# Patient Record
Sex: MALE | Race: WHITE | NOT HISPANIC OR LATINO | Employment: OTHER | URBAN - NONMETROPOLITAN AREA
[De-identification: names, ages, dates, MRNs, and addresses within clinical notes are randomized per-mention and may not be internally consistent; named-entity substitution may affect disease eponyms.]

---

## 2019-04-12 LAB
ALBUMIN SERPL-MCNC: 4 G/DL (ref 3.5–5.2)
ALP BLD-CCNC: 93 U/L (ref 40–130)
ALT SERPL-CCNC: 9 U/L (ref 5–41)
ANION GAP SERPL CALCULATED.3IONS-SCNC: 14 MMOL/L (ref 7–19)
AST SERPL-CCNC: 11 U/L (ref 5–40)
BASOPHILS ABSOLUTE: 0.1 K/UL (ref 0–0.2)
BASOPHILS RELATIVE PERCENT: 0.6 % (ref 0–1)
BILIRUB SERPL-MCNC: 0.3 MG/DL (ref 0.2–1.2)
BUN BLDV-MCNC: 29 MG/DL (ref 8–23)
CALCIUM SERPL-MCNC: 9.5 MG/DL (ref 8.8–10.2)
CHLORIDE BLD-SCNC: 104 MMOL/L (ref 98–111)
CHOLESTEROL, TOTAL: 202 MG/DL (ref 160–199)
CO2: 25 MMOL/L (ref 22–29)
CREAT SERPL-MCNC: 1 MG/DL (ref 0.5–1.2)
EOSINOPHILS ABSOLUTE: 0.2 K/UL (ref 0–0.6)
EOSINOPHILS RELATIVE PERCENT: 1.8 % (ref 0–5)
GFR NON-AFRICAN AMERICAN: >60
GLUCOSE BLD-MCNC: 105 MG/DL (ref 74–109)
HBA1C MFR BLD: 8 % (ref 4–6)
HCT VFR BLD CALC: 46.3 % (ref 42–52)
HDLC SERPL-MCNC: 41 MG/DL (ref 55–121)
HEMOGLOBIN: 15.3 G/DL (ref 14–18)
LDL CHOLESTEROL CALCULATED: 132 MG/DL
LYMPHOCYTES ABSOLUTE: 2.2 K/UL (ref 1.1–4.5)
LYMPHOCYTES RELATIVE PERCENT: 23.1 % (ref 20–40)
MCH RBC QN AUTO: 30.4 PG (ref 27–31)
MCHC RBC AUTO-ENTMCNC: 33 G/DL (ref 33–37)
MCV RBC AUTO: 92 FL (ref 80–94)
MONOCYTES ABSOLUTE: 0.9 K/UL (ref 0–0.9)
MONOCYTES RELATIVE PERCENT: 9.4 % (ref 0–10)
NEUTROPHILS ABSOLUTE: 6.1 K/UL (ref 1.5–7.5)
NEUTROPHILS RELATIVE PERCENT: 64.8 % (ref 50–65)
PDW BLD-RTO: 14.1 % (ref 11.5–14.5)
PLATELET # BLD: 236 K/UL (ref 130–400)
PMV BLD AUTO: 10.1 FL (ref 9.4–12.4)
POTASSIUM SERPL-SCNC: 4.2 MMOL/L (ref 3.5–5)
PROSTATE SPECIFIC ANTIGEN: 0.49 NG/ML (ref 0–4)
RBC # BLD: 5.03 M/UL (ref 4.7–6.1)
SODIUM BLD-SCNC: 143 MMOL/L (ref 136–145)
TOTAL PROTEIN: 7.4 G/DL (ref 6.6–8.7)
TRIGL SERPL-MCNC: 145 MG/DL (ref 0–149)
WBC # BLD: 9.5 K/UL (ref 4.8–10.8)

## 2019-11-01 LAB
ANION GAP SERPL CALCULATED.3IONS-SCNC: 14 MMOL/L (ref 7–19)
BUN BLDV-MCNC: 29 MG/DL (ref 8–23)
CALCIUM SERPL-MCNC: 9.3 MG/DL (ref 8.8–10.2)
CHLORIDE BLD-SCNC: 107 MMOL/L (ref 98–111)
CO2: 23 MMOL/L (ref 22–29)
CREAT SERPL-MCNC: 1 MG/DL (ref 0.5–1.2)
GFR NON-AFRICAN AMERICAN: >60
GLUCOSE BLD-MCNC: 91 MG/DL (ref 74–109)
HBA1C MFR BLD: 7.6 % (ref 4–6)
POTASSIUM SERPL-SCNC: 4.1 MMOL/L (ref 3.5–5)
SODIUM BLD-SCNC: 144 MMOL/L (ref 136–145)

## 2020-04-01 LAB
ALBUMIN SERPL-MCNC: 4.2 G/DL (ref 3.5–5.2)
ALP BLD-CCNC: 77 U/L (ref 40–130)
ALT SERPL-CCNC: 12 U/L (ref 5–41)
ANION GAP SERPL CALCULATED.3IONS-SCNC: 14 MMOL/L (ref 7–19)
AST SERPL-CCNC: 13 U/L (ref 5–40)
BASOPHILS ABSOLUTE: 0.1 K/UL (ref 0–0.2)
BASOPHILS RELATIVE PERCENT: 1 % (ref 0–1)
BILIRUB SERPL-MCNC: 0.3 MG/DL (ref 0.2–1.2)
BUN BLDV-MCNC: 24 MG/DL (ref 8–23)
CALCIUM SERPL-MCNC: 9.6 MG/DL (ref 8.8–10.2)
CHLORIDE BLD-SCNC: 103 MMOL/L (ref 98–111)
CHOLESTEROL, TOTAL: 267 MG/DL (ref 160–199)
CO2: 24 MMOL/L (ref 22–29)
CREAT SERPL-MCNC: 0.9 MG/DL (ref 0.5–1.2)
EOSINOPHILS ABSOLUTE: 0.5 K/UL (ref 0–0.6)
EOSINOPHILS RELATIVE PERCENT: 5.8 % (ref 0–5)
GFR NON-AFRICAN AMERICAN: >60
GLUCOSE BLD-MCNC: 105 MG/DL (ref 74–109)
HBA1C MFR BLD: 7.1 % (ref 4–6)
HCT VFR BLD CALC: 52.9 % (ref 42–52)
HDLC SERPL-MCNC: 47 MG/DL (ref 55–121)
HEMOGLOBIN: 17.2 G/DL (ref 14–18)
IMMATURE GRANULOCYTES #: 0 K/UL
LDL CHOLESTEROL CALCULATED: 177 MG/DL
LYMPHOCYTES ABSOLUTE: 2.2 K/UL (ref 1.1–4.5)
LYMPHOCYTES RELATIVE PERCENT: 28.1 % (ref 20–40)
MCH RBC QN AUTO: 30 PG (ref 27–31)
MCHC RBC AUTO-ENTMCNC: 32.5 G/DL (ref 33–37)
MCV RBC AUTO: 92.2 FL (ref 80–94)
MONOCYTES ABSOLUTE: 0.7 K/UL (ref 0–0.9)
MONOCYTES RELATIVE PERCENT: 8.7 % (ref 0–10)
NEUTROPHILS ABSOLUTE: 4.4 K/UL (ref 1.5–7.5)
NEUTROPHILS RELATIVE PERCENT: 56.3 % (ref 50–65)
PDW BLD-RTO: 13.9 % (ref 11.5–14.5)
PLATELET # BLD: 221 K/UL (ref 130–400)
PMV BLD AUTO: 10.8 FL (ref 9.4–12.4)
POTASSIUM SERPL-SCNC: 4.8 MMOL/L (ref 3.5–5)
PROSTATE SPECIFIC ANTIGEN: 0.54 NG/ML (ref 0–4)
RBC # BLD: 5.74 M/UL (ref 4.7–6.1)
SODIUM BLD-SCNC: 141 MMOL/L (ref 136–145)
TOTAL PROTEIN: 6.7 G/DL (ref 6.6–8.7)
TRIGL SERPL-MCNC: 216 MG/DL (ref 0–149)
TSH SERPL DL<=0.05 MIU/L-ACNC: 6.08 UIU/ML (ref 0.27–4.2)
WBC # BLD: 7.8 K/UL (ref 4.8–10.8)

## 2020-05-01 LAB
CHOLESTEROL, TOTAL: 150 MG/DL (ref 160–199)
HDLC SERPL-MCNC: 41 MG/DL (ref 55–121)
LDL CHOLESTEROL CALCULATED: 83 MG/DL
TRIGL SERPL-MCNC: 128 MG/DL (ref 0–149)

## 2020-10-02 LAB
ANION GAP SERPL CALCULATED.3IONS-SCNC: 9 MMOL/L (ref 7–19)
BUN BLDV-MCNC: 30 MG/DL (ref 8–23)
CALCIUM SERPL-MCNC: 9.1 MG/DL (ref 8.8–10.2)
CHLORIDE BLD-SCNC: 105 MMOL/L (ref 98–111)
CHOLESTEROL, TOTAL: 145 MG/DL (ref 160–199)
CO2: 22 MMOL/L (ref 22–29)
CREAT SERPL-MCNC: 1.1 MG/DL (ref 0.5–1.2)
GFR AFRICAN AMERICAN: >59
GFR NON-AFRICAN AMERICAN: >60
GLUCOSE BLD-MCNC: 91 MG/DL (ref 74–109)
HBA1C MFR BLD: 8.5 % (ref 4–6)
HDLC SERPL-MCNC: 39 MG/DL (ref 55–121)
LDL CHOLESTEROL CALCULATED: 85 MG/DL
POTASSIUM SERPL-SCNC: 4.2 MMOL/L (ref 3.5–5)
SODIUM BLD-SCNC: 136 MMOL/L (ref 136–145)
TRIGL SERPL-MCNC: 107 MG/DL (ref 0–149)

## 2021-09-15 LAB
CHOLESTEROL, TOTAL: 166 MG/DL (ref 160–199)
HBA1C MFR BLD: 9.2 % (ref 4–6)
HDLC SERPL-MCNC: 35 MG/DL (ref 55–121)
LDL CHOLESTEROL CALCULATED: 95 MG/DL
TRIGL SERPL-MCNC: 178 MG/DL (ref 0–149)

## 2022-03-16 LAB
ANION GAP SERPL CALCULATED.3IONS-SCNC: 13 MMOL/L (ref 7–19)
BUN BLDV-MCNC: 29 MG/DL (ref 8–23)
CALCIUM SERPL-MCNC: 9.3 MG/DL (ref 8.8–10.2)
CHLORIDE BLD-SCNC: 100 MMOL/L (ref 98–111)
CHOLESTEROL, TOTAL: 168 MG/DL (ref 160–199)
CO2: 24 MMOL/L (ref 22–29)
CREAT SERPL-MCNC: 1 MG/DL (ref 0.5–1.2)
GFR AFRICAN AMERICAN: >59
GFR NON-AFRICAN AMERICAN: >60
GLUCOSE BLD-MCNC: 153 MG/DL (ref 74–109)
HBA1C MFR BLD: 8.9 % (ref 4–6)
HDLC SERPL-MCNC: 38 MG/DL (ref 55–121)
LDL CHOLESTEROL CALCULATED: 105 MG/DL
POTASSIUM SERPL-SCNC: 5.2 MMOL/L (ref 3.5–5)
SODIUM BLD-SCNC: 137 MMOL/L (ref 136–145)
TRIGL SERPL-MCNC: 126 MG/DL (ref 0–149)

## 2022-08-09 ENCOUNTER — APPOINTMENT (OUTPATIENT)
Dept: GENERAL RADIOLOGY | Facility: HOSPITAL | Age: 67
End: 2022-08-09

## 2022-08-09 ENCOUNTER — APPOINTMENT (OUTPATIENT)
Dept: CT IMAGING | Facility: HOSPITAL | Age: 67
End: 2022-08-09

## 2022-08-09 ENCOUNTER — HOSPITAL ENCOUNTER (INPATIENT)
Facility: HOSPITAL | Age: 67
LOS: 6 days | Discharge: HOME | End: 2022-08-15
Attending: EMERGENCY MEDICINE | Admitting: FAMILY MEDICINE

## 2022-08-09 DIAGNOSIS — E87.1 HYPONATREMIA: ICD-10-CM

## 2022-08-09 DIAGNOSIS — Z74.09 IMPAIRED FUNCTIONAL MOBILITY AND ACTIVITY TOLERANCE: ICD-10-CM

## 2022-08-09 DIAGNOSIS — I48.91 ATRIAL FIBRILLATION WITH RVR: ICD-10-CM

## 2022-08-09 DIAGNOSIS — E87.5 HYPERKALEMIA: ICD-10-CM

## 2022-08-09 DIAGNOSIS — E10.10 DIABETIC KETOACIDOSIS WITHOUT COMA ASSOCIATED WITH TYPE 1 DIABETES MELLITUS: ICD-10-CM

## 2022-08-09 DIAGNOSIS — D72.829 LEUKOCYTOSIS, UNSPECIFIED TYPE: ICD-10-CM

## 2022-08-09 DIAGNOSIS — N17.9 AKI (ACUTE KIDNEY INJURY): ICD-10-CM

## 2022-08-09 DIAGNOSIS — A41.9 SEPSIS, DUE TO UNSPECIFIED ORGANISM, UNSPECIFIED WHETHER ACUTE ORGAN DYSFUNCTION PRESENT: Primary | ICD-10-CM

## 2022-08-09 DIAGNOSIS — I48.0 PAROXYSMAL ATRIAL FIBRILLATION: ICD-10-CM

## 2022-08-09 DIAGNOSIS — Z74.09 IMPAIRED MOBILITY AND ADLS: ICD-10-CM

## 2022-08-09 DIAGNOSIS — Z78.9 IMPAIRED MOBILITY AND ADLS: ICD-10-CM

## 2022-08-09 LAB
ALBUMIN SERPL-MCNC: 3.5 G/DL (ref 3.5–5.2)
ALBUMIN/GLOB SERPL: 1.3 G/DL
ALP SERPL-CCNC: 97 U/L (ref 39–117)
ALT SERPL W P-5'-P-CCNC: 13 U/L (ref 1–41)
AMPHET+METHAMPHET UR QL: NEGATIVE
AMPHETAMINES UR QL: NEGATIVE
ANION GAP SERPL CALCULATED.3IONS-SCNC: 24 MMOL/L (ref 5–15)
ANION GAP SERPL CALCULATED.3IONS-SCNC: 29 MMOL/L (ref 5–15)
ANION GAP SERPL CALCULATED.3IONS-SCNC: 30 MMOL/L (ref 5–15)
APAP SERPL-MCNC: <5 MCG/ML (ref 0–30)
AST SERPL-CCNC: 13 U/L (ref 1–40)
ATMOSPHERIC PRESS: 750 MMHG
BACTERIA UR QL AUTO: ABNORMAL /HPF
BARBITURATES UR QL SCN: NEGATIVE
BASE EXCESS BLDV CALC-SCNC: -10.1 MMOL/L (ref 0–2)
BDY SITE: ABNORMAL
BENZODIAZ UR QL SCN: NEGATIVE
BILIRUB SERPL-MCNC: 0.4 MG/DL (ref 0–1.2)
BILIRUB UR QL STRIP: NEGATIVE
BODY TEMPERATURE: 37 C
BUN SERPL-MCNC: 227 MG/DL (ref 8–23)
BUN SERPL-MCNC: 245 MG/DL (ref 8–23)
BUN SERPL-MCNC: 254 MG/DL (ref 8–23)
BUN/CREAT SERPL: 36.5 (ref 7–25)
BUN/CREAT SERPL: 39.5 (ref 7–25)
BUN/CREAT SERPL: 42.4 (ref 7–25)
BUPRENORPHINE SERPL-MCNC: NEGATIVE NG/ML
BURR CELLS BLD QL SMEAR: ABNORMAL
CALCIUM SPEC-SCNC: 8.8 MG/DL (ref 8.6–10.5)
CALCIUM SPEC-SCNC: 9.2 MG/DL (ref 8.6–10.5)
CALCIUM SPEC-SCNC: 9.6 MG/DL (ref 8.6–10.5)
CANNABINOIDS SERPL QL: NEGATIVE
CHLORIDE SERPL-SCNC: 86 MMOL/L (ref 98–107)
CHLORIDE SERPL-SCNC: 91 MMOL/L (ref 98–107)
CHLORIDE SERPL-SCNC: 95 MMOL/L (ref 98–107)
CLARITY UR: CLEAR
CO2 SERPL-SCNC: 11 MMOL/L (ref 22–29)
CO2 SERPL-SCNC: 11 MMOL/L (ref 22–29)
CO2 SERPL-SCNC: 13 MMOL/L (ref 22–29)
COCAINE UR QL: NEGATIVE
COLOR UR: YELLOW
CREAT SERPL-MCNC: 5.36 MG/DL (ref 0.76–1.27)
CREAT SERPL-MCNC: 5.36 MG/DL (ref 0.76–1.27)
CREAT SERPL-MCNC: 6.21 MG/DL (ref 0.76–1.27)
CREAT SERPL-MCNC: 6.96 MG/DL (ref 0.76–1.27)
D-LACTATE SERPL-SCNC: 1.5 MMOL/L (ref 0.5–2)
D-LACTATE SERPL-SCNC: 2.4 MMOL/L (ref 0.5–2)
D-LACTATE SERPL-SCNC: 3.9 MMOL/L (ref 0.5–2)
D-LACTATE SERPL-SCNC: 4.1 MMOL/L (ref 0.5–2)
DEPRECATED RDW RBC AUTO: 44.9 FL (ref 37–54)
EGFRCR SERPLBLD CKD-EPI 2021: 11 ML/MIN/1.73
EGFRCR SERPLBLD CKD-EPI 2021: 11 ML/MIN/1.73
EGFRCR SERPLBLD CKD-EPI 2021: 8 ML/MIN/1.73
EGFRCR SERPLBLD CKD-EPI 2021: 9.2 ML/MIN/1.73
ERYTHROCYTE [DISTWIDTH] IN BLOOD BY AUTOMATED COUNT: 14.4 % (ref 12.3–15.4)
GAS FLOW AIRWAY: 6 LPM
GLOBULIN UR ELPH-MCNC: 2.8 GM/DL
GLUCOSE BLDC GLUCOMTR-MCNC: 193 MG/DL (ref 70–130)
GLUCOSE BLDC GLUCOMTR-MCNC: 207 MG/DL (ref 70–130)
GLUCOSE BLDC GLUCOMTR-MCNC: 251 MG/DL (ref 70–130)
GLUCOSE BLDC GLUCOMTR-MCNC: 297 MG/DL (ref 70–130)
GLUCOSE SERPL-MCNC: 315 MG/DL (ref 65–99)
GLUCOSE SERPL-MCNC: 329 MG/DL (ref 65–99)
GLUCOSE SERPL-MCNC: 340 MG/DL (ref 65–99)
GLUCOSE UR STRIP-MCNC: ABNORMAL MG/DL
HBA1C MFR BLD: 9.3 % (ref 4.8–5.6)
HCO3 BLDV-SCNC: 15.9 MMOL/L (ref 22–28)
HCT VFR BLD AUTO: 42.9 % (ref 37.5–51)
HGB BLD-MCNC: 15 G/DL (ref 13–17.7)
HGB UR QL STRIP.AUTO: ABNORMAL
HOLD SPECIMEN: NORMAL
HYALINE CASTS UR QL AUTO: ABNORMAL /LPF
INR PPP: 1.74 (ref 0.91–1.09)
KETONES UR QL STRIP: NEGATIVE
LEUKOCYTE ESTERASE UR QL STRIP.AUTO: NEGATIVE
LIPASE SERPL-CCNC: 61 U/L (ref 13–60)
LIPASE SERPL-CCNC: 66 U/L (ref 13–60)
LYMPHOCYTES # BLD MANUAL: 0.57 10*3/MM3 (ref 0.7–3.1)
LYMPHOCYTES NFR BLD MANUAL: 1.6 % (ref 5–12)
Lab: ABNORMAL
MAGNESIUM SERPL-MCNC: 2.7 MG/DL (ref 1.6–2.4)
MAGNESIUM SERPL-MCNC: 2.9 MG/DL (ref 1.6–2.4)
MAGNESIUM SERPL-MCNC: 3 MG/DL (ref 1.6–2.4)
MCH RBC QN AUTO: 29.9 PG (ref 26.6–33)
MCHC RBC AUTO-ENTMCNC: 35 G/DL (ref 31.5–35.7)
MCV RBC AUTO: 85.6 FL (ref 79–97)
METHADONE UR QL SCN: NEGATIVE
MODALITY: ABNORMAL
MONOCYTES # BLD: 0.57 10*3/MM3 (ref 0.1–0.9)
NEUTROPHILS # BLD AUTO: 34.5 10*3/MM3 (ref 1.7–7)
NEUTROPHILS NFR BLD MANUAL: 96.8 % (ref 42.7–76)
NITRITE UR QL STRIP: NEGATIVE
OPIATES UR QL: NEGATIVE
OSMOLALITY SERPL: 381 MOSM/KG (ref 280–301)
OXYCODONE UR QL SCN: NEGATIVE
PCO2 BLDV: 34.8 MM HG (ref 41–51)
PCP UR QL SCN: NEGATIVE
PH BLDV: 7.27 PH UNITS (ref 7.32–7.42)
PH UR STRIP.AUTO: <=5 [PH] (ref 5–8)
PHOSPHATE SERPL-MCNC: 9.7 MG/DL (ref 2.5–4.5)
PLAT MORPH BLD: NORMAL
PLATELET # BLD AUTO: 188 10*3/MM3 (ref 140–450)
PMV BLD AUTO: 12.2 FL (ref 6–12)
PO2 BLDV: 26.9 MM HG (ref 27–53)
POIKILOCYTOSIS BLD QL SMEAR: ABNORMAL
POTASSIUM SERPL-SCNC: 5.3 MMOL/L (ref 3.5–5.2)
POTASSIUM SERPL-SCNC: 5.7 MMOL/L (ref 3.5–5.2)
POTASSIUM SERPL-SCNC: 6.5 MMOL/L (ref 3.5–5.2)
PROCALCITONIN SERPL-MCNC: 0.62 NG/ML (ref 0–0.25)
PROPOXYPH UR QL: NEGATIVE
PROT ?TM UR-MCNC: 25.4 MG/DL
PROT SERPL-MCNC: 6.3 G/DL (ref 6–8.5)
PROT UR QL STRIP: ABNORMAL
PROTHROMBIN TIME: 19.7 SECONDS (ref 11.9–14.6)
RBC # BLD AUTO: 5.01 10*6/MM3 (ref 4.14–5.8)
RBC # UR STRIP: ABNORMAL /HPF
REF LAB TEST METHOD: ABNORMAL
SALICYLATES SERPL-MCNC: <0.3 MG/DL
SAO2 % BLDCOV: 44.3 % (ref 45–75)
SARS-COV-2 RNA PNL SPEC NAA+PROBE: NOT DETECTED
SODIUM SERPL-SCNC: 127 MMOL/L (ref 136–145)
SODIUM SERPL-SCNC: 131 MMOL/L (ref 136–145)
SODIUM SERPL-SCNC: 132 MMOL/L (ref 136–145)
SODIUM UR-SCNC: 52 MMOL/L
SP GR UR STRIP: 1.02 (ref 1–1.03)
SQUAMOUS #/AREA URNS HPF: ABNORMAL /HPF
TRICYCLICS UR QL SCN: NEGATIVE
TROPONIN T SERPL-MCNC: 0.05 NG/ML (ref 0–0.03)
TROPONIN T SERPL-MCNC: 0.06 NG/ML (ref 0–0.03)
URATE SERPL-MCNC: 15.4 MG/DL (ref 3.4–7)
UROBILINOGEN UR QL STRIP: ABNORMAL
VARIANT LYMPHS NFR BLD MANUAL: 1.6 % (ref 19.6–45.3)
VENTILATOR MODE: ABNORMAL
WBC # UR STRIP: ABNORMAL /HPF
WBC MORPH BLD: NORMAL
WBC NRBC COR # BLD: 35.64 10*3/MM3 (ref 3.4–10.8)
WHOLE BLOOD HOLD COAG: NORMAL
WHOLE BLOOD HOLD SPECIMEN: NORMAL

## 2022-08-09 PROCEDURE — 80306 DRUG TEST PRSMV INSTRMNT: CPT | Performed by: EMERGENCY MEDICINE

## 2022-08-09 PROCEDURE — 71045 X-RAY EXAM CHEST 1 VIEW: CPT

## 2022-08-09 PROCEDURE — 25010000002 AMIODARONE IN DEXTROSE 5% 150-4.21 MG/100ML-% SOLUTION: Performed by: EMERGENCY MEDICINE

## 2022-08-09 PROCEDURE — 83930 ASSAY OF BLOOD OSMOLALITY: CPT | Performed by: EMERGENCY MEDICINE

## 2022-08-09 PROCEDURE — 87040 BLOOD CULTURE FOR BACTERIA: CPT | Performed by: PHYSICIAN ASSISTANT

## 2022-08-09 PROCEDURE — 4A133J1 MONITORING OF ARTERIAL PULSE, PERIPHERAL, PERCUTANEOUS APPROACH: ICD-10-PCS | Performed by: STUDENT IN AN ORGANIZED HEALTH CARE EDUCATION/TRAINING PROGRAM

## 2022-08-09 PROCEDURE — 4A133B1 MONITORING OF ARTERIAL PRESSURE, PERIPHERAL, PERCUTANEOUS APPROACH: ICD-10-PCS | Performed by: STUDENT IN AN ORGANIZED HEALTH CARE EDUCATION/TRAINING PROGRAM

## 2022-08-09 PROCEDURE — 25010000002 ENOXAPARIN PER 10 MG: Performed by: PHYSICIAN ASSISTANT

## 2022-08-09 PROCEDURE — 93005 ELECTROCARDIOGRAM TRACING: CPT

## 2022-08-09 PROCEDURE — 80179 DRUG ASSAY SALICYLATE: CPT | Performed by: EMERGENCY MEDICINE

## 2022-08-09 PROCEDURE — 93010 ELECTROCARDIOGRAM REPORT: CPT | Performed by: INTERNAL MEDICINE

## 2022-08-09 PROCEDURE — 82803 BLOOD GASES ANY COMBINATION: CPT

## 2022-08-09 PROCEDURE — 63710000001 INSULIN REGULAR HUMAN PER 5 UNITS: Performed by: PHYSICIAN ASSISTANT

## 2022-08-09 PROCEDURE — 71250 CT THORAX DX C-: CPT

## 2022-08-09 PROCEDURE — 92960 CARDIOVERSION ELECTRIC EXT: CPT

## 2022-08-09 PROCEDURE — 84300 ASSAY OF URINE SODIUM: CPT | Performed by: INTERNAL MEDICINE

## 2022-08-09 PROCEDURE — 99152 MOD SED SAME PHYS/QHP 5/>YRS: CPT

## 2022-08-09 PROCEDURE — 94640 AIRWAY INHALATION TREATMENT: CPT

## 2022-08-09 PROCEDURE — 5A2204Z RESTORATION OF CARDIAC RHYTHM, SINGLE: ICD-10-PCS | Performed by: INTERNAL MEDICINE

## 2022-08-09 PROCEDURE — 81001 URINALYSIS AUTO W/SCOPE: CPT | Performed by: PHYSICIAN ASSISTANT

## 2022-08-09 PROCEDURE — 74176 CT ABD & PELVIS W/O CONTRAST: CPT

## 2022-08-09 PROCEDURE — 80053 COMPREHEN METABOLIC PANEL: CPT | Performed by: PHYSICIAN ASSISTANT

## 2022-08-09 PROCEDURE — 25010000002 ONDANSETRON PER 1 MG: Performed by: PHYSICIAN ASSISTANT

## 2022-08-09 PROCEDURE — 25010000002 AMIODARONE IN DEXTROSE 5% 150-4.21 MG/100ML-% SOLUTION: Performed by: STUDENT IN AN ORGANIZED HEALTH CARE EDUCATION/TRAINING PROGRAM

## 2022-08-09 PROCEDURE — 85025 COMPLETE CBC W/AUTO DIFF WBC: CPT | Performed by: PHYSICIAN ASSISTANT

## 2022-08-09 PROCEDURE — 87635 SARS-COV-2 COVID-19 AMP PRB: CPT | Performed by: PHYSICIAN ASSISTANT

## 2022-08-09 PROCEDURE — 02HV33Z INSERTION OF INFUSION DEVICE INTO SUPERIOR VENA CAVA, PERCUTANEOUS APPROACH: ICD-10-PCS | Performed by: INTERNAL MEDICINE

## 2022-08-09 PROCEDURE — 83036 HEMOGLOBIN GLYCOSYLATED A1C: CPT | Performed by: PHYSICIAN ASSISTANT

## 2022-08-09 PROCEDURE — 83690 ASSAY OF LIPASE: CPT | Performed by: PHYSICIAN ASSISTANT

## 2022-08-09 PROCEDURE — 25010000002 CEFTRIAXONE PER 250 MG: Performed by: PHYSICIAN ASSISTANT

## 2022-08-09 PROCEDURE — 82962 GLUCOSE BLOOD TEST: CPT

## 2022-08-09 PROCEDURE — 83735 ASSAY OF MAGNESIUM: CPT | Performed by: FAMILY MEDICINE

## 2022-08-09 PROCEDURE — 82043 UR ALBUMIN QUANTITATIVE: CPT | Performed by: INTERNAL MEDICINE

## 2022-08-09 PROCEDURE — 0 INSULIN REGULAR HUMAN PER 5 UNITS: Performed by: FAMILY MEDICINE

## 2022-08-09 PROCEDURE — 93005 ELECTROCARDIOGRAM TRACING: CPT | Performed by: PHYSICIAN ASSISTANT

## 2022-08-09 PROCEDURE — 80143 DRUG ASSAY ACETAMINOPHEN: CPT | Performed by: EMERGENCY MEDICINE

## 2022-08-09 PROCEDURE — 84145 PROCALCITONIN (PCT): CPT | Performed by: PHYSICIAN ASSISTANT

## 2022-08-09 PROCEDURE — 83735 ASSAY OF MAGNESIUM: CPT | Performed by: PHYSICIAN ASSISTANT

## 2022-08-09 PROCEDURE — 93005 ELECTROCARDIOGRAM TRACING: CPT | Performed by: STUDENT IN AN ORGANIZED HEALTH CARE EDUCATION/TRAINING PROGRAM

## 2022-08-09 PROCEDURE — 94664 DEMO&/EVAL PT USE INHALER: CPT

## 2022-08-09 PROCEDURE — 83690 ASSAY OF LIPASE: CPT | Performed by: FAMILY MEDICINE

## 2022-08-09 PROCEDURE — 84550 ASSAY OF BLOOD/URIC ACID: CPT | Performed by: INTERNAL MEDICINE

## 2022-08-09 PROCEDURE — 83605 ASSAY OF LACTIC ACID: CPT | Performed by: PHYSICIAN ASSISTANT

## 2022-08-09 PROCEDURE — 85610 PROTHROMBIN TIME: CPT | Performed by: FAMILY MEDICINE

## 2022-08-09 PROCEDURE — 25010000002 MAGNESIUM SULFATE 2 GM/50ML SOLUTION: Performed by: EMERGENCY MEDICINE

## 2022-08-09 PROCEDURE — 99291 CRITICAL CARE FIRST HOUR: CPT

## 2022-08-09 PROCEDURE — 84484 ASSAY OF TROPONIN QUANT: CPT | Performed by: PHYSICIAN ASSISTANT

## 2022-08-09 PROCEDURE — 84156 ASSAY OF PROTEIN URINE: CPT | Performed by: INTERNAL MEDICINE

## 2022-08-09 PROCEDURE — 85060 BLOOD SMEAR INTERPRETATION: CPT | Performed by: PHYSICIAN ASSISTANT

## 2022-08-09 PROCEDURE — 36415 COLL VENOUS BLD VENIPUNCTURE: CPT | Performed by: PHYSICIAN ASSISTANT

## 2022-08-09 PROCEDURE — 84100 ASSAY OF PHOSPHORUS: CPT | Performed by: FAMILY MEDICINE

## 2022-08-09 PROCEDURE — 94799 UNLISTED PULMONARY SVC/PX: CPT

## 2022-08-09 PROCEDURE — 94761 N-INVAS EAR/PLS OXIMETRY MLT: CPT

## 2022-08-09 PROCEDURE — 82565 ASSAY OF CREATININE: CPT | Performed by: FAMILY MEDICINE

## 2022-08-09 PROCEDURE — 85007 BL SMEAR W/DIFF WBC COUNT: CPT | Performed by: PHYSICIAN ASSISTANT

## 2022-08-09 PROCEDURE — 82570 ASSAY OF URINE CREATININE: CPT | Performed by: INTERNAL MEDICINE

## 2022-08-09 PROCEDURE — C1751 CATH, INF, PER/CENT/MIDLINE: HCPCS

## 2022-08-09 PROCEDURE — 93005 ELECTROCARDIOGRAM TRACING: CPT | Performed by: EMERGENCY MEDICINE

## 2022-08-09 RX ORDER — CALCIUM CHLORIDE 100 MG/ML
INJECTION INTRAVENOUS; INTRAVENTRICULAR
Status: COMPLETED
Start: 2022-08-09 | End: 2022-08-09

## 2022-08-09 RX ORDER — SODIUM CHLORIDE 0.9 % (FLUSH) 0.9 %
10 SYRINGE (ML) INJECTION EVERY 12 HOURS SCHEDULED
Status: DISCONTINUED | OUTPATIENT
Start: 2022-08-09 | End: 2022-08-10

## 2022-08-09 RX ORDER — SODIUM CHLORIDE 0.9 % (FLUSH) 0.9 %
10 SYRINGE (ML) INJECTION EVERY 12 HOURS SCHEDULED
Status: DISCONTINUED | OUTPATIENT
Start: 2022-08-09 | End: 2022-08-15 | Stop reason: HOSPADM

## 2022-08-09 RX ORDER — DEXTROSE AND SODIUM CHLORIDE 5; .45 G/100ML; G/100ML
150 INJECTION, SOLUTION INTRAVENOUS CONTINUOUS PRN
Status: DISCONTINUED | OUTPATIENT
Start: 2022-08-09 | End: 2022-08-10

## 2022-08-09 RX ORDER — AMOXICILLIN 250 MG
2 CAPSULE ORAL 2 TIMES DAILY PRN
Status: DISCONTINUED | OUTPATIENT
Start: 2022-08-09 | End: 2022-08-12

## 2022-08-09 RX ORDER — POLYETHYLENE GLYCOL 3350 17 G/17G
17 POWDER, FOR SOLUTION ORAL DAILY PRN
Status: DISCONTINUED | OUTPATIENT
Start: 2022-08-09 | End: 2022-08-12

## 2022-08-09 RX ORDER — ENOXAPARIN SODIUM 100 MG/ML
1 INJECTION SUBCUTANEOUS ONCE
Status: COMPLETED | OUTPATIENT
Start: 2022-08-09 | End: 2022-08-09

## 2022-08-09 RX ORDER — DIGOXIN 0.25 MG/ML
250 INJECTION INTRAMUSCULAR; INTRAVENOUS ONCE
Status: DISCONTINUED | OUTPATIENT
Start: 2022-08-09 | End: 2022-08-09

## 2022-08-09 RX ORDER — MAGNESIUM SULFATE HEPTAHYDRATE 40 MG/ML
2 INJECTION, SOLUTION INTRAVENOUS ONCE
Status: COMPLETED | OUTPATIENT
Start: 2022-08-09 | End: 2022-08-09

## 2022-08-09 RX ORDER — ETOMIDATE 2 MG/ML
0.3 INJECTION INTRAVENOUS ONCE
Status: DISCONTINUED | OUTPATIENT
Start: 2022-08-09 | End: 2022-08-10

## 2022-08-09 RX ORDER — SODIUM CHLORIDE 0.9 % (FLUSH) 0.9 %
10 SYRINGE (ML) INJECTION AS NEEDED
Status: DISCONTINUED | OUTPATIENT
Start: 2022-08-09 | End: 2022-08-15 | Stop reason: HOSPADM

## 2022-08-09 RX ORDER — ONDANSETRON 2 MG/ML
4 INJECTION INTRAMUSCULAR; INTRAVENOUS ONCE
Status: COMPLETED | OUTPATIENT
Start: 2022-08-09 | End: 2022-08-09

## 2022-08-09 RX ORDER — SODIUM CHLORIDE 9 MG/ML
250 INJECTION, SOLUTION INTRAVENOUS CONTINUOUS PRN
Status: DISCONTINUED | OUTPATIENT
Start: 2022-08-09 | End: 2022-08-10

## 2022-08-09 RX ORDER — MAGNESIUM SULFATE HEPTAHYDRATE 40 MG/ML
2 INJECTION, SOLUTION INTRAVENOUS ONCE
Status: DISCONTINUED | OUTPATIENT
Start: 2022-08-09 | End: 2022-08-09

## 2022-08-09 RX ORDER — ETOMIDATE 2 MG/ML
0.3 INJECTION INTRAVENOUS ONCE
Status: COMPLETED | OUTPATIENT
Start: 2022-08-09 | End: 2022-08-09

## 2022-08-09 RX ORDER — METOPROLOL TARTRATE 5 MG/5ML
INJECTION INTRAVENOUS
Status: COMPLETED
Start: 2022-08-09 | End: 2022-08-09

## 2022-08-09 RX ORDER — DEXTROSE MONOHYDRATE 25 G/50ML
10-50 INJECTION, SOLUTION INTRAVENOUS
Status: DISCONTINUED | OUTPATIENT
Start: 2022-08-09 | End: 2022-08-10

## 2022-08-09 RX ORDER — SODIUM CHLORIDE AND POTASSIUM CHLORIDE 300; 900 MG/100ML; MG/100ML
250 INJECTION, SOLUTION INTRAVENOUS CONTINUOUS PRN
Status: DISCONTINUED | OUTPATIENT
Start: 2022-08-09 | End: 2022-08-10

## 2022-08-09 RX ORDER — DEXTROSE, SODIUM CHLORIDE, AND POTASSIUM CHLORIDE 5; .9; .15 G/100ML; G/100ML; G/100ML
150 INJECTION INTRAVENOUS CONTINUOUS PRN
Status: DISCONTINUED | OUTPATIENT
Start: 2022-08-09 | End: 2022-08-10

## 2022-08-09 RX ORDER — SODIUM CHLORIDE 0.9 % (FLUSH) 0.9 %
10 SYRINGE (ML) INJECTION AS NEEDED
Status: DISCONTINUED | OUTPATIENT
Start: 2022-08-09 | End: 2022-08-09 | Stop reason: SDUPTHER

## 2022-08-09 RX ORDER — BISACODYL 10 MG
10 SUPPOSITORY, RECTAL RECTAL DAILY PRN
Status: DISCONTINUED | OUTPATIENT
Start: 2022-08-09 | End: 2022-08-12

## 2022-08-09 RX ORDER — ALBUTEROL SULFATE 2.5 MG/3ML
10 SOLUTION RESPIRATORY (INHALATION) ONCE
Status: COMPLETED | OUTPATIENT
Start: 2022-08-09 | End: 2022-08-09

## 2022-08-09 RX ORDER — METOPROLOL TARTRATE 5 MG/5ML
5 INJECTION INTRAVENOUS ONCE
Status: COMPLETED | OUTPATIENT
Start: 2022-08-09 | End: 2022-08-09

## 2022-08-09 RX ORDER — NICOTINE POLACRILEX 4 MG
15 LOZENGE BUCCAL
Status: DISCONTINUED | OUTPATIENT
Start: 2022-08-09 | End: 2022-08-09 | Stop reason: SDUPTHER

## 2022-08-09 RX ORDER — DEXTROSE AND SODIUM CHLORIDE 5; .9 G/100ML; G/100ML
150 INJECTION, SOLUTION INTRAVENOUS CONTINUOUS PRN
Status: DISCONTINUED | OUTPATIENT
Start: 2022-08-09 | End: 2022-08-10

## 2022-08-09 RX ORDER — ENOXAPARIN SODIUM 100 MG/ML
1 INJECTION SUBCUTANEOUS EVERY 24 HOURS
Status: DISCONTINUED | OUTPATIENT
Start: 2022-08-10 | End: 2022-08-10

## 2022-08-09 RX ORDER — ALUMINA, MAGNESIA, AND SIMETHICONE 2400; 2400; 240 MG/30ML; MG/30ML; MG/30ML
15 SUSPENSION ORAL EVERY 6 HOURS PRN
Status: DISCONTINUED | OUTPATIENT
Start: 2022-08-09 | End: 2022-08-15 | Stop reason: HOSPADM

## 2022-08-09 RX ORDER — INSULIN LISPRO 100 [IU]/ML
0-9 INJECTION, SOLUTION INTRAVENOUS; SUBCUTANEOUS
Status: DISCONTINUED | OUTPATIENT
Start: 2022-08-10 | End: 2022-08-09 | Stop reason: SDUPTHER

## 2022-08-09 RX ORDER — SODIUM CHLORIDE 0.9 % (FLUSH) 0.9 %
10 SYRINGE (ML) INJECTION AS NEEDED
Status: DISCONTINUED | OUTPATIENT
Start: 2022-08-09 | End: 2022-08-10

## 2022-08-09 RX ORDER — ATORVASTATIN CALCIUM 20 MG/1
20 TABLET, FILM COATED ORAL DAILY
COMMUNITY
End: 2022-10-04 | Stop reason: HOSPADM

## 2022-08-09 RX ORDER — BISACODYL 5 MG/1
5 TABLET, DELAYED RELEASE ORAL DAILY PRN
Status: DISCONTINUED | OUTPATIENT
Start: 2022-08-09 | End: 2022-08-12

## 2022-08-09 RX ORDER — POTASSIUM CHLORIDE, DEXTROSE MONOHYDRATE AND SODIUM CHLORIDE 300; 5; 900 MG/100ML; G/100ML; MG/100ML
150 INJECTION, SOLUTION INTRAVENOUS CONTINUOUS PRN
Status: DISCONTINUED | OUTPATIENT
Start: 2022-08-09 | End: 2022-08-10

## 2022-08-09 RX ORDER — DEXTROSE, SODIUM CHLORIDE, AND POTASSIUM CHLORIDE 5; .45; .3 G/100ML; G/100ML; G/100ML
150 INJECTION INTRAVENOUS CONTINUOUS PRN
Status: DISCONTINUED | OUTPATIENT
Start: 2022-08-09 | End: 2022-08-10

## 2022-08-09 RX ORDER — DEXTROSE MONOHYDRATE 25 G/50ML
25 INJECTION, SOLUTION INTRAVENOUS
Status: DISCONTINUED | OUTPATIENT
Start: 2022-08-09 | End: 2022-08-09 | Stop reason: SDUPTHER

## 2022-08-09 RX ORDER — NICOTINE POLACRILEX 4 MG
15 LOZENGE BUCCAL
Status: DISCONTINUED | OUTPATIENT
Start: 2022-08-09 | End: 2022-08-10

## 2022-08-09 RX ORDER — ACETAMINOPHEN 325 MG/1
650 TABLET ORAL EVERY 4 HOURS PRN
Status: DISCONTINUED | OUTPATIENT
Start: 2022-08-09 | End: 2022-08-15 | Stop reason: HOSPADM

## 2022-08-09 RX ORDER — LISINOPRIL AND HYDROCHLOROTHIAZIDE 20; 12.5 MG/1; MG/1
1 TABLET ORAL DAILY
COMMUNITY
End: 2022-08-15 | Stop reason: HOSPADM

## 2022-08-09 RX ORDER — ALBUTEROL SULFATE 1.25 MG/3ML
10 SOLUTION RESPIRATORY (INHALATION) ONCE
Status: DISCONTINUED | OUTPATIENT
Start: 2022-08-09 | End: 2022-08-09 | Stop reason: SDUPTHER

## 2022-08-09 RX ORDER — ONDANSETRON 2 MG/ML
4 INJECTION INTRAMUSCULAR; INTRAVENOUS EVERY 6 HOURS PRN
Status: DISCONTINUED | OUTPATIENT
Start: 2022-08-09 | End: 2022-08-15 | Stop reason: HOSPADM

## 2022-08-09 RX ORDER — SODIUM CHLORIDE AND POTASSIUM CHLORIDE 150; 900 MG/100ML; MG/100ML
250 INJECTION, SOLUTION INTRAVENOUS CONTINUOUS PRN
Status: DISCONTINUED | OUTPATIENT
Start: 2022-08-09 | End: 2022-08-10

## 2022-08-09 RX ORDER — DEXTROSE, SODIUM CHLORIDE, AND POTASSIUM CHLORIDE 5; .45; .15 G/100ML; G/100ML; G/100ML
150 INJECTION INTRAVENOUS CONTINUOUS PRN
Status: DISCONTINUED | OUTPATIENT
Start: 2022-08-09 | End: 2022-08-10

## 2022-08-09 RX ORDER — NOREPINEPHRINE BIT/0.9 % NACL 8 MG/250ML
.02-.3 INFUSION BOTTLE (ML) INTRAVENOUS
Status: DISCONTINUED | OUTPATIENT
Start: 2022-08-09 | End: 2022-08-10

## 2022-08-09 RX ORDER — CALCIUM CHLORIDE 100 MG/ML
1 INJECTION INTRAVENOUS; INTRAVENTRICULAR ONCE
Status: COMPLETED | OUTPATIENT
Start: 2022-08-09 | End: 2022-08-09

## 2022-08-09 RX ORDER — ALBUTEROL SULFATE 2.5 MG/3ML
SOLUTION RESPIRATORY (INHALATION)
Status: COMPLETED
Start: 2022-08-09 | End: 2022-08-09

## 2022-08-09 RX ORDER — AMLODIPINE BESYLATE 10 MG/1
10 TABLET ORAL DAILY
COMMUNITY
End: 2022-08-15 | Stop reason: HOSPADM

## 2022-08-09 RX ORDER — SODIUM CHLORIDE AND POTASSIUM CHLORIDE 150; 450 MG/100ML; MG/100ML
250 INJECTION, SOLUTION INTRAVENOUS CONTINUOUS PRN
Status: DISCONTINUED | OUTPATIENT
Start: 2022-08-09 | End: 2022-08-10

## 2022-08-09 RX ORDER — SODIUM CHLORIDE 450 MG/100ML
250 INJECTION, SOLUTION INTRAVENOUS CONTINUOUS PRN
Status: DISCONTINUED | OUTPATIENT
Start: 2022-08-09 | End: 2022-08-10

## 2022-08-09 RX ORDER — GLIMEPIRIDE 2 MG/1
2 TABLET ORAL
COMMUNITY

## 2022-08-09 RX ADMIN — ONDANSETRON 4 MG: 2 INJECTION INTRAMUSCULAR; INTRAVENOUS at 16:04

## 2022-08-09 RX ADMIN — Medication 10 ML: at 21:15

## 2022-08-09 RX ADMIN — SODIUM BICARBONATE 50 MEQ: 84 INJECTION INTRAVENOUS at 15:16

## 2022-08-09 RX ADMIN — AMIODARONE HYDROCHLORIDE 150 MG: 1.5 INJECTION, SOLUTION INTRAVENOUS at 19:17

## 2022-08-09 RX ADMIN — INSULIN HUMAN 6 UNITS: 100 INJECTION, SOLUTION PARENTERAL at 17:51

## 2022-08-09 RX ADMIN — SODIUM BICARBONATE 150 MEQ: 84 INJECTION, SOLUTION INTRAVENOUS at 17:39

## 2022-08-09 RX ADMIN — ENOXAPARIN SODIUM 60 MG: 100 INJECTION SUBCUTANEOUS at 15:20

## 2022-08-09 RX ADMIN — ALBUTEROL SULFATE 10 MG: 2.5 SOLUTION RESPIRATORY (INHALATION) at 15:20

## 2022-08-09 RX ADMIN — SODIUM CHLORIDE 1 G: 9 INJECTION, SOLUTION INTRAVENOUS at 18:20

## 2022-08-09 RX ADMIN — SODIUM BICARBONATE 150 MEQ: 84 INJECTION, SOLUTION INTRAVENOUS at 23:44

## 2022-08-09 RX ADMIN — SODIUM CHLORIDE 1000 ML: 9 INJECTION, SOLUTION INTRAVENOUS at 20:48

## 2022-08-09 RX ADMIN — AMIODARONE HYDROCHLORIDE 150 MG: 1.5 INJECTION, SOLUTION INTRAVENOUS at 15:00

## 2022-08-09 RX ADMIN — METOPROLOL TARTRATE 5 MG: 5 INJECTION INTRAVENOUS at 15:10

## 2022-08-09 RX ADMIN — CALCIUM CHLORIDE 1 G: 100 INJECTION INTRAVENOUS; INTRAVENTRICULAR at 18:22

## 2022-08-09 RX ADMIN — MAGNESIUM SULFATE HEPTAHYDRATE 2 G: 2 INJECTION, SOLUTION INTRAVENOUS at 15:19

## 2022-08-09 RX ADMIN — CALCIUM CHLORIDE 1 G: 100 INJECTION INTRAVENOUS; INTRAVENTRICULAR at 15:15

## 2022-08-09 RX ADMIN — SODIUM CHLORIDE 1000 ML/HR: 9 INJECTION, SOLUTION INTRAVENOUS at 21:22

## 2022-08-09 RX ADMIN — Medication 10 ML: at 21:16

## 2022-08-09 RX ADMIN — ETOMIDATE 5 MG: 20 INJECTION, SOLUTION INTRAVENOUS at 15:02

## 2022-08-09 RX ADMIN — Medication 0.02 MCG/KG/MIN: at 20:48

## 2022-08-09 RX ADMIN — SODIUM CHLORIDE 2.4 UNITS/HR: 9 INJECTION, SOLUTION INTRAVENOUS at 20:45

## 2022-08-09 RX ADMIN — SODIUM CHLORIDE 1500 ML: 9 INJECTION, SOLUTION INTRAVENOUS at 15:20

## 2022-08-09 RX ADMIN — SODIUM CHLORIDE, POTASSIUM CHLORIDE, SODIUM LACTATE AND CALCIUM CHLORIDE 1737 ML: 600; 310; 30; 20 INJECTION, SOLUTION INTRAVENOUS at 17:44

## 2022-08-09 NOTE — CONSULTS
Nephrology ([facility]) Consult Note      Patient:  Chito Govea  YOB: 1955  Date of Service: 8/9/2022  MRN: [id number]   Acct: [id number]   Primary Care Physician: Provider, No Known  Advance Directive:   There are no questions and answers to display.     Admit Date: 8/9/2022       Hospital Day: 0  Referring Provider: No Known Provider      Patient Seen, Chart, Consults, Notes, Labs, Radiology studies reviewed.    Chief complaint: Abnormal labs.    Subjective:  Chito Govea is a 66 y.o. male  whom we were consulted for acute kidney injury.  Patient denies any history of chronic kidney disease.  He has history of type 2 diabetes and hypertension.  He follows Dr. Chito Rubio in his office for these medical problem.  Presented to the emergency room complaining of nausea vomiting and diarrhea for the last 30 days.  This has been worsening lately.  He was complaining of profound weakness, lack of energy and was unable to hold any liquid food or medication by mouth.  He decided to come to the emergency room.  On arrival here his blood pressure was running low.  He was given a bolus of IV fluid.  His lab data indicated serum creatinine of 6.9 mg, potassium 6.5 mmol, bicarb of 11 mmol and blood urea nitrogen more than 200 mg.  He is waiting to go to ICU.  He has noticed some urine output.  His  hemodynamics has been stabilized, nephrology is consulted for evaluation and treatment of hyperkalemia and acute kidney injury.  In the ER he was already given insulin, D50, IV fluid bolus, calcium chloride and albuterol nebulizer.    Allergies:  Patient has no known allergies.    Home Meds:  (Not in a hospital admission)      Medicines:  Current Facility-Administered Medications   Medication Dose Route Frequency Provider Last Rate Last Admin   • cefTRIAXone (ROCEPHIN) 1 g in sodium chloride 0.9 % 100 mL IVPB-VTB  1 g Intravenous Once Graeme Gorman PA-C       • insulin regular  "(humuLIN R,novoLIN R) injection 6 Units  6 Units Subcutaneous Once Graeme Gorman PA-C       • lactated ringers bolus 1,000 mL  1,000 mL Intravenous Once Graeme Gorman PA-C       • lactated ringers bolus 1,737 mL  30 mL/kg Intravenous Once Graeme Gorman PA-C       • sodium bicarbonate 8.4 % 150 mEq in sterile water 1,000 mL infusion (greater than 75 mEq)  150 mEq Intravenous Continuous Bradly Roberson MD       • sodium chloride 0.9 % bolus 1,500 mL  1,500 mL Intravenous Once Graeme Gorman PA-C       • sodium chloride 0.9 % flush 10 mL  10 mL Intravenous PRN Bradly Roberson MD       • sodium chloride 0.9 % flush 10 mL  10 mL Intravenous PRN Graeme Gorman PA-C         No current outpatient medications on file.       Past Medical History:  1.  Type 2 diabetes.  2.  Benign essential hypertension.  Past Surgical History:  No past surgical history on file.    Family History  No family history on file.    Social History  Social History     Socioeconomic History   • Marital status:          Review of Systems:  History obtained from chart review and the patient  General ROS: No fever or chills  Respiratory ROS: No cough, shortness of breath, wheezing  Cardiovascular ROS: no chest pain or dyspnea on exertion  Gastrointestinal ROS: positive for - diarrhea and nausea/vomiting  Genito-Urinary ROS: No dysuria or hematuria  14 point ROS reviewed with the patient and negative except as noted above and in the HPI unless unable to obtain.    Objective:  BP (!) 144/109   Pulse 110   Temp 97.6 °F (36.4 °C) (Oral)   Resp 22   Ht 172.7 cm (68\")   Wt 57.9 kg (127 lb 9.6 oz)   SpO2 99%   BMI 19.40 kg/m²     Intake/Output Summary (Last 24 hours) at 8/9/2022 1626  Last data filed at 8/9/2022 1520  Gross per 24 hour   Intake 100 ml   Output --   Net 100 ml     General: awake/alert   HEENT: Normocephalic atraumatic head  Neck: Supple with no JVD or carotid bruits.  Chest:  clear to auscultation bilaterally without " respiratory distress  CVS: regular rate and rhythm  Abdominal: soft, nontender, normal bowel sounds  Extremities: no cyanosis or edema  Skin: warm and dry without rash      Labs:    Results from last 7 days   Lab Units 08/09/22  1435   WBC 10*3/mm3 35.64*   HEMOGLOBIN g/dL 15.0   HEMATOCRIT % 42.9   PLATELETS 10*3/mm3 188       Results from last 7 days   Lab Units 08/09/22  1435   SODIUM mmol/L 127*   POTASSIUM mmol/L 6.5*   CHLORIDE mmol/L 86*   CO2 mmol/L 11.0*   BUN mg/dL 254*   CREATININE mg/dL 6.96*   CALCIUM mg/dL 8.8   BILIRUBIN mg/dL 0.4   ALK PHOS U/L 97   ALT (SGPT) U/L 13   AST (SGOT) U/L 13   GLUCOSE mg/dL 329*   EGFR mL/min/1.73 8.0*         Radiology:   Imaging Results (Last 24 Hours)     Procedure Component Value Units Date/Time    XR Chest 1 View [[id number]] Collected: 08/09/22 1540     Updated: 08/09/22 1545    Narrative:      XR CHEST 1 VW- 8/9/2022 2:42 PM CDT     HISTORY: Chest pain     COMPARISON: None.     FINDINGS:      No lung consolidation. No pleural effusion or pneumothorax. Right IJ  central venous catheters in good position. Heart size is normal.  Pulmonary vasculature are nondilated. Remote granulomatous  calcification. Right-sided of rotator cuff arthropathy. No acute bony  abnormality.       Impression:      1. No radiographic evidence of acute cardiopulmonary process.  2. Right IJ central venous catheter is in good position.        This report was finalized on 08/09/2022 15:41 by Dr Luis Mullen, .          Culture:  No components found for: WOUNDCUL, 3  No components found for: CSFCUL, 3  No components found for: BC, 3  No components found for: URINECUL, 3      Assessment   1.  Acute kidney injury stage III.  2.  Severe intravascular volume depletion versus ATN.  3.  Severe metabolic acidosis.  4.  Moderate to severe hyperkalemia.  5.  Severe leukocytosis.  6.  Possible DKA.    Plan:  1.  Agree with the care provided thus far.  2.  Agree with IV fluid bolus and maintenance IV  fluid.  3.  Repeat BMP this afternoon to look for improvement of hyperkalemia and renal function.  4.  Agree to admit him to ICU.  5.  Baseline renal ultrasound.  6.  I will continue to follow him      Thank you for the consult, we appreciate the opportunity to provide care to your patients.  Feel free to contact me if I can be of any further assistance.      William Torres MD  8/9/2022  16:26 CDT

## 2022-08-09 NOTE — ED PROVIDER NOTES
"Subjective   History of Present Illness    Patient is a 66-year-old male presenting to ED via EMS with vomiting.  PMH unknown due to poor patient historian.  Patient states approximately a month ago he was exposed to his daughter who he believes had COVID symptoms.  Patient states since that time has had progressively worsening nausea, vomiting, as well as generalized abdominal pain.  Patient states at this point he is only able to tolerate water however over the past few days can no longer keep that down either.  Patient denies any hematemesis, black/bloody/tarry stools.  Patient states he developed generalized worsening weakness and today felt generalized chest pain at which time he contacted EMS.  Patient denies any feelings of palpitations, calf tenderness, lower extremity edema, dizziness, headaches.  Patient states he has not sought evaluation over the past month but was \"thinking I had COVID.\"  Patient does not believe that he takes any blood thinners however is unsure if he is on blood pressure medicines.  Patient states he has not been using any medicines for his symptoms.  Patient denies any other known recent sick contacts.    Records reviewed show patient last seen in the ED on 8/25/2013.    Patient follows up outpatient with his primary care provider routinely for labs with most recent on 3/16/2022    Review of Systems   Constitutional: Positive for appetite change (decreased due to nausea). Negative for chills, diaphoresis and fever.   HENT: Positive for sneezing. Negative for sore throat and trouble swallowing.    Eyes: Negative.    Respiratory: Positive for cough (productive). Negative for shortness of breath.    Cardiovascular: Positive for chest pain. Negative for palpitations and leg swelling.   Gastrointestinal: Positive for abdominal pain (Generalized), diarrhea, nausea and vomiting. Negative for blood in stool and constipation.        Denies hematemesis   Genitourinary: Positive for decreased " urine volume. Negative for dysuria, flank pain and hematuria.   Musculoskeletal: Positive for myalgias.   Skin: Negative.    Neurological: Positive for weakness (generalized, progressively worsening). Negative for dizziness, syncope and light-headedness.   Psychiatric/Behavioral: Negative.    All other systems reviewed and are negative.      Past Medical History:   Diagnosis Date   • Arthritis    • Diabetes mellitus (HCC)    • HLD (hyperlipidemia)    • Hypertension        No Known Allergies    No past surgical history on file.    Family History   Problem Relation Age of Onset   • Cancer Mother    • Heart disease Father    • Diabetes Sister    • Cancer Sister    • COPD Brother        Social History     Socioeconomic History   • Marital status:    Tobacco Use   • Smoking status: Former Smoker     Packs/day: 1.50     Years: 50.00     Pack years: 75.00     Types: Cigarettes   • Smokeless tobacco: Never Used   Substance and Sexual Activity   • Alcohol use: Never   • Drug use: Yes     Types: Marijuana   • Sexual activity: Defer           Objective   Physical Exam  Vitals and nursing note reviewed.   Constitutional:       Appearance: Normal appearance. He is well-developed, well-groomed and normal weight. He is ill-appearing. He is not diaphoretic.   HENT:      Head: Normocephalic.      Mouth/Throat:      Mouth: Mucous membranes are dry.      Pharynx: Oropharynx is clear.   Eyes:      General: No scleral icterus.     Conjunctiva/sclera: Conjunctivae normal.      Pupils: Pupils are equal, round, and reactive to light.      Comments: No conjunctival pallor   Cardiovascular:      Rate and Rhythm: Tachycardia present. Rhythm regularly irregular.      Heart sounds: No murmur heard.     Comments: No calf tenderness bilaterally  Pulmonary:      Effort: Tachypnea present. No accessory muscle usage, prolonged expiration or respiratory distress.   Abdominal:      General: Bowel sounds are normal. There is no distension.       Palpations: Abdomen is soft.      Tenderness: There is abdominal tenderness (generalized). There is no right CVA tenderness, left CVA tenderness or guarding.   Musculoskeletal:         General: Normal range of motion.      Cervical back: Normal range of motion and neck supple.      Right lower leg: No edema.      Left lower leg: No edema.   Skin:     General: Skin is warm and dry.      Coloration: Skin is not jaundiced.   Neurological:      Mental Status: He is alert and oriented to person, place, and time.   Psychiatric:         Attention and Perception: Attention normal.         Mood and Affect: Mood is anxious.         Speech: Speech normal.         Behavior: Behavior normal. Behavior is cooperative.         Procedures           ED Course    [id number] SCORE FOR AFIB: 2 (moderate to high risk, should otherwise be on anticoagulation)    ED Course as of 08/09/22 2027   Tue Aug 09, 2022   1443 Patient sick 66 years old with vomiting A. fib with RVR hypotensive poor peripheral perfusion with chest pain and discomfort we will emergently cardiovert medicated with amiodarone and magnesium.  Lovenox will be administered. [TS]   1532 The gentleman came to the ER complaining of vomiting and not feeling well was in A. fib very poor historian we do not know that he is got a history of A. fib or not.  In the ER was hypotensive tachycardic with poor perfusion and therefore was moderately sedated and cardioverted x2 we were able to get him into normal sinus rhythm but this rhythm did not last more than 10 to 15 seconds and reverted back to A. fib at a lower rate blood pressure although it has improved he received IV fluid boluses.  A central access obtained by me because the potassium was very elevated he was given calcium chloride sodium bicarbonate and IV fluids his white cell count is 35,000 IV antibiotics will be initiated [TS]   1537 Cardioversion on this patient was done under emergent basis [TS]   1548 Phone discussion  with MARKELL Andrews nephrology. Will kindly consult on patient during admission for initiation of dialysis.    [JS]   1551 Phone discussion with Dr. Du, hospitalist, will kindly accept the patient under his services at this time. [JS]   1844 Phone discussion with Dr. Link, cardiology.  States that if patient is unstable can cardiovert again with no further recommendations. [JS]      ED Course User Index  [JS] Graeme Gorman PA-C  [TS] Bradly Roberson MD                                           MDM  Number of Diagnoses or Management Options     Amount and/or Complexity of Data Reviewed  Clinical lab tests: ordered and reviewed  Tests in the radiology section of CPT®: reviewed and ordered  Tests in the medicine section of CPT®: reviewed and ordered  Discuss the patient with other providers: yes (Dr. Bradly Roberson (attending)  MARKELL Andrews (nephrology)  Dr. Du (hospitalist))      Patient is a 66-year-old male presenting to ED via EMS with vomiting.  PMH unknown due to poor patient historian.  Patient with unknown medical history secondary to confusion from acute illness as well as poor historian.  Patient has been noncompliant with his medications over the past month due to nausea vomiting and presents acidotic and new A. fib with RVR, as well as new onset renal failure.  Due to Hyperkalemia of 6.5 medication treatment was initiated utilizing calcium, insulin, dextrose, sodium bicarb, albuterol nebulizers.  Patient was given amiodarone, initial dose of Lovenox, etomidate, and had a cardioversion performed.  Patient had central line placed, arterial line placed, and was advised on need for admission to ICU for further evaluation and treatment for which she is amenable.    Final diagnoses:   Sepsis, due to unspecified organism, unspecified whether acute organ dysfunction present (HCC)   CONSUELO (acute kidney injury) (HCC)   Hyponatremia   Hyperkalemia   Atrial fibrillation with RVR  (HCC)   Leukocytosis, unspecified type   Diabetic ketoacidosis without coma associated with type 1 diabetes mellitus (HCC)       ED Disposition  ED Disposition     ED Disposition   Decision to Admit    Condition   --    Comment   Level of Care: Critical Care [6]   Diagnosis: Sepsis, due to unspecified organism, unspecified whether acute organ dysfunction present ([facility]) [[id number]]   Admitting Physician: AYANNA MALIK [[id number]]   Certification: I Certify That Inpatient Hospital Services Are Medically Necessary For Greater Than 2 Midnights               No follow-up provider specified.       Medication List      No changes were made to your prescriptions during this visit.          Graeme Gorman PA-C  08/09/22 2027

## 2022-08-09 NOTE — ED PROVIDER NOTES
Subjective   History of Present Illness    Review of Systems    Past Medical History:   Diagnosis Date   • Arthritis    • Diabetes mellitus (HCC)    • HLD (hyperlipidemia)    • Hypertension        No Known Allergies    No past surgical history on file.    Family History   Problem Relation Age of Onset   • Cancer Mother    • Heart disease Father    • Diabetes Sister    • Cancer Sister    • COPD Brother        Social History     Socioeconomic History   • Marital status:    Tobacco Use   • Smoking status: Former Smoker     Packs/day: 1.50     Years: 50.00     Pack years: 75.00     Types: Cigarettes   • Smokeless tobacco: Never Used   Substance and Sexual Activity   • Alcohol use: Never   • Drug use: Yes     Types: Marijuana   • Sexual activity: Defer           Objective   Physical Exam    Central Line At Bedside    Date/Time: 8/9/2022 3:34 PM  Performed by: Bradly Roberson MD  Authorized by: Bradly Roberson MD     Consent:     Consent obtained:  Emergent situation  Universal protocol:     Procedure explained and questions answered to patient or proxy's satisfaction: yes      Relevant documents present and verified: yes      Site/side marked: yes      Immediately prior to procedure, a time out was called: yes      Patient identity confirmed:  Hospital-assigned identification number  Pre-procedure details:     Indication(s): central venous access, hemodynamic monitoring and insufficient peripheral access      Hand hygiene: Hand hygiene performed prior to insertion      Skin preparation:  Chlorhexidine    Skin preparation agent: Skin preparation agent completely dried prior to procedure    Anesthesia:     Anesthesia method:  Local infiltration    Local anesthetic:  Lidocaine 1% w/o epi  Procedure details:     Location:  R internal jugular    Patient position:  Trendelenburg    Procedural supplies:  Triple lumen    Catheter size:  8 Fr    Landmarks identified: yes      Ultrasound guidance: yes      Ultrasound guidance  timing: prior to insertion and real time      Sterile ultrasound techniques: Sterile gel and sterile probe covers were used      Number of attempts:  1    Successful placement: yes    Post-procedure details:     Post-procedure:  Dressing applied    Assessment:  Blood return through all ports, free fluid flow and no pneumothorax on x-ray    Procedure completion:  Tolerated  Electrical Cardioversion    Date/Time: 8/9/2022 3:34 PM  Performed by: Bradly Roberson MD  Authorized by: Bradly Roberson MD     Consent:     Consent obtained:  Emergent situation    Risks, benefits, and alternatives were discussed: not applicable    Universal protocol:     Procedure explained and questions answered to patient or proxy's satisfaction: yes      Immediately prior to procedure, a time out was called: yes      Patient identity confirmed:  Hospital-assigned identification number  Pre-procedure details:     Cardioversion basis:  Emergent    Rhythm:  Atrial fibrillation    Electrode placement:  Anterior-posterior  Attempt one:     Cardioversion mode:  Synchronous    Waveform:  Biphasic    Shock (Joules):  200    Shock outcome:  Conversion to normal sinus rhythm  Attempt two:     Cardioversion mode:  Synchronous    Waveform:  Biphasic    Shock (Joules):  200    Shock outcome:  Conversion to normal sinus rhythm  Post-procedure details:     Patient status:  Awake    Procedure completion:  Tolerated  Procedural Sedation    Date/Time: 8/9/2022 3:35 PM  Performed by: Bradly Roberson MD  Authorized by: Bradly Roberson MD     Consent:     Consent obtained:  Emergent situation  Universal protocol:     Procedure explained and questions answered to patient or proxy's satisfaction: yes      Relevant documents present and verified: yes      Patient identity confirmed:  Hospital-assigned identification number  Indications:     Procedure performed:  Cardioversion    Procedure necessitating sedation performed by:  Physician performing sedation    Intended  level of sedation:  Moderate  Pre-sedation assessment:     NPO status caution: urgency dictates proceeding with non-ideal NPO status      ASA classification: class 3 - patient with severe systemic disease      Mouth opening:  3 or more finger widths    Mallampati score:  II - soft palate, uvula, fauces visible    Neck mobility: normal      Pre-sedation assessments completed and reviewed: airway patency, anesthesia/sedation history, cardiovascular function, hydration status, mental status, nausea/vomiting, pain level, respiratory function and temperature    Immediate pre-procedure details:     Reviewed: vital signs, relevant labs/tests and NPO status      Verified: bag valve mask available, emergency equipment available, intubation equipment available, IV patency confirmed, oxygen available and suction available    Procedure details (see MAR for exact dosages):     Preoxygenation:  Nasal cannula    Sedation:  Etomidate    Intra-procedure monitoring:  Blood pressure monitoring, cardiac monitor, continuous pulse oximetry, continuous capnometry, frequent LOC assessments and frequent vital sign checks    Intra-procedure events: none      Total sedation time (minutes):  10  Post-procedure details:     Attendance: Constant attendance by certified staff until patient recovered      Recovery: Patient returned to pre-procedure baseline      Post-sedation assessments completed and reviewed: airway patency, cardiovascular function, hydration status, mental status, nausea/vomiting, pain level, respiratory function and temperature      Specimens recovered:  None    Patient is stable for discharge or admission: yes      Procedure completion:  Tolerated               ED Course  ED Course as of 08/10/22 2131   Tue Aug 09, 2022   1443 Patient sick 66 years old with vomiting A. fib with RVR hypotensive poor peripheral perfusion with chest pain and discomfort we will emergently cardiovert medicated with amiodarone and magnesium.   Lovenox will be administered. [TS]   1535 The gentleman came to the ER complaining of vomiting and not feeling well was in A. fib very poor historian we do not know that he is got a history of A. fib or not.  In the ER was hypotensive tachycardic with poor perfusion and therefore was moderately sedated and cardioverted x2 we were able to get him into normal sinus rhythm but this rhythm did not last more than 10 to 15 seconds and reverted back to A. fib at a lower rate blood pressure although it has improved he received IV fluid boluses.  A central access obtained by me because the potassium was very elevated he was given calcium chloride sodium bicarbonate and IV fluids his white cell count is 35,000 IV antibiotics will be initiated [TS]   1537 Cardioversion on this patient was done under emergent basis [TS]   1545 Phone discussion with MARKELL Andrews nephrology. Will kindly consult on patient during admission for initiation of dialysis.    [JS]   1551 Phone discussion with Dr. Du, hospitalist, will kindly accept the patient under his services at this time. [JS]   1844 Phone discussion with Dr. Link, cardiology.  States that if patient is unstable can cardiovert again with no further recommendations. [JS]      ED Course User Index  [JS] Graeme Gorman PA-C  [TS] Bradly Roberson MD                  [id number] Score: 3                          MDM    Final diagnoses:   Sepsis, due to unspecified organism, unspecified whether acute organ dysfunction present (HCC)   CONSUELO (acute kidney injury) (HCC)   Hyponatremia   Hyperkalemia   Atrial fibrillation with RVR (HCC)   Leukocytosis, unspecified type   Diabetic ketoacidosis without coma associated with type 1 diabetes mellitus (HCC)       ED Disposition  ED Disposition     ED Disposition   Decision to Admit    Condition   --    Comment   Level of Care: Critical Care [6]   Diagnosis: Sepsis, due to unspecified organism, unspecified whether acute organ  dysfunction present ([facility]) [[id number]]   Admitting Physician: AYANNA MALIK [[id number]]   Certification: I Certify That Inpatient Hospital Services Are Medically Necessary For Greater Than 2 Midnights               No follow-up provider specified.       Medication List      No changes were made to your prescriptions during this visit.          Bradly Roberson MD  08/09/22 1630       Bradly Roberson MD  08/10/22 1115

## 2022-08-09 NOTE — H&P
[facility]  HISTORY AND PHYSICAL    Date of Admission: 8/9/2022  Primary Care Physician: Provider, No Known    Subjective     Chief Complaint:   Nausea and vomiting with abdominal pain    History of Present Illness    This 66-year-old male presents to the emergency department with a chief complaint of nausea, vomiting and abdominal discomfort.  The patient states that his primary complaints have been present for about a month.  He has become significantly worse over the past 2 to 3 days.  He has been unable to keep down food and has consumed only clear liquids including water for the past 3 to 4 days.  He has now no longer able to keep fluids down.  He also complains of generalized weakness and developed chest discomfort today.  He denies any palpitations.  On arrival at the emergency department the patient was found to be in atrial fibrillation with RVR.  Rate was as high as 160.  Because of his medical instability, the patient was anesthetized and electrically cardioverted x2.  Each time the patient was cardioverted, he subsequently reverted to atrial fibrillation.  At that point, cardioversion attempts were ceased.    Work-up in the emergency department reveals the patient to be acidotic on a venous blood gas with a pH of 7.268, PCO2 31.8 and PO2 of 26.9.  COVID swab is negative.  CMP shows glucose elevated at 329, , creatinine 6.96, sodium 127, potassium 6.5.  Lipase slightly elevated at 61.  Troponin slightly elevated at 0.062 likely secondary to renal clearance issues.  Lactate elevated at 4.1.  Procalcitonin 0.62, magnesium 2.9.  White blood cell count elevated at 35.6 and is likely secondary to stress related demargination due to renal failure.  There is no left shift.  Hemoglobin A1c is elevated at 9.3%.  Osmolality of serum elevated at 381 with uric acid 15.4.  A noncontrast CT scan of the abdomen, pelvis and chest is pending.  Renal ultrasound  "is pending.  The patient is currently on a bicarbonate drip.  The patient has been seen and evaluated by nephrology.    Review of Systems   Constitutional: Positive for activity change and appetite change.   Eyes: Negative.    Respiratory: Negative.    Cardiovascular: Positive for chest pain.   Gastrointestinal: Positive for abdominal pain, nausea and vomiting.   Endocrine: Negative.    Genitourinary: Negative.    Musculoskeletal: Negative.    Skin: Negative.    Allergic/Immunologic: Negative.    Neurological: Negative.    Hematological: Negative.    Psychiatric/Behavioral: Negative.         Otherwise complete ROS reviewed and negative except as mentioned in the HPI.      Past Medical History:     Past Medical History:   Diagnosis Date   • Arthritis    • Diabetes mellitus (HCC)    • HLD (hyperlipidemia)    • Hypertension        Past Surgical History:  No past surgical history on file.    Family History:   family history includes COPD in his brother; Cancer in his mother and sister; Diabetes in his sister; Heart disease in his father.    Social History:    reports that he has quit smoking. His smoking use included cigarettes. He has a 75.00 pack-year smoking history. He has never used smokeless tobacco. He reports current drug use. Drug: Marijuana. He reports that he does not drink alcohol.    Allergies:  No Known Allergies    Medications:  Prior to Admission medications    Not on File       I have utilized all available immediate resources to obtain, update, and review the patient's current medications.    Objective     Vital Signs:   BP (!) 144/109   Pulse 110   Temp 97.6 °F (36.4 °C) (Oral)   Resp 22   Ht 172.7 cm (68\")   Wt 57.9 kg (127 lb 9.6 oz)   SpO2 99%   BMI 19.40 kg/m²     Physical Exam  Constitutional:       General: He is in acute distress.      Appearance: Normal appearance. He is normal weight. He is ill-appearing.   HENT:      Head: Normocephalic and atraumatic.      Right Ear: External ear " normal.      Left Ear: External ear normal.      Nose: Nose normal.      Mouth/Throat:      Mouth: Mucous membranes are dry.      Pharynx: Oropharynx is clear.   Eyes:      General: No scleral icterus.     Extraocular Movements: Extraocular movements intact.      Conjunctiva/sclera: Conjunctivae normal.      Pupils: Pupils are equal, round, and reactive to light.   Cardiovascular:      Rate and Rhythm: Tachycardia present. Rhythm regularly irregular.      Pulses: Normal pulses.      Heart sounds: Normal heart sounds. No murmur heard.  Pulmonary:      Effort: Pulmonary effort is normal. Tachypnea present. No respiratory distress.      Breath sounds: Decreased breath sounds (Bilaterally) present.   Abdominal:      General: Abdomen is flat. Bowel sounds are normal.      Palpations: Abdomen is soft. There is no mass.      Tenderness: There is no abdominal tenderness.   Musculoskeletal:         General: Normal range of motion.      Right lower leg: No edema.      Left lower leg: No edema.   Skin:     General: Skin is warm and dry.      Coloration: Skin is not pale.   Neurological:      General: No focal deficit present.      Mental Status: He is alert and oriented to person, place, and time. Mental status is at baseline.      Cranial Nerves: No cranial nerve deficit.      Comments: Generalized weakness without focal deficit.   Psychiatric:         Mood and Affect: Mood normal.         Judgment: Judgment normal.       Results Reviewed:  Lab Results (last 24 hours)     Procedure Component Value Units Date/Time    Uric Acid [[id number]] Collected: 08/09/22 1435    Specimen: Blood Updated: 08/09/22 1704    Hemoglobin A1c [[id number]]  (Abnormal) Collected: 08/09/22 1435    Specimen: Blood Updated: 08/09/22 1641     Hemoglobin A1C 9.30 %     Narrative:      Hemoglobin A1C Ranges:    Increased Risk for Diabetes  5.7% to 6.4%  Diabetes                     >= 6.5%  Diabetic Goal                < 7.0%    Osmolality, Serum  [[id number]]  (Abnormal) Collected: 08/09/22 1435    Specimen: Blood Updated: 08/09/22 1636     Osmolality 381 mOsm/kg     Blood Culture - Blood, Arm, Right [[id number]] Updated: 08/09/22 1627    Specimen: Blood from Arm, Right     Blood Culture - Blood, Arm, Right [[id number]] Collected: 08/09/22 1600    Specimen: Blood from Arm, Right Updated: 08/09/22 1625    COVID-19,[facility] IN-HOUSE,Nasal Swab No Transport Media 3-4 HR TAT - Swab, Nasal Cavity [[id number]]  (Normal) Collected: 08/09/22 1444    Specimen: Swab from Nasal Cavity Updated: 08/09/22 1623     COVID19 Not Detected    Narrative:      Fact sheet for providers: https://www.fda.gov/media/254576/download     Fact sheet for patients: https://www.fda.gov/media/486008/download    Test performed by PCR.    Consider negative results in combination with clinical observations, patient history, and epidemiological information.  Fact sheet for providers: https://www.fda.gov/media/368538/download     Fact sheet for patients: https://www.fda.gov/media/210428/download    Test performed by PCR.    Consider negative results in combination with clinical observations, patient history, and epidemiological information.    Salicylate Level [[id number]]  (Normal) Collected: 08/09/22 1435    Specimen: Blood Updated: 08/09/22 1617     Salicylate <0.3 mg/dL     Acetaminophen Level [[id number]]  (Normal) Collected: 08/09/22 1435    Specimen: Blood Updated: 08/09/22 1617     Acetaminophen <5.0 mcg/mL     Blood Gas, Venous - [[id number]]  (Abnormal) Collected: 08/09/22 1553    Specimen: Venous Blood Updated: 08/09/22 1553     Site OTHER     pH, Venous 7.268 pH Units      pCO2, Venous 34.8 mm Hg      Comment: 84 Value below reference range        pO2, Venous 26.9 mm Hg      Comment: 84 Value below reference range        HCO3, Venous 15.9 mmol/L      Comment: 84 Value below reference range        Base Excess, Venous -10.1 mmol/L      Comment: 84 Value below reference range        O2  Saturation, Venous 44.3 %      Comment: 84 Value below reference range        Temperature 37.0 C      Barometric Pressure for Blood Gas 750 mmHg      Modality Aerosol Mask     Flow Rate 6.0 lpm      Ventilator Mode NA     Collected by [id number]     Comment: Meter: [id number]     :  [id number]       [place] Draw [[id number]] Collected: 08/09/22 1435    Specimen: Blood Updated: 08/09/22 1548    Narrative:      The following orders were created for panel order [place] Draw.  Procedure                               Abnormality         Status                     ---------                               -----------         ------                     Green Top (Gel)[[id number]]                                  Final result               Lavender Top[[id number]]                                     Final result               Red Top[[id number]]                                          Final result               Grande Top[[id number]]                                         In process                 Light Blue Top[[id number]]                                   Final result                 Please view results for these tests on the individual orders.    Lavender Top [[id number]] Collected: 08/09/22 1435    Specimen: Blood Updated: 08/09/22 1548     Extra Tube hold for add-on     Comment: Auto resulted       Green Top (Gel) [[id number]] Collected: 08/09/22 1435    Specimen: Blood Updated: 08/09/22 1548     Extra Tube Hold for add-ons.     Comment: Auto resulted.       Red Top [[id number]] Collected: 08/09/22 1435    Specimen: Blood Updated: 08/09/22 1548     Extra Tube Hold for add-ons.     Comment: Auto resulted.       Light Blue Top [[id number]] Collected: 08/09/22 1435    Specimen: Blood Updated: 08/09/22 1548     Extra Tube Hold for add-ons.     Comment: Auto resulted       Peripheral Blood Smear [[id number]] Collected: 08/09/22 1435    Specimen: Blood Updated: 08/09/22 1536    Comprehensive Metabolic Panel [[id number]]   (Abnormal) Collected: 08/09/22 1435    Specimen: Blood Updated: 08/09/22 1530     Glucose 329 mg/dL       mg/dL      Creatinine 6.96 mg/dL      Sodium 127 mmol/L      Potassium 6.5 mmol/L      Chloride 86 mmol/L      CO2 11.0 mmol/L      Calcium 8.8 mg/dL      Total Protein 6.3 g/dL      Albumin 3.50 g/dL      ALT (SGPT) 13 U/L      AST (SGOT) 13 U/L      Alkaline Phosphatase 97 U/L      Total Bilirubin 0.4 mg/dL      Globulin 2.8 gm/dL      A/G Ratio 1.3 g/dL      BUN/Creatinine Ratio 36.5     Anion Gap 30.0 mmol/L      eGFR 8.0 mL/min/1.73      Comment: <15 Indicative of kidney failure       Narrative:      GFR Normal >60  Chronic Kidney Disease <60  Kidney Failure <15      Manual Differential [[id number]]  (Abnormal) Collected: 08/09/22 1435    Specimen: Blood Updated: 08/09/22 1526     Neutrophil % 96.8 %      Lymphocyte % 1.6 %      Monocyte % 1.6 %      Neutrophils Absolute 34.50 10*3/mm3      Lymphocytes Absolute 0.57 10*3/mm3      Monocytes Absolute 0.57 10*3/mm3      Crenated RBC's Slight/1+     Poikilocytes Slight/1+     WBC Morphology Normal     Platelet Morphology Normal    CBC & Differential [[id number]]  (Abnormal) Collected: 08/09/22 1435    Specimen: Blood Updated: 08/09/22 1526    Narrative:      The following orders were created for panel order CBC & Differential.  Procedure                               Abnormality         Status                     ---------                               -----------         ------                     CBC Auto Differential[[id number]]        Abnormal            Final result                 Please view results for these tests on the individual orders.    CBC Auto Differential [[id number]]  (Abnormal) Collected: 08/09/22 1435    Specimen: Blood Updated: 08/09/22 1526     WBC 35.64 10*3/mm3      RBC 5.01 10*6/mm3      Hemoglobin 15.0 g/dL      Hematocrit 42.9 %      MCV 85.6 fL      MCH 29.9 pg      MCHC 35.0 g/dL      RDW 14.4 %      RDW-SD 44.9 fl      MPV 12.2  "fL      Platelets 188 10*3/mm3     Procalcitonin [[id number]]  (Abnormal) Collected: 08/09/22 1435    Specimen: Blood Updated: 08/09/22 1520     Procalcitonin 0.62 ng/mL     Narrative:      As a Marker for Sepsis (Non-Neonates):    1. <0.5 ng/mL represents a low risk of severe sepsis and/or septic shock.  2. >2 ng/mL represents a high risk of severe sepsis and/or septic shock.    As a Marker for Lower Respiratory Tract Infections that require antibiotic therapy:    PCT on Admission    Antibiotic Therapy       6-12 Hrs later    >0.5                Strongly Recommended  >0.25 - <0.5        Recommended   0.1 - 0.25          Discouraged              Remeasure/reassess PCT  <0.1                Strongly Discouraged     Remeasure/reassess PCT    As 28 day mortality risk marker: \"Change in Procalcitonin Result\" (>80% or <=80%) if Day 0 (or Day 1) and Day 4 values are available. Refer to http://www.[facility]pct[place]calculator[place]com    Change in PCT <=80%  A decrease of PCT levels below or equal to 80% defines a positive change in PCT test result representing a higher risk for 28-day all-cause mortality of patients diagnosed with severe sepsis for septic shock.    Change in PCT >80%  A decrease of PCT levels of more than 80% defines a negative change in PCT result representing a lower risk for 28-day all-cause mortality of patients diagnosed with severe sepsis or septic shock.       Troponin [[id number]]  (Abnormal) Collected: 08/09/22 1435    Specimen: Blood Updated: 08/09/22 1516     Troponin T 0.062 ng/mL     Narrative:      Troponin T Reference Range:  <= 0.03 ng/mL-   Negative for AMI  >0.03 ng/mL-     Abnormal for myocardial necrosis.  Clinicians would have to utilize clinical acumen, EKG, Troponin and serial changes to determine if it is an Acute Myocardial Infarction or myocardial injury due to an underlying chronic condition.       Results may be falsely decreased if patient taking Biotin.      Lactic Acid, Plasma [[id number]]  " (Abnormal) Collected: 08/09/22 1435    Specimen: Blood Updated: 08/09/22 1516     Lactate 4.1 mmol/L     Lipase [[id number]]  (Abnormal) Collected: 08/09/22 1435    Specimen: Blood Updated: 08/09/22 1511     Lipase 61 U/L     Magnesium [[id number]]  (Abnormal) Collected: 08/09/22 1435    Specimen: Blood Updated: 08/09/22 1510     Magnesium 2.9 mg/dL     Gray Top [[id number]] Collected: 08/09/22 1435    Specimen: Blood Updated: 08/09/22 1447          XR Chest 1 View    Result Date: 8/9/2022  Narrative: XR CHEST 1 VW- 8/9/2022 2:42 PM CDT  HISTORY: Chest pain  COMPARISON: None.  FINDINGS:  No lung consolidation. No pleural effusion or pneumothorax. Right IJ central venous catheters in good position. Heart size is normal. Pulmonary vasculature are nondilated. Remote granulomatous calcification. Right-sided of rotator cuff arthropathy. No acute bony abnormality.      Impression: 1. No radiographic evidence of acute cardiopulmonary process. 2. Right IJ central venous catheter is in good position.   This report was finalized on 08/09/2022 15:41 by Dr Luis Mullen, .     I have personally reviewed and interpreted the radiology studies and ECG obtained at time of admission.     Assessment / Plan      Assessment & Plan  Active Hospital Problems    Diagnosis    • **Acute renal failure (HCC)    • Atrial fibrillation with rapid ventricular response (HCC)    • Hypertension    • Diabetes mellitus (HCC)        PLAN:   Admit to critical care unit  Nephrology consultation, done  Fluids including bicarbonate drip per nephrology  Noncontrast CT scan of the abdomen, pelvis, chest pending  Renal ultrasound pending  Daily CBC, CMP, magnesium, phosphorus, lipase    Code Status:   Full code  Surrogate decision makers the patient's daughter     I discussed the patient's findings and my recommendations with: The patient and his daughter    Estimated length of stay: Unknown    Electronically signed by Edwin uD DO, 08/09/22, 17:14  CDT.    I spent 50 minutes providing critical care management to this patient. This excludes time spent in performing separately billed procedures.

## 2022-08-09 NOTE — ED PROVIDER NOTES
Notified by TRE of acute change in patient status; pt previously co-managed by MD from previous shift. On my evaluation, he has hyperglycemia, AGMA, tachyarrhythmia. ECG shows aflutter with variable response 140s-150s, and BP is unclear: cuff hypertensive, manual BP per RN about 100/40. Mentation is intact, cap refill 2-3s.  Patient has central line in place and has received calcium, bicarbonate, amiodarone already.  He received a gram of calcium chloride upon my bedside evaluation as per above and have verbally ordered an amiodarone bolus.  Arterial line was placed and shows technically normal blood pressures with a systolic of 100, MAP of 65.  After the calcium was given in the amiodarone was initiated, the patient's heart rate slowed to between 100-110.  I discussed with his admitting doctor, Dr. Du, who had initially plan to give digoxin but I let him know I would like to give amiodarone and asked him to discontinue the digoxin.  Graeme TOLEDO discussed with cardiology as the patient has received extensive medications and his tachyarrhythmia is difficult to control and is likely the etiology of his hypotension; they recommended cardioversion for instability.  I discussed with the patient and family and I have elected to defer cardioversion at this time given his normotensive status and the fact that he has improved mentation and appears to have improved perfusion after calcium with amiodarone infusion.  I discussed the case prior to his disposition to the unit with Dr. Andrews and requested that the digoxin be discontinued since he received another bolus of amiodarone and Dr. Andrews agreed to cancel it.  Shortly after our discussion the patient was dispositioned to the medical ICU. See procedure note for details.     Antonio Phan MD  08/09/22 2002       Antonio Phan MD  08/09/22 2002       Antonio Phan MD  08/09/22 2003

## 2022-08-10 ENCOUNTER — APPOINTMENT (OUTPATIENT)
Dept: ULTRASOUND IMAGING | Facility: HOSPITAL | Age: 67
End: 2022-08-10

## 2022-08-10 LAB
ALBUMIN SERPL-MCNC: 2.8 G/DL (ref 3.5–5.2)
ALBUMIN UR-MCNC: 8.4 MG/DL
ALBUMIN/GLOB SERPL: 1.5 G/DL
ALP SERPL-CCNC: 76 U/L (ref 39–117)
ALT SERPL W P-5'-P-CCNC: 13 U/L (ref 1–41)
ANION GAP SERPL CALCULATED.3IONS-SCNC: 12 MMOL/L (ref 5–15)
ANION GAP SERPL CALCULATED.3IONS-SCNC: 15 MMOL/L (ref 5–15)
ANION GAP SERPL CALCULATED.3IONS-SCNC: 17 MMOL/L (ref 5–15)
AST SERPL-CCNC: 18 U/L (ref 1–40)
BASOPHILS # BLD AUTO: 0.03 10*3/MM3 (ref 0–0.2)
BASOPHILS NFR BLD AUTO: 0.1 % (ref 0–1.5)
BILIRUB SERPL-MCNC: 0.3 MG/DL (ref 0–1.2)
BUN SERPL-MCNC: 163 MG/DL (ref 8–23)
BUN SERPL-MCNC: 176 MG/DL (ref 8–23)
BUN SERPL-MCNC: 204 MG/DL (ref 8–23)
BUN/CREAT SERPL: 45.8 (ref 7–25)
BUN/CREAT SERPL: 52.9 (ref 7–25)
BUN/CREAT SERPL: 53.4 (ref 7–25)
CALCIUM SPEC-SCNC: 8 MG/DL (ref 8.6–10.5)
CALCIUM SPEC-SCNC: 8.4 MG/DL (ref 8.6–10.5)
CALCIUM SPEC-SCNC: 8.5 MG/DL (ref 8.6–10.5)
CHLORIDE SERPL-SCNC: 102 MMOL/L (ref 98–107)
CHLORIDE SERPL-SCNC: 105 MMOL/L (ref 98–107)
CHLORIDE SERPL-SCNC: 105 MMOL/L (ref 98–107)
CO2 SERPL-SCNC: 16 MMOL/L (ref 22–29)
CO2 SERPL-SCNC: 19 MMOL/L (ref 22–29)
CO2 SERPL-SCNC: 23 MMOL/L (ref 22–29)
CREAT SERPL-MCNC: 3.05 MG/DL (ref 0.76–1.27)
CREAT SERPL-MCNC: 3.33 MG/DL (ref 0.76–1.27)
CREAT SERPL-MCNC: 4.45 MG/DL (ref 0.76–1.27)
CREAT UR-MCNC: 88 MG/DL
CREAT UR-MCNC: 88 MG/DL
CYTOLOGIST CVX/VAG CYTO: NORMAL
DEPRECATED RDW RBC AUTO: 42.6 FL (ref 37–54)
EGFRCR SERPLBLD CKD-EPI 2021: 13.8 ML/MIN/1.73
EGFRCR SERPLBLD CKD-EPI 2021: 19.5 ML/MIN/1.73
EGFRCR SERPLBLD CKD-EPI 2021: 21.6 ML/MIN/1.73
EOSINOPHIL # BLD AUTO: 0.01 10*3/MM3 (ref 0–0.4)
EOSINOPHIL NFR BLD AUTO: 0 % (ref 0.3–6.2)
ERYTHROCYTE [DISTWIDTH] IN BLOOD BY AUTOMATED COUNT: 14.2 % (ref 12.3–15.4)
GLOBULIN UR ELPH-MCNC: 1.9 GM/DL
GLUCOSE BLDC GLUCOMTR-MCNC: 117 MG/DL (ref 70–130)
GLUCOSE BLDC GLUCOMTR-MCNC: 129 MG/DL (ref 70–130)
GLUCOSE BLDC GLUCOMTR-MCNC: 131 MG/DL (ref 70–130)
GLUCOSE BLDC GLUCOMTR-MCNC: 149 MG/DL (ref 70–130)
GLUCOSE BLDC GLUCOMTR-MCNC: 151 MG/DL (ref 70–130)
GLUCOSE BLDC GLUCOMTR-MCNC: 159 MG/DL (ref 70–130)
GLUCOSE BLDC GLUCOMTR-MCNC: 173 MG/DL (ref 70–130)
GLUCOSE BLDC GLUCOMTR-MCNC: 177 MG/DL (ref 70–130)
GLUCOSE BLDC GLUCOMTR-MCNC: 177 MG/DL (ref 70–130)
GLUCOSE BLDC GLUCOMTR-MCNC: 181 MG/DL (ref 70–130)
GLUCOSE BLDC GLUCOMTR-MCNC: 187 MG/DL (ref 70–130)
GLUCOSE BLDC GLUCOMTR-MCNC: 188 MG/DL (ref 70–130)
GLUCOSE SERPL-MCNC: 143 MG/DL (ref 65–99)
GLUCOSE SERPL-MCNC: 162 MG/DL (ref 65–99)
GLUCOSE SERPL-MCNC: 199 MG/DL (ref 65–99)
HCT VFR BLD AUTO: 36.4 % (ref 37.5–51)
HGB BLD-MCNC: 13.2 G/DL (ref 13–17.7)
IMM GRANULOCYTES # BLD AUTO: 0.11 10*3/MM3 (ref 0–0.05)
IMM GRANULOCYTES NFR BLD AUTO: 0.4 % (ref 0–0.5)
LYMPHOCYTES # BLD AUTO: 0.5 10*3/MM3 (ref 0.7–3.1)
LYMPHOCYTES NFR BLD AUTO: 2 % (ref 19.6–45.3)
MAGNESIUM SERPL-MCNC: 2.5 MG/DL (ref 1.6–2.4)
MAGNESIUM SERPL-MCNC: 2.6 MG/DL (ref 1.6–2.4)
MCH RBC QN AUTO: 30 PG (ref 26.6–33)
MCHC RBC AUTO-ENTMCNC: 36.3 G/DL (ref 31.5–35.7)
MCV RBC AUTO: 82.7 FL (ref 79–97)
MICROALBUMIN/CREAT UR: 95.5 MG/G
MONOCYTES # BLD AUTO: 2.29 10*3/MM3 (ref 0.1–0.9)
MONOCYTES NFR BLD AUTO: 9 % (ref 5–12)
NEUTROPHILS NFR BLD AUTO: 22.53 10*3/MM3 (ref 1.7–7)
NEUTROPHILS NFR BLD AUTO: 88.5 % (ref 42.7–76)
NRBC BLD AUTO-RTO: 0 /100 WBC (ref 0–0.2)
PATH INTERP BLD-IMP: NORMAL
PHOSPHATE SERPL-MCNC: 4.6 MG/DL (ref 2.5–4.5)
PHOSPHATE SERPL-MCNC: 5.4 MG/DL (ref 2.5–4.5)
PHOSPHATE SERPL-MCNC: 7.1 MG/DL (ref 2.5–4.5)
PLATELET # BLD AUTO: 168 10*3/MM3 (ref 140–450)
PMV BLD AUTO: 12 FL (ref 6–12)
POTASSIUM SERPL-SCNC: 4.6 MMOL/L (ref 3.5–5.2)
POTASSIUM SERPL-SCNC: 4.9 MMOL/L (ref 3.5–5.2)
POTASSIUM SERPL-SCNC: 5 MMOL/L (ref 3.5–5.2)
PROT SERPL-MCNC: 4.7 G/DL (ref 6–8.5)
RBC # BLD AUTO: 4.4 10*6/MM3 (ref 4.14–5.8)
SODIUM SERPL-SCNC: 135 MMOL/L (ref 136–145)
SODIUM SERPL-SCNC: 139 MMOL/L (ref 136–145)
SODIUM SERPL-SCNC: 140 MMOL/L (ref 136–145)
WBC NRBC COR # BLD: 25.47 10*3/MM3 (ref 3.4–10.8)

## 2022-08-10 PROCEDURE — 93005 ELECTROCARDIOGRAM TRACING: CPT | Performed by: FAMILY MEDICINE

## 2022-08-10 PROCEDURE — 84100 ASSAY OF PHOSPHORUS: CPT | Performed by: FAMILY MEDICINE

## 2022-08-10 PROCEDURE — 80048 BASIC METABOLIC PNL TOTAL CA: CPT | Performed by: FAMILY MEDICINE

## 2022-08-10 PROCEDURE — 83735 ASSAY OF MAGNESIUM: CPT | Performed by: FAMILY MEDICINE

## 2022-08-10 PROCEDURE — 94799 UNLISTED PULMONARY SVC/PX: CPT

## 2022-08-10 PROCEDURE — 25010000002 ONDANSETRON PER 1 MG: Performed by: FAMILY MEDICINE

## 2022-08-10 PROCEDURE — 25010000002 DIGOXIN PER 500 MCG: Performed by: FAMILY MEDICINE

## 2022-08-10 PROCEDURE — 82962 GLUCOSE BLOOD TEST: CPT

## 2022-08-10 PROCEDURE — 25010000002 ENOXAPARIN PER 10 MG: Performed by: FAMILY MEDICINE

## 2022-08-10 PROCEDURE — 76775 US EXAM ABDO BACK WALL LIM: CPT

## 2022-08-10 PROCEDURE — 63710000001 INSULIN LISPRO (HUMAN) PER 5 UNITS: Performed by: FAMILY MEDICINE

## 2022-08-10 PROCEDURE — 85025 COMPLETE CBC W/AUTO DIFF WBC: CPT | Performed by: FAMILY MEDICINE

## 2022-08-10 PROCEDURE — 93010 ELECTROCARDIOGRAM REPORT: CPT | Performed by: INTERNAL MEDICINE

## 2022-08-10 PROCEDURE — 94761 N-INVAS EAR/PLS OXIMETRY MLT: CPT

## 2022-08-10 RX ORDER — ENOXAPARIN SODIUM 100 MG/ML
1 INJECTION SUBCUTANEOUS EVERY 24 HOURS
Status: DISCONTINUED | OUTPATIENT
Start: 2022-08-10 | End: 2022-08-11

## 2022-08-10 RX ORDER — NICOTINE POLACRILEX 4 MG
15 LOZENGE BUCCAL
Status: DISCONTINUED | OUTPATIENT
Start: 2022-08-10 | End: 2022-08-15 | Stop reason: HOSPADM

## 2022-08-10 RX ORDER — INSULIN LISPRO 100 [IU]/ML
0-9 INJECTION, SOLUTION INTRAVENOUS; SUBCUTANEOUS
Status: DISCONTINUED | OUTPATIENT
Start: 2022-08-10 | End: 2022-08-15 | Stop reason: HOSPADM

## 2022-08-10 RX ORDER — DIGOXIN 0.25 MG/ML
250 INJECTION INTRAMUSCULAR; INTRAVENOUS ONCE
Status: COMPLETED | OUTPATIENT
Start: 2022-08-10 | End: 2022-08-10

## 2022-08-10 RX ORDER — NOREPINEPHRINE BIT/0.9 % NACL 8 MG/250ML
.02-.3 INFUSION BOTTLE (ML) INTRAVENOUS
Status: DISCONTINUED | OUTPATIENT
Start: 2022-08-10 | End: 2022-08-12

## 2022-08-10 RX ORDER — DEXTROSE MONOHYDRATE 25 G/50ML
25 INJECTION, SOLUTION INTRAVENOUS
Status: DISCONTINUED | OUTPATIENT
Start: 2022-08-10 | End: 2022-08-15 | Stop reason: HOSPADM

## 2022-08-10 RX ORDER — SODIUM CHLORIDE 9 MG/ML
50 INJECTION, SOLUTION INTRAVENOUS CONTINUOUS
Status: DISCONTINUED | OUTPATIENT
Start: 2022-08-10 | End: 2022-08-12

## 2022-08-10 RX ADMIN — POTASSIUM CHLORIDE, DEXTROSE MONOHYDRATE AND SODIUM CHLORIDE 150 ML/HR: 150; 5; 450 INJECTION, SOLUTION INTRAVENOUS at 06:36

## 2022-08-10 RX ADMIN — Medication 10 ML: at 21:58

## 2022-08-10 RX ADMIN — Medication 10 ML: at 08:01

## 2022-08-10 RX ADMIN — SODIUM CHLORIDE, POTASSIUM CHLORIDE, SODIUM LACTATE AND CALCIUM CHLORIDE 1000 ML: 600; 310; 30; 20 INJECTION, SOLUTION INTRAVENOUS at 10:53

## 2022-08-10 RX ADMIN — POTASSIUM CHLORIDE, DEXTROSE MONOHYDRATE AND SODIUM CHLORIDE 150 ML/HR: 150; 5; 450 INJECTION, SOLUTION INTRAVENOUS at 00:39

## 2022-08-10 RX ADMIN — Medication 0.08 MCG/KG/MIN: at 19:52

## 2022-08-10 RX ADMIN — DIGOXIN 250 MCG: 0.25 INJECTION INTRAMUSCULAR; INTRAVENOUS at 17:22

## 2022-08-10 RX ADMIN — ENOXAPARIN SODIUM 70 MG: 100 INJECTION SUBCUTANEOUS at 17:26

## 2022-08-10 RX ADMIN — ONDANSETRON 4 MG: 2 INJECTION INTRAMUSCULAR; INTRAVENOUS at 18:55

## 2022-08-10 RX ADMIN — INSULIN LISPRO 2 UNITS: 100 INJECTION, SOLUTION INTRAVENOUS; SUBCUTANEOUS at 18:03

## 2022-08-10 RX ADMIN — SODIUM CHLORIDE 100 ML/HR: 9 INJECTION, SOLUTION INTRAVENOUS at 17:54

## 2022-08-10 RX ADMIN — Medication 0.3 MCG/KG/MIN: at 05:28

## 2022-08-10 NOTE — CASE MANAGEMENT/SOCIAL WORK
Discharge Planning Assessment  [facility]     Patient Name: Chito Govea  MRN: [id number]  Today's Date: 8/10/2022    Admit Date: 8/9/2022     Discharge Needs Assessment     Row Name 08/10/22 1159       Living Environment    People in Home child(juana), adult    Name(s) of People in Home Shae    Current Living Arrangements home    Primary Care Provided by child(juana)    Family Caregiver if Needed child(juana), adult    Family Caregiver Names Shae    Able to Return to Prior Arrangements yes       Transition Planning    Patient/Family Anticipates Transition to home with family       Discharge Needs Assessment    Equipment Currently Used at Home none    Concerns to be Addressed no discharge needs identified    Equipment Needed After Discharge none    Discharge Coordination/Progress spoke to daughter Ekaterina patient lives with christophe Kaufman; unable to reach Shae; has RX coverage and PCP; independent at home prior to illness will follow for DC needs               Discharge Plan     Row Name 08/10/22 0946       Plan    Plan Comments VM left for Ekaterina daughter requesting return call for DC planning screen              Continued Care and Services - Admitted Since 8/9/2022    Coordination has not been started for this encounter.          Demographic Summary    No documentation.                Functional Status    No documentation.                Psychosocial    No documentation.                Abuse/Neglect    No documentation.                Legal    No documentation.                Substance Abuse    No documentation.                Patient Forms    No documentation.                   Sujatha Redmond RN

## 2022-08-10 NOTE — CASE MANAGEMENT/SOCIAL WORK
Continued Stay Note   [place]     Patient Name: Chito Govea  MRN: [id number]  Today's Date: 8/10/2022    Admit Date: 8/9/2022     Discharge Plan     Row Name 08/10/22 0946       Plan    Plan Comments VM left for Ekaterina daughter requesting return call for DC planning screen               Discharge Codes    No documentation.                     Sujatha Redmond RN

## 2022-08-10 NOTE — ED PROCEDURE NOTE
Place of Service:[facility] CARDIAC CARE  Patient Name:Chito Govea  :1955    Procedure  Insert Arterial Line    Date/Time: 2022 8:03 PM  Performed by: Antonio Phan MD  Authorized by: Edwni Du DO     Consent:     Consent obtained:  Verbal    Consent given by:  Patient    Risks, benefits, and alternatives were discussed: yes      Risks discussed:  Bleeding, infection, pain, ischemia and repeat procedure  Universal protocol:     Patient identity confirmed:  Verbally with patient  Indications:     Indications: hemodynamic monitoring and multiple ABGs    Pre-procedure details:     Skin preparation:  Chlorhexidine    Preparation: Patient was prepped and draped in sterile fashion    Procedure details:     Location:  L radial    Steve's test performed: yes      Steve's test abnormal: no      Needle gauge:  20 G    Placement technique:  Kattydinger    Number of attempts:  1    Transducer: waveform confirmed    Post-procedure details:     Post-procedure:  Sutured and sterile dressing applied    CMS:  Normal    Procedure completion:  Tolerated    Critical Care  Performed by: Antonio Phan MD  Authorized by: Edwin Du DO     Critical care provider statement:     Critical care time (minutes):  45    Critical care start time:  2022 7:00 PM    Critical care end time:  2022 7:45 PM    Critical care was necessary to treat or prevent imminent or life-threatening deterioration of the following conditions:  Circulatory failure and metabolic crisis    Critical care was time spent personally by me on the following activities:  Evaluation of patient's response to treatment, examination of patient, obtaining history from patient or surrogate, vascular access procedures, ordering and performing treatments and interventions and ordering and review of laboratory studies    I assumed direction of critical care for this patient from another provider in my  specialty: yes      Care discussed with: admitting provider                Antonio Phan MD  08/09/22 2006

## 2022-08-10 NOTE — PROGRESS NOTES
[facility] Medicine Services  INPATIENT PROGRESS NOTE    Length of Stay: 1  Date of Admission: 8/9/2022  Primary Care Physician: Provider, No Known    Subjective     Chief Complaint:     Nausea and vomiting with abdominal pain    HPI the patient    The patient is feeling better this morning.  Blood sugars are stable.  He was placed on an insulin drip by opal.  Insulin drip will be discontinued.  Glucose is 131.  He was made n.p.o. per insulin drip protocol.  He will be allowed to eat today.  Sliding scale insulin will be initiated as ordered at admission.  There was no evidence of diabetic ketoacidosis at the time of admission and acidosis was secondary to renal failure, not diabetes.  The patient continues to require a low-dose of Levophed for pressor support.  He will be bolused 1 L of LR today in an attempt to wean pressor support.  Renal function is significantly improved with creatinine 3.05.  BUN is improved to 163.  Magnesium and phosphorus are elevated today.  Bicarbonate fluids have been discontinued by nephrology.  Arterial line is in place.  Intake has exceeded output by 3600 cc so far.  The patient continues to produce urine.    Review of Systems     All pertinent negatives and positives are as above. All other systems have been reviewed and are negative unless otherwise stated.     Objective    Temp:  [97.6 °F (36.4 °C)-97.9 °F (36.6 °C)] 97.9 °F (36.6 °C)  Heart Rate:  [] 128  Resp:  [12-27] 13  BP: ()/() 118/68  Arterial Line BP: ()/(39-67) 112/52    Lab Results (last 24 hours)     Procedure Component Value Units Date/Time    POC Glucose Once [[id number]]  (Abnormal) Collected: 08/10/22 0940    Specimen: Blood Updated: 08/10/22 0951     Glucose 131 mg/dL      Comment: : [id number] Lomas  Leola ID: [id number]       Basic Metabolic Panel [[id number]]  (Abnormal) Collected: 08/10/22 0758    Specimen: Blood Updated: 08/10/22  0839     Glucose 143 mg/dL       mg/dL      Creatinine 3.05 mg/dL      Sodium 140 mmol/L      Potassium 4.6 mmol/L      Chloride 105 mmol/L      CO2 23.0 mmol/L      Calcium 8.0 mg/dL      BUN/Creatinine Ratio 53.4     Anion Gap 12.0 mmol/L      eGFR 21.6 mL/min/1.73      Comment: National Kidney Foundation and American Society of Nephrology (ASN) Task Force recommended calculation based on the Chronic Kidney Disease Epidemiology Collaboration (CKD-EPI) equation refit without adjustment for race.       Narrative:      GFR Normal >60  Chronic Kidney Disease <60  Kidney Failure <15      Phosphorus [[id number]]  (Abnormal) Collected: 08/10/22 0758    Specimen: Blood Updated: 08/10/22 0828     Phosphorus 4.6 mg/dL     Magnesium [[id number]]  (Abnormal) Collected: 08/10/22 0758    Specimen: Blood Updated: 08/10/22 0828     Magnesium 2.5 mg/dL     POC Glucose Once [[id number]]  (Normal) Collected: 08/10/22 0743    Specimen: Blood Updated: 08/10/22 0803     Glucose 129 mg/dL      Comment: : [id number] Gwendolyn SyedMeter ID: [id number]       POC Glucose Once [[id number]]  (Abnormal) Collected: 08/10/22 0622    Specimen: Blood Updated: 08/10/22 0633     Glucose 151 mg/dL      Comment: : [id number] Triston TimMeter ID: [id number]       Basic Metabolic Panel [[id number]]  (Abnormal) Collected: 08/10/22 0534    Specimen: Blood Updated: 08/10/22 0610     Glucose 162 mg/dL       mg/dL      Creatinine 3.33 mg/dL      Sodium 139 mmol/L      Potassium 5.0 mmol/L      Chloride 105 mmol/L      CO2 19.0 mmol/L      Calcium 8.4 mg/dL      BUN/Creatinine Ratio 52.9     Anion Gap 15.0 mmol/L      eGFR 19.5 mL/min/1.73      Comment: National Kidney Foundation and American Society of Nephrology (ASN) Task Force recommended calculation based on the Chronic Kidney Disease Epidemiology Collaboration (CKD-EPI) equation refit without adjustment for race.       Narrative:      GFR Normal >60  Chronic Kidney Disease <60  Kidney  Failure <15      Phosphorus [[id number]]  (Abnormal) Collected: 08/10/22 0534    Specimen: Blood Updated: 08/10/22 0559     Phosphorus 5.4 mg/dL     Magnesium [[id number]]  (Abnormal) Collected: 08/10/22 0534    Specimen: Blood Updated: 08/10/22 0559     Magnesium 2.6 mg/dL     CBC & Differential [[id number]]  (Abnormal) Collected: 08/10/22 0534    Specimen: Blood Updated: 08/10/22 0547    Narrative:      The following orders were created for panel order CBC & Differential.  Procedure                               Abnormality         Status                     ---------                               -----------         ------                     CBC Auto Differential[[id number]]        Abnormal            Final result                 Please view results for these tests on the individual orders.    CBC Auto Differential [[id number]]  (Abnormal) Collected: 08/10/22 0534    Specimen: Blood Updated: 08/10/22 0547     WBC 25.47 10*3/mm3      RBC 4.40 10*6/mm3      Hemoglobin 13.2 g/dL      Hematocrit 36.4 %      MCV 82.7 fL      MCH 30.0 pg      MCHC 36.3 g/dL      RDW 14.2 %      RDW-SD 42.6 fl      MPV 12.0 fL      Platelets 168 10*3/mm3      Neutrophil % 88.5 %      Lymphocyte % 2.0 %      Monocyte % 9.0 %      Eosinophil % 0.0 %      Basophil % 0.1 %      Immature Grans % 0.4 %      Neutrophils, Absolute 22.53 10*3/mm3      Lymphocytes, Absolute 0.50 10*3/mm3      Monocytes, Absolute 2.29 10*3/mm3      Eosinophils, Absolute 0.01 10*3/mm3      Basophils, Absolute 0.03 10*3/mm3      Immature Grans, Absolute 0.11 10*3/mm3      nRBC 0.0 /100 WBC     POC Glucose Once [[id number]]  (Abnormal) Collected: 08/10/22 0531    Specimen: Blood Updated: 08/10/22 0542     Glucose 149 mg/dL      Comment: : [id number] Goldstein [employer] ID: [id number]       POC Glucose Once [[id number]]  (Abnormal) Collected: 08/10/22 0429    Specimen: Blood Updated: 08/10/22 0440     Glucose 159 mg/dL      Comment: : [id number] Exhibia  ID: [id number]       POC Glucose Once [[id number]]  (Abnormal) Collected: 08/10/22 0329    Specimen: Blood Updated: 08/10/22 0340     Glucose 173 mg/dL      Comment: : [id number] TeachScapeMeter ID: [id number]       Creatinine, Urine, Random - Urine, Clean Catch [[id number]] Collected: 08/09/22 1841    Specimen: Urine, Clean Catch Updated: 08/10/22 0315     Creatinine, Urine 88.0 mg/dL     Narrative:      Reference intervals for random urine have not been established.  Clinical usage is dependent upon physician's interpretation in combination with other laboratory tests.       POC Glucose Once [[id number]]  (Abnormal) Collected: 08/10/22 0232    Specimen: Blood Updated: 08/10/22 0243     Glucose 181 mg/dL      Comment: : [id number] [employer] ID: [id number]       POC Glucose Once [[id number]]  (Abnormal) Collected: 08/10/22 0135    Specimen: Blood Updated: 08/10/22 0146     Glucose 187 mg/dL      Comment: : [id number] [employer]eter ID: [id number]       Microalbumin / Creatinine Urine Ratio - Urine, Clean Catch [[id number]] Collected: 08/09/22 1841    Specimen: Urine, Clean Catch Updated: 08/10/22 0110     Microalbumin/Creatinine Ratio 95.5 mg/g      Creatinine, Urine 88.0 mg/dL      Microalbumin, Urine 8.4 mg/dL     POC Glucose Once [[id number]]  (Abnormal) Collected: 08/10/22 0025    Specimen: Blood Updated: 08/10/22 0037     Glucose 177 mg/dL      Comment: : [id number] [employer]eter ID: [id number]       Comprehensive Metabolic Panel [[id number]]  (Abnormal) Collected: 08/09/22 2328    Specimen: Blood Updated: 08/10/22 0010     Glucose 199 mg/dL       mg/dL      Creatinine 4.45 mg/dL      Sodium 135 mmol/L      Potassium 4.9 mmol/L      Chloride 102 mmol/L      CO2 16.0 mmol/L      Calcium 8.5 mg/dL      Total Protein 4.7 g/dL      Albumin 2.80 g/dL      ALT (SGPT) 13 U/L      AST (SGOT) 18 U/L      Alkaline Phosphatase 76 U/L      Total Bilirubin 0.3 mg/dL      Globulin 1.9  gm/dL      A/G Ratio 1.5 g/dL      BUN/Creatinine Ratio 45.8     Anion Gap 17.0 mmol/L      eGFR 13.8 mL/min/1.73      Comment: <15 Indicative of kidney failure       Narrative:      GFR Normal >60  Chronic Kidney Disease <60  Kidney Failure <15      Phosphorus [[id number]]  (Abnormal) Collected: 08/09/22 2328    Specimen: Blood Updated: 08/10/22 0002     Phosphorus 7.1 mg/dL     STAT Lactic Acid, Reflex [[id number]]  (Normal) Collected: 08/09/22 2328    Specimen: Blood Updated: 08/09/22 2354     Lactate 1.5 mmol/L     Lipase [[id number]]  (Abnormal) Collected: 08/09/22 2328    Specimen: Blood Updated: 08/09/22 2351     Lipase 66 U/L     Magnesium [[id number]]  (Abnormal) Collected: 08/09/22 2328    Specimen: Blood Updated: 08/09/22 2351     Magnesium 2.7 mg/dL     POC Glucose Once [[id number]]  (Abnormal) Collected: 08/09/22 2325    Specimen: Blood Updated: 08/09/22 2336     Glucose 193 mg/dL      Comment: : [id number] TastemakerXer ID: [id number]       POC Glucose Once [[id number]]  (Abnormal) Collected: 08/09/22 2231    Specimen: Blood Updated: 08/09/22 2242     Glucose 207 mg/dL      Comment: : [id number] YibailinMeter ID: [id number]       POC Glucose Once [[id number]]  (Abnormal) Collected: 08/09/22 2140    Specimen: Blood Updated: 08/09/22 2151     Glucose 251 mg/dL      Comment: : [id number] YibailinMeter ID: [id number]       Basic Metabolic Panel [[id number]]  (Abnormal) Collected: 08/09/22 2022    Specimen: Blood Updated: 08/09/22 2106     Glucose 315 mg/dL       mg/dL      Creatinine 5.36 mg/dL      Sodium 132 mmol/L      Potassium 5.3 mmol/L      Chloride 95 mmol/L      CO2 13.0 mmol/L      Calcium 9.6 mg/dL      BUN/Creatinine Ratio 42.4     Anion Gap 24.0 mmol/L      eGFR 11.0 mL/min/1.73      Comment: <15 Indicative of kidney failure       Narrative:      GFR Normal >60  Chronic Kidney Disease <60  Kidney Failure <15      STAT Lactic Acid, Reflex [[id number]]  (Abnormal)  Collected: 08/09/22 2022    Specimen: Blood Updated: 08/09/22 2055     Lactate 2.4 mmol/L     Phosphorus [[id number]]  (Abnormal) Collected: 08/09/22 2022    Specimen: Blood Updated: 08/09/22 2051     Phosphorus 9.7 mg/dL     Creatinine, Serum [[id number]]  (Abnormal) Collected: 08/09/22 2022    Specimen: Blood Updated: 08/09/22 2048     Creatinine 5.36 mg/dL      eGFR 11.0 mL/min/1.73      Comment: <15 Indicative of kidney failure       Narrative:      GFR Normal >60  Chronic Kidney Disease <60  Kidney Failure <15      Magnesium [[id number]]  (Abnormal) Collected: 08/09/22 2022    Specimen: Blood Updated: 08/09/22 2048     Magnesium 3.0 mg/dL     POC Glucose Once [[id number]]  (Abnormal) Collected: 08/09/22 2032    Specimen: Blood Updated: 08/09/22 2043     Glucose 297 mg/dL      Comment: : [id number] Goldstein JosephMeter ID: [id number]       Protime-INR [[id number]]  (Abnormal) Collected: 08/09/22 2022    Specimen: Blood Updated: 08/09/22 2039     Protime 19.7 Seconds      INR 1.74    Urinalysis, Microscopic Only - Urine, Clean Catch [[id number]]  (Abnormal) Collected: 08/09/22 1841    Specimen: Urine, Clean Catch Updated: 08/09/22 1924     RBC, UA 3-5 /HPF      WBC, UA 0-2 /HPF      Comment: Urine culture not indicated.        Bacteria, UA None Seen /HPF      Squamous Epithelial Cells, UA 0-2 /HPF      Hyaline Casts, UA 7-12 /LPF      Methodology Automated Microscopy    Urinalysis With Culture If Indicated - Urine, Clean Catch [[id number]]  (Abnormal) Collected: 08/09/22 1841    Specimen: Urine, Clean Catch Updated: 08/09/22 1914     Color, UA Yellow     Appearance, UA Clear     pH, UA <=5.0     Specific Gravity, UA 1.016     Glucose,  mg/dL (Trace)     Ketones, UA Negative     Bilirubin, UA Negative     Blood, UA Moderate (2+)     Protein, UA 30 mg/dL (1+)     Leuk Esterase, UA Negative     Nitrite, UA Negative     Urobilinogen, UA 0.2 E.U./dL    Narrative:      In absence of clinical symptoms, the presence of  pyuria, bacteria, and/or nitrites on the urinalysis result does not correlate with infection.    Sodium, Urine, Random - Urine, Clean Catch [[id number]] Collected: 08/09/22 1841    Specimen: Urine, Clean Catch Updated: 08/09/22 1909     Sodium, Urine 52 mmol/L     Narrative:      Reference intervals for random urine have not been established.  Clinical usage is dependent upon physician's interpretation in combination with other laboratory tests.       Protein, Urine, Random - Urine, Clean Catch [[id number]] Collected: 08/09/22 1841    Specimen: Urine, Clean Catch Updated: 08/09/22 1908     Total Protein, Urine 25.4 mg/dL     Narrative:      Reference intervals for random urine have not been established.  Clinical usage is dependent upon physician's interpretation in combination with other laboratory tests.       Urine Drug Screen - Urine, Clean Catch [[id number]]  (Normal) Collected: 08/09/22 1841    Specimen: Urine, Clean Catch Updated: 08/09/22 1904     THC, Screen, Urine Negative     Phencyclidine (PCP), Urine Negative     Cocaine Screen, Urine Negative     Methamphetamine, Ur Negative     Opiate Screen Negative     Amphetamine Screen, Urine Negative     Benzodiazepine Screen, Urine Negative     Tricyclic Antidepressants Screen Negative     Methadone Screen, Urine Negative     Barbiturates Screen, Urine Negative     Oxycodone Screen, Urine Negative     Propoxyphene Screen Negative     Buprenorphine, Screen, Urine Negative    Narrative:      Cutoff For Drugs Screened:    Amphetamines               500 ng/ml  Barbiturates               200 ng/ml  Benzodiazepines            150 ng/ml  Cocaine                    150 ng/ml  Methadone                  200 ng/ml  Opiates                    100 ng/ml  Phencyclidine               25 ng/ml  THC                            50 ng/ml  Methamphetamine            500 ng/ml  Tricyclic Antidepressants  300 ng/ml  Oxycodone                  100 ng/ml  Propoxyphene               300  ng/ml  Buprenorphine               10 ng/ml    The normal value for all drugs tested is negative. This report includes unconfirmed screening results, with the cutoff values listed, to be used for medical treatment purposes only.  Unconfirmed results must not be used for non-medical purposes such as employment or legal testing.  Clinical consideration should be applied to any drug of abuse test, particularly when unconfirmed results are used.      [place] Draw [[id number]] Collected: 08/09/22 1435    Specimen: Blood Updated: 08/09/22 1848    Narrative:      The following orders were created for panel order [place] Draw.  Procedure                               Abnormality         Status                     ---------                               -----------         ------                     Green Top (Gel)[[id number]]                                  Final result               Lavender Top[[id number]]                                     Final result               Red Top[[id number]]                                          Final result               Grande Top[[id number]]                                         Final result               Light Blue Top[[id number]]                                   Final result                 Please view results for these tests on the individual orders.    Gray Top [[id number]] Collected: 08/09/22 1435    Specimen: Blood Updated: 08/09/22 1848     Extra Tube Hold for add-ons.     Comment: Auto resulted.       Basic Metabolic Panel [[id number]]  (Abnormal) Collected: 08/09/22 1758    Specimen: Blood Updated: 08/09/22 1843     Glucose 340 mg/dL       mg/dL      Creatinine 6.21 mg/dL      Sodium 131 mmol/L      Potassium 5.7 mmol/L      Comment: Slight hemolysis detected by analyzer. Results may be affected.        Chloride 91 mmol/L      CO2 11.0 mmol/L      Calcium 9.2 mg/dL      BUN/Creatinine Ratio 39.5     Anion Gap 29.0 mmol/L      eGFR 9.2 mL/min/1.73      Comment: <15 Indicative  of kidney failure       Narrative:      GFR Normal >60  Chronic Kidney Disease <60  Kidney Failure <15      STAT Lactic Acid, Reflex [[id number]]  (Abnormal) Collected: 08/09/22 1758    Specimen: Blood Updated: 08/09/22 1838     Lactate 3.9 mmol/L     Troponin [[id number]]  (Abnormal) Collected: 08/09/22 1758    Specimen: Blood Updated: 08/09/22 1832     Troponin T 0.054 ng/mL     Narrative:      Troponin T Reference Range:  <= 0.03 ng/mL-   Negative for AMI  >0.03 ng/mL-     Abnormal for myocardial necrosis.  Clinicians would have to utilize clinical acumen, EKG, Troponin and serial changes to determine if it is an Acute Myocardial Infarction or myocardial injury due to an underlying chronic condition.       Results may be falsely decreased if patient taking Biotin.      Blood Culture - Blood, Arm, Right [[id number]] Collected: 08/09/22 1800    Specimen: Blood from Arm, Right Updated: 08/09/22 1822    Uric Acid [[id number]]  (Abnormal) Collected: 08/09/22 1435    Specimen: Blood Updated: 08/09/22 1717     Uric Acid 15.4 mg/dL     Hemoglobin A1c [[id number]]  (Abnormal) Collected: 08/09/22 1435    Specimen: Blood Updated: 08/09/22 1641     Hemoglobin A1C 9.30 %     Narrative:      Hemoglobin A1C Ranges:    Increased Risk for Diabetes  5.7% to 6.4%  Diabetes                     >= 6.5%  Diabetic Goal                < 7.0%    Osmolality, Serum [[id number]]  (Abnormal) Collected: 08/09/22 1435    Specimen: Blood Updated: 08/09/22 1636     Osmolality 381 mOsm/kg     Blood Culture - Blood, Arm, Right [[id number]] Collected: 08/09/22 1600    Specimen: Blood from Arm, Right Updated: 08/09/22 1625    COVID-19,[facility],Nasal Swab No Transport Media 3-4 HR TAT - Swab, Nasal Cavity [[id number]]  (Normal) Collected: 08/09/22 1444    Specimen: Swab from Nasal Cavity Updated: 08/09/22 1623     COVID19 Not Detected    Narrative:      Fact sheet for providers: https://www.fda.gov/media/355697/download     Fact sheet for patients:  https://www.fda.gov/media/639822/download    Test performed by PCR.    Consider negative results in combination with clinical observations, patient history, and epidemiological information.  Fact sheet for providers: https://www.fda.gov/media/988222/download     Fact sheet for patients: https://www.fda.gov/media/402773/download    Test performed by PCR.    Consider negative results in combination with clinical observations, patient history, and epidemiological information.    Salicylate Level [[id number]]  (Normal) Collected: 08/09/22 1435    Specimen: Blood Updated: 08/09/22 1617     Salicylate <0.3 mg/dL     Acetaminophen Level [[id number]]  (Normal) Collected: 08/09/22 1435    Specimen: Blood Updated: 08/09/22 1617     Acetaminophen <5.0 mcg/mL     Blood Gas, Venous - [[id number]]  (Abnormal) Collected: 08/09/22 1553    Specimen: Venous Blood Updated: 08/09/22 1553     Site OTHER     pH, Venous 7.268 pH Units      pCO2, Venous 34.8 mm Hg      Comment: 84 Value below reference range        pO2, Venous 26.9 mm Hg      Comment: 84 Value below reference range        HCO3, Venous 15.9 mmol/L      Comment: 84 Value below reference range        Base Excess, Venous -10.1 mmol/L      Comment: 84 Value below reference range        O2 Saturation, Venous 44.3 %      Comment: 84 Value below reference range        Temperature 37.0 C      Barometric Pressure for Blood Gas 750 mmHg      Modality Aerosol Mask     Flow Rate 6.0 lpm      Ventilator Mode NA     Collected by [id number]     Comment: Meter: [id number]     :  [id number]       Lavender Top [[id number]] Collected: 08/09/22 1435    Specimen: Blood Updated: 08/09/22 1548     Extra Tube hold for add-on     Comment: Auto resulted       Green Top (Gel) [[id number]] Collected: 08/09/22 1435    Specimen: Blood Updated: 08/09/22 1548     Extra Tube Hold for add-ons.     Comment: Auto resulted.       Red Top [[id number]] Collected: 08/09/22 1435    Specimen: Blood Updated:  08/09/22 1548     Extra Tube Hold for add-ons.     Comment: Auto resulted.       Light Blue Top [[id number]] Collected: 08/09/22 1435    Specimen: Blood Updated: 08/09/22 1548     Extra Tube Hold for add-ons.     Comment: Auto resulted       Peripheral Blood Smear [[id number]] Collected: 08/09/22 1435    Specimen: Blood Updated: 08/09/22 1536    Comprehensive Metabolic Panel [[id number]]  (Abnormal) Collected: 08/09/22 1435    Specimen: Blood Updated: 08/09/22 1530     Glucose 329 mg/dL       mg/dL      Creatinine 6.96 mg/dL      Sodium 127 mmol/L      Potassium 6.5 mmol/L      Chloride 86 mmol/L      CO2 11.0 mmol/L      Calcium 8.8 mg/dL      Total Protein 6.3 g/dL      Albumin 3.50 g/dL      ALT (SGPT) 13 U/L      AST (SGOT) 13 U/L      Alkaline Phosphatase 97 U/L      Total Bilirubin 0.4 mg/dL      Globulin 2.8 gm/dL      A/G Ratio 1.3 g/dL      BUN/Creatinine Ratio 36.5     Anion Gap 30.0 mmol/L      eGFR 8.0 mL/min/1.73      Comment: <15 Indicative of kidney failure       Narrative:      GFR Normal >60  Chronic Kidney Disease <60  Kidney Failure <15      Manual Differential [[id number]]  (Abnormal) Collected: 08/09/22 1435    Specimen: Blood Updated: 08/09/22 1526     Neutrophil % 96.8 %      Lymphocyte % 1.6 %      Monocyte % 1.6 %      Neutrophils Absolute 34.50 10*3/mm3      Lymphocytes Absolute 0.57 10*3/mm3      Monocytes Absolute 0.57 10*3/mm3      Crenated RBC's Slight/1+     Poikilocytes Slight/1+     WBC Morphology Normal     Platelet Morphology Normal    CBC & Differential [[id number]]  (Abnormal) Collected: 08/09/22 1435    Specimen: Blood Updated: 08/09/22 1526    Narrative:      The following orders were created for panel order CBC & Differential.  Procedure                               Abnormality         Status                     ---------                               -----------         ------                     CBC Auto Differential[[id number]]        Abnormal            Final result       "           Please view results for these tests on the individual orders.    CBC Auto Differential [[id number]]  (Abnormal) Collected: 08/09/22 1435    Specimen: Blood Updated: 08/09/22 1526     WBC 35.64 10*3/mm3      RBC 5.01 10*6/mm3      Hemoglobin 15.0 g/dL      Hematocrit 42.9 %      MCV 85.6 fL      MCH 29.9 pg      MCHC 35.0 g/dL      RDW 14.4 %      RDW-SD 44.9 fl      MPV 12.2 fL      Platelets 188 10*3/mm3     Procalcitonin [[id number]]  (Abnormal) Collected: 08/09/22 1435    Specimen: Blood Updated: 08/09/22 1520     Procalcitonin 0.62 ng/mL     Narrative:      As a Marker for Sepsis (Non-Neonates):    1. <0.5 ng/mL represents a low risk of severe sepsis and/or septic shock.  2. >2 ng/mL represents a high risk of severe sepsis and/or septic shock.    As a Marker for Lower Respiratory Tract Infections that require antibiotic therapy:    PCT on Admission    Antibiotic Therapy       6-12 Hrs later    >0.5                Strongly Recommended  >0.25 - <0.5        Recommended   0.1 - 0.25          Discouraged              Remeasure/reassess PCT  <0.1                Strongly Discouraged     Remeasure/reassess PCT    As 28 day mortality risk marker: \"Change in Procalcitonin Result\" (>80% or <=80%) if Day 0 (or Day 1) and Day 4 values are available. Refer to [email or web address]    Change in PCT <=80%  A decrease of PCT levels below or equal to 80% defines a positive change in PCT test result representing a higher risk for 28-day all-cause mortality of patients diagnosed with severe sepsis for septic shock.    Change in PCT >80%  A decrease of PCT levels of more than 80% defines a negative change in PCT result representing a lower risk for 28-day all-cause mortality of patients diagnosed with severe sepsis or septic shock.       Troponin [[id number]]  (Abnormal) Collected: 08/09/22 1435    Specimen: Blood Updated: 08/09/22 1516     Troponin T 0.062 ng/mL     Narrative:      Troponin T Reference " Range:  <= 0.03 ng/mL-   Negative for AMI  >0.03 ng/mL-     Abnormal for myocardial necrosis.  Clinicians would have to utilize clinical acumen, EKG, Troponin and serial changes to determine if it is an Acute Myocardial Infarction or myocardial injury due to an underlying chronic condition.       Results may be falsely decreased if patient taking Biotin.      Lactic Acid, Plasma [[id number]]  (Abnormal) Collected: 08/09/22 1435    Specimen: Blood Updated: 08/09/22 1516     Lactate 4.1 mmol/L     Lipase [[id number]]  (Abnormal) Collected: 08/09/22 1435    Specimen: Blood Updated: 08/09/22 1511     Lipase 61 U/L     Magnesium [[id number]]  (Abnormal) Collected: 08/09/22 1435    Specimen: Blood Updated: 08/09/22 1510     Magnesium 2.9 mg/dL           Imaging Results (Last 24 Hours)     Procedure Component Value Units Date/Time    CT Abdomen Pelvis Without Contrast [[id number]] Collected: 08/09/22 1751     Updated: 08/09/22 1806    Narrative:      EXAM/TECHNIQUE: CT abdomen pelvis without contrast     INDICATION: Pain, nausea, vomiting, diarrhea, sepsis     COMPARISON: None available.     DLP: 183 mGy cm. Automated exposure control was also utilized to  decrease patient radiation dose.     FINDINGS:     Findings in the included lower lungs are described in a separate report.     Unenhanced liver appears unremarkable. No gallstones or gallbladder wall  thickening. Gallbladder is mildly distended. No biliary ductal  dilatation. The pancreas appears unremarkable. Spleen and adrenal glands  appear normal.     Bilateral hilar renal vascular calcifications are noted. No urolithiasis  or hydronephrosis. No large renal lesion. No focal urinary bladder wall  thickening.     No colonic wall thickening or pericolonic inflammation. The appendix is  not confidently identified but there are no secondary signs of  appendicitis. Mild circumferential wall thickening of the distal  esophagus. No focal gastric wall thickening or perigastric  fat  stranding. No abnormal small bowel distention or evidence of active  small bowel inflammation.     No ascites or free pelvic fluid. No pelvic mass or pelvic collection.  Prostate and seminal vesicles appear unremarkable.     Nonaneurysmal atherosclerotic abdominal aorta. No enlarged  retroperitoneal, mesenteric, pelvic, or inguinal lymph nodes.     No acute abdominal wall soft tissue abnormality. A small amount of soft  tissue gas in the LEFT lower abdomen is likely related to subcutaneous  injections. No acute osseous finding. Multilevel lumbar spine  degenerative change.       Impression:         No bowel obstruction. No definite evidence of active inflammation in the  small bowel or colon. There is some mild wall thickening of the distal  esophagus which may represent a mild esophagitis.  This report was finalized on 08/09/2022 18:03 by Dr. Kishor Escobedo MD.    CT Chest Without Contrast Diagnostic [[id number]] Collected: 08/09/22 1746     Updated: 08/09/22 1758    Narrative:      CT CHEST WO CONTRAST DIAGNOSTIC- 8/9/2022 5:14 PM CDT     HISTORY: sob, pain, palpitations,; A41.9-Sepsis, unspecified organism;  N17.9-Acute kidney failure, unspecified; E87.4-Egyi-flkonqqhop and  hyponatremia; E87.5-Hyperkalemia; I48.91-Unspecified atrial  fibrillation; D72.829-Elevated white blood cell count, unspecified;  E10.10-Type 1 diabetes mellitus with ketoacidosis without coma     COMPARISON: Chest x-ray dated 08/09/2022     DOSE LENGTH PRODUCT: 142 mGy cm. Automated exposure control was also  utilized to decrease patient radiation dose.     TECHNIQUE: Serial helical tomographic images of the chest were acquired  without contrast. Multiplanar reformatted images were provided for  review.     FINDINGS:     Visualized neck base: The imaged portion of the base of the neck appears  unremarkable.      Lungs: There is a mild lung emphysema. Scattered granulomatous  calcifications. There are a few peripheral groundglass  opacities  identified, right lung more so than the left. In the right lower lobe  there is a developing mixed groundglass/consolidative infiltrate.  Minimal groundglass opacities in the left lower lobe. No pleural  effusion. The airways are clear.     Heart: The heart is normal in size. There is no pericardial effusion.  Moderate coronary atheromatous calcification.     Vasculature: Thoracic aorta is normal in caliber. Densely calcified  plaque in the aortic arch. Central pulmonary arteries are nondilated.     Mediastinum and lymph nodes: No suspicious hilar or mediastinal  adenopathy. Incidental granulomatous calcification..     Skeletal and soft tissues: Chest wall soft tissues are unremarkable. No  acute bony abnormality. 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        Impression:      1. There are a few asymmetric pulmonary infiltrates, predominantly  peripheral and groundglass. This is most notable in the right lower  lobe. Early/mild atypical pneumonia would be consideration although this  could also be chronic/fibrotic in nature.  2. Lungs are well expanded. No consolidation.  3. There is no interlobular septal thickening to suggest cardiogenic  interstitial edema.  4. Coronary atheromatous calcification.     This report was finalized on 08/09/2022 17:55 by Dr Luis Mullen, .    XR Chest 1 View [[id number]] Collected: 08/09/22 1540     Updated: 08/09/22 1545    Narrative:      XR CHEST 1 VW- 8/9/2022 2:42 PM CDT     HISTORY: Chest pain     COMPARISON: None.     FINDINGS:      No lung consolidation. No pleural effusion or pneumothorax. Right IJ  central venous catheters in good position. Heart size is normal.  Pulmonary vasculature are nondilated. Remote granulomatous  calcification. Right-sided of rotator cuff arthropathy. No acute bony  abnormality.       Impression:      1. No radiographic evidence of acute cardiopulmonary process.  2. Right IJ central venous catheter  is in good position.        This report was finalized on 08/09/2022 15:41 by Dr Luis Mullen, .             Intake/Output Summary (Last 24 hours) at 8/10/2022 1030  Last data filed at 8/10/2022 0800  Gross per 24 hour   Intake 5289.47 ml   Output 1600 ml   Net 3689.47 ml       Physical Exam  Constitutional:       General: He is in no acute distress.      Appearance: Normal appearance. He is normal weight. He is ill-appearing.   HENT:      Head: Normocephalic and atraumatic.      Right Ear: External ear normal.      Left Ear: External ear normal.      Nose: Nose normal.      Mouth: Mucous membranes are dry.      Pharynx: Oropharynx is clear.   Eyes:      General: No scleral icterus.      Conjunctiva/sclera: Conjunctivae normal.   Cardiovascular:      Rate and Rhythm: Tachycardia present. Rhythm regularly irregular.      Pulses: Normal pulses.      Heart sounds: Normal heart sounds. No murmur heard.  Pulmonary:      Effort: Pulmonary effort is normal.  No respiratory distress.      Breath sounds: Decreased breath sounds (Bilaterally) present.   Abdominal:      General: Abdomen is flat. Bowel sounds are normal.      Palpations: Abdomen is soft. There is no mass.      Tenderness: There is no abdominal tenderness.   Musculoskeletal:         General: Normal range of motion.      Right lower leg: No edema.      Left lower leg: No edema.   Skin:     General: Skin is warm and dry.      Coloration: Skin is not pale.   Neurological:      General: No focal deficit present.      Mental Status: He is alert and oriented to person, place, and time. Mental status is at baseline.      Comments: Generalized weakness without focal deficit.   Psychiatric:         Mood and Affect: Mood normal.         Judgment: Judgment normal.     Results Review:  I have reviewed the labs, radiology results, and diagnostic studies since my last progress note and made treatment changes reflective of the results.   I have reviewed the current  medications.    Assessment/Plan     Active Hospital Problems    Diagnosis    • **Acute renal failure (HCC)    • Atrial fibrillation with rapid ventricular response (HCC)    • Hypertension    • Diabetes mellitus (HCC)        PLAN:  Lactated Ringers 1 L bolus  Attempt to wean Levophed  Continue daily labs  Continue to monitor for renal recovery  Discontinue DKA protocol  Sliding scale insulin    Electronically signed by Edwin Du DO, 08/10/22, 10:30 CDT.

## 2022-08-10 NOTE — PROGRESS NOTES
"Pharmacy Dosing Service  Anticoagulant  Enoxaparin    Assessment/Action/Plan:  Consult placed for Pharmacy to Dose Lovenox for Afib.  Initiated Lovenox 1 mg/kg q 24hrs.  Pharmacy will continue to follow and adjust as needed.     Subjective:  Chito Govea is a 66 y.o. male on Enoxaparin 1 mg/kg SQ every 24 hours for indication of atrial fibrillation.  Objective:  [Ht: 172.7 cm (68\"); Wt: 57.9 kg (127 lb 9.6 oz); BMI: Body mass index is 19.4 kg/m².]  Estimated Creatinine Clearance: 9.5 mL/min (A) (by C-G formula based on SCr of 6.21 mg/dL (H)). No results found for: DDIMER No results found for: INR, PROTIME   Lab Results   Component Value Date    HGB 15.0 08/09/2022    HGB 17.2 04/01/2020    HGB 15.3 04/12/2019      Lab Results   Component Value Date     08/09/2022     04/01/2020     04/12/2019       DARRIAN Goldstein [facility]  08/09/22 20:33 CDT     "

## 2022-08-10 NOTE — PROGRESS NOTES
Nephrology ([facility]) Progress Note      Patient:  Chito Govea  YOB: 1955  Date of Service: 8/10/2022  MRN: [id number]   Acct: [id number]   Primary Care Physician: Provider, No Known  Advance Directive:   Code Status and Medical Interventions:   Ordered at: 08/09/22 1714     Level Of Support Discussed With:    Patient     Code Status (Patient has no pulse and is not breathing):    CPR (Attempt to Resuscitate)     Medical Interventions (Patient has pulse or is breathing):    Full Support     Admit Date: 8/9/2022       Hospital Day: 1  Referring Provider: No Known Provider      Patient personally seen and examined.  Complete chart including Consults, Notes, Operative Reports, Labs, Cardiology, and Radiology studies reviewed as able.        Subjective:  Chito Govea is a 66 y.o. male for whom we were consulted for evaluation and treatment of acute kidney injury, hyperkalemia. No prior known renal issues. History of type 2 diabetes, hypertension. Presented to ER with nausea, vomiting, abdominal pain. Symptoms present for several weeks and significantly worse the past 3-4 days. Minimal PO intake last several days. Labs in ER revealed multiple abnormalities. Creatinine 6.96, potassium 6.5, , sodium 127, glucose 329. He was in atrial fibrillation with RVR and was cardioverted X 2.  Patient was also hypotensive requiring Levophed drip. Admitted to ICU and started on bicarbonate IV fluids and DKA protocol.    Today is still weak/fatigued. Denies pain or nausea at time of exam. Remains on Levophed and insulin drips. Urine output >200 ml/hour overnight    Allergies:  Patient has no known allergies.    Home Meds:  Medications Prior to Admission   Medication Sig Dispense Refill Last Dose   • amLODIPine (NORVASC) 10 MG tablet Take 10 mg by mouth Daily.      • atorvastatin (LIPITOR) 20 MG tablet Take 20 mg by mouth Daily.      • glimepiride (AMARYL) 2 MG tablet Take 2 mg by  mouth Every Morning Before Breakfast.      • lisinopril-hydrochlorothiazide (PRINZIDE,ZESTORETIC) 20-12.5 MG per tablet Take 1 tablet by mouth Daily.          Medicines:  Current Facility-Administered Medications   Medication Dose Route Frequency Provider Last Rate Last Admin   • acetaminophen (TYLENOL) tablet 650 mg  650 mg Oral Q4H PRN Edwin Du, DO       • aluminum-magnesium hydroxide-simethicone (MAALOX MAX) 400-400-40 MG/5ML suspension 15 mL  15 mL Oral Q6H PRN Edwin Du DO       • sennosides-docusate (PERICOLACE) 8.6-50 MG per tablet 2 tablet  2 tablet Oral BID PRN Edwin Du DO        And   • polyethylene glycol (MIRALAX) packet 17 g  17 g Oral Daily PRN Edwin Du DO        And   • bisacodyl (DULCOLAX) EC tablet 5 mg  5 mg Oral Daily PRN Edwin Du DO        And   • bisacodyl (DULCOLAX) suppository 10 mg  10 mg Rectal Daily PRN Edwin Du DO       • dextrose (D50W) (25 g/50 mL) IV injection 10-50 mL  10-50 mL Intravenous Q15 Min PRN Twin Andrews MD       • dextrose (GLUTOSE) oral gel 15 g  15 g Oral Q15 Min PRN Twin Andrews MD       • dextrose 5 % and sodium chloride 0.45 % infusion  150 mL/hr Intravenous Continuous PRN Twin Andrews MD       • dextrose 5 % and sodium chloride 0.45 % with KCl 20 mEq/L infusion  150 mL/hr Intravenous Continuous PRN Twin Adnrews  mL/hr at 08/10/22 0636 150 mL/hr at 08/10/22 0636   • dextrose 5 % and sodium chloride 0.45 % with KCl 40 mEq/L infusion  150 mL/hr Intravenous Continuous PRN Twin Andrews MD       • dextrose 5 % and sodium chloride 0.9 % infusion  150 mL/hr Intravenous Continuous PRN Twin Andrews MD       • dextrose 5 % and sodium chloride 0.9 % with KCl 20 mEq/L infusion  150 mL/hr Intravenous Continuous PRN Twin Andrews MD       • dextrose 5 % and sodium chloride 0.9 % with KCl 40 mEq/L infusion  150 mL/hr Intravenous  Continuous PRN Twin Andrews MD       • Enoxaparin Sodium (LOVENOX) syringe 60 mg  1 mg/kg Subcutaneous Q24H Edwin uD DO       • etomidate (AMIDATE) injection 17.38 mg  0.3 mg/kg Intravenous Once Antonio Phan MD       • glucagon (human recombinant) (GLUCAGEN DIAGNOSTIC) injection 1 mg  1 mg Intramuscular Q15 Min PRN Twin Andrews MD       • insulin regular (HumuLIN R,NovoLIN R) 100 Units in sodium chloride 0.9 % 100 mL (1 Units/mL) infusion  0-100 Units/hr Intravenous Titrated Twin Andrews MD 2.1 mL/hr at 08/10/22 0745 2.1 Units/hr at 08/10/22 0745   • lactated ringers bolus 1,000 mL  1,000 mL Intravenous Once Edwin Du DO       • norepinephrine (LEVOPHED) 8 mg in 250 mL NS infusion (premix)  0.02-0.3 mcg/kg/min Intravenous Titrated Twin Andrews MD 23.9 mL/hr at 08/10/22 0913 0.22 mcg/kg/min at 08/10/22 0913   • ondansetron (ZOFRAN) injection 4 mg  4 mg Intravenous Q6H PRN Edwin Du DO       • Pharmacy to Dose enoxaparin (LOVENOX)   Does not apply Continuous PRN Edwin Du DO       • sodium chloride 0.45 % infusion  250 mL/hr Intravenous Continuous PRN Twin Andrews MD       • sodium chloride 0.45 % with KCl 20 mEq/L infusion  250 mL/hr Intravenous Continuous PRN Twin Andrews MD       • sodium chloride 0.9 % flush 10 mL  10 mL Intravenous Q12H Edwin Du DO   10 mL at 08/10/22 0801   • sodium chloride 0.9 % flush 10 mL  10 mL Intravenous PRN Edwin Du DO       • sodium chloride 0.9 % flush 10 mL  10 mL Intravenous Q12H Twin Andrews MD   10 mL at 08/10/22 0801   • sodium chloride 0.9 % flush 10 mL  10 mL Intravenous PRN Twin Andrews MD       • sodium chloride 0.9 % infusion  250 mL/hr Intravenous Continuous PRN Twin Andrews MD       • sodium chloride 0.9 % with KCl 20 mEq/L infusion  250 mL/hr Intravenous Continuous PRN Twin Andrews  MD Armaan       • sodium chloride 0.9 % with KCl 40 mEq/L infusion  250 mL/hr Intravenous Continuous PRN Twin Andrews MD           Past Medical History:  Past Medical History:   Diagnosis Date   • Arthritis    • Diabetes mellitus (HCC)    • HLD (hyperlipidemia)    • Hypertension        Past Surgical History:  No past surgical history on file.    Family History  Family History   Problem Relation Age of Onset   • Cancer Mother    • Heart disease Father    • Diabetes Sister    • Cancer Sister    • COPD Brother        Social History  Social History     Socioeconomic History   • Marital status:    Tobacco Use   • Smoking status: Former Smoker     Packs/day: 1.50     Years: 50.00     Pack years: 75.00     Types: Cigarettes   • Smokeless tobacco: Never Used   Substance and Sexual Activity   • Alcohol use: Never   • Drug use: Yes     Types: Marijuana   • Sexual activity: Defer       Review of Systems:  History obtained from chart review and the patient  General ROS: No fever or chills  Respiratory ROS: No cough, shortness of breath, wheezing  Cardiovascular ROS: No chest pain or palpitations  Gastrointestinal ROS: No abdominal pain or melena  Genito-Urinary ROS: No dysuria or hematuria  Psych ROS: No anxiety and depression  14 point ROS reviewed with the patient and negative except as noted above and in the HPI unless unable to obtain.    Objective:  Patient Vitals for the past 24 hrs:   BP Temp Temp src Pulse Resp SpO2 Height Weight   08/10/22 0845 145/87 -- -- 120 -- 97 % -- --   08/10/22 0830 154/68 -- -- 113 -- 98 % -- --   08/10/22 0815 132/64 -- -- 108 -- 98 % -- --   08/10/22 0800 141/81 97.9 °F (36.6 °C) Axillary (!) 124 13 97 % -- --   08/10/22 0745 144/86 -- -- 106 -- 98 % -- --   08/10/22 0730 142/74 -- -- 106 -- 97 % -- --   08/10/22 0715 147/76 -- -- 109 -- 98 % -- --   08/10/22 0700 133/68 -- -- (!) 124 -- 98 % -- --   08/10/22 0645 118/73 -- -- 91 -- 98 % -- --   08/10/22 0630 103/57 -- --  "100 -- 98 % -- --   08/10/22 0615 113/69 -- -- 103 -- 98 % -- --   08/10/22 0607 -- -- -- (!) 121 13 99 % -- --   08/10/22 0600 138/85 -- -- 117 -- 99 % -- --   08/10/22 0545 111/68 -- -- 115 -- 97 % -- --   08/10/22 0530 142/84 -- -- (!) 122 -- 98 % -- --   08/10/22 0515 150/76 -- -- 105 -- 98 % -- --   08/10/22 0500 130/80 -- -- 112 -- 98 % -- --   08/10/22 0445 129/78 -- -- 112 -- 98 % -- --   08/10/22 0430 144/70 -- -- 95 -- 99 % -- --   08/10/22 0415 122/66 -- -- 102 -- 98 % -- --   08/10/22 0400 131/75 97.8 °F (36.6 °C) -- 103 -- 99 % -- 67.4 kg (148 lb 11.2 oz)   08/10/22 0345 121/67 -- -- 98 -- 99 % -- --   08/10/22 0330 133/79 -- -- 106 -- 99 % -- --   08/10/22 0304 104/73 -- -- 89 -- 99 % -- --   08/10/22 0230 133/73 -- -- 107 -- 99 % -- --   08/10/22 0215 135/74 -- -- 99 -- 99 % -- --   08/10/22 0200 117/61 -- -- 114 -- 98 % -- --   08/10/22 0145 140/69 -- -- 110 -- 98 % -- --   08/10/22 0130 146/76 -- -- (!) 128 -- 96 % -- --   08/10/22 0115 114/74 -- -- 103 -- 98 % -- --   08/10/22 0100 109/66 -- -- 112 -- 98 % -- --   08/10/22 0045 90/61 -- -- (!) 123 -- 98 % -- --   08/10/22 0030 120/72 -- -- (!) 121 -- 98 % -- --   08/10/22 0015 124/73 -- -- 117 -- 98 % -- --   08/10/22 0000 135/58 97.6 °F (36.4 °C) Oral 116 12 98 % -- --   08/09/22 2345 118/92 -- -- 102 -- 97 % -- --   08/09/22 2330 128/72 -- -- 107 -- 96 % 174 cm (68.5\") --   08/09/22 2315 100/78 -- -- 117 -- 97 % -- --   08/09/22 2300 102/77 -- -- 109 -- 97 % -- --   08/09/22 2245 117/72 -- -- 93 -- 97 % -- --   08/09/22 2215 107/83 -- -- 105 -- 98 % -- --   08/09/22 2200 106/74 -- -- 110 -- 98 % -- --   08/09/22 2145 103/74 -- -- 85 -- 97 % -- --   08/09/22 2130 108/62 -- -- 110 -- 98 % -- --   08/09/22 2115 114/69 -- -- 110 -- 98 % -- --   08/09/22 2100 95/53 -- -- 109 -- 98 % -- --   08/09/22 2045 (!) 82/53 -- -- 108 -- 97 % -- --   08/09/22 2030 (!) 84/51 -- -- 114 -- 97 % -- --   08/09/22 2015 (!) 78/41 -- -- 106 -- 97 % -- --   08/09/22 2005 " "(!) 87/61 97.6 °F (36.4 °C) -- 108 22 94 % -- --   08/09/22 2000 116/62 -- -- 102 -- 97 % -- 63 kg (138 lb 14.4 oz)   08/09/22 1945 91/45 -- -- 108 -- 94 % -- --   08/09/22 1930 100/45 -- -- 105 -- 98 % -- --   08/09/22 1915 92/52 -- -- (!) 126 -- 95 % -- --   08/09/22 1622 -- -- -- 110 22 99 % -- --   08/09/22 1618 -- -- -- (!) 138 20 100 % -- --   08/09/22 1546 -- -- -- 113 20 98 % -- --   08/09/22 1532 -- -- -- 98 22 94 % -- --   08/09/22 1520 -- -- -- 110 14 94 % -- --   08/09/22 1510 (!) 144/109 -- -- 105 -- -- -- --   08/09/22 1438 -- -- -- -- -- 97 % -- --   08/09/22 1431 97/63 97.6 °F (36.4 °C) Oral 119 27 -- 172.7 cm (68\") 57.9 kg (127 lb 9.6 oz)       Intake/Output Summary (Last 24 hours) at 8/10/2022 0915  Last data filed at 8/10/2022 0800  Gross per 24 hour   Intake 5289.47 ml   Output 1600 ml   Net 3689.47 ml     General: awake/alert   Chest:  clear to auscultation bilaterally without respiratory distress  CVS: regular rate and rhythm  Abdominal: soft, nontender, positive bowel sounds  Extremities: no cyanosis or edema  Skin: warm and dry without rash      Labs:  Results from last 7 days   Lab Units 08/10/22  0534 08/09/22  1435   WBC 10*3/mm3 25.47* 35.64*   HEMOGLOBIN g/dL 13.2 15.0   HEMATOCRIT % 36.4* 42.9   PLATELETS 10*3/mm3 168 188         Results from last 7 days   Lab Units 08/10/22  0758 08/10/22  0534 08/09/22  2328 08/09/22  1758 08/09/22  1435   SODIUM mmol/L 140 139 135*   < > 127*   POTASSIUM mmol/L 4.6 5.0 4.9   < > 6.5*   CHLORIDE mmol/L 105 105 102   < > 86*   CO2 mmol/L 23.0 19.0* 16.0*   < > 11.0*   BUN mg/dL 163* 176* 204*   < > 254*   CREATININE mg/dL 3.05* 3.33* 4.45*   < > 6.96*   CALCIUM mg/dL 8.0* 8.4* 8.5*   < > 8.8   EGFR mL/min/1.73 21.6* 19.5* 13.8*   < > 8.0*   BILIRUBIN mg/dL  --   --  0.3  --  0.4   ALK PHOS U/L  --   --  76  --  97   ALT (SGPT) U/L  --   --  13  --  13   AST (SGOT) U/L  --   --  18  --  13   GLUCOSE mg/dL 143* 162* 199*   < > 329*    < > = values in " this interval not displayed.       Radiology:   Imaging Results (Last 72 Hours)     Procedure Component Value Units Date/Time    CT Abdomen Pelvis Without Contrast [[id number]] Collected: 08/09/22 1751     Updated: 08/09/22 1806    Narrative:      EXAM/TECHNIQUE: CT abdomen pelvis without contrast     INDICATION: Pain, nausea, vomiting, diarrhea, sepsis     COMPARISON: None available.     DLP: 183 mGy cm. Automated exposure control was also utilized to  decrease patient radiation dose.     FINDINGS:     Findings in the included lower lungs are described in a separate report.     Unenhanced liver appears unremarkable. No gallstones or gallbladder wall  thickening. Gallbladder is mildly distended. No biliary ductal  dilatation. The pancreas appears unremarkable. Spleen and adrenal glands  appear normal.     Bilateral hilar renal vascular calcifications are noted. No urolithiasis  or hydronephrosis. No large renal lesion. No focal urinary bladder wall  thickening.     No colonic wall thickening or pericolonic inflammation. The appendix is  not confidently identified but there are no secondary signs of  appendicitis. Mild circumferential wall thickening of the distal  esophagus. No focal gastric wall thickening or perigastric fat  stranding. No abnormal small bowel distention or evidence of active  small bowel inflammation.     No ascites or free pelvic fluid. No pelvic mass or pelvic collection.  Prostate and seminal vesicles appear unremarkable.     Nonaneurysmal atherosclerotic abdominal aorta. No enlarged  retroperitoneal, mesenteric, pelvic, or inguinal lymph nodes.     No acute abdominal wall soft tissue abnormality. A small amount of soft  tissue gas in the LEFT lower abdomen is likely related to subcutaneous  injections. No acute osseous finding. Multilevel lumbar spine  degenerative change.       Impression:         No bowel obstruction. No definite evidence of active inflammation in the  small bowel or colon.  There is some mild wall thickening of the distal  esophagus which may represent a mild esophagitis.  This report was finalized on 08/09/2022 18:03 by Dr. Kishor Escobedo MD.    CT Chest Without Contrast Diagnostic [[id number]] Collected: 08/09/22 1746     Updated: 08/09/22 1758    Narrative:      CT CHEST WO CONTRAST DIAGNOSTIC- 8/9/2022 5:14 PM CDT     HISTORY: sob, pain, palpitations,; A41.9-Sepsis, unspecified organism;  N17.9-Acute kidney failure, unspecified; E87.2-Phsf-wsbzgookgj and  hyponatremia; E87.5-Hyperkalemia; I48.91-Unspecified atrial  fibrillation; D72.829-Elevated white blood cell count, unspecified;  E10.10-Type 1 diabetes mellitus with ketoacidosis without coma     COMPARISON: Chest x-ray dated 08/09/2022     DOSE LENGTH PRODUCT: 142 mGy cm. Automated exposure control was also  utilized to decrease patient radiation dose.     TECHNIQUE: Serial helical tomographic images of the chest were acquired  without contrast. Multiplanar reformatted images were provided for  review.     FINDINGS:     Visualized neck base: The imaged portion of the base of the neck appears  unremarkable.      Lungs: There is a mild lung emphysema. Scattered granulomatous  calcifications. There are a few peripheral groundglass opacities  identified, right lung more so than the left. In the right lower lobe  there is a developing mixed groundglass/consolidative infiltrate.  Minimal groundglass opacities in the left lower lobe. No pleural  effusion. The airways are clear.     Heart: The heart is normal in size. There is no pericardial effusion.  Moderate coronary atheromatous calcification.     Vasculature: Thoracic aorta is normal in caliber. Densely calcified  plaque in the aortic arch. Central pulmonary arteries are nondilated.     Mediastinum and lymph nodes: No suspicious hilar or mediastinal  adenopathy. Incidental granulomatous calcification..     Skeletal and soft tissues: Chest wall soft tissues are unremarkable.  No  acute bony abnormality. 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        Impression:      1. There are a few asymmetric pulmonary infiltrates, predominantly  peripheral and groundglass. This is most notable in the right lower  lobe. Early/mild atypical pneumonia would be consideration although this  could also be chronic/fibrotic in nature.  2. Lungs are well expanded. No consolidation.  3. There is no interlobular septal thickening to suggest cardiogenic  interstitial edema.  4. Coronary atheromatous calcification.     This report was finalized on 08/09/2022 17:55 by Dr Luis Mullen, .    XR Chest 1 View [[id number]] Collected: 08/09/22 1540     Updated: 08/09/22 1545    Narrative:      XR CHEST 1 VW- 8/9/2022 2:42 PM CDT     HISTORY: Chest pain     COMPARISON: None.     FINDINGS:      No lung consolidation. No pleural effusion or pneumothorax. Right IJ  central venous catheters in good position. Heart size is normal.  Pulmonary vasculature are nondilated. Remote granulomatous  calcification. Right-sided of rotator cuff arthropathy. No acute bony  abnormality.       Impression:      1. No radiographic evidence of acute cardiopulmonary process.  2. Right IJ central venous catheter is in good position.        This report was finalized on 08/09/2022 15:41 by Dr Luis Mullen, .          Culture:  No results found for: BLOODCX, URINECX, WOUNDCX, MRSACX, RESPCX, STOOLCX      Assessment   1.  Acute kidney injury, prerenal--improving  2.  Metabolic acidosis--improved  3.  DKA--improved  4.  Hyperkalemia--improved  5.  Hyponatremia--improved  6.  Leukocytosis     Plan:  1.  Hold bicarbonate IV fluids  2.  Wean Levophed as able  3.  Continue to monitor labs for ongoing renal recovery      Corey Vallecillo, MARKELL  8/10/2022  09:15 CDT

## 2022-08-10 NOTE — PROGRESS NOTES
"Pharmacy Dosing Service  Anticoagulant  Enoxaparin    Assessment/Action/Plan:  Pharmacy to dose Enoxaparin for atrial fibrillation. Patient is currently receiving Enoxaparin 60 mg (1mg/kg) SQ every 24 hours.. Labs/dose reviewed. Patient's weight has increased significantly since admission. Will increase dose to 70 mg (1mg/kg) SQ every 24 hours to reflect current weight of 67.4 kg. Pharmacy will continue to monitor renal function and adjust dose accordingly.     Subjective:  Chito Govea is a 66 y.o. male  Objective:  [Ht: 174 cm (68.5\"); Wt: 67.4 kg (148 lb 11.2 oz); BMI: Body mass index is 22.28 kg/m².]  Estimated Creatinine Clearance: 22.4 mL/min (A) (by C-G formula based on SCr of 3.05 mg/dL (H)).   Lab Results   Component Value Date    INR 1.74 (H) 08/09/2022    PROTIME 19.7 (H) 08/09/2022      Lab Results   Component Value Date    HGB 13.2 08/10/2022    HGB 15.0 08/09/2022    HGB 17.2 04/01/2020      Lab Results   Component Value Date     08/10/2022     08/09/2022     04/01/2020         Ad Nogueira PharmWALKER  08/10/22 10:07 CDT     "

## 2022-08-11 ENCOUNTER — APPOINTMENT (OUTPATIENT)
Dept: CARDIOLOGY | Facility: HOSPITAL | Age: 67
End: 2022-08-11

## 2022-08-11 LAB
ALBUMIN SERPL-MCNC: 2.7 G/DL (ref 3.5–5.2)
ALBUMIN/GLOB SERPL: 1 G/DL
ALP SERPL-CCNC: 79 U/L (ref 39–117)
ALT SERPL W P-5'-P-CCNC: 13 U/L (ref 1–41)
ANION GAP SERPL CALCULATED.3IONS-SCNC: 17 MMOL/L (ref 5–15)
AST SERPL-CCNC: 16 U/L (ref 1–40)
BASOPHILS # BLD AUTO: 0.03 10*3/MM3 (ref 0–0.2)
BASOPHILS NFR BLD AUTO: 0.2 % (ref 0–1.5)
BH CV ECHO MEAS - ACS: 0.9 CM
BH CV ECHO MEAS - AO MAX PG: 28.5 MMHG
BH CV ECHO MEAS - AO MEAN PG: 10 MMHG
BH CV ECHO MEAS - AO ROOT DIAM: 2.8 CM
BH CV ECHO MEAS - AO V2 MAX: 267 CM/SEC
BH CV ECHO MEAS - AO V2 VTI: 35.5 CM
BH CV ECHO MEAS - AVA(I,D): 0.83 CM2
BH CV ECHO MEAS - EDV(CUBED): 64 ML
BH CV ECHO MEAS - EDV(MOD-SP2): 65.6 ML
BH CV ECHO MEAS - EDV(MOD-SP4): 71.1 ML
BH CV ECHO MEAS - EF(MOD-BP): 51.5 %
BH CV ECHO MEAS - EF(MOD-SP2): 60.7 %
BH CV ECHO MEAS - EF(MOD-SP4): 48.1 %
BH CV ECHO MEAS - ESV(CUBED): 14.7 ML
BH CV ECHO MEAS - ESV(MOD-SP2): 25.8 ML
BH CV ECHO MEAS - ESV(MOD-SP4): 36.9 ML
BH CV ECHO MEAS - FS: 38.7 %
BH CV ECHO MEAS - IVS/LVPW: 0.81 CM
BH CV ECHO MEAS - IVSD: 0.92 CM
BH CV ECHO MEAS - LA DIMENSION: 3.1 CM
BH CV ECHO MEAS - LAT PEAK E' VEL: 9.6 CM/SEC
BH CV ECHO MEAS - LV MASS(C)D: 132.7 GRAMS
BH CV ECHO MEAS - LV MAX PG: 1.5 MMHG
BH CV ECHO MEAS - LV MEAN PG: 1 MMHG
BH CV ECHO MEAS - LV V1 MAX: 61.3 CM/SEC
BH CV ECHO MEAS - LV V1 VTI: 8.6 CM
BH CV ECHO MEAS - LVIDD: 4 CM
BH CV ECHO MEAS - LVIDS: 2.45 CM
BH CV ECHO MEAS - LVOT AREA: 3.5 CM2
BH CV ECHO MEAS - LVOT DIAM: 2.1 CM
BH CV ECHO MEAS - LVPWD: 1.14 CM
BH CV ECHO MEAS - MED PEAK E' VEL: 7.4 CM/SEC
BH CV ECHO MEAS - MR MAX PG: 13.2 MMHG
BH CV ECHO MEAS - MR MAX VEL: 182 CM/SEC
BH CV ECHO MEAS - MV A MAX VEL: 42.2 CM/SEC
BH CV ECHO MEAS - MV DEC SLOPE: 586.5 CM/SEC2
BH CV ECHO MEAS - MV DEC TIME: 0.23 MSEC
BH CV ECHO MEAS - MV E MAX VEL: 104 CM/SEC
BH CV ECHO MEAS - MV E/A: 2.46
BH CV ECHO MEAS - MV MAX PG: 4.2 MMHG
BH CV ECHO MEAS - MV MEAN PG: 2 MMHG
BH CV ECHO MEAS - MV P1/2T: 52.9 MSEC
BH CV ECHO MEAS - MV V2 VTI: 17 CM
BH CV ECHO MEAS - MVA(P1/2T): 4.2 CM2
BH CV ECHO MEAS - MVA(VTI): 1.74 CM2
BH CV ECHO MEAS - RAP SYSTOLE: 10 MMHG
BH CV ECHO MEAS - RVDD: 2.25 CM
BH CV ECHO MEAS - RVSP: 35.8 MMHG
BH CV ECHO MEAS - SV(LVOT): 29.6 ML
BH CV ECHO MEAS - SV(MOD-SP2): 39.8 ML
BH CV ECHO MEAS - SV(MOD-SP4): 34.2 ML
BH CV ECHO MEAS - TAPSE (>1.6): 1.74 CM
BH CV ECHO MEAS - TR MAX PG: 25.8 MMHG
BH CV ECHO MEAS - TR MAX VEL: 254 CM/SEC
BH CV ECHO MEASUREMENTS AVERAGE E/E' RATIO: 12.24
BH CV XLRA - TDI S': 10.7 CM/SEC
BILIRUB SERPL-MCNC: 0.5 MG/DL (ref 0–1.2)
BUN SERPL-MCNC: 96 MG/DL (ref 8–23)
BUN/CREAT SERPL: 62.7 (ref 7–25)
CALCIUM SPEC-SCNC: 7.9 MG/DL (ref 8.6–10.5)
CHLORIDE SERPL-SCNC: 106 MMOL/L (ref 98–107)
CO2 SERPL-SCNC: 19 MMOL/L (ref 22–29)
CREAT SERPL-MCNC: 1.53 MG/DL (ref 0.76–1.27)
DEPRECATED RDW RBC AUTO: 46.7 FL (ref 37–54)
EGFRCR SERPLBLD CKD-EPI 2021: 49.5 ML/MIN/1.73
EOSINOPHIL # BLD AUTO: 0.01 10*3/MM3 (ref 0–0.4)
EOSINOPHIL NFR BLD AUTO: 0.1 % (ref 0.3–6.2)
ERYTHROCYTE [DISTWIDTH] IN BLOOD BY AUTOMATED COUNT: 14.6 % (ref 12.3–15.4)
GLOBULIN UR ELPH-MCNC: 2.7 GM/DL
GLUCOSE BLDC GLUCOMTR-MCNC: 120 MG/DL (ref 70–130)
GLUCOSE BLDC GLUCOMTR-MCNC: 209 MG/DL (ref 70–130)
GLUCOSE BLDC GLUCOMTR-MCNC: 210 MG/DL (ref 70–130)
GLUCOSE BLDC GLUCOMTR-MCNC: 228 MG/DL (ref 70–130)
GLUCOSE SERPL-MCNC: 225 MG/DL (ref 65–99)
HCT VFR BLD AUTO: 36.4 % (ref 37.5–51)
HGB BLD-MCNC: 12.1 G/DL (ref 13–17.7)
IMM GRANULOCYTES # BLD AUTO: 0.11 10*3/MM3 (ref 0–0.05)
IMM GRANULOCYTES NFR BLD AUTO: 0.6 % (ref 0–0.5)
LEFT ATRIUM VOLUME: 36.9 ML
LV EF 2D ECHO EST: 50 %
LYMPHOCYTES # BLD AUTO: 0.49 10*3/MM3 (ref 0.7–3.1)
LYMPHOCYTES NFR BLD AUTO: 2.6 % (ref 19.6–45.3)
MAGNESIUM SERPL-MCNC: 1.9 MG/DL (ref 1.6–2.4)
MAXIMAL PREDICTED HEART RATE: 153 BPM
MCH RBC QN AUTO: 29.2 PG (ref 26.6–33)
MCHC RBC AUTO-ENTMCNC: 33.2 G/DL (ref 31.5–35.7)
MCV RBC AUTO: 87.9 FL (ref 79–97)
MONOCYTES # BLD AUTO: 1.66 10*3/MM3 (ref 0.1–0.9)
MONOCYTES NFR BLD AUTO: 8.8 % (ref 5–12)
NEUTROPHILS NFR BLD AUTO: 16.56 10*3/MM3 (ref 1.7–7)
NEUTROPHILS NFR BLD AUTO: 87.7 % (ref 42.7–76)
NRBC BLD AUTO-RTO: 0 /100 WBC (ref 0–0.2)
PHOSPHATE SERPL-MCNC: 3 MG/DL (ref 2.5–4.5)
PLATELET # BLD AUTO: 143 10*3/MM3 (ref 140–450)
PMV BLD AUTO: 12.4 FL (ref 6–12)
POTASSIUM SERPL-SCNC: 4.9 MMOL/L (ref 3.5–5.2)
PROT SERPL-MCNC: 5.4 G/DL (ref 6–8.5)
QT INTERVAL: 304 MS
QT INTERVAL: 312 MS
QT INTERVAL: 314 MS
QT INTERVAL: 340 MS
QT INTERVAL: 346 MS
QTC INTERVAL: 450 MS
QTC INTERVAL: 451 MS
QTC INTERVAL: 468 MS
QTC INTERVAL: 472 MS
QTC INTERVAL: 500 MS
RBC # BLD AUTO: 4.14 10*6/MM3 (ref 4.14–5.8)
SODIUM SERPL-SCNC: 142 MMOL/L (ref 136–145)
STRESS TARGET HR: 130 BPM
WBC NRBC COR # BLD: 18.86 10*3/MM3 (ref 3.4–10.8)

## 2022-08-11 PROCEDURE — 25010000002 PERFLUTREN 6.52 MG/ML SUSPENSION: Performed by: FAMILY MEDICINE

## 2022-08-11 PROCEDURE — 99222 1ST HOSP IP/OBS MODERATE 55: CPT | Performed by: INTERNAL MEDICINE

## 2022-08-11 PROCEDURE — 25010000002 ENOXAPARIN PER 10 MG: Performed by: FAMILY MEDICINE

## 2022-08-11 PROCEDURE — 83735 ASSAY OF MAGNESIUM: CPT | Performed by: FAMILY MEDICINE

## 2022-08-11 PROCEDURE — 80053 COMPREHEN METABOLIC PANEL: CPT | Performed by: NURSE PRACTITIONER

## 2022-08-11 PROCEDURE — 85025 COMPLETE CBC W/AUTO DIFF WBC: CPT | Performed by: FAMILY MEDICINE

## 2022-08-11 PROCEDURE — 93306 TTE W/DOPPLER COMPLETE: CPT

## 2022-08-11 PROCEDURE — 84100 ASSAY OF PHOSPHORUS: CPT | Performed by: FAMILY MEDICINE

## 2022-08-11 PROCEDURE — 82962 GLUCOSE BLOOD TEST: CPT

## 2022-08-11 PROCEDURE — 63710000001 INSULIN LISPRO (HUMAN) PER 5 UNITS: Performed by: FAMILY MEDICINE

## 2022-08-11 PROCEDURE — 25010000002 ONDANSETRON PER 1 MG: Performed by: FAMILY MEDICINE

## 2022-08-11 PROCEDURE — 25010000002 DIGOXIN PER 500 MCG: Performed by: INTERNAL MEDICINE

## 2022-08-11 PROCEDURE — 93306 TTE W/DOPPLER COMPLETE: CPT | Performed by: INTERNAL MEDICINE

## 2022-08-11 RX ORDER — ENOXAPARIN SODIUM 100 MG/ML
1 INJECTION SUBCUTANEOUS EVERY 12 HOURS
Status: DISCONTINUED | OUTPATIENT
Start: 2022-08-11 | End: 2022-08-11

## 2022-08-11 RX ORDER — DIGOXIN 0.25 MG/ML
500 INJECTION INTRAMUSCULAR; INTRAVENOUS ONCE
Status: COMPLETED | OUTPATIENT
Start: 2022-08-11 | End: 2022-08-11

## 2022-08-11 RX ORDER — AMIODARONE HYDROCHLORIDE 200 MG/1
800 TABLET ORAL ONCE
Status: COMPLETED | OUTPATIENT
Start: 2022-08-11 | End: 2022-08-11

## 2022-08-11 RX ORDER — HYDROXYZINE HYDROCHLORIDE 25 MG/1
50 TABLET, FILM COATED ORAL NIGHTLY PRN
Status: DISCONTINUED | OUTPATIENT
Start: 2022-08-11 | End: 2022-08-15 | Stop reason: HOSPADM

## 2022-08-11 RX ORDER — AMIODARONE HYDROCHLORIDE 200 MG/1
200 TABLET ORAL EVERY 12 HOURS SCHEDULED
Status: DISCONTINUED | OUTPATIENT
Start: 2022-08-11 | End: 2022-08-15 | Stop reason: HOSPADM

## 2022-08-11 RX ADMIN — Medication 10 ML: at 21:18

## 2022-08-11 RX ADMIN — AMIODARONE HYDROCHLORIDE 800 MG: 200 TABLET ORAL at 15:13

## 2022-08-11 RX ADMIN — INSULIN LISPRO 4 UNITS: 100 INJECTION, SOLUTION INTRAVENOUS; SUBCUTANEOUS at 06:44

## 2022-08-11 RX ADMIN — Medication 10 ML: at 09:05

## 2022-08-11 RX ADMIN — INSULIN LISPRO 4 UNITS: 100 INJECTION, SOLUTION INTRAVENOUS; SUBCUTANEOUS at 11:12

## 2022-08-11 RX ADMIN — SODIUM CHLORIDE 100 ML/HR: 9 INJECTION, SOLUTION INTRAVENOUS at 03:56

## 2022-08-11 RX ADMIN — APIXABAN 5 MG: 5 TABLET, FILM COATED ORAL at 21:18

## 2022-08-11 RX ADMIN — AMIODARONE HYDROCHLORIDE 200 MG: 200 TABLET ORAL at 21:18

## 2022-08-11 RX ADMIN — DIGOXIN 500 MCG: 0.25 INJECTION INTRAMUSCULAR; INTRAVENOUS at 15:12

## 2022-08-11 RX ADMIN — ONDANSETRON 4 MG: 2 INJECTION INTRAMUSCULAR; INTRAVENOUS at 15:17

## 2022-08-11 RX ADMIN — ONDANSETRON 4 MG: 2 INJECTION INTRAMUSCULAR; INTRAVENOUS at 00:43

## 2022-08-11 RX ADMIN — ONDANSETRON 4 MG: 2 INJECTION INTRAMUSCULAR; INTRAVENOUS at 21:17

## 2022-08-11 RX ADMIN — ONDANSETRON 4 MG: 2 INJECTION INTRAMUSCULAR; INTRAVENOUS at 06:37

## 2022-08-11 RX ADMIN — PERFLUTREN 2 ML: 6.52 INJECTION, SUSPENSION INTRAVENOUS at 16:11

## 2022-08-11 RX ADMIN — ENOXAPARIN SODIUM 70 MG: 100 INJECTION SUBCUTANEOUS at 09:05

## 2022-08-11 NOTE — PLAN OF CARE
Goal Outcome Evaluation: Bedside report and levo verified with MILES Cardona.  Yadirafran x 3 for nausea.  Pt taking water po well.  Good urine OP.  No c/o pain.  BP labile.  Levo at 0.12.  Pt remains tachy in afib.  Currently 1 teens to 1 20's.

## 2022-08-11 NOTE — PROGRESS NOTES
Nephrology ([facility]) Progress Note      Patient:  Chito Govea  YOB: 1955  Date of Service: 8/11/2022  MRN: [id number]   Acct: [id number]   Primary Care Physician: Provider, No Known  Advance Directive:   Code Status and Medical Interventions:   Ordered at: 08/09/22 1714     Level Of Support Discussed With:    Patient     Code Status (Patient has no pulse and is not breathing):    CPR (Attempt to Resuscitate)     Medical Interventions (Patient has pulse or is breathing):    Full Support     Admit Date: 8/9/2022       Hospital Day: 2  Referring Provider: No Known Provider      Patient personally seen and examined.  Complete chart including Consults, Notes, Operative Reports, Labs, Cardiology, and Radiology studies reviewed as able.        Subjective:  Chito Govea is a 66 y.o. male for whom we were consulted for evaluation and treatment of acute kidney injury, hyperkalemia. No prior known renal issues. History of type 2 diabetes, hypertension. Presented to ER with nausea, vomiting, abdominal pain. Symptoms present for several weeks and significantly worse the past 3-4 days. Minimal PO intake last several days. Labs in ER revealed multiple abnormalities. Creatinine 6.96, potassium 6.5, , sodium 127, glucose 329. He was in atrial fibrillation with RVR and was cardioverted X 2.  Patient was also hypotensive requiring Levophed drip. Admitted to ICU and started on bicarbonate IV fluids and DKA protocol.    Today is feeling better, still weak. Denies pain or nausea at time of exam. Remains on Levophed drip. Urine output nonoliguric    Allergies:  Patient has no known allergies.    Home Meds:  Medications Prior to Admission   Medication Sig Dispense Refill Last Dose   • amLODIPine (NORVASC) 10 MG tablet Take 10 mg by mouth Daily.      • atorvastatin (LIPITOR) 20 MG tablet Take 20 mg by mouth Daily.      • glimepiride (AMARYL) 2 MG tablet Take 2 mg by mouth Every Morning  Before Breakfast.      • lisinopril-hydrochlorothiazide (PRINZIDE,ZESTORETIC) 20-12.5 MG per tablet Take 1 tablet by mouth Daily.          Medicines:  Current Facility-Administered Medications   Medication Dose Route Frequency Provider Last Rate Last Admin   • acetaminophen (TYLENOL) tablet 650 mg  650 mg Oral Q4H PRN Edwin Du DO       • aluminum-magnesium hydroxide-simethicone (MAALOX MAX) 400-400-40 MG/5ML suspension 15 mL  15 mL Oral Q6H PRN Edwin Du DO       • sennosides-docusate (PERICOLACE) 8.6-50 MG per tablet 2 tablet  2 tablet Oral BID PRN Edwin Du DO        And   • polyethylene glycol (MIRALAX) packet 17 g  17 g Oral Daily PRN Edwin Du DO        And   • bisacodyl (DULCOLAX) EC tablet 5 mg  5 mg Oral Daily PRN Edwin Du DO        And   • bisacodyl (DULCOLAX) suppository 10 mg  10 mg Rectal Daily PRN Edwin Du DO       • dextrose (D50W) (25 g/50 mL) IV injection 25 g  25 g Intravenous Q15 Min PRN Edwin Du DO       • dextrose (GLUTOSE) oral gel 15 g  15 g Oral Q15 Min PRN Edwin Du DO       • Enoxaparin Sodium (LOVENOX) syringe 70 mg  1 mg/kg Subcutaneous Q12H Edwin Du DO   70 mg at 08/11/22 0905   • glucagon (human recombinant) (GLUCAGEN DIAGNOSTIC) injection 1 mg  1 mg Intramuscular Q15 Min PRN Edwin Du DO       • Insulin Lispro (humaLOG) injection 0-9 Units  0-9 Units Subcutaneous TID AC Edwin Du DO   4 Units at 08/11/22 1112   • lactated ringers bolus 1,000 mL  1,000 mL Intravenous Once Edwin Du DO       • norepinephrine (LEVOPHED) 8 mg in 250 mL NS infusion (premix)  0.02-0.3 mcg/kg/min Intravenous Titrated Twin Andrews MD 10.13 mL/hr at 08/11/22 1117 0.08 mcg/kg/min at 08/11/22 1117   • ondansetron (ZOFRAN) injection 4 mg  4 mg Intravenous Q6H PRN Edwin Du DO   4 mg at 08/11/22 0637   • Pharmacy to Dose enoxaparin (LOVENOX)   Does not apply  Continuous PRN Edwin Du DO       • sodium chloride 0.9 % flush 10 mL  10 mL Intravenous Q12H Edwin Du DO   10 mL at 08/11/22 0905   • sodium chloride 0.9 % flush 10 mL  10 mL Intravenous PRN Edwin Du DO       • sodium chloride 0.9 % infusion  100 mL/hr Intravenous Continuous William Torres  mL/hr at 08/11/22 0356 100 mL/hr at 08/11/22 0356       Past Medical History:  Past Medical History:   Diagnosis Date   • Arthritis    • Diabetes mellitus (HCC)    • HLD (hyperlipidemia)    • Hypertension        Past Surgical History:  No past surgical history on file.    Family History  Family History   Problem Relation Age of Onset   • Cancer Mother    • Heart disease Father    • Diabetes Sister    • Cancer Sister    • COPD Brother        Social History  Social History     Socioeconomic History   • Marital status:    Tobacco Use   • Smoking status: Former Smoker     Packs/day: 1.50     Years: 50.00     Pack years: 75.00     Types: Cigarettes   • Smokeless tobacco: Never Used   Substance and Sexual Activity   • Alcohol use: Never   • Drug use: Yes     Types: Marijuana   • Sexual activity: Defer       Review of Systems:  History obtained from chart review and the patient  General ROS: No fever or chills  Respiratory ROS: No cough, shortness of breath, wheezing  Cardiovascular ROS: No chest pain or palpitations  Gastrointestinal ROS: No abdominal pain or melena  Genito-Urinary ROS: No dysuria or hematuria  Psych ROS: No anxiety and depression  14 point ROS reviewed with the patient and negative except as noted above and in the HPI unless unable to obtain.    Objective:  Patient Vitals for the past 24 hrs:   BP Temp Temp src Pulse Resp SpO2   08/11/22 1100 127/93 -- -- (!) 156 -- 94 %   08/11/22 1030 137/81 -- -- (!) 139 -- 94 %   08/11/22 1000 (!) 84/64 -- -- 99 -- 94 %   08/11/22 0930 124/84 -- -- (!) 122 -- 93 %   08/11/22 0900 118/71 -- -- 114 -- 93 %   08/11/22 0830 138/80 -- --  109 -- 94 %   08/11/22 0800 113/73 -- -- (!) 141 -- 93 %   08/11/22 0730 138/66 98.5 °F (36.9 °C) Oral (!) 124 -- 92 %   08/11/22 0700 141/73 -- -- (!) 132 -- 93 %   08/11/22 0500 142/93 -- -- (!) 139 14 93 %   08/11/22 0400 124/80 -- -- 117 14 93 %   08/11/22 0300 146/81 -- -- (!) 131 14 93 %   08/11/22 0200 120/84 -- -- (!) 134 14 92 %   08/11/22 0100 104/62 -- -- (!) 141 14 93 %   08/11/22 0000 127/84 98.3 °F (36.8 °C) Oral (!) 125 14 95 %   08/10/22 2300 100/62 -- -- (!) 154 14 93 %   08/10/22 2200 113/68 -- -- (!) 139 13 95 %   08/10/22 2100 106/74 -- -- 118 14 95 %   08/10/22 2000 113/99 98.3 °F (36.8 °C) -- (!) 122 14 95 %   08/10/22 1900 120/86 -- -- (!) 155 14 95 %   08/10/22 1800 120/62 -- -- (!) 137 -- 95 %   08/10/22 1700 110/68 -- -- (!) 142 -- 95 %   08/10/22 1600 105/89 -- -- (!) 121 -- 94 %   08/10/22 1545 -- -- -- (!) 127 -- 95 %   08/10/22 1530 -- -- -- (!) 141 -- 96 %   08/10/22 1515 -- -- -- (!) 136 -- 93 %   08/10/22 1500 129/72 -- -- (!) 142 -- 95 %   08/10/22 1445 -- -- -- (!) 142 -- 96 %   08/10/22 1430 -- -- -- 117 -- 95 %   08/10/22 1415 -- -- -- (!) 150 -- 95 %   08/10/22 1400 114/71 99.1 °F (37.3 °C) Axillary (!) 133 14 95 %   08/10/22 1345 -- -- -- 110 -- 95 %   08/10/22 1330 -- -- -- (!) 130 -- 92 %   08/10/22 1315 -- -- -- 113 -- 94 %   08/10/22 1300 (!) 164/142 -- -- (!) 152 -- 95 %   08/10/22 1245 -- -- -- (!) 143 -- 94 %   08/10/22 1230 -- -- -- (!) 124 -- 97 %   08/10/22 1215 -- -- -- (!) 130 -- 97 %   08/10/22 1200 141/65 98.4 °F (36.9 °C) Oral (!) 125 12 97 %   08/10/22 1145 -- -- -- (!) 124 -- 97 %   08/10/22 1130 -- -- -- (!) 121 -- 97 %       Intake/Output Summary (Last 24 hours) at 8/11/2022 1126  Last data filed at 8/11/2022 1117  Gross per 24 hour   Intake 3059.29 ml   Output 3350 ml   Net -290.71 ml     General: awake/alert   Chest:  clear to auscultation bilaterally without respiratory distress  CVS: regular rate and rhythm  Abdominal: soft, nontender, positive bowel  sounds  Extremities: no cyanosis or edema  Skin: warm and dry without rash      Labs:  Results from last 7 days   Lab Units 08/11/22  0529 08/10/22  0534 08/09/22  1435   WBC 10*3/mm3 18.86* 25.47* 35.64*   HEMOGLOBIN g/dL 12.1* 13.2 15.0   HEMATOCRIT % 36.4* 36.4* 42.9   PLATELETS 10*3/mm3 143 168 188         Results from last 7 days   Lab Units 08/11/22  0529 08/10/22  0758 08/10/22  0534 08/09/22  2328 08/09/22  1758 08/09/22  1435   SODIUM mmol/L 142 140 139 135*   < > 127*   POTASSIUM mmol/L 4.9 4.6 5.0 4.9   < > 6.5*   CHLORIDE mmol/L 106 105 105 102   < > 86*   CO2 mmol/L 19.0* 23.0 19.0* 16.0*   < > 11.0*   BUN mg/dL 96* 163* 176* 204*   < > 254*   CREATININE mg/dL 1.53* 3.05* 3.33* 4.45*   < > 6.96*   CALCIUM mg/dL 7.9* 8.0* 8.4* 8.5*   < > 8.8   EGFR mL/min/1.73 49.5* 21.6* 19.5* 13.8*   < > 8.0*   BILIRUBIN mg/dL 0.5  --   --  0.3  --  0.4   ALK PHOS U/L 79  --   --  76  --  97   ALT (SGPT) U/L 13  --   --  13  --  13   AST (SGOT) U/L 16  --   --  18  --  13   GLUCOSE mg/dL 225* 143* 162* 199*   < > 329*    < > = values in this interval not displayed.       Radiology:   Imaging Results (Last 72 Hours)     Procedure Component Value Units Date/Time    US Renal Bilateral [[id number]] Collected: 08/10/22 1451     Updated: 08/10/22 1456    Narrative:      EXAM/TECHNIQUE: US RENAL BILATERAL-     INDICATION: CONSUELO; A41.9-Sepsis, unspecified organism; N17.9-Acute kidney  failure, unspecified; E87.1-Ifhi-vrjmwsxgye and hyponatremia;  E87.5-Hyperkalemia; I48.91-Unspecified atrial fibrillation;  D72.829-Elevated white blood cell count, unspecified; E10.10-Type 1  diabetes mellitus with ketoacidosis without coma     COMPARISON: 8/9/2022     FINDINGS:     RIGHT kidney is 10.9 cm in length and demonstrates normal cortical  thickness and echogenicity. No right-sided renal calculi or  hydronephrosis. Trace nonspecific RIGHT perinephric stranding/fluid.     LEFT kidney is 10.9 cm in length and demonstrates normal  cortical  thickness and echogenicity. No left-sided renal calculi or  hydronephrosis.     No focal urinary bladder abnormality.       Impression:         Negative for hydronephrosis, renal atrophy, or renal calculi.  This report was finalized on 08/10/2022 14:53 by Dr. Kishor Escobedo MD.    CT Abdomen Pelvis Without Contrast [[id number]] Collected: 08/09/22 1751     Updated: 08/09/22 1806    Narrative:      EXAM/TECHNIQUE: CT abdomen pelvis without contrast     INDICATION: Pain, nausea, vomiting, diarrhea, sepsis     COMPARISON: None available.     DLP: 183 mGy cm. Automated exposure control was also utilized to  decrease patient radiation dose.     FINDINGS:     Findings in the included lower lungs are described in a separate report.     Unenhanced liver appears unremarkable. No gallstones or gallbladder wall  thickening. Gallbladder is mildly distended. No biliary ductal  dilatation. The pancreas appears unremarkable. Spleen and adrenal glands  appear normal.     Bilateral hilar renal vascular calcifications are noted. No urolithiasis  or hydronephrosis. No large renal lesion. No focal urinary bladder wall  thickening.     No colonic wall thickening or pericolonic inflammation. The appendix is  not confidently identified but there are no secondary signs of  appendicitis. Mild circumferential wall thickening of the distal  esophagus. No focal gastric wall thickening or perigastric fat  stranding. No abnormal small bowel distention or evidence of active  small bowel inflammation.     No ascites or free pelvic fluid. No pelvic mass or pelvic collection.  Prostate and seminal vesicles appear unremarkable.     Nonaneurysmal atherosclerotic abdominal aorta. No enlarged  retroperitoneal, mesenteric, pelvic, or inguinal lymph nodes.     No acute abdominal wall soft tissue abnormality. A small amount of soft  tissue gas in the LEFT lower abdomen is likely related to subcutaneous  injections. No acute osseous finding.  Multilevel lumbar spine  degenerative change.       Impression:         No bowel obstruction. No definite evidence of active inflammation in the  small bowel or colon. There is some mild wall thickening of the distal  esophagus which may represent a mild esophagitis.  This report was finalized on 08/09/2022 18:03 by Dr. Kishor Escobedo MD.    CT Chest Without Contrast Diagnostic [[id number]] Collected: 08/09/22 1746     Updated: 08/09/22 1758    Narrative:      CT CHEST WO CONTRAST DIAGNOSTIC- 8/9/2022 5:14 PM CDT     HISTORY: sob, pain, palpitations,; A41.9-Sepsis, unspecified organism;  N17.9-Acute kidney failure, unspecified; E87.0-Zyxo-eosjcahsly and  hyponatremia; E87.5-Hyperkalemia; I48.91-Unspecified atrial  fibrillation; D72.829-Elevated white blood cell count, unspecified;  E10.10-Type 1 diabetes mellitus with ketoacidosis without coma     COMPARISON: Chest x-ray dated 08/09/2022     DOSE LENGTH PRODUCT: 142 mGy cm. Automated exposure control was also  utilized to decrease patient radiation dose.     TECHNIQUE: Serial helical tomographic images of the chest were acquired  without contrast. Multiplanar reformatted images were provided for  review.     FINDINGS:     Visualized neck base: The imaged portion of the base of the neck appears  unremarkable.      Lungs: There is a mild lung emphysema. Scattered granulomatous  calcifications. There are a few peripheral groundglass opacities  identified, right lung more so than the left. In the right lower lobe  there is a developing mixed groundglass/consolidative infiltrate.  Minimal groundglass opacities in the left lower lobe. No pleural  effusion. The airways are clear.     Heart: The heart is normal in size. There is no pericardial effusion.  Moderate coronary atheromatous calcification.     Vasculature: Thoracic aorta is normal in caliber. Densely calcified  plaque in the aortic arch. Central pulmonary arteries are nondilated.     Mediastinum and lymph nodes: No  suspicious hilar or mediastinal  adenopathy. Incidental granulomatous calcification..     Skeletal and soft tissues: Chest wall soft tissues are unremarkable. No  acute bony abnormality. 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        Impression:      1. There are a few asymmetric pulmonary infiltrates, predominantly  peripheral and groundglass. This is most notable in the right lower  lobe. Early/mild atypical pneumonia would be consideration although this  could also be chronic/fibrotic in nature.  2. Lungs are well expanded. No consolidation.  3. There is no interlobular septal thickening to suggest cardiogenic  interstitial edema.  4. Coronary atheromatous calcification.     This report was finalized on 08/09/2022 17:55 by Dr Luis Mullen, .    XR Chest 1 View [[id number]] Collected: 08/09/22 1540     Updated: 08/09/22 1545    Narrative:      XR CHEST 1 VW- 8/9/2022 2:42 PM CDT     HISTORY: Chest pain     COMPARISON: None.     FINDINGS:      No lung consolidation. No pleural effusion or pneumothorax. Right IJ  central venous catheters in good position. Heart size is normal.  Pulmonary vasculature are nondilated. Remote granulomatous  calcification. Right-sided of rotator cuff arthropathy. No acute bony  abnormality.       Impression:      1. No radiographic evidence of acute cardiopulmonary process.  2. Right IJ central venous catheter is in good position.        This report was finalized on 08/09/2022 15:41 by Dr Luis Mullen, .          Culture:  No results found for: BLOODCX, URINECX, WOUNDCX, MRSACX, RESPCX, STOOLCX      Assessment   1.  Acute kidney injury, prerenal--improving  2.  Metabolic acidosis--improved  3.  DKA--improved  4.  Hyperkalemia--improved  5.  Hyponatremia--improved  6.  Leukocytosis     Plan:  1.  Continue IV fluids  2.  Wean Levophed as able  3.  Continue to monitor labs for ongoing renal recovery      Corey Vallecillo, MARKELL  8/11/2022  11:26  CDT

## 2022-08-11 NOTE — CONSULTS
Referring Provider: No Known Provider    Reason for Consultation: afib with RVR    Chief Complaint:   Chief Complaint   Patient presents with   • Vomiting   • Shortness of Breath       Subjective .     History of present illness:  Chito Govea is a 66 y.o. yo male with history of DM and HTN who presented to the ER with complaints of N/V and was unaware that he was in afib. He denies any chest pain. He was cardioverted 2x in the ER unsuccessfully    Chief Complaint   Patient presents with   • Vomiting   • Shortness of Breath   .    History  Past Medical History:   Diagnosis Date   • Arthritis    • Diabetes mellitus (HCC)    • HLD (hyperlipidemia)    • Hypertension    ,   No past surgical history on file.,   Family History   Problem Relation Age of Onset   • Cancer Mother    • Heart disease Father    • Diabetes Sister    • Cancer Sister    • COPD Brother    ,   Social History     Tobacco Use   • Smoking status: Former Smoker     Packs/day: 1.50     Years: 50.00     Pack years: 75.00     Types: Cigarettes   • Smokeless tobacco: Never Used   Substance Use Topics   • Alcohol use: Never   • Drug use: Yes     Types: Marijuana   ,     Medications  Current Facility-Administered Medications   Medication Dose Route Frequency Provider Last Rate Last Admin   • acetaminophen (TYLENOL) tablet 650 mg  650 mg Oral Q4H PRN Edwin Du DO       • aluminum-magnesium hydroxide-simethicone (MAALOX MAX) 400-400-40 MG/5ML suspension 15 mL  15 mL Oral Q6H PRN Edwin Du DO       • sennosides-docusate (PERICOLACE) 8.6-50 MG per tablet 2 tablet  2 tablet Oral BID PRN Edwin Du DO        And   • polyethylene glycol (MIRALAX) packet 17 g  17 g Oral Daily PRN Edwin Du DO        And   • bisacodyl (DULCOLAX) EC tablet 5 mg  5 mg Oral Daily PRN Edwin Du DO        And   • bisacodyl (DULCOLAX) suppository 10 mg  10 mg Rectal Daily PRN Edwin Du DO       • dextrose (D50W) (25 g/50  mL) IV injection 25 g  25 g Intravenous Q15 Min PRN Edwin Du DO       • dextrose (GLUTOSE) oral gel 15 g  15 g Oral Q15 Min PRN Edwin Du DO       • Enoxaparin Sodium (LOVENOX) syringe 70 mg  1 mg/kg Subcutaneous Q12H Edwin Du DO   70 mg at 08/11/22 0905   • glucagon (human recombinant) (GLUCAGEN DIAGNOSTIC) injection 1 mg  1 mg Intramuscular Q15 Min PRN Edwin Du DO       • Insulin Lispro (humaLOG) injection 0-9 Units  0-9 Units Subcutaneous TID AC Edwin Du DO   4 Units at 08/11/22 1112   • lactated ringers bolus 1,000 mL  1,000 mL Intravenous Once Edwin Du DO       • norepinephrine (LEVOPHED) 8 mg in 250 mL NS infusion (premix)  0.02-0.3 mcg/kg/min Intravenous Titrated Twin Andrews MD   Stopped at 08/11/22 1201   • ondansetron (ZOFRAN) injection 4 mg  4 mg Intravenous Q6H PRN Edwin Du DO   4 mg at 08/11/22 0637   • Pharmacy to Dose enoxaparin (LOVENOX)   Does not apply Continuous PRN Edwin Du DO       • sodium chloride 0.9 % flush 10 mL  10 mL Intravenous Q12H Edwin Du DO   10 mL at 08/11/22 0905   • sodium chloride 0.9 % flush 10 mL  10 mL Intravenous PRN Edwin Du DO       • sodium chloride 0.9 % infusion  100 mL/hr Intravenous Continuous William Torres  mL/hr at 08/11/22 0356 100 mL/hr at 08/11/22 0356       Allergies:  Patient has no known allergies.    Review of Systems  Review of Systems   HENT: Negative for nosebleeds.    Cardiovascular: Positive for dyspnea on exertion. Negative for chest pain, claudication, leg swelling, near-syncope, orthopnea, palpitations, paroxysmal nocturnal dyspnea and syncope.   Respiratory: Negative for hemoptysis and shortness of breath.    Gastrointestinal: Positive for nausea and vomiting. Negative for melena.   Genitourinary: Negative for hematuria.       Objective     Physical Exam:  Patient Vitals for the past 24 hrs:   BP Temp Temp src Pulse  Resp SpO2   08/11/22 1145 -- -- -- 100 -- 94 %   08/11/22 1130 140/72 -- -- 112 -- 94 %   08/11/22 1115 -- -- -- 116 -- 95 %   08/11/22 1100 127/93 -- -- (!) 156 -- 94 %   08/11/22 1045 -- -- -- (!) 129 -- 93 %   08/11/22 1030 137/81 -- -- (!) 139 -- 94 %   08/11/22 1015 -- -- -- 101 -- 94 %   08/11/22 1000 (!) 84/64 -- -- 99 -- 94 %   08/11/22 0945 -- -- -- 112 -- 94 %   08/11/22 0930 124/84 -- -- (!) 122 -- 93 %   08/11/22 0915 -- -- -- (!) 132 -- 94 %   08/11/22 0900 118/71 -- -- 114 -- 93 %   08/11/22 0845 -- -- -- (!) 127 -- 94 %   08/11/22 0830 138/80 -- -- 109 -- 94 %   08/11/22 0815 -- -- -- (!) 145 -- 93 %   08/11/22 0800 113/73 -- -- (!) 141 -- 93 %   08/11/22 0745 -- -- -- (!) 122 -- 91 %   08/11/22 0730 138/66 98.5 °F (36.9 °C) Oral (!) 124 -- 92 %   08/11/22 0715 -- -- -- (!) 136 -- 95 %   08/11/22 0700 141/73 -- -- (!) 132 -- 93 %   08/11/22 0500 142/93 -- -- (!) 139 14 93 %   08/11/22 0400 124/80 -- -- 117 14 93 %   08/11/22 0300 146/81 -- -- (!) 131 14 93 %   08/11/22 0200 120/84 -- -- (!) 134 14 92 %   08/11/22 0100 104/62 -- -- (!) 141 14 93 %   08/11/22 0000 127/84 98.3 °F (36.8 °C) Oral (!) 125 14 95 %   08/10/22 2300 100/62 -- -- (!) 154 14 93 %   08/10/22 2200 113/68 -- -- (!) 139 13 95 %   08/10/22 2100 106/74 -- -- 118 14 95 %   08/10/22 2000 113/99 98.3 °F (36.8 °C) -- (!) 122 14 95 %   08/10/22 1900 120/86 -- -- (!) 155 14 95 %   08/10/22 1800 120/62 -- -- (!) 137 -- 95 %   08/10/22 1700 110/68 -- -- (!) 142 -- 95 %   08/10/22 1600 105/89 -- -- (!) 121 -- 94 %   08/10/22 1545 -- -- -- (!) 127 -- 95 %   08/10/22 1530 -- -- -- (!) 141 -- 96 %   08/10/22 1515 -- -- -- (!) 136 -- 93 %   08/10/22 1500 129/72 -- -- (!) 142 -- 95 %   08/10/22 1445 -- -- -- (!) 142 -- 96 %     Physical Exam  Vitals and nursing note reviewed.   Constitutional:       Appearance: He is ill-appearing.   HENT:      Head: Normocephalic and atraumatic.   Cardiovascular:      Rate and Rhythm: Tachycardia present. Rhythm  irregular.      Pulses: Normal pulses.      Heart sounds: S1 normal and S2 normal. No murmur heard.    No gallop.   Pulmonary:      Effort: Pulmonary effort is normal.      Breath sounds: Normal breath sounds.   Abdominal:      General: Bowel sounds are normal. There is no distension.      Palpations: Abdomen is soft.      Tenderness: There is no abdominal tenderness. There is no guarding.   Musculoskeletal:      Cervical back: Normal range of motion and neck supple.      Right lower leg: No edema.      Left lower leg: No edema.   Skin:     General: Skin is warm and dry.   Neurological:      General: No focal deficit present.      Mental Status: He is alert and oriented to person, place, and time.   Psychiatric:         Mood and Affect: Mood normal.         Behavior: Behavior normal.         Results Review:   I reviewed the patient's new clinical results.  Lab Results (last 24 hours)     Procedure Component Value Units Date/Time    POC Glucose Once [[id number]]  (Abnormal) Collected: 08/11/22 1107    Specimen: Blood Updated: 08/11/22 1118     Glucose 228 mg/dL      Comment: : COLE Russellolm AnthonyMeter ID: [id number]       POC Glucose Once [[id number]]  (Abnormal) Collected: 08/11/22 0637    Specimen: Blood Updated: 08/11/22 0648     Glucose 210 mg/dL      Comment: : [id number] Chavo DanielleMeter ID: [id number]       Phosphorus [[id number]]  (Normal) Collected: 08/11/22 0529    Specimen: Blood Updated: 08/11/22 0603     Phosphorus 3.0 mg/dL     Comprehensive Metabolic Panel [[id number]]  (Abnormal) Collected: 08/11/22 0529    Specimen: Blood Updated: 08/11/22 0601     Glucose 225 mg/dL      BUN 96 mg/dL      Creatinine 1.53 mg/dL      Sodium 142 mmol/L      Potassium 4.9 mmol/L      Chloride 106 mmol/L      CO2 19.0 mmol/L      Calcium 7.9 mg/dL      Total Protein 5.4 g/dL      Albumin 2.70 g/dL      ALT (SGPT) 13 U/L      AST (SGOT) 16 U/L      Alkaline Phosphatase 79 U/L      Total Bilirubin 0.5  mg/dL      Globulin 2.7 gm/dL      A/G Ratio 1.0 g/dL      BUN/Creatinine Ratio 62.7     Anion Gap 17.0 mmol/L      eGFR 49.5 mL/min/1.73      Comment: National Kidney Foundation and American Society of Nephrology (ASN) Task Force recommended calculation based on the Chronic Kidney Disease Epidemiology Collaboration (CKD-EPI) equation refit without adjustment for race.       Narrative:      GFR Normal >60  Chronic Kidney Disease <60  Kidney Failure <15      Magnesium [[id number]]  (Normal) Collected: 08/11/22 0529    Specimen: Blood Updated: 08/11/22 0555     Magnesium 1.9 mg/dL     CBC & Differential [[id number]]  (Abnormal) Collected: 08/11/22 0529    Specimen: Blood Updated: 08/11/22 0545    Narrative:      The following orders were created for panel order CBC & Differential.  Procedure                               Abnormality         Status                     ---------                               -----------         ------                     CBC Auto Differential[[id number]]        Abnormal            Final result                 Please view results for these tests on the individual orders.    CBC Auto Differential [[id number]]  (Abnormal) Collected: 08/11/22 0529    Specimen: Blood Updated: 08/11/22 0545     WBC 18.86 10*3/mm3      RBC 4.14 10*6/mm3      Hemoglobin 12.1 g/dL      Hematocrit 36.4 %      MCV 87.9 fL      MCH 29.2 pg      MCHC 33.2 g/dL      RDW 14.6 %      RDW-SD 46.7 fl      MPV 12.4 fL      Platelets 143 10*3/mm3      Neutrophil % 87.7 %      Lymphocyte % 2.6 %      Monocyte % 8.8 %      Eosinophil % 0.1 %      Basophil % 0.2 %      Immature Grans % 0.6 %      Neutrophils, Absolute 16.56 10*3/mm3      Lymphocytes, Absolute 0.49 10*3/mm3      Monocytes, Absolute 1.66 10*3/mm3      Eosinophils, Absolute 0.01 10*3/mm3      Basophils, Absolute 0.03 10*3/mm3      Immature Grans, Absolute 0.11 10*3/mm3      nRBC 0.0 /100 WBC     POC Glucose Once [[id number]]  (Abnormal) Collected: 08/10/22 5090     Specimen: Blood Updated: 08/10/22 2208     Glucose 177 mg/dL      Comment: : [id number] Chavo BradshawMeter ID: [id number]       Blood Culture - Blood, Arm, Right [[id number]]  (Normal) Collected: 08/09/22 1800    Specimen: Blood from Arm, Right Updated: 08/10/22 1833     Blood Culture No growth at 24 hours    POC Glucose Once [[id number]]  (Abnormal) Collected: 08/10/22 1753    Specimen: Blood Updated: 08/10/22 1805     Glucose 188 mg/dL      Comment: : [id number] Gwendolyn SyedMeter ID: [id number]       Blood Culture - Blood, Arm, Right [[id number]]  (Normal) Collected: 08/09/22 1600    Specimen: Blood from Arm, Right Updated: 08/10/22 1634     Blood Culture No growth at 24 hours    Peripheral Blood Smear [[id number]] Collected: 08/09/22 1435    Specimen: Blood Updated: 08/10/22 1448     Performed by: Reji Coronel MD     Pathologist Interpretation Peripheral blood smear:  Moderate leukocytosis with absolute neutrophilia.  Otherwise within normal limits.         Imaging Results (Last 24 Hours)     Procedure Component Value Units Date/Time    US Renal Bilateral [[id number]] Collected: 08/10/22 1451     Updated: 08/10/22 1456    Narrative:      EXAM/TECHNIQUE: US RENAL BILATERAL-     INDICATION: CONSUELO; A41.9-Sepsis, unspecified organism; N17.9-Acute kidney  failure, unspecified; E87.6-Setf-kpjnmnidkb and hyponatremia;  E87.5-Hyperkalemia; I48.91-Unspecified atrial fibrillation;  D72.829-Elevated white blood cell count, unspecified; E10.10-Type 1  diabetes mellitus with ketoacidosis without coma     COMPARISON: 8/9/2022     FINDINGS:     RIGHT kidney is 10.9 cm in length and demonstrates normal cortical  thickness and echogenicity. No right-sided renal calculi or  hydronephrosis. Trace nonspecific RIGHT perinephric stranding/fluid.     LEFT kidney is 10.9 cm in length and demonstrates normal cortical  thickness and echogenicity. No left-sided renal calculi or  hydronephrosis.     No focal urinary bladder  abnormality.       Impression:         Negative for hydronephrosis, renal atrophy, or renal calculi.  This report was finalized on 08/10/2022 14:53 by Dr. Kishor Escobedo MD.        No results found for: ECHOEFEST  I have reviewed his ECG which reveals Afib with RVR    Assessment & Plan     Acute renal failure (HCC)    Atrial fibrillation with rapid ventricular response (HCC)    Hypertension    Diabetes mellitus (HCC)    New Problems that need treatment:  1. afib with RVR of undetermined onset. Could be chronic. Will add a dose of dig and oral amiodarone. Check an echo to assess LV function and atrial size  2. N/V of ?etiology. Will get amylase and lipase to r/o pancreatits. May also need to consider cholelithiasis  New Problems that are stable:  1. HTN  2, DM  3. COPD    MDM High complexity

## 2022-08-11 NOTE — PROGRESS NOTES
"Pharmacy Dosing Service  Anticoagulant  Enoxaparin    Assessment/Action/Plan:  Pharmacy consulted to dose Lovenox for Atrial fibrillation.  Patient admitted with acute renal failure. Serum creatinine trend below  3 hours (08/11/22 0529) 1.53 (H)    0.76 - 1.27 mg/dL Final result     1 day (08/10/22 0758) 3.05 (H)    0.76 - 1.27 mg/dL Final result    1 day (08/10/22 0534) 3.33 (H)    0.76 - 1.27 mg/dL Final result     Current weight: 67.4 kg. Est CrCl > 30     Adjust to Lovenox 70 mg (rounded for weight) subQ every 12 hours    Will continue to follow     Subjective:  Chito Govea is a 66 y.o. male on Enoxaparin 70 mg SQ every 12 hours for indication of atrial fibrillation.  Objective:  [Ht: 174 cm (68.5\"); Wt: 67.4 kg (148 lb 11.2 oz); BMI: Body mass index is 22.28 kg/m².]  Estimated Creatinine Clearance: 44.7 mL/min (A) (by C-G formula based on SCr of 1.53 mg/dL (H)). No results found for: DDIMER   Lab Results   Component Value Date    INR 1.74 (H) 08/09/2022    PROTIME 19.7 (H) 08/09/2022      Lab Results   Component Value Date    HGB 12.1 (L) 08/11/2022    HGB 13.2 08/10/2022    HGB 15.0 08/09/2022      Lab Results   Component Value Date     08/11/2022     08/10/2022     08/09/2022       Myles Dunbar, PharmD  08/11/22 08:29 CDT     "

## 2022-08-11 NOTE — PROGRESS NOTES
"    [facility] Medicine Services  INPATIENT PROGRESS NOTE    Length of Stay: 2  Date of Admission: 8/9/2022  Primary Care Physician: Provider, No Known    Subjective     Chief Complaint:     Nausea, vomiting with abdominal pain    HPI     Patient continues to improve gradually.  He is alert and talkative but he has no appetite this morning.  He remains in atrial flutter with rates as high as 160s.  He continues to require pressor support.  He was given amiodarone boluses twice in the emergency department without significant effect on the heart rate.  Digoxin was given yesterday without much effect on heart rate.  Diltiazem does not appear to be an option secondary to the patient's hypotension requiring pressors.  Improvements in renal function and electrolyte balance have not contributed to heart rate reduction.  Cardiology will be consulted for evaluation and treatment regarding persistent, refractory A. fib RVR.  Arterial line remains in place.  The patient remains on IV fluids.  Hemoglobin 12.1.  White blood cell count has improved to 18,900.  Magnesium and phosphorus are within normal limits.  He has been told in the past that his heart rate of 140-150 is \"normal for him\".    Review of Systems     All pertinent negatives and positives are as above. All other systems have been reviewed and are negative unless otherwise stated.     Objective    Temp:  [98.3 °F (36.8 °C)-99.1 °F (37.3 °C)] 98.5 °F (36.9 °C)  Heart Rate:  [] 100  Resp:  [13-14] 14  BP: ()/() 140/72  Arterial Line BP: ()/(31-77) 151/61    Lab Results (last 24 hours)     Procedure Component Value Units Date/Time    POC Glucose Once [[id number]]  (Abnormal) Collected: 08/11/22 1107    Specimen: Blood Updated: 08/11/22 1118     Glucose 228 mg/dL      Comment: : COLE Starkey AnthonyMeter ID: [id number]       POC Glucose Once [[id number]]  (Abnormal) Collected: 08/11/22 0637    Specimen: " Blood Updated: 08/11/22 0648     Glucose 210 mg/dL      Comment: : [id number] Chavo Reeder ID: [id number]       Phosphorus [[id number]]  (Normal) Collected: 08/11/22 0529    Specimen: Blood Updated: 08/11/22 0603     Phosphorus 3.0 mg/dL     Comprehensive Metabolic Panel [[id number]]  (Abnormal) Collected: 08/11/22 0529    Specimen: Blood Updated: 08/11/22 0601     Glucose 225 mg/dL      BUN 96 mg/dL      Creatinine 1.53 mg/dL      Sodium 142 mmol/L      Potassium 4.9 mmol/L      Chloride 106 mmol/L      CO2 19.0 mmol/L      Calcium 7.9 mg/dL      Total Protein 5.4 g/dL      Albumin 2.70 g/dL      ALT (SGPT) 13 U/L      AST (SGOT) 16 U/L      Alkaline Phosphatase 79 U/L      Total Bilirubin 0.5 mg/dL      Globulin 2.7 gm/dL      A/G Ratio 1.0 g/dL      BUN/Creatinine Ratio 62.7     Anion Gap 17.0 mmol/L      eGFR 49.5 mL/min/1.73      Comment: National Kidney Foundation and American Society of Nephrology (ASN) Task Force recommended calculation based on the Chronic Kidney Disease Epidemiology Collaboration (CKD-EPI) equation refit without adjustment for race.       Narrative:      GFR Normal >60  Chronic Kidney Disease <60  Kidney Failure <15      Magnesium [[id number]]  (Normal) Collected: 08/11/22 0529    Specimen: Blood Updated: 08/11/22 0555     Magnesium 1.9 mg/dL     CBC & Differential [[id number]]  (Abnormal) Collected: 08/11/22 0529    Specimen: Blood Updated: 08/11/22 0545    Narrative:      The following orders were created for panel order CBC & Differential.  Procedure                               Abnormality         Status                     ---------                               -----------         ------                     CBC Auto Differential[[id number]]        Abnormal            Final result                 Please view results for these tests on the individual orders.    CBC Auto Differential [[id number]]  (Abnormal) Collected: 08/11/22 0529    Specimen: Blood Updated: 08/11/22 0545      WBC 18.86 10*3/mm3      RBC 4.14 10*6/mm3      Hemoglobin 12.1 g/dL      Hematocrit 36.4 %      MCV 87.9 fL      MCH 29.2 pg      MCHC 33.2 g/dL      RDW 14.6 %      RDW-SD 46.7 fl      MPV 12.4 fL      Platelets 143 10*3/mm3      Neutrophil % 87.7 %      Lymphocyte % 2.6 %      Monocyte % 8.8 %      Eosinophil % 0.1 %      Basophil % 0.2 %      Immature Grans % 0.6 %      Neutrophils, Absolute 16.56 10*3/mm3      Lymphocytes, Absolute 0.49 10*3/mm3      Monocytes, Absolute 1.66 10*3/mm3      Eosinophils, Absolute 0.01 10*3/mm3      Basophils, Absolute 0.03 10*3/mm3      Immature Grans, Absolute 0.11 10*3/mm3      nRBC 0.0 /100 WBC     POC Glucose Once [[id number]]  (Abnormal) Collected: 08/10/22 2157    Specimen: Blood Updated: 08/10/22 2208     Glucose 177 mg/dL      Comment: : [id number] Raulharlan ManuelmargotMeter ID: [id number]       Blood Culture - Blood, Arm, Right [[id number]]  (Normal) Collected: 08/09/22 1800    Specimen: Blood from Arm, Right Updated: 08/10/22 1833     Blood Culture No growth at 24 hours    POC Glucose Once [[id number]]  (Abnormal) Collected: 08/10/22 1753    Specimen: Blood Updated: 08/10/22 1805     Glucose 188 mg/dL      Comment: : [id number] Gwendolyn SyedMeter ID: [id number]       Blood Culture - Blood, Arm, Right [[id number]]  (Normal) Collected: 08/09/22 1600    Specimen: Blood from Arm, Right Updated: 08/10/22 1634     Blood Culture No growth at 24 hours    Peripheral Blood Smear [[id number]] Collected: 08/09/22 1435    Specimen: Blood Updated: 08/10/22 1448     Performed by: Reji Coronel MD     Pathologist Interpretation Peripheral blood smear:  Moderate leukocytosis with absolute neutrophilia.  Otherwise within normal limits.           Imaging Results (Last 24 Hours)     Procedure Component Value Units Date/Time    US Renal Bilateral [[id number]] Collected: 08/10/22 1451     Updated: 08/10/22 5187    Narrative:      EXAM/TECHNIQUE: US RENAL BILATERAL-     INDICATION: CONSUELO;  A41.9-Sepsis, unspecified organism; N17.9-Acute kidney  failure, unspecified; E87.0-Mmpk-scdqdivxzl and hyponatremia;  E87.5-Hyperkalemia; I48.91-Unspecified atrial fibrillation;  D72.829-Elevated white blood cell count, unspecified; E10.10-Type 1  diabetes mellitus with ketoacidosis without coma     COMPARISON: 8/9/2022     FINDINGS:     RIGHT kidney is 10.9 cm in length and demonstrates normal cortical  thickness and echogenicity. No right-sided renal calculi or  hydronephrosis. Trace nonspecific RIGHT perinephric stranding/fluid.     LEFT kidney is 10.9 cm in length and demonstrates normal cortical  thickness and echogenicity. No left-sided renal calculi or  hydronephrosis.     No focal urinary bladder abnormality.       Impression:         Negative for hydronephrosis, renal atrophy, or renal calculi.  This report was finalized on 08/10/2022 14:53 by Dr. Kishor Escobedo MD.             Intake/Output Summary (Last 24 hours) at 8/11/2022 1200  Last data filed at 8/11/2022 1117  Gross per 24 hour   Intake 2761.56 ml   Output 2550 ml   Net 211.56 ml       Physical Exam  Constitutional:       General: He is in no acute distress.      Appearance: Normal appearance. He is normal weight. He is ill-appearing.   HENT:      Head: Normocephalic and atraumatic.      Right Ear: External ear normal.      Left Ear: External ear normal.      Nose: Nose normal.      Mouth: Mucous membranes are dry.      Pharynx: Oropharynx is clear.   Eyes:      General: No scleral icterus.      Conjunctiva/sclera: Conjunctivae normal.   Cardiovascular:      Rate and Rhythm: Tachycardia present. Rhythm regularly irregular.      Pulses: Normal pulses.      Heart sounds: Normal heart sounds. No murmur heard.  Pulmonary:      Effort: Pulmonary effort is normal.  No respiratory distress.      Breath sounds: Decreased breath sounds (Bilaterally) present.   Abdominal:      General: Abdomen is flat. Bowel sounds are normal.      Palpations: Abdomen is  soft. There is no mass.      Tenderness: There is no abdominal tenderness.   Musculoskeletal:         General: Normal range of motion.      Right lower leg: No edema.      Left lower leg: No edema.   Skin:     General: Skin is warm and dry.      Coloration: Skin is not pale.   Neurological:      General: No focal deficit present.      Mental Status: He is alert and oriented to person, place, and time. Mental status is at baseline.      Comments: Generalized weakness without focal deficit.   Psychiatric:         Mood and Affect: Mood normal.         Judgment: Judgment normal.     Results Review:  I have reviewed the labs, radiology results, and diagnostic studies since my last progress note and made treatment changes reflective of the results.   I have reviewed the current medications.    Assessment/Plan     Active Hospital Problems    Diagnosis    • **Acute renal failure (HCC)    • Atrial fibrillation with rapid ventricular response (HCC)    • Hypertension    • Diabetes mellitus (HCC)        PLAN:  Continue attempts to wean pressor support  Continue IV fluids  Cardiology consultation regarding refractory atrial fibrillation  PT/OT evaluations    Electronically signed by Edwin Du DO, 08/11/22, 12:00 CDT.

## 2022-08-11 NOTE — PLAN OF CARE
Goal Outcome Evaluation:  Plan of Care Reviewed With: patient, daughter        Progress: improving   No s/s of distress currently. Levophed titrated off, currently MAP goal WNL. No c/o pain at this time, but does request medication to help him sleep. Attending informed, awaiting new orders at this time. Patient received digoxin and loading dose of amiodarone, afib currently at a controlled rate.

## 2022-08-12 ENCOUNTER — APPOINTMENT (OUTPATIENT)
Dept: GENERAL RADIOLOGY | Facility: HOSPITAL | Age: 67
End: 2022-08-12

## 2022-08-12 PROBLEM — E44.0 MODERATE MALNUTRITION: Status: ACTIVE | Noted: 2022-08-12

## 2022-08-12 LAB
ALBUMIN SERPL-MCNC: 2.6 G/DL (ref 3.5–5.2)
ALBUMIN/GLOB SERPL: 1 G/DL
ALP SERPL-CCNC: 73 U/L (ref 39–117)
ALT SERPL W P-5'-P-CCNC: 11 U/L (ref 1–41)
AMYLASE SERPL-CCNC: 42 U/L (ref 28–100)
ANION GAP SERPL CALCULATED.3IONS-SCNC: 11 MMOL/L (ref 5–15)
AST SERPL-CCNC: 13 U/L (ref 1–40)
BASOPHILS # BLD AUTO: 0.02 10*3/MM3 (ref 0–0.2)
BASOPHILS NFR BLD AUTO: 0.1 % (ref 0–1.5)
BILIRUB SERPL-MCNC: 0.4 MG/DL (ref 0–1.2)
BUN SERPL-MCNC: 55 MG/DL (ref 8–23)
BUN/CREAT SERPL: 47.8 (ref 7–25)
CALCIUM SPEC-SCNC: 7.9 MG/DL (ref 8.6–10.5)
CHLORIDE SERPL-SCNC: 106 MMOL/L (ref 98–107)
CO2 SERPL-SCNC: 25 MMOL/L (ref 22–29)
CREAT SERPL-MCNC: 1.15 MG/DL (ref 0.76–1.27)
DEPRECATED RDW RBC AUTO: 48.7 FL (ref 37–54)
EGFRCR SERPLBLD CKD-EPI 2021: 69.8 ML/MIN/1.73
EOSINOPHIL # BLD AUTO: 0.02 10*3/MM3 (ref 0–0.4)
EOSINOPHIL NFR BLD AUTO: 0.1 % (ref 0.3–6.2)
ERYTHROCYTE [DISTWIDTH] IN BLOOD BY AUTOMATED COUNT: 14.5 % (ref 12.3–15.4)
GLOBULIN UR ELPH-MCNC: 2.6 GM/DL
GLUCOSE BLDC GLUCOMTR-MCNC: 143 MG/DL (ref 70–130)
GLUCOSE BLDC GLUCOMTR-MCNC: 156 MG/DL (ref 70–130)
GLUCOSE BLDC GLUCOMTR-MCNC: 170 MG/DL (ref 70–130)
GLUCOSE SERPL-MCNC: 143 MG/DL (ref 65–99)
HCT VFR BLD AUTO: 36.2 % (ref 37.5–51)
HGB BLD-MCNC: 11.5 G/DL (ref 13–17.7)
IMM GRANULOCYTES # BLD AUTO: 0.06 10*3/MM3 (ref 0–0.05)
IMM GRANULOCYTES NFR BLD AUTO: 0.4 % (ref 0–0.5)
LIPASE SERPL-CCNC: 39 U/L (ref 13–60)
LYMPHOCYTES # BLD AUTO: 0.56 10*3/MM3 (ref 0.7–3.1)
LYMPHOCYTES NFR BLD AUTO: 4.1 % (ref 19.6–45.3)
MAGNESIUM SERPL-MCNC: 1.7 MG/DL (ref 1.6–2.4)
MCH RBC QN AUTO: 29.4 PG (ref 26.6–33)
MCHC RBC AUTO-ENTMCNC: 31.8 G/DL (ref 31.5–35.7)
MCV RBC AUTO: 92.6 FL (ref 79–97)
MONOCYTES # BLD AUTO: 1.19 10*3/MM3 (ref 0.1–0.9)
MONOCYTES NFR BLD AUTO: 8.7 % (ref 5–12)
NEUTROPHILS NFR BLD AUTO: 11.79 10*3/MM3 (ref 1.7–7)
NEUTROPHILS NFR BLD AUTO: 86.6 % (ref 42.7–76)
NRBC BLD AUTO-RTO: 0 /100 WBC (ref 0–0.2)
PHOSPHATE SERPL-MCNC: 2.4 MG/DL (ref 2.5–4.5)
PLATELET # BLD AUTO: 128 10*3/MM3 (ref 140–450)
PMV BLD AUTO: 12.7 FL (ref 6–12)
POTASSIUM SERPL-SCNC: 4.8 MMOL/L (ref 3.5–5.2)
PROT SERPL-MCNC: 5.2 G/DL (ref 6–8.5)
RBC # BLD AUTO: 3.91 10*6/MM3 (ref 4.14–5.8)
SODIUM SERPL-SCNC: 142 MMOL/L (ref 136–145)
TSH SERPL DL<=0.05 MIU/L-ACNC: 1.88 UIU/ML (ref 0.27–4.2)
WBC NRBC COR # BLD: 13.64 10*3/MM3 (ref 3.4–10.8)

## 2022-08-12 PROCEDURE — 85025 COMPLETE CBC W/AUTO DIFF WBC: CPT | Performed by: FAMILY MEDICINE

## 2022-08-12 PROCEDURE — 83690 ASSAY OF LIPASE: CPT | Performed by: INTERNAL MEDICINE

## 2022-08-12 PROCEDURE — 84443 ASSAY THYROID STIM HORMONE: CPT | Performed by: INTERNAL MEDICINE

## 2022-08-12 PROCEDURE — 25010000002 ONDANSETRON PER 1 MG: Performed by: INTERNAL MEDICINE

## 2022-08-12 PROCEDURE — 83735 ASSAY OF MAGNESIUM: CPT | Performed by: FAMILY MEDICINE

## 2022-08-12 PROCEDURE — 97162 PT EVAL MOD COMPLEX 30 MIN: CPT

## 2022-08-12 PROCEDURE — 74018 RADEX ABDOMEN 1 VIEW: CPT

## 2022-08-12 PROCEDURE — 82962 GLUCOSE BLOOD TEST: CPT

## 2022-08-12 PROCEDURE — 25010000002 ONDANSETRON PER 1 MG: Performed by: FAMILY MEDICINE

## 2022-08-12 PROCEDURE — 80053 COMPREHEN METABOLIC PANEL: CPT | Performed by: NURSE PRACTITIONER

## 2022-08-12 PROCEDURE — 63710000001 INSULIN LISPRO (HUMAN) PER 5 UNITS: Performed by: FAMILY MEDICINE

## 2022-08-12 PROCEDURE — 25010000002 METOCLOPRAMIDE PER 10 MG: Performed by: INTERNAL MEDICINE

## 2022-08-12 PROCEDURE — 99232 SBSQ HOSP IP/OBS MODERATE 35: CPT | Performed by: NURSE PRACTITIONER

## 2022-08-12 PROCEDURE — 84100 ASSAY OF PHOSPHORUS: CPT | Performed by: FAMILY MEDICINE

## 2022-08-12 PROCEDURE — 97166 OT EVAL MOD COMPLEX 45 MIN: CPT | Performed by: OCCUPATIONAL THERAPIST

## 2022-08-12 PROCEDURE — 36415 COLL VENOUS BLD VENIPUNCTURE: CPT | Performed by: INTERNAL MEDICINE

## 2022-08-12 PROCEDURE — 82150 ASSAY OF AMYLASE: CPT | Performed by: INTERNAL MEDICINE

## 2022-08-12 RX ORDER — PANTOPRAZOLE SODIUM 40 MG/1
40 TABLET, DELAYED RELEASE ORAL
Status: DISCONTINUED | OUTPATIENT
Start: 2022-08-12 | End: 2022-08-15 | Stop reason: HOSPADM

## 2022-08-12 RX ORDER — AMOXICILLIN 250 MG
1 CAPSULE ORAL 2 TIMES DAILY
Status: DISCONTINUED | OUTPATIENT
Start: 2022-08-12 | End: 2022-08-15 | Stop reason: HOSPADM

## 2022-08-12 RX ORDER — METOCLOPRAMIDE HYDROCHLORIDE 5 MG/ML
10 INJECTION INTRAMUSCULAR; INTRAVENOUS ONCE
Status: COMPLETED | OUTPATIENT
Start: 2022-08-12 | End: 2022-08-12

## 2022-08-12 RX ORDER — METOPROLOL TARTRATE 5 MG/5ML
5 INJECTION INTRAVENOUS ONCE
Status: COMPLETED | OUTPATIENT
Start: 2022-08-12 | End: 2022-08-12

## 2022-08-12 RX ORDER — SUCRALFATE ORAL 1 G/10ML
1 SUSPENSION ORAL
Status: DISCONTINUED | OUTPATIENT
Start: 2022-08-12 | End: 2022-08-15 | Stop reason: HOSPADM

## 2022-08-12 RX ADMIN — SODIUM CHLORIDE 50 ML/HR: 9 INJECTION, SOLUTION INTRAVENOUS at 10:24

## 2022-08-12 RX ADMIN — INSULIN LISPRO 2 UNITS: 100 INJECTION, SOLUTION INTRAVENOUS; SUBCUTANEOUS at 08:31

## 2022-08-12 RX ADMIN — SODIUM CHLORIDE 100 ML/HR: 9 INJECTION, SOLUTION INTRAVENOUS at 00:42

## 2022-08-12 RX ADMIN — Medication 10 ML: at 21:07

## 2022-08-12 RX ADMIN — ONDANSETRON 4 MG: 2 INJECTION INTRAMUSCULAR; INTRAVENOUS at 16:42

## 2022-08-12 RX ADMIN — METOCLOPRAMIDE 10 MG: 5 INJECTION, SOLUTION INTRAMUSCULAR; INTRAVENOUS at 12:13

## 2022-08-12 RX ADMIN — PANTOPRAZOLE SODIUM 40 MG: 40 TABLET, DELAYED RELEASE ORAL at 16:42

## 2022-08-12 RX ADMIN — AMIODARONE HYDROCHLORIDE 200 MG: 200 TABLET ORAL at 21:06

## 2022-08-12 RX ADMIN — APIXABAN 5 MG: 5 TABLET, FILM COATED ORAL at 21:06

## 2022-08-12 RX ADMIN — SUCRALFATE 1 G: 1 SUSPENSION ORAL at 21:06

## 2022-08-12 RX ADMIN — ONDANSETRON 4 MG: 2 INJECTION INTRAMUSCULAR; INTRAVENOUS at 04:15

## 2022-08-12 RX ADMIN — ONDANSETRON 4 MG: 2 INJECTION INTRAMUSCULAR; INTRAVENOUS at 10:24

## 2022-08-12 RX ADMIN — SUCRALFATE 1 G: 1 SUSPENSION ORAL at 12:13

## 2022-08-12 RX ADMIN — Medication 10 ML: at 08:31

## 2022-08-12 RX ADMIN — APIXABAN 5 MG: 5 TABLET, FILM COATED ORAL at 08:32

## 2022-08-12 RX ADMIN — INSULIN LISPRO 2 UNITS: 100 INJECTION, SOLUTION INTRAVENOUS; SUBCUTANEOUS at 12:14

## 2022-08-12 RX ADMIN — SUCRALFATE 1 G: 1 SUSPENSION ORAL at 17:10

## 2022-08-12 RX ADMIN — DOCUSATE SODIUM 50 MG AND SENNOSIDES 8.6 MG 1 TABLET: 8.6; 5 TABLET, FILM COATED ORAL at 12:13

## 2022-08-12 RX ADMIN — METOPROLOL TARTRATE 5 MG: 1 INJECTION, SOLUTION INTRAVENOUS at 17:28

## 2022-08-12 RX ADMIN — AMIODARONE HYDROCHLORIDE 200 MG: 200 TABLET ORAL at 08:32

## 2022-08-12 NOTE — THERAPY EVALUATION
Patient Name: Chito Govea  : 1955    MRN: [id number]                              Today's Date: 2022       Admit Date: 2022    Visit Dx:     ICD-10-CM ICD-9-CM   1. Sepsis, due to unspecified organism, unspecified whether acute organ dysfunction present (formerly [facility])  A41.9 038.9     995.91   2. CONSUELO (acute kidney injury) (formerly [facility])  N17.9 584.9   3. Hyponatremia  E87.1 276.1   4. Hyperkalemia  E87.5 276.7   5. Atrial fibrillation with RVR (formerly [facility])  I48.91 427.31   6. Leukocytosis, unspecified type  D72.829 288.60   7. Diabetic ketoacidosis without coma associated with type 1 diabetes mellitus (formerly [facility])  E10.10 250.13   8. Impaired functional mobility and activity tolerance  Z74.09 V49.89   9. Impaired mobility and ADLs  Z74.09 V49.89    Z78.9      Patient Active Problem List   Diagnosis   • Atrial fibrillation with rapid ventricular response (HCC)   • Acute renal failure (HCC)   • Hypertension   • Diabetes mellitus (HCC)     Past Medical History:   Diagnosis Date   • Arthritis    • Diabetes mellitus (HCC)    • HLD (hyperlipidemia)    • Hypertension      No past surgical history on file.   General Information     Row Name 22          Physical Therapy Time and Intention    Document Type evaluation;other (see comments)  see MAR  -JE     Mode of Treatment physical therapy  -     Row Name 22          General Information    Patient Profile Reviewed yes  -JE     Prior Level of Function independent:;all household mobility;ADL's  prior to 1 mth ago; pt has had a gradual decline in mobility  -JE     Existing Precautions/Restrictions fall  -JE     Barriers to Rehab medically complex  -JE     Row Name 22          Living Environment    People in Home child(juana), adult  -JE     Name(s) of People in Home Shae- dtr  -JE     Row Name 22          Cognition    Orientation Status (Cognition) oriented x 4  -JE     Row Name 22          Safety Issues, Functional Mobility    Safety  Issues Affecting Function (Mobility) friction/shear risk;safety precaution awareness;insight into deficits/self-awareness  -     Impairments Affecting Function (Mobility) balance;endurance/activity tolerance;strength;range of motion (ROM)  -           User Key  (r) = Recorded By, (t) = Taken By, (c) = Cosigned By    Initials Name Provider Type    Lyubov Johnson, PT Physical Therapist               Mobility     Row Name 08/12/22 0951          Bed Mobility    Bed Mobility supine-sit  -     Supine-Sit [place] (Bed Mobility) contact guard;verbal cues  -     Assistive Device (Bed Mobility) head of bed elevated  -     Row Name 08/12/22 0951          Sit-Stand Transfer    Sit-Stand [place] (Transfers) contact guard;minimum assist (75% patient effort);2 person assist;verbal cues  -     Row Name 08/12/22 0951          Gait/Stairs (Locomotion)    [place] Level (Gait) contact guard;minimum assist (75% patient effort);2 person assist;verbal cues  -     Assistive Device (Gait) other (see comments)  HHA  -     Distance in Feet (Gait) ~ 8-10 ft  -     Deviations/Abnormal Patterns (Gait) gait speed decreased;stride length decreased;base of support, wide  -     Bilateral Gait Deviations forward flexed posture;heel strike decreased  -     Comment, (Gait/Stairs) c/os nausea w/ vomiting after moving to sit up in chair; RN notified and treated w/ prescribed meds  -           User Key  (r) = Recorded By, (t) = Taken By, (c) = Cosigned By    Initials Name Provider Type    Lyubov Johnson, PT Physical Therapist               Obj/Interventions     Row Name 08/12/22 0951          Range of Motion Comprehensive    Comment, General Range of Motion AROM all 4 extremities WFLs except noted decrease flexion R fingers - per pt due to swelling from unknown cause ~ 2-3 mths prior  -     Row Name 08/12/22 0951          Strength Comprehensive (MMT)    Comment, General Manual Muscle Testing (MMT)  Assessment defer to OT for UE strength assessment, however pt able to move all 4 extremities against gravity w/out difficulty; decrease  on R due to decrease ROM; B LEs grossly 4/5  -[facility] Name 08/12/22 0951          Balance    Balance Assessment sitting static balance;sitting dynamic balance;sit to stand dynamic balance;standing static balance;standing dynamic balance  -     Static Sitting Balance standby assist  -     Dynamic Sitting Balance standby assist  -     Position, Sitting Balance supported;sitting in chair  -     Sit to Stand Dynamic Balance contact guard;minimal assist;2-person assist;verbal cues  -     Static Standing Balance contact guard;1-person assist;2-person assist  -     Dynamic Standing Balance contact guard;minimal assist;2-person assist;verbal cues  -     Position/Device Used, Standing Balance supported  HHA X 1-2  -[facility] Name 08/12/22 0951          Sensory Assessment (Somatosensory)    Sensory Assessment (Somatosensory) sensation intact  -           User Key  (r) = Recorded By, (t) = Taken By, (c) = Cosigned By    Initials Name Provider Type    Lyubov Johnson, PT Physical Therapist               Goals/Plan     [facility] Name 08/12/22 0951          Bed Mobility Goal 1 (PT)    Activity/Assistive Device (Bed Mobility Goal 1, PT) bed mobility activities, all  -     [place] Level/Cues Needed (Bed Mobility Goal 1, PT) independent  -     Time Frame (Bed Mobility Goal 1, PT) long term goal (LTG);10 days  -     Progress/Outcomes (Bed Mobility Goal 1, PT) goal ongoing  -[facility] Name 08/12/22 0951          Transfer Goal 1 (PT)    Activity/Assistive Device (Transfer Goal 1, PT) sit-to-stand/stand-to-sit;bed-to-chair/chair-to-bed  -     [place] Level/Cues Needed (Transfer Goal 1, PT) standby assist  -     Time Frame (Transfer Goal 1, PT) long term goal (LTG);10 days  -     Progress/Outcome (Transfer Goal 1, PT) goal ongoing  -JE     Row Name 08/12/22  0951          Gait Training Goal 1 (PT)    Activity/Assistive Device (Gait Training Goal 1, PT) gait (walking locomotion);assistive device use;decrease fall risk;diminish gait deviation;improve balance and speed;increase endurance/gait distance;walker, rolling  -     [place] Level (Gait Training Goal 1, PT) contact guard required;standby assist  -JE     Distance (Gait Training Goal 1, PT)  ft  -     Time Frame (Gait Training Goal 1, PT) long term goal (LTG);10 days  -     Progress/Outcome (Gait Training Goal 1, PT) goal ongoing  -     Row Name 08/12/22 0950          Therapy Assessment/Plan (PT)    Planned Therapy Interventions (PT) balance training;bed mobility training;gait training;postural re-education;patient/family education;ROM (range of motion);strengthening;transfer training;other (see comments)  safety/falls prevention  -           User Key  (r) = Recorded By, (t) = Taken By, (c) = Cosigned By    Initials Name Provider Type    Lyubov Johnson, PT Physical Therapist               Clinical Impression     Row Name 08/12/22 1402          Pain    Pretreatment Pain Rating 0/10 - no pain  -     Posttreatment Pain Rating 0/10 - no pain  -     Row Name 08/12/22 1402          Plan of Care Review    Plan of Care Reviewed With patient  -     Progress no change  -     Outcome Evaluation PT eval completed.  Pt pleasant and agreeable to therapy.  Pt demonstrates generalized weakness and decrease balance requiring UE support w/ out of bed activity.  Pt performed bed mobility w/ assist of 1, tfers w/ assist of 2, and gait ~ 8-10 ft w/ assist of 2 w/ noted decrease gait mechanics and increase fall risk.  Pt will benefit from continued PT services to improve strength, balance, activity tolerance, safety awareness, and I w/ functional mobility reducing fall risk using AD if appropriate progressing activity when safety allows.  Recommend continued skilled care at discharge.  Will follow for  progress and needs.  Pt may need stay in SNF pending his progress.  -     Row Name 08/12/22 1402          Therapy Assessment/Plan (PT)    Patient/Family Therapy Goals Statement (PT) improve mobility, decrease nausea  -     Rehab Potential (PT) good, to achieve stated therapy goals  -     Criteria for Skilled Interventions Met (PT) yes;meets criteria;skilled treatment is necessary  -     Therapy Frequency (PT) 2 times/day  -     Predicted Duration of Therapy Intervention (PT) until discharge or goals achieved  -     Row Name 08/12/22 1402          Vital Signs    O2 Delivery Pre Treatment room air  -JE     O2 Delivery Intra Treatment room air  -JE     O2 Delivery Post Treatment room air  -JE     Pre Patient Position Supine  -JE     Intra Patient Position Standing  -JE     Post Patient Position Sitting  -     Row Name 08/12/22 1402          Positioning and Restraints    Pre-Treatment Position in bed  -JE     Post Treatment Position chair  -JE     In Chair notified nsg;reclined;call light within reach;encouraged to call for assist;legs elevated;patient within staff view  -           User Key  (r) = Recorded By, (t) = Taken By, (c) = Cosigned By    Initials Name Provider Type    Lyubov Johnson, PT Physical Therapist               Outcome Measures     Row Name 08/12/22 0951          How much help from another person do you currently need...    Turning from your back to your side while in flat bed without using bedrails? 3  -JE     Moving from lying on back to sitting on the side of a flat bed without bedrails? 3  -JE     Moving to and from a bed to a chair (including a wheelchair)? 3  -JE     Standing up from a chair using your arms (e.g., wheelchair, bedside chair)? 3  -JE     Climbing 3-5 steps with a railing? 3  -JE     To walk in hospital room? 3  -JE     AM-PAC 6 Clicks Score (PT) 18  -     Highest level of mobility 6 --> Walked 10 steps or more  -GRISELDA     Row Name 08/12/22 0951 08/12/22 0987        Functional Assessment    Outcome Measure Options AM-PAC 6 Clicks Basic Mobility (PT)  -GRISELDA AM-PAC 6 Clicks Daily Activity (OT)  -STEPHAN          User Key  (r) = Recorded By, (t) = Taken By, (c) = Cosigned By    Initials Name Provider Type    Lyubov Johnson, PT Physical Therapist    Radha Saenz, OTR/L, CSRS Occupational Therapist                             Physical Therapy Education                 Title: PT OT SLP Therapies (In Progress)     Topic: Physical Therapy (In Progress)     Point: Mobility training (Done)     Learning Progress Summary           Patient Acceptance, E,TB,D, VU,NR by GRISELDA at 8/12/2022 1349    Comment: Education re: purpose of PT/importance of activity, for safety/falls prevetions, improved tech w/ tfers and gait activity                   Point: Home exercise program (Not Started)     Learner Progress:  Not documented in this visit.          Point: Precautions (Done)     Learning Progress Summary           Patient Acceptance, E,TB,D, VU,NR by  at 8/12/2022 1349    Comment: Education re: purpose of PT/importance of activity, for safety/falls prevetions, improved tech w/ tfers and gait activity                               User Key     Initials Effective Dates Name Provider Type Discipline     08/02/18 -  Lyubov Shay, PT Physical Therapist PT              PT Recommendation and Plan  Planned Therapy Interventions (PT): balance training, bed mobility training, gait training, postural re-education, patient/family education, ROM (range of motion), strengthening, transfer training, other (see comments) (safety/falls prevention)  Plan of Care Reviewed With: patient  Progress: no change  Outcome Evaluation: PT eval completed.  Pt pleasant and agreeable to therapy.  Pt demonstrates generalized weakness and decrease balance requiring UE support w/ out of bed activity.  Pt performed bed mobility w/ assist of 1, tfers w/ assist of 2, and gait ~ 8-10 ft w/ assist of 2 w/ noted decrease  gait mechanics and increase fall risk.  Pt will benefit from continued PT services to improve strength, balance, activity tolerance, safety awareness, and I w/ functional mobility reducing fall risk using AD if appropriate progressing activity when safety allows.  Recommend continued skilled care at discharge.  Will follow for progress and needs.  Pt may need stay in SNF pending his progress.     Time Calculation:    PT Charges     Row Name 08/12/22 1202             Time Calculation    Start Time 0951  -      Stop Time 1034  -      Time Calculation (min) 43 min  -      PT Received On 08/12/22  -      PT Goal Re-Cert Due Date 08/22/22  -            User Key  (r) = Recorded By, (t) = Taken By, (c) = Cosigned By    Initials Name Provider Type    Lyubov Johnson, PT Physical Therapist              Therapy Charges for Today     Code Description Service Date Service Provider Modifiers Qty    48147803075 HC PT EVAL MOD COMPLEXITY 3 8/12/2022 Lyubov Shay, PT GP 1          PT G-Codes  Outcome Measure Options: AM-PAC 6 Clicks Basic Mobility (PT)  AM-PAC 6 Clicks Score (PT): 18  AM-PAC 6 Clicks Score (OT): 18    Lyubov Shay PT  8/12/2022

## 2022-08-12 NOTE — PROGRESS NOTES
[facility]  INPATIENT PROGRESS NOTE    Patient Name: Chito Govea  Date of Admission: 8/9/2022  Today's Date: 08/12/22  Length of Stay: 3  Primary Care Physician: Provider, No Known    Subjective   Chief Complaint: nausea  HPI     Patient seen and examined at bedside.  Patient hemodynamics and HR improved.  Still having poor intake and nausea and vomiting with any attempts of eating or drinking.        Review of Systems   Constitutional: Positive for activity change, appetite change and fatigue. Negative for chills and fever.   Respiratory: Negative for cough and shortness of breath.    Gastrointestinal: Positive for abdominal distention, abdominal pain (bloating), nausea and vomiting.   Neurological: Positive for weakness.        All pertinent negatives and positives are as above. All other systems have been reviewed and are negative unless otherwise stated.     Objective    Temp:  [97.9 °F (36.6 °C)-98.1 °F (36.7 °C)] 98 °F (36.7 °C)  Heart Rate:  [] 80  Resp:  [12-21] 19  BP: ()/(55-93) 153/68  Arterial Line BP: ()/(48-62) 107/48  Physical Exam  Vitals and nursing note reviewed.   Constitutional:       Appearance: He is ill-appearing.   HENT:      Head: Normocephalic and atraumatic.      Mouth/Throat:      Mouth: Mucous membranes are dry.      Pharynx: Oropharynx is clear.   Eyes:      General: No scleral icterus.     Conjunctiva/sclera: Conjunctivae normal.   Cardiovascular:      Rate and Rhythm: Normal rate and regular rhythm.      Heart sounds: Murmur heard.   Pulmonary:      Effort: Pulmonary effort is normal. No respiratory distress.      Breath sounds: Normal breath sounds. No stridor.   Abdominal:      General: Abdomen is flat. Bowel sounds are normal.      Palpations: Abdomen is soft.   Skin:     General: Skin is warm and dry.      Coloration: Skin is pale.   Neurological:      General: No focal deficit present.      Mental Status:  He is alert and oriented to person, place, and time.   Psychiatric:         Mood and Affect: Mood normal.         Behavior: Behavior normal.             Results Review:  I have reviewed the labs, radiology results, and diagnostic studies.    Laboratory Data:   Results from last 7 days   Lab Units 08/12/22 0231 08/11/22  0529 08/10/22  0534   WBC 10*3/mm3 13.64* 18.86* 25.47*   HEMOGLOBIN g/dL 11.5* 12.1* 13.2   HEMATOCRIT % 36.2* 36.4* 36.4*   PLATELETS 10*3/mm3 128* 143 168        Results from last 7 days   Lab Units 08/12/22  0231 08/11/22  0529 08/10/22  0758 08/10/22  0534 08/09/22  2328   SODIUM mmol/L 142 142 140   < > 135*   POTASSIUM mmol/L 4.8 4.9 4.6   < > 4.9   CHLORIDE mmol/L 106 106 105   < > 102   CO2 mmol/L 25.0 19.0* 23.0   < > 16.0*   BUN mg/dL 55* 96* 163*   < > 204*   CREATININE mg/dL 1.15 1.53* 3.05*   < > 4.45*   CALCIUM mg/dL 7.9* 7.9* 8.0*   < > 8.5*   BILIRUBIN mg/dL 0.4 0.5  --   --  0.3   ALK PHOS U/L 73 79  --   --  76   ALT (SGPT) U/L 11 13  --   --  13   AST (SGOT) U/L 13 16  --   --  18   GLUCOSE mg/dL 143* 225* 143*   < > 199*    < > = values in this interval not displayed.       Culture Data:   Blood Culture   Date Value Ref Range Status   08/09/2022 No growth at 2 days  Preliminary   08/09/2022 No growth at 2 days  Preliminary       Radiology Data:   Imaging Results (Last 24 Hours)     ** No results found for the last 24 hours. **          I have reviewed the patient's current medications.     Assessment/Plan     Active Hospital Problems    Diagnosis    • **Acute renal failure (HCC)    • Aortic stenosis, moderate    • Moderate malnutrition (HCC)    • Paroxysmal atrial fibrillation (HCC)    • Hypertension    • Diabetes mellitus (HCC)        Plan:  Patient admitted on 8/9 by Dr. Du.  The patient had a 1 month history of poor PO intake due to nausea and vomiting.  Upon arrival patient was acidotic , hyponatremic, elevated creatine.    Patient presented with a Cr of 6.96 and a BUN  of 254.  Patient's CONSUELO most likely related to volume depletion.  Patient was treated with bicarbonate containing fluids initially and subsequently fluids were adjusted depending on BMP.  Nephrology was consulted and assisted in the care of the patient.  Cr has returned to normal.    Patient had poorly controlled diabetes mellitus.  A1c was > 9.  Sliding scale insulin with accuchecks.  May add some levemir depending on trends.  Patient required insulin gtt early is he stay.    Patient had an elevated troponin upon arrival.  This is a type 2 MI related to hypotension, CONSUELO, and tachycardia.  Patient was requiring levophed support early in his stay.    Patient was in atrial fibrillation with RVR and they attempted cardioversion x 2 as patient was hypotensive.  This was unsuccessful.  Suspect it was unsuccessful as the patient was significant volume depleted.  Treating the underlying cause of the tachyarrhythmia did show improvement, although controlled rates to acceptable level (<110) was still somewhat difficult.  Cardiology consulted and patient was placed on amiodarone.  Echo showed,  Results for orders placed during the hospital encounter of 08/09/22    Adult Transthoracic Echo Complete W/ Cont if Necessary Per Protocol    Interpretation Summary  · Mild pulmonary hypertension is present.  · Estimated right ventricular systolic pressure from tricuspid regurgitation is mildly elevated (35-45 mmHg).  · There is a small (<1cm) pericardial effusion.  · Moderate aortic valve stenosis is present.  · Left ventricular wall thickness is consistent with mild concentric hypertrophy.  · Estimated left ventricular EF = 50% Left ventricular systolic function is normal.  · Left ventricular diastolic dysfunction is noted.     Patient ultimately started on eliquis for empiric stroke reduction related to atrial fibrillation.  [id number] 3.  Recommend holter at [place] per cardiology.  HR improved.  Patient now only intermittent atrial  fibrillation and mostly sinus.    For the patients nausea and vomiting, he had a CT scan of the abdomen and pelvis that showed, concern for esophagitis.  Otherwise it was unremarkable.  Ordered pantoprazole, carafate and a dose of reglan.  Patient also had not had a BM will start bowel regimen.  I think this nausea, early satiety, and emesis is likely related to severe GERD and esophagitis, he may also have some gastroparesis given his poorly controlled DM.      Patient extremely weak, PT/OT    D/c central line    Transfer to floor      Discharge Planning: I expect the patient to be discharged to ? in >2 days.    Electronically signed by Ellis Rey MD, 08/12/22, 11:14 CDT.

## 2022-08-12 NOTE — PROGRESS NOTES
Nephrology ([facility]) Progress Note      Patient:  Chito Govea  YOB: 1955  Date of Service: 8/12/2022  MRN: [id number]   Acct: [id number]   Primary Care Physician: Provider, No Known  Advance Directive:   Code Status and Medical Interventions:   Ordered at: 08/09/22 1714     Level Of Support Discussed With:    Patient     Code Status (Patient has no pulse and is not breathing):    CPR (Attempt to Resuscitate)     Medical Interventions (Patient has pulse or is breathing):    Full Support     Admit Date: 8/9/2022       Hospital Day: 3  Referring Provider: No Known Provider      Patient personally seen and examined.  Complete chart including Consults, Notes, Operative Reports, Labs, Cardiology, and Radiology studies reviewed as able.        Subjective:  Chito Govea is a 66 y.o. male for whom we were consulted for evaluation and treatment of acute kidney injury, hyperkalemia. No prior known renal issues. History of type 2 diabetes, hypertension. Presented to ER with nausea, vomiting, abdominal pain. Symptoms present for several weeks and significantly worse the past 3-4 days. Minimal PO intake last several days. Labs in ER revealed multiple abnormalities. Creatinine 6.96, potassium 6.5, , sodium 127, glucose 329. He was in atrial fibrillation with RVR and was cardioverted X 2.  Patient was also hypotensive requiring Levophed drip. Admitted to ICU and started on bicarbonate IV fluids and DKA protocol.    Today is feeling better, labs continue to improve. Levophed is off. Urine output nonoliguric    Allergies:  Patient has no known allergies.    Home Meds:  Medications Prior to Admission   Medication Sig Dispense Refill Last Dose   • amLODIPine (NORVASC) 10 MG tablet Take 10 mg by mouth Daily.      • atorvastatin (LIPITOR) 20 MG tablet Take 20 mg by mouth Daily.      • glimepiride (AMARYL) 2 MG tablet Take 2 mg by mouth Every Morning Before Breakfast.      •  lisinopril-hydrochlorothiazide (PRINZIDE,ZESTORETIC) 20-12.5 MG per tablet Take 1 tablet by mouth Daily.          Medicines:  Current Facility-Administered Medications   Medication Dose Route Frequency Provider Last Rate Last Admin   • acetaminophen (TYLENOL) tablet 650 mg  650 mg Oral Q4H PRN Edwin Du DO       • aluminum-magnesium hydroxide-simethicone (MAALOX MAX) 400-400-40 MG/5ML suspension 15 mL  15 mL Oral Q6H PRN Edwin Du DO       • amiodarone (PACERONE) tablet 200 mg  200 mg Oral Q12H Ben Mckeon MD   200 mg at 08/12/22 0832   • apixaban (ELIQUIS) tablet 5 mg  5 mg Oral Q12H Ben Mckeon MD   5 mg at 08/12/22 0832   • sennosides-docusate (PERICOLACE) 8.6-50 MG per tablet 2 tablet  2 tablet Oral BID PRN Edwin Du DO        And   • polyethylene glycol (MIRALAX) packet 17 g  17 g Oral Daily PRN Edwin Du DO        And   • bisacodyl (DULCOLAX) EC tablet 5 mg  5 mg Oral Daily PRN Edwin Du DO        And   • bisacodyl (DULCOLAX) suppository 10 mg  10 mg Rectal Daily PRN Edwin Du DO       • dextrose (D50W) (25 g/50 mL) IV injection 25 g  25 g Intravenous Q15 Min PRN Edwin Du DO       • dextrose (GLUTOSE) oral gel 15 g  15 g Oral Q15 Min PRN Edwin Du DO       • glucagon (human recombinant) (GLUCAGEN DIAGNOSTIC) injection 1 mg  1 mg Intramuscular Q15 Min PRN Edwin Du DO       • hydrOXYzine (ATARAX) tablet 50 mg  50 mg Oral Nightly PRN Edwin Du DO       • Insulin Lispro (humaLOG) injection 0-9 Units  0-9 Units Subcutaneous TID AC Edwin Du DO   2 Units at 08/12/22 0831   • lactated ringers bolus 1,000 mL  1,000 mL Intravenous Once Edwin Du DO       • norepinephrine (LEVOPHED) 8 mg in 250 mL NS infusion (premix)  0.02-0.3 mcg/kg/min Intravenous Titrated Twin Andrews MD   Stopped at 08/11/22 1201   • ondansetron (ZOFRAN) injection 4 mg  4 mg Intravenous Q6H PRN  Edwin Du DO   4 mg at 08/12/22 0415   • Pharmacy to Dose enoxaparin (LOVENOX)   Does not apply Continuous PRN Edwin Du DO       • sodium chloride 0.9 % flush 10 mL  10 mL Intravenous Q12H Edwin Du DO   10 mL at 08/12/22 0831   • sodium chloride 0.9 % flush 10 mL  10 mL Intravenous PRN Edwin Du DO       • sodium chloride 0.9 % infusion  100 mL/hr Intravenous Continuous William Torres  mL/hr at 08/12/22 0042 100 mL/hr at 08/12/22 0042       Past Medical History:  Past Medical History:   Diagnosis Date   • Arthritis    • Diabetes mellitus (HCC)    • HLD (hyperlipidemia)    • Hypertension        Past Surgical History:  No past surgical history on file.    Family History  Family History   Problem Relation Age of Onset   • Cancer Mother    • Heart disease Father    • Diabetes Sister    • Cancer Sister    • COPD Brother        Social History  Social History     Socioeconomic History   • Marital status:    Tobacco Use   • Smoking status: Former Smoker     Packs/day: 1.50     Years: 50.00     Pack years: 75.00     Types: Cigarettes   • Smokeless tobacco: Never Used   Substance and Sexual Activity   • Alcohol use: Never   • Drug use: Yes     Types: Marijuana   • Sexual activity: Defer       Review of Systems:  History obtained from chart review and the patient  General ROS: No fever or chills  Respiratory ROS: No cough, shortness of breath, wheezing  Cardiovascular ROS: No chest pain or palpitations  Gastrointestinal ROS: No abdominal pain or melena  Genito-Urinary ROS: No dysuria or hematuria  Psych ROS: No anxiety and depression  14 point ROS reviewed with the patient and negative except as noted above and in the HPI unless unable to obtain.    Objective:  Patient Vitals for the past 24 hrs:   BP Temp Temp src Pulse Resp SpO2   08/12/22 0930 117/66 -- -- 98 -- 95 %   08/12/22 0900 133/93 -- -- 109 -- 97 %   08/12/22 0830 115/89 -- -- 95 -- 96 %   08/12/22 0800  131/87 98 °F (36.7 °C) Oral 103 19 95 %   08/12/22 0700 107/69 -- -- 98 -- 94 %   08/12/22 0600 138/66 -- -- 78 20 95 %   08/12/22 0500 112/67 -- -- 98 15 94 %   08/12/22 0400 94/64 98.1 °F (36.7 °C) Oral 98 15 94 %   08/12/22 0300 148/57 -- -- 110 15 95 %   08/12/22 0200 (!) 84/55 -- -- 92 15 94 %   08/12/22 0100 114/65 -- -- 106 16 96 %   08/12/22 0000 119/68 97.9 °F (36.6 °C) Oral 112 18 96 %   08/11/22 2300 109/70 -- -- 98 21 94 %   08/11/22 2200 119/72 -- -- 99 16 93 %   08/11/22 2118 102/74 -- -- 93 -- --   08/11/22 2100 102/74 -- -- 98 12 93 %   08/11/22 2000 125/66 98.1 °F (36.7 °C) Oral 107 16 96 %   08/11/22 1900 123/72 -- -- 109 18 94 %   08/11/22 1830 117/71 -- -- 103 14 95 %   08/11/22 1800 132/65 -- -- 100 12 95 %   08/11/22 1730 121/77 -- -- (!) 124 18 93 %   08/11/22 1700 144/71 -- -- 116 14 94 %   08/11/22 1630 138/64 -- -- 101 12 94 %   08/11/22 1600 165/68 -- -- 102 16 94 %   08/11/22 1530 137/86 -- -- 109 18 95 %   08/11/22 1500 116/69 98 °F (36.7 °C) Oral (!) 132 16 93 %   08/11/22 1430 135/73 -- -- 82 14 94 %   08/11/22 1400 140/76 -- -- 110 12 95 %   08/11/22 1330 105/60 -- -- 93 13 95 %   08/11/22 1300 106/70 -- -- (!) 128 17 93 %   08/11/22 1230 112/63 -- -- 113 14 94 %   08/11/22 1200 125/67 -- -- (!) 132 16 93 %   08/11/22 1145 -- -- -- 100 12 94 %   08/11/22 1130 140/72 -- -- 112 14 94 %   08/11/22 1115 -- -- -- 116 14 95 %   08/11/22 1100 127/93 -- -- (!) 156 16 94 %   08/11/22 1045 -- -- -- (!) 129 18 93 %   08/11/22 1030 137/81 -- -- (!) 139 13 94 %   08/11/22 1015 -- -- -- 101 12 94 %   08/11/22 1000 (!) 84/64 -- -- 99 17 94 %   08/11/22 0945 -- -- -- 112 15 94 %       Intake/Output Summary (Last 24 hours) at 8/12/2022 0938  Last data filed at 8/12/2022 0800  Gross per 24 hour   Intake 2623 ml   Output 2300 ml   Net 323 ml     General: awake/alert   Chest:  clear to auscultation bilaterally without respiratory distress  CVS: regular rate and rhythm  Abdominal: soft, nontender,  positive bowel sounds  Extremities: no cyanosis or edema  Skin: warm and dry without rash   Neuro: no focal motor deficits      Labs:  Results from last 7 days   Lab Units 08/12/22  0231 08/11/22  0529 08/10/22  0534   WBC 10*3/mm3 13.64* 18.86* 25.47*   HEMOGLOBIN g/dL 11.5* 12.1* 13.2   HEMATOCRIT % 36.2* 36.4* 36.4*   PLATELETS 10*3/mm3 128* 143 168         Results from last 7 days   Lab Units 08/12/22  0231 08/11/22  0529 08/10/22  0758 08/10/22  0534 08/09/22  2328   SODIUM mmol/L 142 142 140   < > 135*   POTASSIUM mmol/L 4.8 4.9 4.6   < > 4.9   CHLORIDE mmol/L 106 106 105   < > 102   CO2 mmol/L 25.0 19.0* 23.0   < > 16.0*   BUN mg/dL 55* 96* 163*   < > 204*   CREATININE mg/dL 1.15 1.53* 3.05*   < > 4.45*   CALCIUM mg/dL 7.9* 7.9* 8.0*   < > 8.5*   EGFR mL/min/1.73 69.8 49.5* 21.6*   < > 13.8*   BILIRUBIN mg/dL 0.4 0.5  --   --  0.3   ALK PHOS U/L 73 79  --   --  76   ALT (SGPT) U/L 11 13  --   --  13   AST (SGOT) U/L 13 16  --   --  18   GLUCOSE mg/dL 143* 225* 143*   < > 199*    < > = values in this interval not displayed.       Radiology:   Imaging Results (Last 72 Hours)     Procedure Component Value Units Date/Time    US Renal Bilateral [[id number]] Collected: 08/10/22 1451     Updated: 08/10/22 1456    Narrative:      EXAM/TECHNIQUE: US RENAL BILATERAL-     INDICATION: CONSUELO; A41.9-Sepsis, unspecified organism; N17.9-Acute kidney  failure, unspecified; E87.6-Wwyy-rboagjezff and hyponatremia;  E87.5-Hyperkalemia; I48.91-Unspecified atrial fibrillation;  D72.829-Elevated white blood cell count, unspecified; E10.10-Type 1  diabetes mellitus with ketoacidosis without coma     COMPARISON: 8/9/2022     FINDINGS:     RIGHT kidney is 10.9 cm in length and demonstrates normal cortical  thickness and echogenicity. No right-sided renal calculi or  hydronephrosis. Trace nonspecific RIGHT perinephric stranding/fluid.     LEFT kidney is 10.9 cm in length and demonstrates normal cortical  thickness and echogenicity. No  left-sided renal calculi or  hydronephrosis.     No focal urinary bladder abnormality.       Impression:         Negative for hydronephrosis, renal atrophy, or renal calculi.  This report was finalized on 08/10/2022 14:53 by Dr. Kishor Escobedo MD.    CT Abdomen Pelvis Without Contrast [[id number]] Collected: 08/09/22 1751     Updated: 08/09/22 1806    Narrative:      EXAM/TECHNIQUE: CT abdomen pelvis without contrast     INDICATION: Pain, nausea, vomiting, diarrhea, sepsis     COMPARISON: None available.     DLP: 183 mGy cm. Automated exposure control was also utilized to  decrease patient radiation dose.     FINDINGS:     Findings in the included lower lungs are described in a separate report.     Unenhanced liver appears unremarkable. No gallstones or gallbladder wall  thickening. Gallbladder is mildly distended. No biliary ductal  dilatation. The pancreas appears unremarkable. Spleen and adrenal glands  appear normal.     Bilateral hilar renal vascular calcifications are noted. No urolithiasis  or hydronephrosis. No large renal lesion. No focal urinary bladder wall  thickening.     No colonic wall thickening or pericolonic inflammation. The appendix is  not confidently identified but there are no secondary signs of  appendicitis. Mild circumferential wall thickening of the distal  esophagus. No focal gastric wall thickening or perigastric fat  stranding. No abnormal small bowel distention or evidence of active  small bowel inflammation.     No ascites or free pelvic fluid. No pelvic mass or pelvic collection.  Prostate and seminal vesicles appear unremarkable.     Nonaneurysmal atherosclerotic abdominal aorta. No enlarged  retroperitoneal, mesenteric, pelvic, or inguinal lymph nodes.     No acute abdominal wall soft tissue abnormality. A small amount of soft  tissue gas in the LEFT lower abdomen is likely related to subcutaneous  injections. No acute osseous finding. Multilevel lumbar spine  degenerative change.        Impression:         No bowel obstruction. No definite evidence of active inflammation in the  small bowel or colon. There is some mild wall thickening of the distal  esophagus which may represent a mild esophagitis.  This report was finalized on 08/09/2022 18:03 by Dr. Kishor Escobedo MD.    CT Chest Without Contrast Diagnostic [[id number]] Collected: 08/09/22 1746     Updated: 08/09/22 1758    Narrative:      CT CHEST WO CONTRAST DIAGNOSTIC- 8/9/2022 5:14 PM CDT     HISTORY: sob, pain, palpitations,; A41.9-Sepsis, unspecified organism;  N17.9-Acute kidney failure, unspecified; E87.8-Ldbj-zltghfvapm and  hyponatremia; E87.5-Hyperkalemia; I48.91-Unspecified atrial  fibrillation; D72.829-Elevated white blood cell count, unspecified;  E10.10-Type 1 diabetes mellitus with ketoacidosis without coma     COMPARISON: Chest x-ray dated 08/09/2022     DOSE LENGTH PRODUCT: 142 mGy cm. Automated exposure control was also  utilized to decrease patient radiation dose.     TECHNIQUE: Serial helical tomographic images of the chest were acquired  without contrast. Multiplanar reformatted images were provided for  review.     FINDINGS:     Visualized neck base: The imaged portion of the base of the neck appears  unremarkable.      Lungs: There is a mild lung emphysema. Scattered granulomatous  calcifications. There are a few peripheral groundglass opacities  identified, right lung more so than the left. In the right lower lobe  there is a developing mixed groundglass/consolidative infiltrate.  Minimal groundglass opacities in the left lower lobe. No pleural  effusion. The airways are clear.     Heart: The heart is normal in size. There is no pericardial effusion.  Moderate coronary atheromatous calcification.     Vasculature: Thoracic aorta is normal in caliber. Densely calcified  plaque in the aortic arch. Central pulmonary arteries are nondilated.     Mediastinum and lymph nodes: No suspicious hilar or mediastinal  adenopathy.  Incidental granulomatous calcification..     Skeletal and soft tissues: Chest wall soft tissues are unremarkable. No  acute bony abnormality. 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        Impression:      1. There are a few asymmetric pulmonary infiltrates, predominantly  peripheral and groundglass. This is most notable in the right lower  lobe. Early/mild atypical pneumonia would be consideration although this  could also be chronic/fibrotic in nature.  2. Lungs are well expanded. No consolidation.  3. There is no interlobular septal thickening to suggest cardiogenic  interstitial edema.  4. Coronary atheromatous calcification.     This report was finalized on 08/09/2022 17:55 by Dr Luis Mullen, .    XR Chest 1 View [[id number]] Collected: 08/09/22 1540     Updated: 08/09/22 1545    Narrative:      XR CHEST 1 VW- 8/9/2022 2:42 PM CDT     HISTORY: Chest pain     COMPARISON: None.     FINDINGS:      No lung consolidation. No pleural effusion or pneumothorax. Right IJ  central venous catheters in good position. Heart size is normal.  Pulmonary vasculature are nondilated. Remote granulomatous  calcification. Right-sided of rotator cuff arthropathy. No acute bony  abnormality.       Impression:      1. No radiographic evidence of acute cardiopulmonary process.  2. Right IJ central venous catheter is in good position.        This report was finalized on 08/09/2022 15:41 by Dr Luis Mullen, .          Culture:  No results found for: BLOODCX, URINECX, WOUNDCX, MRSACX, RESPCX, STOOLCX      Assessment   1.  Acute kidney injury, prerenal--improving  2.  Metabolic acidosis--improved  3.  DKA--improved  4.  Hyperkalemia--improved  5.  Hyponatremia--improved  6.  Leukocytosis     Plan:  1.  Continue IV fluids, adjust rate  2.  Continue to monitor labs for ongoing renal recovery      Corey Vallecillo, MARKELL  8/12/2022  09:38 CDT

## 2022-08-12 NOTE — PLAN OF CARE
Goal Outcome Evaluation:  Plan of Care Reviewed With: patient        Progress: no change  Outcome Evaluation: PT eval completed.  Pt pleasant and agreeable to therapy.  Pt demonstrates generalized weakness and decrease balance requiring UE support w/ out of bed activity.  Pt performed bed mobility w/ assist of 1, tfers w/ assist of 2, and gait ~ 8-10 ft w/ assist of 2 w/ noted decrease gait mechanics and increase fall risk.  Pt will benefit from continued PT services to improve strength, balance, activity tolerance, safety awareness, and I w/ functional mobility reducing fall risk using AD if appropriate progressing activity when safety allows.  Recommend continued skilled care at discharge.  Will follow for progress and needs.  Pt may need stay in SNF pending his progress.

## 2022-08-12 NOTE — PROGRESS NOTES
[facility] -  Progress Note     LOS: 3 days   Patient Care Team:  Provider, No Known as PCP - General  Provider, No Known as PCP - Family Medicine    Chief Complaint: afib with RVR    Subjective     Interval History:   No complaints today. Review of telemetry notes intermittent atrial fib with HR 90-110s while in afib. Labs stable.     Review of Systems:   Review of Systems   Constitutional: Negative for chills, diaphoresis, fatigue and unexpected weight change.   HENT: Negative for nosebleeds.    Respiratory: Negative for cough, chest tightness, shortness of breath and wheezing.    Cardiovascular: Negative for chest pain, palpitations and leg swelling.   Gastrointestinal: Negative for anal bleeding, blood in stool, diarrhea and nausea.   Neurological: Negative for dizziness, syncope and light-headedness.   All other systems reviewed and are negative.      Objective     Vital Sign Min/Max for last 24 hours  Temp  Min: 97.9 °F (36.6 °C)  Max: 98.1 °F (36.7 °C)   BP  Min: 84/55  Max: 165/68   Pulse  Min: 73  Max: 132   Resp  Min: 12  Max: 21   SpO2  Min: 93 %  Max: 98 %   No data recorded   No data recorded         08/10/22  0400   Weight: 67.4 kg (148 lb 11.2 oz)         Intake/Output Summary (Last 24 hours) at 8/12/2022 1423  Last data filed at 8/12/2022 1214  Gross per 24 hour   Intake 2805 ml   Output 2300 ml   Net 505 ml         Physical Exam:  Vitals reviewed.   Constitutional:       General: Not in acute distress.     Appearance: Healthy appearance. Well-developed. Not diaphoretic.   Eyes:      General: No scleral icterus.     Conjunctiva/sclera: Conjunctivae normal.      Pupils: Pupils are equal, round, and reactive to light.   HENT:      Head: Normocephalic.    Mouth/Throat:      Pharynx: No oropharyngeal exudate.   Neck:      Vascular: No JVR.   Pulmonary:      Effort: Pulmonary effort is normal. No respiratory distress.      Breath sounds: Examination of the right-upper field reveals  rhonchi. Examination of the left-upper field reveals rhonchi. No wheezing. Rhonchi present. No rales.   Chest:      Chest wall: Not tender to palpatation.   Cardiovascular:      Tachycardia present. Irregularly irregular rhythm.   Pulses:     Intact distal pulses.   Edema:     Peripheral edema absent.   Abdominal:      General: Bowel sounds are normal. There is no distension.      Palpations: Abdomen is soft.      Tenderness: There is no abdominal tenderness.   Musculoskeletal: Normal range of motion.      Cervical back: Normal range of motion and neck supple. Skin:     General: Skin is warm and dry.      Coloration: Skin is not pale.      Findings: No erythema or rash.   Neurological:      Mental Status: Alert, oriented to person, place, and time and oriented to person, place and time.      Deep Tendon Reflexes: Reflexes are normal and symmetric.   Psychiatric:         Behavior: Behavior normal.          Results Review:   Lab Results (last 72 hours)     Procedure Component Value Units Date/Time    POC Glucose Once [[id number]]  (Abnormal) Collected: 08/12/22 1140    Specimen: Blood Updated: 08/12/22 1151     Glucose 156 mg/dL      Comment: : [id number] Cole TheodoreTaxiPixi ID: [id number]       POC Glucose Once [[id number]]  (Abnormal) Collected: 08/12/22 0805    Specimen: Blood Updated: 08/12/22 0817     Glucose 170 mg/dL      Comment: : [id number] Cole TheodoreTaxiPixi ID: [id number]       Comprehensive Metabolic Panel [[id number]]  (Abnormal) Collected: 08/12/22 0231    Specimen: Blood Updated: 08/12/22 0341     Glucose 143 mg/dL      BUN 55 mg/dL      Creatinine 1.15 mg/dL      Sodium 142 mmol/L      Potassium 4.8 mmol/L      Chloride 106 mmol/L      CO2 25.0 mmol/L      Calcium 7.9 mg/dL      Total Protein 5.2 g/dL      Albumin 2.60 g/dL      ALT (SGPT) 11 U/L      AST (SGOT) 13 U/L      Alkaline Phosphatase 73 U/L      Total Bilirubin 0.4 mg/dL      Globulin 2.6 gm/dL      A/G Ratio 1.0 g/dL       BUN/Creatinine Ratio 47.8     Anion Gap 11.0 mmol/L      eGFR 69.8 mL/min/1.73      Comment: National Kidney Foundation and American Society of Nephrology (ASN) Task Force recommended calculation based on the Chronic Kidney Disease Epidemiology Collaboration (CKD-EPI) equation refit without adjustment for race.       Narrative:      GFR Normal >60  Chronic Kidney Disease <60  Kidney Failure <15      Amylase [[id number]]  (Normal) Collected: 08/12/22 0231    Specimen: Blood Updated: 08/12/22 0341     Amylase 42 U/L     Lipase [[id number]]  (Normal) Collected: 08/12/22 0231    Specimen: Blood Updated: 08/12/22 0341     Lipase 39 U/L     Phosphorus [[id number]]  (Abnormal) Collected: 08/12/22 0231    Specimen: Blood Updated: 08/12/22 0341     Phosphorus 2.4 mg/dL     Magnesium [[id number]]  (Normal) Collected: 08/12/22 0231    Specimen: Blood Updated: 08/12/22 0341     Magnesium 1.7 mg/dL     TSH [[id number]]  (Normal) Collected: 08/12/22 0231    Specimen: Blood Updated: 08/12/22 0341     TSH 1.880 uIU/mL     CBC & Differential [[id number]]  (Abnormal) Collected: 08/12/22 0231    Specimen: Blood Updated: 08/12/22 0323    Narrative:      The following orders were created for panel order CBC & Differential.  Procedure                               Abnormality         Status                     ---------                               -----------         ------                     CBC Auto Differential[[id number]]        Abnormal            Final result                 Please view results for these tests on the individual orders.    CBC Auto Differential [[id number]]  (Abnormal) Collected: 08/12/22 0231    Specimen: Blood Updated: 08/12/22 0323     WBC 13.64 10*3/mm3      RBC 3.91 10*6/mm3      Hemoglobin 11.5 g/dL      Hematocrit 36.2 %      MCV 92.6 fL      MCH 29.4 pg      MCHC 31.8 g/dL      RDW 14.5 %      RDW-SD 48.7 fl      MPV 12.7 fL      Platelets 128 10*3/mm3      Neutrophil % 86.6 %      Lymphocyte % 4.1 %       Monocyte % 8.7 %      Eosinophil % 0.1 %      Basophil % 0.1 %      Immature Grans % 0.4 %      Neutrophils, Absolute 11.79 10*3/mm3      Lymphocytes, Absolute 0.56 10*3/mm3      Monocytes, Absolute 1.19 10*3/mm3      Eosinophils, Absolute 0.02 10*3/mm3      Basophils, Absolute 0.02 10*3/mm3      Immature Grans, Absolute 0.06 10*3/mm3      nRBC 0.0 /100 WBC     POC Glucose Once [[id number]]  (Abnormal) Collected: 08/11/22 2020    Specimen: Blood Updated: 08/11/22 2032     Glucose 209 mg/dL      Comment: : [id number] Anu  GABRIELeter ID: [id number]       Blood Culture - Blood, Arm, Right [[id number]]  (Normal) Collected: 08/09/22 1800    Specimen: Blood from Arm, Right Updated: 08/11/22 1831     Blood Culture No growth at 2 days    Blood Culture - Blood, Arm, Right [[id number]]  (Normal) Collected: 08/09/22 1600    Specimen: Blood from Arm, Right Updated: 08/11/22 1632     Blood Culture No growth at 2 days    POC Glucose Once [[id number]]  (Normal) Collected: 08/11/22 1615    Specimen: Blood Updated: 08/11/22 1628     Glucose 120 mg/dL      Comment: : [id number] Genovevarafa TheodorenMeter ID: [id number]       POC Glucose Once [[id number]]  (Abnormal) Collected: 08/11/22 1107    Specimen: Blood Updated: 08/11/22 1118     Glucose 228 mg/dL      Comment: : COLE MondragononyMeter ID: [id number]       POC Glucose Once [[id number]]  (Abnormal) Collected: 08/11/22 0637    Specimen: Blood Updated: 08/11/22 0648     Glucose 210 mg/dL      Comment: : [id number] Chavo Reeder ID: [id number]       Phosphorus [[id number]]  (Normal) Collected: 08/11/22 0529    Specimen: Blood Updated: 08/11/22 0603     Phosphorus 3.0 mg/dL     Comprehensive Metabolic Panel [[id number]]  (Abnormal) Collected: 08/11/22 0529    Specimen: Blood Updated: 08/11/22 0601     Glucose 225 mg/dL      BUN 96 mg/dL      Creatinine 1.53 mg/dL      Sodium 142 mmol/L      Potassium 4.9 mmol/L      Chloride 106 mmol/L      CO2 19.0  mmol/L      Calcium 7.9 mg/dL      Total Protein 5.4 g/dL      Albumin 2.70 g/dL      ALT (SGPT) 13 U/L      AST (SGOT) 16 U/L      Alkaline Phosphatase 79 U/L      Total Bilirubin 0.5 mg/dL      Globulin 2.7 gm/dL      A/G Ratio 1.0 g/dL      BUN/Creatinine Ratio 62.7     Anion Gap 17.0 mmol/L      eGFR 49.5 mL/min/1.73      Comment: National Kidney Foundation and American Society of Nephrology (ASN) Task Force recommended calculation based on the Chronic Kidney Disease Epidemiology Collaboration (CKD-EPI) equation refit without adjustment for race.       Narrative:      GFR Normal >60  Chronic Kidney Disease <60  Kidney Failure <15      Magnesium [[id number]]  (Normal) Collected: 08/11/22 0529    Specimen: Blood Updated: 08/11/22 0555     Magnesium 1.9 mg/dL     CBC & Differential [[id number]]  (Abnormal) Collected: 08/11/22 0529    Specimen: Blood Updated: 08/11/22 0545    Narrative:      The following orders were created for panel order CBC & Differential.  Procedure                               Abnormality         Status                     ---------                               -----------         ------                     CBC Auto Differential[[id number]]        Abnormal            Final result                 Please view results for these tests on the individual orders.    CBC Auto Differential [[id number]]  (Abnormal) Collected: 08/11/22 0529    Specimen: Blood Updated: 08/11/22 0545     WBC 18.86 10*3/mm3      RBC 4.14 10*6/mm3      Hemoglobin 12.1 g/dL      Hematocrit 36.4 %      MCV 87.9 fL      MCH 29.2 pg      MCHC 33.2 g/dL      RDW 14.6 %      RDW-SD 46.7 fl      MPV 12.4 fL      Platelets 143 10*3/mm3      Neutrophil % 87.7 %      Lymphocyte % 2.6 %      Monocyte % 8.8 %      Eosinophil % 0.1 %      Basophil % 0.2 %      Immature Grans % 0.6 %      Neutrophils, Absolute 16.56 10*3/mm3      Lymphocytes, Absolute 0.49 10*3/mm3      Monocytes, Absolute 1.66 10*3/mm3      Eosinophils, Absolute 0.01  10*3/mm3      Basophils, Absolute 0.03 10*3/mm3      Immature Grans, Absolute 0.11 10*3/mm3      nRBC 0.0 /100 WBC     POC Glucose Once [[id number]]  (Abnormal) Collected: 08/10/22 2157    Specimen: Blood Updated: 08/10/22 2208     Glucose 177 mg/dL      Comment: : [id number] Chavo BradshawMeter ID: [id number]       POC Glucose Once [[id number]]  (Abnormal) Collected: 08/10/22 1753    Specimen: Blood Updated: 08/10/22 1805     Glucose 188 mg/dL      Comment: : [id number] Gwendolyn SyedMeter ID: [id number]       Peripheral Blood Smear [[id number]] Collected: 08/09/22 1435    Specimen: Blood Updated: 08/10/22 1448     Performed by: Reji Coronel MD     Pathologist Interpretation Peripheral blood smear:  Moderate leukocytosis with absolute neutrophilia.  Otherwise within normal limits.     POC Glucose Once [[id number]]  (Normal) Collected: 08/10/22 1113    Specimen: Blood Updated: 08/10/22 1124     Glucose 117 mg/dL      Comment: : [id number] Gwendolyn SyedMeter ID: [id number]       POC Glucose Once [[id number]]  (Abnormal) Collected: 08/10/22 0940    Specimen: Blood Updated: 08/10/22 0951     Glucose 131 mg/dL      Comment: : [id number] Abebe Duarte) [place] ID: [id number]       Basic Metabolic Panel [[id number]]  (Abnormal) Collected: 08/10/22 0758    Specimen: Blood Updated: 08/10/22 0839     Glucose 143 mg/dL       mg/dL      Creatinine 3.05 mg/dL      Sodium 140 mmol/L      Potassium 4.6 mmol/L      Chloride 105 mmol/L      CO2 23.0 mmol/L      Calcium 8.0 mg/dL      BUN/Creatinine Ratio 53.4     Anion Gap 12.0 mmol/L      eGFR 21.6 mL/min/1.73      Comment: National Kidney Foundation and American Society of Nephrology (ASN) Task Force recommended calculation based on the Chronic Kidney Disease Epidemiology Collaboration (CKD-EPI) equation refit without adjustment for race.       Narrative:      GFR Normal >60  Chronic Kidney Disease <60  Kidney Failure <15      Phosphorus [[id number]]   (Abnormal) Collected: 08/10/22 0758    Specimen: Blood Updated: 08/10/22 0828     Phosphorus 4.6 mg/dL     Magnesium [[id number]]  (Abnormal) Collected: 08/10/22 0758    Specimen: Blood Updated: 08/10/22 0828     Magnesium 2.5 mg/dL     POC Glucose Once [[id number]]  (Normal) Collected: 08/10/22 0743    Specimen: Blood Updated: 08/10/22 0803     Glucose 129 mg/dL      Comment: : [id number] Gwendolyn SyedMeter ID: [id number]       POC Glucose Once [[id number]]  (Abnormal) Collected: 08/10/22 0622    Specimen: Blood Updated: 08/10/22 0633     Glucose 151 mg/dL      Comment: : [id number] Triston TimMeter ID: [id number]       Basic Metabolic Panel [[id number]]  (Abnormal) Collected: 08/10/22 0534    Specimen: Blood Updated: 08/10/22 0610     Glucose 162 mg/dL       mg/dL      Creatinine 3.33 mg/dL      Sodium 139 mmol/L      Potassium 5.0 mmol/L      Chloride 105 mmol/L      CO2 19.0 mmol/L      Calcium 8.4 mg/dL      BUN/Creatinine Ratio 52.9     Anion Gap 15.0 mmol/L      eGFR 19.5 mL/min/1.73      Comment: National Kidney Foundation and American Society of Nephrology (ASN) Task Force recommended calculation based on the Chronic Kidney Disease Epidemiology Collaboration (CKD-EPI) equation refit without adjustment for race.       Narrative:      GFR Normal >60  Chronic Kidney Disease <60  Kidney Failure <15      Phosphorus [[id number]]  (Abnormal) Collected: 08/10/22 0534    Specimen: Blood Updated: 08/10/22 0559     Phosphorus 5.4 mg/dL     Magnesium [[id number]]  (Abnormal) Collected: 08/10/22 0534    Specimen: Blood Updated: 08/10/22 0559     Magnesium 2.6 mg/dL     CBC & Differential [[id number]]  (Abnormal) Collected: 08/10/22 0534    Specimen: Blood Updated: 08/10/22 0547    Narrative:      The following orders were created for panel order CBC & Differential.  Procedure                               Abnormality         Status                     ---------                               -----------          ------                     CBC Auto Differential[[id number]]        Abnormal            Final result                 Please view results for these tests on the individual orders.    CBC Auto Differential [[id number]]  (Abnormal) Collected: 08/10/22 0534    Specimen: Blood Updated: 08/10/22 0547     WBC 25.47 10*3/mm3      RBC 4.40 10*6/mm3      Hemoglobin 13.2 g/dL      Hematocrit 36.4 %      MCV 82.7 fL      MCH 30.0 pg      MCHC 36.3 g/dL      RDW 14.2 %      RDW-SD 42.6 fl      MPV 12.0 fL      Platelets 168 10*3/mm3      Neutrophil % 88.5 %      Lymphocyte % 2.0 %      Monocyte % 9.0 %      Eosinophil % 0.0 %      Basophil % 0.1 %      Immature Grans % 0.4 %      Neutrophils, Absolute 22.53 10*3/mm3      Lymphocytes, Absolute 0.50 10*3/mm3      Monocytes, Absolute 2.29 10*3/mm3      Eosinophils, Absolute 0.01 10*3/mm3      Basophils, Absolute 0.03 10*3/mm3      Immature Grans, Absolute 0.11 10*3/mm3      nRBC 0.0 /100 WBC     POC Glucose Once [[id number]]  (Abnormal) Collected: 08/10/22 0531    Specimen: Blood Updated: 08/10/22 0542     Glucose 149 mg/dL      Comment: : [id number] GearBoxMeter ID: [id number]       POC Glucose Once [[id number]]  (Abnormal) Collected: 08/10/22 0429    Specimen: Blood Updated: 08/10/22 0440     Glucose 159 mg/dL      Comment: : [id number] GearBoxMeter ID: [id number]       POC Glucose Once [[id number]]  (Abnormal) Collected: 08/10/22 0329    Specimen: Blood Updated: 08/10/22 0340     Glucose 173 mg/dL      Comment: : [id number] GearBoxM[employer] ID: [id number]       Creatinine, Urine, Random - Urine, Clean Catch [[id number]] Collected: 08/09/22 1841    Specimen: Urine, Clean Catch Updated: 08/10/22 0315     Creatinine, Urine 88.0 mg/dL     Narrative:      Reference intervals for random urine have not been established.  Clinical usage is dependent upon physician's interpretation in combination with other laboratory tests.       POC Glucose Once [[id number]]   (Abnormal) Collected: 08/10/22 0232    Specimen: Blood Updated: 08/10/22 0243     Glucose 181 mg/dL      Comment: : [id number] Goldstein JosephMeter ID: [id number]       POC Glucose Once [[id number]]  (Abnormal) Collected: 08/10/22 0135    Specimen: Blood Updated: 08/10/22 0146     Glucose 187 mg/dL      Comment: : [id number] [place] MedicalMeter ID: [id number]       Microalbumin / Creatinine Urine Ratio - Urine, Clean Catch [[id number]] Collected: 08/09/22 1841    Specimen: Urine, Clean Catch Updated: 08/10/22 0110     Microalbumin/Creatinine Ratio 95.5 mg/g      Creatinine, Urine 88.0 mg/dL      Microalbumin, Urine 8.4 mg/dL     POC Glucose Once [[id number]]  (Abnormal) Collected: 08/10/22 0025    Specimen: Blood Updated: 08/10/22 0037     Glucose 177 mg/dL      Comment: : [id number] [place] MedicalMeter ID: [id number]       Comprehensive Metabolic Panel [[id number]]  (Abnormal) Collected: 08/09/22 2328    Specimen: Blood Updated: 08/10/22 0010     Glucose 199 mg/dL       mg/dL      Creatinine 4.45 mg/dL      Sodium 135 mmol/L      Potassium 4.9 mmol/L      Chloride 102 mmol/L      CO2 16.0 mmol/L      Calcium 8.5 mg/dL      Total Protein 4.7 g/dL      Albumin 2.80 g/dL      ALT (SGPT) 13 U/L      AST (SGOT) 18 U/L      Alkaline Phosphatase 76 U/L      Total Bilirubin 0.3 mg/dL      Globulin 1.9 gm/dL      A/G Ratio 1.5 g/dL      BUN/Creatinine Ratio 45.8     Anion Gap 17.0 mmol/L      eGFR 13.8 mL/min/1.73      Comment: <15 Indicative of kidney failure       Narrative:      GFR Normal >60  Chronic Kidney Disease <60  Kidney Failure <15      Phosphorus [[id number]]  (Abnormal) Collected: 08/09/22 2328    Specimen: Blood Updated: 08/10/22 0002     Phosphorus 7.1 mg/dL     STAT Lactic Acid, Reflex [[id number]]  (Normal) Collected: 08/09/22 2328    Specimen: Blood Updated: 08/09/22 2354     Lactate 1.5 mmol/L     Lipase [[id number]]  (Abnormal) Collected: 08/09/22 2328    Specimen: Blood Updated: 08/09/22 1059      Lipase 66 U/L     Magnesium [[id number]]  (Abnormal) Collected: 08/09/22 2328    Specimen: Blood Updated: 08/09/22 2351     Magnesium 2.7 mg/dL     POC Glucose Once [[id number]]  (Abnormal) Collected: 08/09/22 2325    Specimen: Blood Updated: 08/09/22 2336     Glucose 193 mg/dL      Comment: : [id number] [employer]Whe[employer] ID: [id number]       POC Glucose Once [[id number]]  (Abnormal) Collected: 08/09/22 2231    Specimen: Blood Updated: 08/09/22 2242     Glucose 207 mg/dL      Comment: : [id number] [employer] ID: [id number]       POC Glucose Once [[id number]]  (Abnormal) Collected: 08/09/22 2140    Specimen: Blood Updated: 08/09/22 2151     Glucose 251 mg/dL      Comment: : [id number] [employer] ID: [id number]       Basic Metabolic Panel [[id number]]  (Abnormal) Collected: 08/09/22 2022    Specimen: Blood Updated: 08/09/22 2106     Glucose 315 mg/dL       mg/dL      Creatinine 5.36 mg/dL      Sodium 132 mmol/L      Potassium 5.3 mmol/L      Chloride 95 mmol/L      CO2 13.0 mmol/L      Calcium 9.6 mg/dL      BUN/Creatinine Ratio 42.4     Anion Gap 24.0 mmol/L      eGFR 11.0 mL/min/1.73      Comment: <15 Indicative of kidney failure       Narrative:      GFR Normal >60  Chronic Kidney Disease <60  Kidney Failure <15      STAT Lactic Acid, Reflex [[id number]]  (Abnormal) Collected: 08/09/22 2022    Specimen: Blood Updated: 08/09/22 2055     Lactate 2.4 mmol/L     Phosphorus [[id number]]  (Abnormal) Collected: 08/09/22 2022    Specimen: Blood Updated: 08/09/22 2051     Phosphorus 9.7 mg/dL     Creatinine, Serum [[id number]]  (Abnormal) Collected: 08/09/22 2022    Specimen: Blood Updated: 08/09/22 2048     Creatinine 5.36 mg/dL      eGFR 11.0 mL/min/1.73      Comment: <15 Indicative of kidney failure       Narrative:      GFR Normal >60  Chronic Kidney Disease <60  Kidney Failure <15      Magnesium [[id number]]  (Abnormal) Collected: 08/09/22 2022    Specimen: Blood Updated: 08/09/22 2048      Magnesium 3.0 mg/dL     POC Glucose Once [[id number]]  (Abnormal) Collected: 08/09/22 2032    Specimen: Blood Updated: 08/09/22 2043     Glucose 297 mg/dL      Comment: : [id number] TimMeter ID: [id number]       Protime-INR [[id number]]  (Abnormal) Collected: 08/09/22 2022    Specimen: Blood Updated: 08/09/22 2039     Protime 19.7 Seconds      INR 1.74    Urinalysis, Microscopic Only - Urine, Clean Catch [[id number]]  (Abnormal) Collected: 08/09/22 1841    Specimen: Urine, Clean Catch Updated: 08/09/22 1924     RBC, UA 3-5 /HPF      WBC, UA 0-2 /HPF      Comment: Urine culture not indicated.        Bacteria, UA None Seen /HPF      Squamous Epithelial Cells, UA 0-2 /HPF      Hyaline Casts, UA 7-12 /LPF      Methodology Automated Microscopy    Urinalysis With Culture If Indicated - Urine, Clean Catch [[id number]]  (Abnormal) Collected: 08/09/22 1841    Specimen: Urine, Clean Catch Updated: 08/09/22 1914     Color, UA Yellow     Appearance, UA Clear     pH, UA <=5.0     Specific Gravity, UA 1.016     Glucose,  mg/dL (Trace)     Ketones, UA Negative     Bilirubin, UA Negative     Blood, UA Moderate (2+)     Protein, UA 30 mg/dL (1+)     Leuk Esterase, UA Negative     Nitrite, UA Negative     Urobilinogen, UA 0.2 E.U./dL    Narrative:      In absence of clinical symptoms, the presence of pyuria, bacteria, and/or nitrites on the urinalysis result does not correlate with infection.    Sodium, Urine, Random - Urine, Clean Catch [[id number]] Collected: 08/09/22 1841    Specimen: Urine, Clean Catch Updated: 08/09/22 1909     Sodium, Urine 52 mmol/L     Narrative:      Reference intervals for random urine have not been established.  Clinical usage is dependent upon physician's interpretation in combination with other laboratory tests.       Protein, Urine, Random - Urine, Clean Catch [[id number]] Collected: 08/09/22 1841    Specimen: Urine, Clean Catch Updated: 08/09/22 1908     Total Protein, Urine 25.4 mg/dL      Narrative:      Reference intervals for random urine have not been established.  Clinical usage is dependent upon physician's interpretation in combination with other laboratory tests.       Urine Drug Screen - Urine, Clean Catch [[id number]]  (Normal) Collected: 08/09/22 1841    Specimen: Urine, Clean Catch Updated: 08/09/22 1904     THC, Screen, Urine Negative     Phencyclidine (PCP), Urine Negative     Cocaine Screen, Urine Negative     Methamphetamine, Ur Negative     Opiate Screen Negative     Amphetamine Screen, Urine Negative     Benzodiazepine Screen, Urine Negative     Tricyclic Antidepressants Screen Negative     Methadone Screen, Urine Negative     Barbiturates Screen, Urine Negative     Oxycodone Screen, Urine Negative     Propoxyphene Screen Negative     Buprenorphine, Screen, Urine Negative    Narrative:      Cutoff For Drugs Screened:    Amphetamines               500 ng/ml  Barbiturates               200 ng/ml  Benzodiazepines            150 ng/ml  Cocaine                    150 ng/ml  Methadone                  200 ng/ml  Opiates                    100 ng/ml  Phencyclidine               25 ng/ml  THC                            50 ng/ml  Methamphetamine            500 ng/ml  Tricyclic Antidepressants  300 ng/ml  Oxycodone                  100 ng/ml  Propoxyphene               300 ng/ml  Buprenorphine               10 ng/ml    The normal value for all drugs tested is negative. This report includes unconfirmed screening results, with the cutoff values listed, to be used for medical treatment purposes only.  Unconfirmed results must not be used for non-medical purposes such as employment or legal testing.  Clinical consideration should be applied to any drug of abuse test, particularly when unconfirmed results are used.      Miles Draw [[id number]] Collected: 08/09/22 1435    Specimen: Blood Updated: 08/09/22 1848    Narrative:      The following orders were created for panel order Miles  Draw.  Procedure                               Abnormality         Status                     ---------                               -----------         ------                     Green Top (Gel)[[id number]]                                  Final result               Lavender Top[[id number]]                                     Final result               Red Top[[id number]]                                          Final result               Grande Top[[id number]]                                         Final result               Light Blue Top[[id number]]                                   Final result                 Please view results for these tests on the individual orders.    Gray Top [[id number]] Collected: 08/09/22 1435    Specimen: Blood Updated: 08/09/22 1848     Extra Tube Hold for add-ons.     Comment: Auto resulted.       Basic Metabolic Panel [[id number]]  (Abnormal) Collected: 08/09/22 1758    Specimen: Blood Updated: 08/09/22 1843     Glucose 340 mg/dL       mg/dL      Creatinine 6.21 mg/dL      Sodium 131 mmol/L      Potassium 5.7 mmol/L      Comment: Slight hemolysis detected by analyzer. Results may be affected.        Chloride 91 mmol/L      CO2 11.0 mmol/L      Calcium 9.2 mg/dL      BUN/Creatinine Ratio 39.5     Anion Gap 29.0 mmol/L      eGFR 9.2 mL/min/1.73      Comment: <15 Indicative of kidney failure       Narrative:      GFR Normal >60  Chronic Kidney Disease <60  Kidney Failure <15      STAT Lactic Acid, Reflex [[id number]]  (Abnormal) Collected: 08/09/22 1758    Specimen: Blood Updated: 08/09/22 1838     Lactate 3.9 mmol/L     Troponin [[id number]]  (Abnormal) Collected: 08/09/22 1758    Specimen: Blood Updated: 08/09/22 1832     Troponin T 0.054 ng/mL     Narrative:      Troponin T Reference Range:  <= 0.03 ng/mL-   Negative for AMI  >0.03 ng/mL-     Abnormal for myocardial necrosis.  Clinicians would have to utilize clinical acumen, EKG, Troponin and serial changes to determine if it is an  Acute Myocardial Infarction or myocardial injury due to an underlying chronic condition.       Results may be falsely decreased if patient taking Biotin.      Uric Acid [[id number]]  (Abnormal) Collected: 08/09/22 1435    Specimen: Blood Updated: 08/09/22 1717     Uric Acid 15.4 mg/dL     Hemoglobin A1c [[id number]]  (Abnormal) Collected: 08/09/22 1435    Specimen: Blood Updated: 08/09/22 1641     Hemoglobin A1C 9.30 %     Narrative:      Hemoglobin A1C Ranges:    Increased Risk for Diabetes  5.7% to 6.4%  Diabetes                     >= 6.5%  Diabetic Goal                < 7.0%    Osmolality, Serum [[id number]]  (Abnormal) Collected: 08/09/22 1435    Specimen: Blood Updated: 08/09/22 1636     Osmolality 381 mOsm/kg     COVID-19,[facility],Nasal Swab No Transport Media 3-4 HR TAT - Swab, Nasal Cavity [[id number]]  (Normal) Collected: 08/09/22 1444    Specimen: Swab from Nasal Cavity Updated: 08/09/22 1623     COVID19 Not Detected    Narrative:      Fact sheet for providers: https://www.fda.gov/media/009603/download     Fact sheet for patients: https://www.fda.gov/media/306508/download    Test performed by PCR.    Consider negative results in combination with clinical observations, patient history, and epidemiological information.  Fact sheet for providers: https://www.fda.gov/media/770132/download     Fact sheet for patients: https://www.fda.gov/media/478990/download    Test performed by PCR.    Consider negative results in combination with clinical observations, patient history, and epidemiological information.    Salicylate Level [[id number]]  (Normal) Collected: 08/09/22 1435    Specimen: Blood Updated: 08/09/22 1617     Salicylate <0.3 mg/dL     Acetaminophen Level [[id number]]  (Normal) Collected: 08/09/22 1435    Specimen: Blood Updated: 08/09/22 1617     Acetaminophen <5.0 mcg/mL     Blood Gas, Venous - [[id number]]  (Abnormal) Collected: 08/09/22 1553    Specimen: Venous Blood Updated: 08/09/22 1553     Site  OTHER     pH, Venous 7.268 pH Units      pCO2, Venous 34.8 mm Hg      Comment: 84 Value below reference range        pO2, Venous 26.9 mm Hg      Comment: 84 Value below reference range        HCO3, Venous 15.9 mmol/L      Comment: 84 Value below reference range        Base Excess, Venous -10.1 mmol/L      Comment: 84 Value below reference range        O2 Saturation, Venous 44.3 %      Comment: 84 Value below reference range        Temperature 37.0 C      Barometric Pressure for Blood Gas 750 mmHg      Modality Aerosol Mask     Flow Rate 6.0 lpm      Ventilator Mode NA     Collected by [id number]     Comment: Meter: [id number]     :  [id number]       Lavender Top [[id number]] Collected: 08/09/22 1435    Specimen: Blood Updated: 08/09/22 1548     Extra Tube hold for add-on     Comment: Auto resulted       Green Top (Gel) [[id number]] Collected: 08/09/22 1435    Specimen: Blood Updated: 08/09/22 1548     Extra Tube Hold for add-ons.     Comment: Auto resulted.       Red Top [[id number]] Collected: 08/09/22 1435    Specimen: Blood Updated: 08/09/22 1548     Extra Tube Hold for add-ons.     Comment: Auto resulted.       Light Blue Top [[id number]] Collected: 08/09/22 1435    Specimen: Blood Updated: 08/09/22 1548     Extra Tube Hold for add-ons.     Comment: Auto resulted       Comprehensive Metabolic Panel [[id number]]  (Abnormal) Collected: 08/09/22 1435    Specimen: Blood Updated: 08/09/22 1530     Glucose 329 mg/dL       mg/dL      Creatinine 6.96 mg/dL      Sodium 127 mmol/L      Potassium 6.5 mmol/L      Chloride 86 mmol/L      CO2 11.0 mmol/L      Calcium 8.8 mg/dL      Total Protein 6.3 g/dL      Albumin 3.50 g/dL      ALT (SGPT) 13 U/L      AST (SGOT) 13 U/L      Alkaline Phosphatase 97 U/L      Total Bilirubin 0.4 mg/dL      Globulin 2.8 gm/dL      A/G Ratio 1.3 g/dL      BUN/Creatinine Ratio 36.5     Anion Gap 30.0 mmol/L      eGFR 8.0 mL/min/1.73      Comment: <15 Indicative of kidney failure        Narrative:      GFR Normal >60  Chronic Kidney Disease <60  Kidney Failure <15      Manual Differential [[id number]]  (Abnormal) Collected: 08/09/22 1435    Specimen: Blood Updated: 08/09/22 1526     Neutrophil % 96.8 %      Lymphocyte % 1.6 %      Monocyte % 1.6 %      Neutrophils Absolute 34.50 10*3/mm3      Lymphocytes Absolute 0.57 10*3/mm3      Monocytes Absolute 0.57 10*3/mm3      Crenated RBC's Slight/1+     Poikilocytes Slight/1+     WBC Morphology Normal     Platelet Morphology Normal    CBC & Differential [[id number]]  (Abnormal) Collected: 08/09/22 1435    Specimen: Blood Updated: 08/09/22 1526    Narrative:      The following orders were created for panel order CBC & Differential.  Procedure                               Abnormality         Status                     ---------                               -----------         ------                     CBC Auto Differential[[id number]]        Abnormal            Final result                 Please view results for these tests on the individual orders.    CBC Auto Differential [[id number]]  (Abnormal) Collected: 08/09/22 1435    Specimen: Blood Updated: 08/09/22 1526     WBC 35.64 10*3/mm3      RBC 5.01 10*6/mm3      Hemoglobin 15.0 g/dL      Hematocrit 42.9 %      MCV 85.6 fL      MCH 29.9 pg      MCHC 35.0 g/dL      RDW 14.4 %      RDW-SD 44.9 fl      MPV 12.2 fL      Platelets 188 10*3/mm3     Procalcitonin [[id number]]  (Abnormal) Collected: 08/09/22 1435    Specimen: Blood Updated: 08/09/22 1520     Procalcitonin 0.62 ng/mL     Narrative:      As a Marker for Sepsis (Non-Neonates):    1. <0.5 ng/mL represents a low risk of severe sepsis and/or septic shock.  2. >2 ng/mL represents a high risk of severe sepsis and/or septic shock.    As a Marker for Lower Respiratory Tract Infections that require antibiotic therapy:    PCT on Admission    Antibiotic Therapy       6-12 Hrs later    >0.5                Strongly Recommended  >0.25 - <0.5        Recommended  "  0.1 - 0.25          Discouraged              Remeasure/reassess PCT  <0.1                Strongly Discouraged     Remeasure/reassess PCT    As 28 day mortality risk marker: \"Change in Procalcitonin Result\" (>80% or <=80%) if Day 0 (or Day 1) and Day 4 values are available. Refer to http://www.[facility] [place]pct[facility]cal[facility]    Change in PCT <=80%  A decrease of PCT levels below or equal to 80% defines a positive change in PCT test result representing a higher risk for 28-day all-cause mortality of patients diagnosed with severe sepsis for septic shock.    Change in PCT >80%  A decrease of PCT levels of more than 80% defines a negative change in PCT result representing a lower risk for 28-day all-cause mortality of patients diagnosed with severe sepsis or septic shock.       Troponin [[id number]]  (Abnormal) Collected: 08/09/22 1435    Specimen: Blood Updated: 08/09/22 1516     Troponin T 0.062 ng/mL     Narrative:      Troponin T Reference Range:  <= 0.03 ng/mL-   Negative for AMI  >0.03 ng/mL-     Abnormal for myocardial necrosis.  Clinicians would have to utilize clinical acumen, EKG, Troponin and serial changes to determine if it is an Acute Myocardial Infarction or myocardial injury due to an underlying chronic condition.       Results may be falsely decreased if patient taking Biotin.      Lactic Acid, Plasma [[id number]]  (Abnormal) Collected: 08/09/22 1435    Specimen: Blood Updated: 08/09/22 1516     Lactate 4.1 mmol/L     Lipase [[id number]]  (Abnormal) Collected: 08/09/22 1435    Specimen: Blood Updated: 08/09/22 1511     Lipase 61 U/L     Magnesium [[id number]]  (Abnormal) Collected: 08/09/22 1435    Specimen: Blood Updated: 08/09/22 1510     Magnesium 2.9 mg/dL               Echo EF Estimated  Lab Results   Component Value Date    ECHOEFEST 50 08/11/2022         Cath Ejection Fraction Quantitative  No results found for: CATHEF        Medication Review: yes  Current Facility-Administered Medications   Medication " Dose Route Frequency Provider Last Rate Last Admin   • acetaminophen (TYLENOL) tablet 650 mg  650 mg Oral Q4H PRN Edwin Du DO       • aluminum-magnesium hydroxide-simethicone (MAALOX MAX) 400-400-40 MG/5ML suspension 15 mL  15 mL Oral Q6H PRN Edwin Du DO       • amiodarone (PACERONE) tablet 200 mg  200 mg Oral Q12H Ben Mckeon MD   200 mg at 08/12/22 0832   • apixaban (ELIQUIS) tablet 5 mg  5 mg Oral Q12H Ben Mckeon MD   5 mg at 08/12/22 0832   • dextrose (D50W) (25 g/50 mL) IV injection 25 g  25 g Intravenous Q15 Min PRN Edwin Du DO       • dextrose (GLUTOSE) oral gel 15 g  15 g Oral Q15 Min PRN Edwin Du DO       • glucagon (human recombinant) (GLUCAGEN DIAGNOSTIC) injection 1 mg  1 mg Intramuscular Q15 Min PRN Edwin Du DO       • hydrOXYzine (ATARAX) tablet 50 mg  50 mg Oral Nightly PRN Edwin Du DO       • Insulin Lispro (humaLOG) injection 0-9 Units  0-9 Units Subcutaneous TID AC Edwin Du DO   2 Units at 08/12/22 1214   • ondansetron (ZOFRAN) injection 4 mg  4 mg Intravenous Q6H PRN Edwin Du DO   4 mg at 08/12/22 1024   • pantoprazole (PROTONIX) EC tablet 40 mg  40 mg Oral BID AC Ellis Rey MD       • sennosides-docusate (PERICOLACE) 8.6-50 MG per tablet 1 tablet  1 tablet Oral BID Ellis Rey MD   1 tablet at 08/12/22 1213   • sodium chloride 0.9 % flush 10 mL  10 mL Intravenous Q12H Edwin Du DO   10 mL at 08/12/22 0831   • sodium chloride 0.9 % flush 10 mL  10 mL Intravenous PRN Edwin Du DO       • sucralfate (CARAFATE) 1 GM/10ML suspension 1 g  1 g Oral 4x Daily AC & at Bedtime Ellis Rey MD   1 g at 08/12/22 1213       Assessment & Plan       Acute renal failure (HCC)    Atrial fibrillation with rapid ventricular response (HCC)    Hypertension    Diabetes mellitus (HCC)    Plan:   Afib with RVR: intermittent episodes on tele with rates semi controlled   while in afib. Continue Amiodarone 200mg BID and Eliquis 5mg BID. [id number] 3.     HTN: controlled.     Acute renal failure: improved with hydration. creatinine normal today. Nephrology is following.     Further recommendations per Dr. Mckeon     Electronically signed by MARKELL Simon, 08/12/22, 2:11 PM CDT.

## 2022-08-12 NOTE — THERAPY EVALUATION
Patient Name: Chito Govea  : 1955    MRN: [id number]                              Today's Date: 2022       Admit Date: 2022    Visit Dx:     ICD-10-CM ICD-9-CM   1. Sepsis, due to unspecified organism, unspecified whether acute organ dysfunction present (HCC)  A41.9 038.9     995.91   2. CONSUELO (acute kidney injury) (HCC)  N17.9 584.9   3. Hyponatremia  E87.1 276.1   4. Hyperkalemia  E87.5 276.7   5. Atrial fibrillation with RVR (HCC)  I48.91 427.31   6. Leukocytosis, unspecified type  D72.829 288.60   7. Diabetic ketoacidosis without coma associated with type 1 diabetes mellitus (HCC)  E10.10 250.13   8. Impaired functional mobility and activity tolerance  Z74.09 V49.89   9. Impaired mobility and ADLs  Z74.09 V49.89    Z78.9      Patient Active Problem List   Diagnosis   • Atrial fibrillation with rapid ventricular response (HCC)   • Acute renal failure (HCC)   • Hypertension   • Diabetes mellitus (HCC)     Past Medical History:   Diagnosis Date   • Arthritis    • Diabetes mellitus (HCC)    • HLD (hyperlipidemia)    • Hypertension      No past surgical history on file.   General Information     Row Name 22 0941          OT Time and Intention    Document Type evaluation  Pt presented with n/v, weakness, abd pain and chest pain. Dx: sepsis, CONSUELO, hyponatremia, hyperkalemia, a-fib, RVR, DKA without coma, ARF.  -JJ     Mode of Treatment occupational therapy  -JJ     Row Name 22          General Information    Patient Profile Reviewed yes  -JJ     Prior Level of Function independent:;all household mobility;transfer;bed mobility;ADL's  prior to one month ago. Gradually decline in independence and mobility.  -JJ     Existing Precautions/Restrictions fall  -JJ     Barriers to Rehab medically complex  -JJ     Row Name 22          Living Environment    People in Home child(juana), adult  -JJ     Row Name 22          Cognition    Orientation Status (Cognition) oriented x 4   -     Row Name 08/12/22 0941          Safety Issues, Functional Mobility    Impairments Affecting Function (Mobility) balance;endurance/activity tolerance;strength;range of motion (ROM)  -           User Key  (r) = Recorded By, (t) = Taken By, (c) = Cosigned By    Initials Name Provider Type     Radha Nevarez OTR/L, CSRS Occupational Therapist                 Mobility/ADL's     Row Name 08/12/22 0941          Bed Mobility    Bed Mobility supine-sit  -     Supine-Sit [place] (Bed Mobility) contact guard;verbal cues  -     Assistive Device (Bed Mobility) head of bed elevated;bed rails  -     Row Name 08/12/22 0941          Transfers    Transfers sit-stand transfer;stand-sit transfer  -     Sit-Stand [place] (Transfers) contact guard;minimum assist (75% patient effort)  -     Stand-Sit [place] (Transfers) contact guard;minimum assist (75% patient effort)  -[facility] Name 08/12/22 0941          Functional Mobility    Functional Mobility- Ind. Level contact guard assist;minimum assist (75% patient effort)  -     Functional Mobility- Device --  HHAx2  -     Functional Mobility- Safety Issues step length decreased;loses balance backward  -     Functional Mobility- Comment bed to window and back to chair.  -     Row Name 08/12/22 0941          Activities of Daily Living    BADL Assessment/Intervention lower body dressing  -[facility] Name 08/12/22 0941          Lower Body Dressing Assessment/Training    [place] Level (Lower Body Dressing) don;doff;socks;maximum assist (25% patient effort)  -     Position (Lower Body Dressing) edge of bed sitting  -           User Key  (r) = Recorded By, (t) = Taken By, (c) = Cosigned By    Initials Name Provider Type     Radha Nevarez OTR/L, CSRS Occupational Therapist               Obj/Interventions     Row Name 08/12/22 0941          Sensory Assessment (Somatosensory)    Sensory Assessment (Somatosensory) UE sensation intact   -[facility] Name 08/12/22 0941          Range of Motion Comprehensive    General Range of Motion bilateral upper extremity ROM WFL  -JJ     Comment, General Range of Motion except for limited flexion of MP, DIPs and PIPs - limited approx 25%. Pt reports this has been going on approx 2-3 months.  -[facility] Name 08/12/22 0941          Strength Comprehensive (MMT)    Comment, General Manual Muscle Testing (MMT) Assessment B UE strength 4/5, except for L  strength 2-/5  -[facility] Name 08/12/22 0941          Balance    Balance Assessment sitting static balance;standing dynamic balance;standing static balance  -JJ     Static Sitting Balance standby assist  -JJ     Position, Sitting Balance unsupported;sitting edge of bed  -JJ     Static Standing Balance contact guard  -JJ     Dynamic Standing Balance contact guard;minimal assist  -JJ     Position/Device Used, Standing Balance supported  HHAx1  -JJ           User Key  (r) = Recorded By, (t) = Taken By, (c) = Cosigned By    Initials Name Provider Type    Radha Saenz, OTR/L, CSRS Occupational Therapist               Goals/Plan     Row Name 08/12/22 0941          Dressing Goal 1 (OT)    Activity/Device (Dressing Goal 1, OT) lower body dressing  -JJ     [place]/Cues Needed (Dressing Goal 1, OT) minimum assist (75% or more patient effort)  -JJ     Time Frame (Dressing Goal 1, OT) long term goal (LTG);by discharge  -JJ     Progress/Outcome (Dressing Goal 1, OT) new goal  -JJ     Row Name 08/12/22 0941          Toileting Goal 1 (OT)    Activity/Device (Toileting Goal 1, OT) toileting skills, all  -JJ     [place] Level/Cues Needed (Toileting Goal 1, OT) supervision required  -JJ     Time Frame (Toileting Goal 1, OT) long term goal (LTG);by discharge  -JJ     Progress/Outcome (Toileting Goal 1, OT) new goal  -JJ     Row Name 08/12/22 0941          Therapy Assessment/Plan (OT)    Planned Therapy Interventions (OT) activity tolerance training;adaptive  equipment training;BADL retraining;functional balance retraining;occupation/activity based interventions;patient/caregiver education/training;transfer/mobility retraining;strengthening exercise  -           User Key  (r) = Recorded By, (t) = Taken By, (c) = Cosigned By    Initials Name Provider Type    Radha Saenz, OTR/L, CSRS Occupational Therapist               Clinical Impression     Row Name 08/12/22 0941          Pain Assessment    Pretreatment Pain Rating 0/10 - no pain  -JJ     Posttreatment Pain Rating 0/10 - no pain  -J     Row Name 08/12/22 0941          Plan of Care Review    Plan of Care Reviewed With patient  -     Outcome Evaluation OT eval completed. Pt presents alert and oriented x4, c/o nausea and weakness. He reports at baseline being independent with all adls and mobility, but over the past month has required increased assistance d/t continued dizziness and weakness. Today he demonstrates decreased strength, balance, activity tolerance, and ROM of L hand. He was able to bring self to sitting at EOB with CGA. Required Max A to don/doff socks d/t weakness. CGA for sit <> stand t/f from EOB and CGA/Min A for short distance in room mobility with HHAx2. He demonstrates limited ROM of digits of L hand and decreased strength, he reports this has been on going for the last several months. He would benefit from skilled OT services to address these deficits. Recommend d/c to SNF for continued skilled therapy services, pending pt progress.  -     Row Name 08/12/22 0941          Therapy Assessment/Plan (OT)    Rehab Potential (OT) good, to achieve stated therapy goals  -     Criteria for Skilled Therapeutic Interventions Met (OT) yes;skilled treatment is necessary  -     Therapy Frequency (OT) 5 times/wk  -J     Predicted Duration of Therapy Intervention (OT) 10 days  -     Row Name 08/12/22 0941          Therapy Plan Review/Discharge Plan (OT)    Anticipated Discharge Disposition (OT)  skilled nursing facility  -     Row Name 08/12/22 0941          Positioning and Restraints    Pre-Treatment Position in bed  -     Post Treatment Position chair  -     In Chair notified nsg;reclined;call light within reach;encouraged to call for assist;patient within staff view  -           User Key  (r) = Recorded By, (t) = Taken By, (c) = Cosigned By    Initials Name Provider Type    Radha Saenz OTR/L, GIOVANNA Occupational Therapist               Outcome Measures     Row Name 08/12/22 0941          How much help from another is currently needed...    Putting on and taking off regular lower body clothing? 2  -JJ     Bathing (including washing, rinsing, and drying) 2  -JJ     Toileting (which includes using toilet bed pan or urinal) 3  -JJ     Putting on and taking off regular upper body clothing 3  -JJ     Taking care of personal grooming (such as brushing teeth) 4  -JJ     Eating meals 4  -     AM-PAC 6 Clicks Score (OT) 18  -     Row Name 08/12/22 0941          Functional Assessment    Outcome Measure Options AM-PAC 6 Clicks Daily Activity (OT)  -           User Key  (r) = Recorded By, (t) = Taken By, (c) = Cosigned By    Initials Name Provider Type    Radha Saenz OTR/L, GIOVANNA Occupational Therapist                Occupational Therapy Education                 Title: PT OT SLP Therapies (In Progress)     Topic: Occupational Therapy (In Progress)     Point: ADL training (Done)     Description:   Instruct learner(s) on proper safety adaptation and remediation techniques during self care or transfers.   Instruct in proper use of assistive devices.              Learning Progress Summary           Patient Acceptance, E, VU by STEPHAN at 8/12/2022 1317                   Point: Home exercise program (Not Started)     Description:   Instruct learner(s) on appropriate technique for monitoring, assisting and/or progressing therapeutic exercises/activities.              Learner Progress:  Not  documented in this visit.          Point: Precautions (Done)     Description:   Instruct learner(s) on prescribed precautions during self-care and functional transfers.              Learning Progress Summary           Patient Tim, RASHID VU by STEPHAN at 8/12/2022 [id number]                   Point: Body mechanics (Not Started)     Description:   Instruct learner(s) on proper positioning and spine alignment during self-care, functional mobility activities and/or exercises.              Learner Progress:  Not documented in this visit.                      User Key     Initials Effective Dates Name Provider Type Discipline    STEPHAN 11/10/21 -  Radha Nevarez, OTAMANDEEP/L, CSRS Occupational Therapist OT              OT Recommendation and Plan  Planned Therapy Interventions (OT): activity tolerance training, adaptive equipment training, BADL retraining, functional balance retraining, occupation/activity based interventions, patient/caregiver education/training, transfer/mobility retraining, strengthening exercise  Therapy Frequency (OT): 5 times/wk  Plan of Care Review  Plan of Care Reviewed With: patient  Outcome Evaluation: OT eval completed. Pt presents alert and oriented x4, c/o nausea and weakness. He reports at baseline being independent with all adls and mobility, but over the past month has required increased assistance d/t continued dizziness and weakness. Today he demonstrates decreased strength, balance, activity tolerance, and ROM of L hand. He was able to bring self to sitting at EOB with CGA. Required Max A to don/doff socks d/t weakness. CGA for sit <> stand t/f from EOB and CGA/Min A for short distance in room mobility with HHAx2. He demonstrates limited ROM of digits of L hand and decreased strength, he reports this has been on going for the last several months. He would benefit from skilled OT services to address these deficits. Recommend d/c to SNF for continued skilled therapy services, pending pt progress.      Time Calculation:    Time Calculation- OT     Row Name 08/12/22 0941             Time Calculation- OT    OT Start Time 0941  -      OT Stop Time 1038  -      OT Time Calculation (min) 57 min  -      OT Received On 08/12/22  -      OT Goal Re-Cert Due Date 08/22/22  -            User Key  (r) = Recorded By, (t) = Taken By, (c) = Cosigned By    Initials Name Provider Type    Radha Saenz OTR/L, CSRS Occupational Therapist              Therapy Charges for Today     Code Description Service Date Service Provider Modifiers Qty    97116960300 HC OT EVAL MOD COMPLEXITY 4 8/12/2022 Radha Nevarez OTR/L, GIOVANNA GO 1               Radha Nevarez, OTR/L, CSRS  8/12/2022

## 2022-08-12 NOTE — CASE MANAGEMENT/SOCIAL WORK
Continued Stay Note   Chris     Patient Name: Chito Govea  MRN: [id number]  Today's Date: 8/12/2022    Admit Date: 8/9/2022     Discharge Plan     Row Name 08/12/22 1055       Plan    Plan Home    Plan Comments Patient plans to return home upon discharge.  SW following for needs.               Discharge Codes    No documentation.                     FELICITY Chan

## 2022-08-12 NOTE — PLAN OF CARE
Goal Outcome Evaluation:     Problem: Fall Injury Risk  Goal: Absence of Fall and Fall-Related Injury  Outcome: Ongoing, Progressing  Intervention: Identify and Manage Contributors  Recent Flowsheet Documentation  Taken 8/12/2022 0400 by Sujatha Bradshaw RN  Medication Review/Management: medications reviewed  Taken 8/12/2022 0100 by Sujatha Bradshaw RN  Medication Review/Management: medications reviewed  Taken 8/12/2022 0000 by Sujatha Bradshaw RN  Medication Review/Management:   medications reviewed   high-risk medications identified  Taken 8/11/2022 2000 by Sujatha Bradshaw RN  Medication Review/Management:   medications reviewed   high-risk medications identified  Intervention: Promote Injury-Free Environment  Recent Flowsheet Documentation  Taken 8/12/2022 0600 by Sujatha Bradshaw RN  Safety Promotion/Fall Prevention: safety round/check completed  Taken 8/12/2022 0500 by Suajtha Bradshaw RN  Safety Promotion/Fall Prevention: safety round/check completed  Taken 8/12/2022 0400 by Sujatha Bradshaw RN  Safety Promotion/Fall Prevention:   assistive device/personal items within reach   clutter free environment maintained   fall prevention program maintained   room organization consistent   safety round/check completed  Taken 8/12/2022 0300 by Sujatha Bradshaw RN  Safety Promotion/Fall Prevention: safety round/check completed  Taken 8/12/2022 0200 by Sujatha Bradshaw RN  Safety Promotion/Fall Prevention: safety round/check completed  Taken 8/12/2022 0100 by Sujatha Bradshaw RN  Safety Promotion/Fall Prevention: safety round/check completed  Taken 8/12/2022 0000 by Sujatha Bradshaw RN  Safety Promotion/Fall Prevention:   assistive device/personal items within reach   clutter free environment maintained   fall prevention program maintained   room organization consistent   safety round/check completed  Taken 8/11/2022 2300 by Sujatha Bradshaw RN  Safety Promotion/Fall Prevention: safety round/check completed  Taken 8/11/2022 2200 by  Bradshaw, Sujatha, RN  Safety Promotion/Fall Prevention: safety round/check completed  Taken 8/11/2022 2100 by Sujatha Bradshaw RN  Safety Promotion/Fall Prevention: safety round/check completed  Taken 8/11/2022 2000 by Sujatha Bradshaw RN  Safety Promotion/Fall Prevention:   assistive device/personal items within reach   clutter free environment maintained   fall prevention program maintained   lighting adjusted   nonskid shoes/slippers when out of bed   room organization consistent   safety round/check completed  Taken 8/11/2022 1900 by Sujatha Bradshaw RN  Safety Promotion/Fall Prevention: room organization consistent     Problem: Adult Inpatient Plan of Care  Goal: Plan of Care Review  Outcome: Ongoing, Progressing  Goal: Patient-Specific Goal (Individualized)  Outcome: Ongoing, Progressing  Goal: Absence of Hospital-Acquired Illness or Injury  Outcome: Ongoing, Progressing  Intervention: Identify and Manage Fall Risk  Recent Flowsheet Documentation  Taken 8/12/2022 0600 by Sujatha Bradshaw RN  Safety Promotion/Fall Prevention: safety round/check completed  Taken 8/12/2022 0500 by Sujatha Bradshaw RN  Safety Promotion/Fall Prevention: safety round/check completed  Taken 8/12/2022 0400 by Sujatha Bradshaw RN  Safety Promotion/Fall Prevention:   assistive device/personal items within reach   clutter free environment maintained   fall prevention program maintained   room organization consistent   safety round/check completed  Taken 8/12/2022 0300 by Sujatha Bradshaw RN  Safety Promotion/Fall Prevention: safety round/check completed  Taken 8/12/2022 0200 by Sujatha Bradshaw RN  Safety Promotion/Fall Prevention: safety round/check completed  Taken 8/12/2022 0100 by Sujatha Bradshaw RN  Safety Promotion/Fall Prevention: safety round/check completed  Taken 8/12/2022 0000 by Sujatha Bradshaw RN  Safety Promotion/Fall Prevention:   assistive device/personal items within reach   clutter free environment maintained   fall prevention  program maintained   room organization consistent   safety round/check completed  Taken 8/11/2022 2300 by Sujatha Bradshaw RN  Safety Promotion/Fall Prevention: safety round/check completed  Taken 8/11/2022 2200 by Sujatha Bradshaw RN  Safety Promotion/Fall Prevention: safety round/check completed  Taken 8/11/2022 2100 by Sujatha Bradshaw RN  Safety Promotion/Fall Prevention: safety round/check completed  Taken 8/11/2022 2000 by Sujatha Bradshaw RN  Safety Promotion/Fall Prevention:   assistive device/personal items within reach   clutter free environment maintained   fall prevention program maintained   lighting adjusted   nonskid shoes/slippers when out of bed   room organization consistent   safety round/check completed  Taken 8/11/2022 1900 by Sujatah Bradshaw RN  Safety Promotion/Fall Prevention: room organization consistent  Intervention: Prevent Skin Injury  Recent Flowsheet Documentation  Taken 8/12/2022 0600 by Sujatha Bradshaw RN  Body Position:   position changed independently   supine  Taken 8/12/2022 0400 by Sujatha Bradshaw RN  Body Position:   position changed independently   turned   left  Taken 8/12/2022 0200 by Sujatha Bradshaw RN  Body Position:   position changed independently   turned   right  Taken 8/12/2022 0000 by Sujatha Bradshaw RN  Body Position:   position changed independently   turned   left  Taken 8/11/2022 2200 by Sujatha Bradshaw RN  Body Position:   position changed independently   turned   right  Taken 8/11/2022 2000 by Sujatha Bradshaw RN  Body Position:   position changed independently   supine, legs elevated  Skin Protection: adhesive use limited  Intervention: Prevent and Manage VTE (Venous Thromboembolism) Risk  Recent Flowsheet Documentation  Taken 8/12/2022 0400 by Sjuatha Bradshaw RN  Range of Motion: active ROM (range of motion) encouraged  Taken 8/12/2022 0000 by Sujatha Bradshaw RN  VTE Prevention/Management:   bilateral   sequential compression devices on  Range of Motion: active  ROM (range of motion) encouraged  Taken 8/11/2022 2000 by Sujatha Bradshaw RN  Activity Management:   activity adjusted per tolerance   bedrest  VTE Prevention/Management:   bilateral   sequential compression devices on  Range of Motion: active ROM (range of motion) encouraged  Intervention: Prevent Infection  Recent Flowsheet Documentation  Taken 8/12/2022 0600 by Sujatha Bradshaw RN  Infection Prevention: rest/sleep promoted  Taken 8/12/2022 0500 by Sujatha Bradshaw RN  Infection Prevention: rest/sleep promoted  Taken 8/12/2022 0400 by Sujatha Bradshaw RN  Infection Prevention: rest/sleep promoted  Taken 8/12/2022 0300 by Sujatha Bradshaw RN  Infection Prevention: rest/sleep promoted  Taken 8/12/2022 0200 by Sujatha Bradshaw RN  Infection Prevention: rest/sleep promoted  Taken 8/12/2022 0100 by Sujatha Bradshaw RN  Infection Prevention: rest/sleep promoted  Taken 8/12/2022 0000 by Sujatha Bradshaw RN  Infection Prevention: rest/sleep promoted  Taken 8/11/2022 2300 by Sujatha Bradshaw RN  Infection Prevention: rest/sleep promoted  Taken 8/11/2022 2200 by Sujatha Bradshaw RN  Infection Prevention: rest/sleep promoted  Taken 8/11/2022 2100 by Sujatha Bradshaw RN  Infection Prevention: rest/sleep promoted  Taken 8/11/2022 2000 by Sujatha Bradshaw RN  Infection Prevention: rest/sleep promoted  Taken 8/11/2022 1900 by Sujatha Bradshaw RN  Infection Prevention: hand hygiene promoted  Goal: Optimal Comfort and Wellbeing  Outcome: Ongoing, Progressing  Intervention: Provide Person-Centered Care  Recent Flowsheet Documentation  Taken 8/12/2022 0000 by Sujatha Bradshaw RN  Trust Relationship/Rapport:   questions answered   questions encouraged  Taken 8/11/2022 2000 by Sujatha Bradshaw RN  Trust Relationship/Rapport:   care explained   questions answered   questions encouraged   thoughts/feelings acknowledged  Goal: Readiness for Transition of Care  Outcome: Ongoing, Progressing     Problem: Skin Injury Risk Increased  Goal: Skin Health and  Integrity  Outcome: Ongoing, Progressing  Intervention: Optimize Skin Protection  Recent Flowsheet Documentation  Taken 8/12/2022 0600 by Sujatha Bradshaw RN  Head of Bed (HOB) Positioning: HOB at 30 degrees  Taken 8/12/2022 0400 by Sujatha Bradshaw RN  Head of Bed (HOB) Positioning: HOB at 30 degrees  Taken 8/12/2022 0200 by Sujatha Bradshaw RN  Head of Bed (HOB) Positioning: HOB at 20-30 degrees  Taken 8/12/2022 0000 by Sujatha Bradshaw RN  Head of Bed (HOB) Positioning: HOB at 20-30 degrees  Taken 8/11/2022 2200 by Sujatha Bradshaw RN  Head of Bed (HOB) Positioning: HOB at 20-30 degrees  Taken 8/11/2022 2000 by Sujatha Bradshaw RN  Pressure Reduction Techniques: frequent weight shift encouraged  Head of Bed (HOB) Positioning: HOB at 20-30 degrees  Pressure Reduction Devices: pressure-redistributing mattress utilized  Skin Protection: adhesive use limited     Problem: Diabetes Comorbidity  Goal: Blood Glucose Level Within Targeted Range  Outcome: Ongoing, Progressing  Intervention: Monitor and Manage Glycemia  Recent Flowsheet Documentation  Taken 8/11/2022 2000 by Sujatha Bradshaw RN  Glycemic Management: blood glucose monitored     Problem: Hypertension Comorbidity  Goal: Blood Pressure in Desired Range  Outcome: Ongoing, Progressing  Intervention: Maintain Blood Pressure Management  Recent Flowsheet Documentation  Taken 8/12/2022 0400 by Sujatha Bradshaw RN  Medication Review/Management: medications reviewed  Taken 8/12/2022 0100 by Sujatha Bradshaw RN  Medication Review/Management: medications reviewed  Taken 8/12/2022 0000 by Sujatha Bradshaw RN  Medication Review/Management:   medications reviewed   high-risk medications identified  Taken 8/11/2022 2000 by Sujatha Bradshaw RN  Medication Review/Management:   medications reviewed   high-risk medications identified

## 2022-08-12 NOTE — PROGRESS NOTES
Malnutrition Severity Assessment    Patient Name:  Chito Govea  YOB: 1955  MRN: [id number]  Admit Date:  8/9/2022    Patient meets criteria for : Moderate (non-severe) Malnutrition (secondary physical signs: poor skin turgor)    Comments:  See RD STEFAN note for further details.     Malnutrition Severity Assessment  Malnutrition Type: Acute Disease or Injury - Related Malnutrition  Malnutrition Type (last 8 hours)     Malnutrition Severity Assessment     Row Name 08/12/22 1549       Malnutrition Severity Assessment    Malnutrition Type Acute Disease or Injury - Related Malnutrition    Row Name 08/12/22 1549       Insufficient Energy Intake     Insufficient Energy Intake Findings Severe    Insufficient Energy Intake  < or equal to 50% of est. energy requirement for > or equal to 5d)    Row Name 08/12/22 1549       Unintentional Weight Loss     Unintentional Weight Loss Findings --  no weight hx to review, pt unsure    Row Name 08/12/22 1549       Muscle Loss    Loss of Muscle Mass Findings Moderate    Alevism Region Moderate - slight depression    Clavicle Bone Region Moderate - some protrusion in females, visible in males    Acromion Bone Region Moderate - acromion may slightly protrude    Scapular Bone Region Moderate - mild depression, bones may show slightly    Row Name 08/12/22 1549       Fat Loss    Subcutaneous Fat Loss Findings Moderate    Orbital Region  Moderate -  somewhat hollowness, slightly dark circles    Row Name 08/12/22 1549       Declining Functional Status    Declining Functional Status Findings Measurably Reduced    Row Name 08/12/22 1549       Criteria Met (Must meet criteria for severity in at least 2 of these categories: M Wasting, Fat Loss, Fluid, Secondary Signs, Wt. Status, Intake)    Patient meets criteria for  Moderate (non-severe) Malnutrition  secondary physical signs: poor skin turgor                Electronically signed by:  Shakila Nur RDN, GERTRUED  08/12/22 15:54  CDT

## 2022-08-12 NOTE — PLAN OF CARE
Goal Outcome Evaluation:  Plan of Care Reviewed With: patient        Progress: no change  Outcome Evaluation: Ntn follow up. Pt is on a C.CHO diet now as of 8/10. He is with N/V today and reports he has had a poor appetite for quite a while. He reports he has not been tolerating any po intake in the hospital and had refused his lunch tray. He says the only thing he has been tolerating has been sipping on sprite zero. He requested crackers. This RD provided him a couple packs of saltine crackers and a pack of moiz crackers. He did start eating the saltines and reports they tasted good. He had an emesis bag in his lap. He did not appear to feel good at this time. He declined nutrition supplements d/t GI distress. Advised pt of alternate selections when he felt like trying to eat. He was unsure of weight changes. No weight hx available for review. He qualifies for malnutrition, see MSA for more details. Will cont to follow.

## 2022-08-13 PROBLEM — I48.0 PAROXYSMAL ATRIAL FIBRILLATION: Status: ACTIVE | Noted: 2022-08-09

## 2022-08-13 PROBLEM — I35.0 AORTIC STENOSIS, MODERATE: Status: ACTIVE | Noted: 2022-08-13

## 2022-08-13 LAB
ALBUMIN SERPL-MCNC: 2.4 G/DL (ref 3.5–5.2)
ALBUMIN/GLOB SERPL: 1 G/DL
ALP SERPL-CCNC: 72 U/L (ref 39–117)
ALT SERPL W P-5'-P-CCNC: 12 U/L (ref 1–41)
ANION GAP SERPL CALCULATED.3IONS-SCNC: 9 MMOL/L (ref 5–15)
AST SERPL-CCNC: 14 U/L (ref 1–40)
BASOPHILS # BLD AUTO: 0.02 10*3/MM3 (ref 0–0.2)
BASOPHILS NFR BLD AUTO: 0.2 % (ref 0–1.5)
BILIRUB SERPL-MCNC: 0.4 MG/DL (ref 0–1.2)
BUN SERPL-MCNC: 35 MG/DL (ref 8–23)
BUN/CREAT SERPL: 37.2 (ref 7–25)
CALCIUM SPEC-SCNC: 7.3 MG/DL (ref 8.6–10.5)
CHLORIDE SERPL-SCNC: 101 MMOL/L (ref 98–107)
CO2 SERPL-SCNC: 25 MMOL/L (ref 22–29)
CREAT SERPL-MCNC: 0.94 MG/DL (ref 0.76–1.27)
DEPRECATED RDW RBC AUTO: 47.6 FL (ref 37–54)
EGFRCR SERPLBLD CKD-EPI 2021: 88.9 ML/MIN/1.73
EOSINOPHIL # BLD AUTO: 0.09 10*3/MM3 (ref 0–0.4)
EOSINOPHIL NFR BLD AUTO: 0.8 % (ref 0.3–6.2)
ERYTHROCYTE [DISTWIDTH] IN BLOOD BY AUTOMATED COUNT: 14.2 % (ref 12.3–15.4)
GLOBULIN UR ELPH-MCNC: 2.5 GM/DL
GLUCOSE BLDC GLUCOMTR-MCNC: 127 MG/DL (ref 70–130)
GLUCOSE BLDC GLUCOMTR-MCNC: 198 MG/DL (ref 70–130)
GLUCOSE BLDC GLUCOMTR-MCNC: 202 MG/DL (ref 70–130)
GLUCOSE BLDC GLUCOMTR-MCNC: 212 MG/DL (ref 70–130)
GLUCOSE BLDC GLUCOMTR-MCNC: 229 MG/DL (ref 70–130)
GLUCOSE SERPL-MCNC: 224 MG/DL (ref 65–99)
HCT VFR BLD AUTO: 30.7 % (ref 37.5–51)
HGB BLD-MCNC: 10 G/DL (ref 13–17.7)
IMM GRANULOCYTES # BLD AUTO: 0.08 10*3/MM3 (ref 0–0.05)
IMM GRANULOCYTES NFR BLD AUTO: 0.7 % (ref 0–0.5)
LYMPHOCYTES # BLD AUTO: 0.75 10*3/MM3 (ref 0.7–3.1)
LYMPHOCYTES NFR BLD AUTO: 7 % (ref 19.6–45.3)
MCH RBC QN AUTO: 29.7 PG (ref 26.6–33)
MCHC RBC AUTO-ENTMCNC: 32.6 G/DL (ref 31.5–35.7)
MCV RBC AUTO: 91.1 FL (ref 79–97)
MONOCYTES # BLD AUTO: 0.99 10*3/MM3 (ref 0.1–0.9)
MONOCYTES NFR BLD AUTO: 9.2 % (ref 5–12)
NEUTROPHILS NFR BLD AUTO: 8.83 10*3/MM3 (ref 1.7–7)
NEUTROPHILS NFR BLD AUTO: 82.1 % (ref 42.7–76)
NRBC BLD AUTO-RTO: 0 /100 WBC (ref 0–0.2)
PLATELET # BLD AUTO: 123 10*3/MM3 (ref 140–450)
PMV BLD AUTO: 11.9 FL (ref 6–12)
POTASSIUM SERPL-SCNC: 4.2 MMOL/L (ref 3.5–5.2)
PROT SERPL-MCNC: 4.9 G/DL (ref 6–8.5)
RBC # BLD AUTO: 3.37 10*6/MM3 (ref 4.14–5.8)
SODIUM SERPL-SCNC: 135 MMOL/L (ref 136–145)
WBC NRBC COR # BLD: 10.76 10*3/MM3 (ref 3.4–10.8)

## 2022-08-13 PROCEDURE — 80053 COMPREHEN METABOLIC PANEL: CPT | Performed by: INTERNAL MEDICINE

## 2022-08-13 PROCEDURE — 25010000002 ONDANSETRON PER 1 MG: Performed by: INTERNAL MEDICINE

## 2022-08-13 PROCEDURE — 97116 GAIT TRAINING THERAPY: CPT

## 2022-08-13 PROCEDURE — 82962 GLUCOSE BLOOD TEST: CPT

## 2022-08-13 PROCEDURE — 63710000001 INSULIN LISPRO (HUMAN) PER 5 UNITS: Performed by: INTERNAL MEDICINE

## 2022-08-13 PROCEDURE — 99232 SBSQ HOSP IP/OBS MODERATE 35: CPT | Performed by: NURSE PRACTITIONER

## 2022-08-13 PROCEDURE — 97110 THERAPEUTIC EXERCISES: CPT

## 2022-08-13 PROCEDURE — 85025 COMPLETE CBC W/AUTO DIFF WBC: CPT | Performed by: INTERNAL MEDICINE

## 2022-08-13 RX ADMIN — SUCRALFATE 1 G: 1 SUSPENSION ORAL at 12:36

## 2022-08-13 RX ADMIN — INSULIN LISPRO 4 UNITS: 100 INJECTION, SOLUTION INTRAVENOUS; SUBCUTANEOUS at 10:00

## 2022-08-13 RX ADMIN — PANTOPRAZOLE SODIUM 40 MG: 40 TABLET, DELAYED RELEASE ORAL at 16:44

## 2022-08-13 RX ADMIN — ONDANSETRON 4 MG: 2 INJECTION INTRAMUSCULAR; INTRAVENOUS at 21:32

## 2022-08-13 RX ADMIN — PANTOPRAZOLE SODIUM 40 MG: 40 TABLET, DELAYED RELEASE ORAL at 10:00

## 2022-08-13 RX ADMIN — INSULIN LISPRO 4 UNITS: 100 INJECTION, SOLUTION INTRAVENOUS; SUBCUTANEOUS at 12:36

## 2022-08-13 RX ADMIN — APIXABAN 5 MG: 5 TABLET, FILM COATED ORAL at 21:32

## 2022-08-13 RX ADMIN — ONDANSETRON 4 MG: 2 INJECTION INTRAMUSCULAR; INTRAVENOUS at 07:44

## 2022-08-13 RX ADMIN — AMIODARONE HYDROCHLORIDE 200 MG: 200 TABLET ORAL at 10:00

## 2022-08-13 RX ADMIN — SUCRALFATE 1 G: 1 SUSPENSION ORAL at 16:44

## 2022-08-13 RX ADMIN — APIXABAN 5 MG: 5 TABLET, FILM COATED ORAL at 10:00

## 2022-08-13 RX ADMIN — SUCRALFATE 1 G: 1 SUSPENSION ORAL at 10:00

## 2022-08-13 RX ADMIN — AMIODARONE HYDROCHLORIDE 200 MG: 200 TABLET ORAL at 21:32

## 2022-08-13 RX ADMIN — HYDROXYZINE HYDROCHLORIDE 50 MG: 25 TABLET ORAL at 21:46

## 2022-08-13 RX ADMIN — SUCRALFATE 1 G: 1 SUSPENSION ORAL at 21:32

## 2022-08-13 RX ADMIN — Medication 10 ML: at 10:00

## 2022-08-13 RX ADMIN — Medication 10 ML: at 21:32

## 2022-08-13 NOTE — PROGRESS NOTES
Nephrology ([facility]) Progress Note      Patient:  Chito Govea  YOB: 1955  Date of Service: 8/13/2022  MRN: [id number]   Acct: [id number]   Primary Care Physician: Provider, No Known  Advance Directive:   Code Status and Medical Interventions:   Ordered at: 08/09/22 1714     Level Of Support Discussed With:    Patient     Code Status (Patient has no pulse and is not breathing):    CPR (Attempt to Resuscitate)     Medical Interventions (Patient has pulse or is breathing):    Full Support     Admit Date: 8/9/2022       Hospital Day: 4  Referring Provider: No Known Provider      Patient personally seen and examined.  Complete chart including Consults, Notes, Operative Reports, Labs, Cardiology, and Radiology studies reviewed as able.    Chief complaint: Abnormal labs.    Subjective:  Chito Govea is a 66 y.o. male for whom we were consulted for evaluation and treatment of acute kidney injury, hyperkalemia. No prior known renal issues. History of type 2 diabetes, hypertension. Presented to ER with nausea, vomiting, abdominal pain. Symptoms present for several weeks and significantly worse the past 3-4 days. Minimal PO intake last several days. Labs in ER revealed multiple abnormalities. Creatinine 6.96, potassium 6.5, , sodium 127, glucose 329. He was in atrial fibrillation with RVR and was cardioverted X 2.  Patient was also hypotensive requiring Levophed drip. Admitted to ICU and started on bicarbonate IV fluids and DKA protocol.  Hospital course remarkable for aggressive fluid resuscitation and treatment with insulin drip.  His renal function slowly improved back to normal values.  He is now moved to regular floor.    This afternoon patient feels well.  He is up and eating lunch     Allergies:  Patient has no known allergies.    Home Meds:  Medications Prior to Admission   Medication Sig Dispense Refill Last Dose   • amLODIPine (NORVASC) 10 MG tablet Take 10 mg  by mouth Daily.      • atorvastatin (LIPITOR) 20 MG tablet Take 20 mg by mouth Daily.      • glimepiride (AMARYL) 2 MG tablet Take 2 mg by mouth Every Morning Before Breakfast.      • lisinopril-hydrochlorothiazide (PRINZIDE,ZESTORETIC) 20-12.5 MG per tablet Take 1 tablet by mouth Daily.          Medicines:  Current Facility-Administered Medications   Medication Dose Route Frequency Provider Last Rate Last Admin   • acetaminophen (TYLENOL) tablet 650 mg  650 mg Oral Q4H PRN Ellis Rey MD       • aluminum-magnesium hydroxide-simethicone (MAALOX MAX) 400-400-40 MG/5ML suspension 15 mL  15 mL Oral Q6H PRN Ellis Rey MD       • amiodarone (PACERONE) tablet 200 mg  200 mg Oral Q12H Ellis Rye MD   200 mg at 08/13/22 1000   • apixaban (ELIQUIS) tablet 5 mg  5 mg Oral Q12H Ellis Rey MD   5 mg at 08/13/22 1000   • dextrose (D50W) (25 g/50 mL) IV injection 25 g  25 g Intravenous Q15 Min PRN Ellis Rey MD       • dextrose (GLUTOSE) oral gel 15 g  15 g Oral Q15 Min PRN Ellis Rey MD       • glucagon (human recombinant) (GLUCAGEN DIAGNOSTIC) injection 1 mg  1 mg Intramuscular Q15 Min PRN Ellis Rey MD       • hydrOXYzine (ATARAX) tablet 50 mg  50 mg Oral Nightly PRN Ellis Rey MD       • Insulin Lispro (humaLOG) injection 0-9 Units  0-9 Units Subcutaneous TID Ellis Cavazos MD   4 Units at 08/13/22 1236   • ondansetron (ZOFRAN) injection 4 mg  4 mg Intravenous Q6H PRN Ellis Rey MD   4 mg at 08/13/22 0744   • pantoprazole (PROTONIX) EC tablet 40 mg  40 mg Oral BID Ellis Cavazos MD   40 mg at 08/13/22 1000   • sennosides-docusate (PERICOLACE) 8.6-50 MG per tablet 1 tablet  1 tablet Oral BID Ellis Rey MD   1 tablet at 08/12/22 1213   • sodium chloride 0.9 % flush 10 mL  10 mL Intravenous Q12H Ellis Rey MD   10 mL at 08/13/22 1000   • sodium chloride 0.9 % flush 10 mL  10 mL Intravenous PRN Ellis Rey MD        • sucralfate (CARAFATE) 1 GM/10ML suspension 1 g  1 g Oral 4x Daily AC & at Bedtime Ellis Rey MD   1 g at 08/13/22 1236       Past Medical History:  Past Medical History:   Diagnosis Date   • Arthritis    • Diabetes mellitus (HCC)    • HLD (hyperlipidemia)    • Hypertension        Past Surgical History:  No past surgical history on file.    Family History  Family History   Problem Relation Age of Onset   • Cancer Mother    • Heart disease Father    • Diabetes Sister    • Cancer Sister    • COPD Brother        Social History  Social History     Socioeconomic History   • Marital status:    Tobacco Use   • Smoking status: Former Smoker     Packs/day: 1.50     Years: 50.00     Pack years: 75.00     Types: Cigarettes   • Smokeless tobacco: Never Used   Substance and Sexual Activity   • Alcohol use: Never   • Drug use: Yes     Types: Marijuana   • Sexual activity: Defer       Review of Systems:  History obtained from chart review and the patient  General ROS: No fever or chills  Respiratory ROS: No cough, shortness of breath, wheezing  Cardiovascular ROS: No chest pain or palpitations  Gastrointestinal ROS: No abdominal pain or melena  Genito-Urinary ROS: No dysuria or hematuria  Psych ROS: No anxiety and depression  14 point ROS reviewed with the patient and negative except as noted above and in the HPI unless unable to obtain.    Objective:  Patient Vitals for the past 24 hrs:   BP Temp Temp src Pulse Resp SpO2 Weight   08/13/22 1105 122/66 98.1 °F (36.7 °C) Oral 75 18 96 % --   08/13/22 0835 134/59 98.7 °F (37.1 °C) Oral 65 18 96 % --   08/13/22 0425 130/57 98.2 °F (36.8 °C) Oral 68 18 97 % --   08/13/22 0029 129/59 97.8 °F (36.6 °C) Oral 72 18 97 % 62.6 kg (138 lb 1.6 oz)   08/12/22 2106 -- -- -- 110 -- -- --   08/12/22 2053 123/73 98 °F (36.7 °C) Oral 95 18 98 % --   08/12/22 1819 -- -- -- 76 -- -- --   08/12/22 1750 -- -- -- 109 -- -- --   08/12/22 1728 -- -- -- (!) 137 -- -- --        Intake/Output Summary (Last 24 hours) at 8/13/2022 1506  Last data filed at 8/13/2022 1300  Gross per 24 hour   Intake 600 ml   Output --   Net 600 ml     General: awake/alert   HEENT: Normocephalic atraumatic head  Chest:  clear to auscultation bilaterally without respiratory distress  CVS: regular rate and rhythm  Abdominal: soft, nontender, positive bowel sounds  Extremities: no cyanosis or edema  Skin: warm and dry without rash   Neuro: no focal motor deficits      Labs:  Results from last 7 days   Lab Units 08/13/22  0357 08/12/22  0231 08/11/22  0529   WBC 10*3/mm3 10.76 13.64* 18.86*   HEMOGLOBIN g/dL 10.0* 11.5* 12.1*   HEMATOCRIT % 30.7* 36.2* 36.4*   PLATELETS 10*3/mm3 123* 128* 143         Results from last 7 days   Lab Units 08/13/22  0357 08/12/22  0231 08/11/22  0529   SODIUM mmol/L 135* 142 142   POTASSIUM mmol/L 4.2 4.8 4.9   CHLORIDE mmol/L 101 106 106   CO2 mmol/L 25.0 25.0 19.0*   BUN mg/dL 35* 55* 96*   CREATININE mg/dL 0.94 1.15 1.53*   CALCIUM mg/dL 7.3* 7.9* 7.9*   EGFR mL/min/1.73 88.9 69.8 49.5*   BILIRUBIN mg/dL 0.4 0.4 0.5   ALK PHOS U/L 72 73 79   ALT (SGPT) U/L 12 11 13   AST (SGOT) U/L 14 13 16   GLUCOSE mg/dL 224* 143* 225*       Radiology:   Imaging Results (Last 72 Hours)     Procedure Component Value Units Date/Time    XR Abdomen KUB [[id number]] Collected: 08/12/22 1533     Updated: 08/12/22 1537    Narrative:      EXAMINATION: XR ABDOMEN KUB- 8/12/2022 3:33 PM CDT     HISTORY: nausea, persistent; A41.9-Sepsis, unspecified organism;  N17.9-Acute kidney failure, unspecified; E87.1-Ghsq-zhtqostinw and  hyponatremia; E87.5-Hyperkalemia; I48.91-Unspecified atrial  fibrillation; D72.829-Elevated white blood cell count, unspecified;  E10.10-Type 1 diabetes mellitus with ketoacidosis without coma;  Z74.09-Other reduced mobility; Z74.09-Other reduced mobility;  Z78.9-Other specified .     REPORT: A supine view of the abdomen was obtained.     COMPARISON: CT abdomen pelvis without  contrast 8/9/2022.     The bowel gas pattern is within normal limits. Residual oral contrast  seen within nondistended bowel loops on the right. There are no definite  urinary tract calculi. Scattered phleboliths are present in the pelvis.  Advanced degenerative changes of the lumbar spine are identified, with  mild dextro scoliosis.       Impression:      Nonobstructive bowel gas pattern, no acute findings.  This report was finalized on 08/12/2022 15:34 by Dr. Ben Mathur MD.          Culture:  No results found for: BLOODCX, URINECX, WOUNDCX, MRSACX, RESPCX, STOOLCX      Assessment   1.  Acute kidney injury/now resolved.  2.  Prerenal azotemia.  3.  DKA--improved  4.  Hyperkalemia--improved  5.  Hyponatremia--improved  6.  Leukocytosis  7.  Metabolic acidosis resolved.    Plan:  1.  Discontinue IV fluid.  2.  I will sign off, please call back if needed.  Thank you for consultation      William Torres MD  8/13/2022  15:06 CDT

## 2022-08-13 NOTE — PROGRESS NOTES
[facility] -  Progress Note     LOS: 4 days   Patient Care Team:  Provider, No Known as PCP - General  Provider, No Known as PCP - Family Medicine    Chief Complaint:Afib with RVR     Subjective     Interval History:   Converted from NSR at 2200. Review of tele notes HR 67-84 with PACs and PVCs since conversion. Creatinine remains stable. He notes he feels better today than yesterday. He is less drowsy. He denies chest pain and dyspnea. He continues to struggle with nausea.     Review of Systems:   Review of Systems   Constitutional: Negative for chills, diaphoresis, fatigue and unexpected weight change.   HENT: Negative for nosebleeds.    Respiratory: Negative for cough, chest tightness, shortness of breath and wheezing.    Cardiovascular: Negative for chest pain, palpitations and leg swelling.   Gastrointestinal: Positive for nausea. Negative for anal bleeding, blood in stool and diarrhea.   Neurological: Negative for dizziness, syncope and light-headedness.   All other systems reviewed and are negative.      Objective     Vital Sign Min/Max for last 24 hours  Temp  Min: 97.8 °F (36.6 °C)  Max: 98.7 °F (37.1 °C)   BP  Min: 123/73  Max: 151/56   Pulse  Min: 65  Max: 137   Resp  Min: 18  Max: 18   SpO2  Min: 96 %  Max: 98 %   No data recorded   Weight  Min: 62.6 kg (138 lb 1.6 oz)  Max: 62.6 kg (138 lb 1.6 oz)         08/13/22  0029   Weight: 62.6 kg (138 lb 1.6 oz)         Intake/Output Summary (Last 24 hours) at 8/13/2022 1030  Last data filed at 8/12/2022 1214  Gross per 24 hour   Intake 302 ml   Output --   Net 302 ml         Physical Exam:  Vitals reviewed.   Constitutional:       General: Not in acute distress.     Appearance: Healthy appearance. Well-developed. Not diaphoretic.   Eyes:      General: No scleral icterus.     Conjunctiva/sclera: Conjunctivae normal.      Pupils: Pupils are equal, round, and reactive to light.   HENT:      Head: Normocephalic.    Mouth/Throat:       Pharynx: No oropharyngeal exudate.   Neck:      Vascular: No JVR.   Pulmonary:      Effort: Pulmonary effort is normal. No respiratory distress.      Breath sounds: Normal breath sounds. No wheezing. No rhonchi. No rales.   Chest:      Chest wall: Not tender to palpatation.   Cardiovascular:      Normal rate. Regular rhythm.      Murmurs: There is a systolic murmur.   Pulses:     Intact distal pulses.   Edema:     Peripheral edema absent.   Abdominal:      General: Bowel sounds are normal. There is no distension.      Palpations: Abdomen is soft.      Tenderness: There is no abdominal tenderness.   Musculoskeletal: Normal range of motion.      Cervical back: Normal range of motion and neck supple. Skin:     General: Skin is warm and dry.      Coloration: Skin is not pale.      Findings: No erythema or rash.   Neurological:      Mental Status: Alert, oriented to person, place, and time and oriented to person, place and time.      Deep Tendon Reflexes: Reflexes are normal and symmetric.   Psychiatric:         Behavior: Behavior normal.          Results Review:   Lab Results (last 72 hours)     Procedure Component Value Units Date/Time    POC Glucose Once [[id number]]  (Abnormal) Collected: 08/13/22 0839    Specimen: Blood Updated: 08/13/22 0914     Glucose 202 mg/dL      Comment: : [id number] Rickey LisaMeter ID: [id number]       Comprehensive Metabolic Panel [[id number]]  (Abnormal) Collected: 08/13/22 0357    Specimen: Blood Updated: 08/13/22 0427     Glucose 224 mg/dL      BUN 35 mg/dL      Creatinine 0.94 mg/dL      Sodium 135 mmol/L      Potassium 4.2 mmol/L      Chloride 101 mmol/L      CO2 25.0 mmol/L      Calcium 7.3 mg/dL      Total Protein 4.9 g/dL      Albumin 2.40 g/dL      ALT (SGPT) 12 U/L      AST (SGOT) 14 U/L      Alkaline Phosphatase 72 U/L      Total Bilirubin 0.4 mg/dL      Globulin 2.5 gm/dL      A/G Ratio 1.0 g/dL      BUN/Creatinine Ratio 37.2     Anion Gap 9.0 mmol/L      eGFR 88.9 mL/min/1.73       Comment: National Kidney Foundation and American Society of Nephrology (ASN) Task Force recommended calculation based on the Chronic Kidney Disease Epidemiology Collaboration (CKD-EPI) equation refit without adjustment for race.       Narrative:      GFR Normal >60  Chronic Kidney Disease <60  Kidney Failure <15      CBC & Differential [[id number]]  (Abnormal) Collected: 08/13/22 0357    Specimen: Blood Updated: 08/13/22 0409    Narrative:      The following orders were created for panel order CBC & Differential.  Procedure                               Abnormality         Status                     ---------                               -----------         ------                     CBC Auto Differential[[id number]]        Abnormal            Final result                 Please view results for these tests on the individual orders.    CBC Auto Differential [[id number]]  (Abnormal) Collected: 08/13/22 0357    Specimen: Blood Updated: 08/13/22 0409     WBC 10.76 10*3/mm3      RBC 3.37 10*6/mm3      Hemoglobin 10.0 g/dL      Hematocrit 30.7 %      MCV 91.1 fL      MCH 29.7 pg      MCHC 32.6 g/dL      RDW 14.2 %      RDW-SD 47.6 fl      MPV 11.9 fL      Platelets 123 10*3/mm3      Neutrophil % 82.1 %      Lymphocyte % 7.0 %      Monocyte % 9.2 %      Eosinophil % 0.8 %      Basophil % 0.2 %      Immature Grans % 0.7 %      Neutrophils, Absolute 8.83 10*3/mm3      Lymphocytes, Absolute 0.75 10*3/mm3      Monocytes, Absolute 0.99 10*3/mm3      Eosinophils, Absolute 0.09 10*3/mm3      Basophils, Absolute 0.02 10*3/mm3      Immature Grans, Absolute 0.08 10*3/mm3      nRBC 0.0 /100 WBC     POC Glucose Once [[id number]]  (Abnormal) Collected: 08/13/22 0033    Specimen: Blood Updated: 08/13/22 0102     Glucose 212 mg/dL      Comment: : [id number] Karen SeymouryMeter ID: [id number]       Blood Culture - Blood, Arm, Right [[id number]]  (Normal) Collected: 08/09/22 1800    Specimen: Blood from Arm, Right Updated: 08/12/22 1836      Blood Culture No growth at 3 days    POC Glucose Once [[id number]]  (Abnormal) Collected: 08/12/22 1621    Specimen: Blood Updated: 08/12/22 1638     Glucose 143 mg/dL      Comment: : [id number] Pilar Warner ID: [id number]       Blood Culture - Blood, Arm, Right [[id number]]  (Normal) Collected: 08/09/22 1600    Specimen: Blood from Arm, Right Updated: 08/12/22 1633     Blood Culture No growth at 3 days    POC Glucose Once [[id number]]  (Abnormal) Collected: 08/12/22 1140    Specimen: Blood Updated: 08/12/22 1151     Glucose 156 mg/dL      Comment: : [id number] Cole TheodorenMeter ID: [id number]       POC Glucose Once [[id number]]  (Abnormal) Collected: 08/12/22 0805    Specimen: Blood Updated: 08/12/22 0817     Glucose 170 mg/dL      Comment: : [id number] Cole TheodorenMeter ID: [id number]       Comprehensive Metabolic Panel [[id number]]  (Abnormal) Collected: 08/12/22 0231    Specimen: Blood Updated: 08/12/22 0341     Glucose 143 mg/dL      BUN 55 mg/dL      Creatinine 1.15 mg/dL      Sodium 142 mmol/L      Potassium 4.8 mmol/L      Chloride 106 mmol/L      CO2 25.0 mmol/L      Calcium 7.9 mg/dL      Total Protein 5.2 g/dL      Albumin 2.60 g/dL      ALT (SGPT) 11 U/L      AST (SGOT) 13 U/L      Alkaline Phosphatase 73 U/L      Total Bilirubin 0.4 mg/dL      Globulin 2.6 gm/dL      A/G Ratio 1.0 g/dL      BUN/Creatinine Ratio 47.8     Anion Gap 11.0 mmol/L      eGFR 69.8 mL/min/1.73      Comment: National Kidney Foundation and American Society of Nephrology (ASN) Task Force recommended calculation based on the Chronic Kidney Disease Epidemiology Collaboration (CKD-EPI) equation refit without adjustment for race.       Narrative:      GFR Normal >60  Chronic Kidney Disease <60  Kidney Failure <15      Amylase [[id number]]  (Normal) Collected: 08/12/22 0231    Specimen: Blood Updated: 08/12/22 0341     Amylase 42 U/L     Lipase [[id number]]  (Normal) Collected: 08/12/22 0231    Specimen: Blood Updated:  08/12/22 0341     Lipase 39 U/L     Phosphorus [[id number]]  (Abnormal) Collected: 08/12/22 0231    Specimen: Blood Updated: 08/12/22 0341     Phosphorus 2.4 mg/dL     Magnesium [[id number]]  (Normal) Collected: 08/12/22 0231    Specimen: Blood Updated: 08/12/22 0341     Magnesium 1.7 mg/dL     TSH [[id number]]  (Normal) Collected: 08/12/22 0231    Specimen: Blood Updated: 08/12/22 0341     TSH 1.880 uIU/mL     CBC & Differential [[id number]]  (Abnormal) Collected: 08/12/22 0231    Specimen: Blood Updated: 08/12/22 0323    Narrative:      The following orders were created for panel order CBC & Differential.  Procedure                               Abnormality         Status                     ---------                               -----------         ------                     CBC Auto Differential[[id number]]        Abnormal            Final result                 Please view results for these tests on the individual orders.    CBC Auto Differential [[id number]]  (Abnormal) Collected: 08/12/22 0231    Specimen: Blood Updated: 08/12/22 0323     WBC 13.64 10*3/mm3      RBC 3.91 10*6/mm3      Hemoglobin 11.5 g/dL      Hematocrit 36.2 %      MCV 92.6 fL      MCH 29.4 pg      MCHC 31.8 g/dL      RDW 14.5 %      RDW-SD 48.7 fl      MPV 12.7 fL      Platelets 128 10*3/mm3      Neutrophil % 86.6 %      Lymphocyte % 4.1 %      Monocyte % 8.7 %      Eosinophil % 0.1 %      Basophil % 0.1 %      Immature Grans % 0.4 %      Neutrophils, Absolute 11.79 10*3/mm3      Lymphocytes, Absolute 0.56 10*3/mm3      Monocytes, Absolute 1.19 10*3/mm3      Eosinophils, Absolute 0.02 10*3/mm3      Basophils, Absolute 0.02 10*3/mm3      Immature Grans, Absolute 0.06 10*3/mm3      nRBC 0.0 /100 WBC     POC Glucose Once [[id number]]  (Abnormal) Collected: 08/11/22 2020    Specimen: Blood Updated: 08/11/22 2032     Glucose 209 mg/dL      Comment: : [id number] Anu Orta ID: [id number]       POC Glucose Once [[id number]]  (Normal)  Collected: 08/11/22 1615    Specimen: Blood Updated: 08/11/22 1628     Glucose 120 mg/dL      Comment: : [id number] Cole TheodorenMeter ID: [id number]       POC Glucose Once [[id number]]  (Abnormal) Collected: 08/11/22 1107    Specimen: Blood Updated: 08/11/22 1118     Glucose 228 mg/dL      Comment: : COLE Starkey AnthonyMeter ID: [id number]       POC Glucose Once [[id number]]  (Abnormal) Collected: 08/11/22 0637    Specimen: Blood Updated: 08/11/22 0648     Glucose 210 mg/dL      Comment: : [id number] Chavo JacksonelleMeter ID: [id number]       Phosphorus [[id number]]  (Normal) Collected: 08/11/22 0529    Specimen: Blood Updated: 08/11/22 0603     Phosphorus 3.0 mg/dL     Comprehensive Metabolic Panel [[id number]]  (Abnormal) Collected: 08/11/22 0529    Specimen: Blood Updated: 08/11/22 0601     Glucose 225 mg/dL      BUN 96 mg/dL      Creatinine 1.53 mg/dL      Sodium 142 mmol/L      Potassium 4.9 mmol/L      Chloride 106 mmol/L      CO2 19.0 mmol/L      Calcium 7.9 mg/dL      Total Protein 5.4 g/dL      Albumin 2.70 g/dL      ALT (SGPT) 13 U/L      AST (SGOT) 16 U/L      Alkaline Phosphatase 79 U/L      Total Bilirubin 0.5 mg/dL      Globulin 2.7 gm/dL      A/G Ratio 1.0 g/dL      BUN/Creatinine Ratio 62.7     Anion Gap 17.0 mmol/L      eGFR 49.5 mL/min/1.73      Comment: National Kidney Foundation and American Society of Nephrology (ASN) Task Force recommended calculation based on the Chronic Kidney Disease Epidemiology Collaboration (CKD-EPI) equation refit without adjustment for race.       Narrative:      GFR Normal >60  Chronic Kidney Disease <60  Kidney Failure <15      Magnesium [[id number]]  (Normal) Collected: 08/11/22 0529    Specimen: Blood Updated: 08/11/22 0555     Magnesium 1.9 mg/dL     CBC & Differential [[id number]]  (Abnormal) Collected: 08/11/22 0529    Specimen: Blood Updated: 08/11/22 0545    Narrative:      The following orders were created for panel order CBC &  Differential.  Procedure                               Abnormality         Status                     ---------                               -----------         ------                     CBC Auto Differential[[id number]]        Abnormal            Final result                 Please view results for these tests on the individual orders.    CBC Auto Differential [[id number]]  (Abnormal) Collected: 08/11/22 0529    Specimen: Blood Updated: 08/11/22 0545     WBC 18.86 10*3/mm3      RBC 4.14 10*6/mm3      Hemoglobin 12.1 g/dL      Hematocrit 36.4 %      MCV 87.9 fL      MCH 29.2 pg      MCHC 33.2 g/dL      RDW 14.6 %      RDW-SD 46.7 fl      MPV 12.4 fL      Platelets 143 10*3/mm3      Neutrophil % 87.7 %      Lymphocyte % 2.6 %      Monocyte % 8.8 %      Eosinophil % 0.1 %      Basophil % 0.2 %      Immature Grans % 0.6 %      Neutrophils, Absolute 16.56 10*3/mm3      Lymphocytes, Absolute 0.49 10*3/mm3      Monocytes, Absolute 1.66 10*3/mm3      Eosinophils, Absolute 0.01 10*3/mm3      Basophils, Absolute 0.03 10*3/mm3      Immature Grans, Absolute 0.11 10*3/mm3      nRBC 0.0 /100 WBC     POC Glucose Once [[id number]]  (Abnormal) Collected: 08/10/22 2157    Specimen: Blood Updated: 08/10/22 2208     Glucose 177 mg/dL      Comment: : [id number] Chavo BradshawMeter ID: [id number]       POC Glucose Once [[id number]]  (Abnormal) Collected: 08/10/22 1753    Specimen: Blood Updated: 08/10/22 1805     Glucose 188 mg/dL      Comment: : [id number] Gwendolyn Tai ID: [id number]       Peripheral Blood Smear [[id number]] Collected: 08/09/22 1435    Specimen: Blood Updated: 08/10/22 1448     Performed by: Reji Coronel MD     Pathologist Interpretation Peripheral blood smear:  Moderate leukocytosis with absolute neutrophilia.  Otherwise within normal limits.     POC Glucose Once [[id number]]  (Normal) Collected: 08/10/22 1113    Specimen: Blood Updated: 08/10/22 1124     Glucose 117 mg/dL      Comment: :  [id number] Gwendolyn Tai ID: [id number]                 Echo EF Estimated  Lab Results   Component Value Date    ECHOEFEST 50 08/11/2022         Cath Ejection Fraction Quantitative  No results found for: CATHEF        Medication Review: yes  Current Facility-Administered Medications   Medication Dose Route Frequency Provider Last Rate Last Admin   • acetaminophen (TYLENOL) tablet 650 mg  650 mg Oral Q4H PRN Ellis Rey MD       • aluminum-magnesium hydroxide-simethicone (MAALOX MAX) 400-400-40 MG/5ML suspension 15 mL  15 mL Oral Q6H PRN Ellis Rey MD       • amiodarone (PACERONE) tablet 200 mg  200 mg Oral Q12H Ellis Rey MD   200 mg at 08/13/22 1000   • apixaban (ELIQUIS) tablet 5 mg  5 mg Oral Q12H Ellis Rey MD   5 mg at 08/13/22 1000   • dextrose (D50W) (25 g/50 mL) IV injection 25 g  25 g Intravenous Q15 Min PRN Ellis Rey MD       • dextrose (GLUTOSE) oral gel 15 g  15 g Oral Q15 Min PRN Ellis Rey MD       • glucagon (human recombinant) (GLUCAGEN DIAGNOSTIC) injection 1 mg  1 mg Intramuscular Q15 Min PRN Ellis Rey MD       • hydrOXYzine (ATARAX) tablet 50 mg  50 mg Oral Nightly PRN Ellis Rey MD       • Insulin Lispro (humaLOG) injection 0-9 Units  0-9 Units Subcutaneous TID  Ellis Rey MD   4 Units at 08/13/22 1000   • ondansetron (ZOFRAN) injection 4 mg  4 mg Intravenous Q6H PRN Ellis Rey MD   4 mg at 08/13/22 0744   • pantoprazole (PROTONIX) EC tablet 40 mg  40 mg Oral BID  Ellis Rey MD   40 mg at 08/13/22 1000   • sennosides-docusate (PERICOLACE) 8.6-50 MG per tablet 1 tablet  1 tablet Oral BID Ellis Rey MD   1 tablet at 08/12/22 1213   • sodium chloride 0.9 % flush 10 mL  10 mL Intravenous Q12H Ellis Rey MD   10 mL at 08/13/22 1000   • sodium chloride 0.9 % flush 10 mL  10 mL Intravenous PRN Ellis Rey MD       • sucralfate (CARAFATE) 1 GM/10ML suspension 1 g  1 g Oral  4x Daily AC & at Bedtime Ellis Rey MD   1 g at 08/13/22 1000     Assessment & Plan       Acute renal failure (HCC)    Paroxysmal atrial fibrillation (HCC)    Hypertension    Diabetes mellitus (HCC)    Moderate malnutrition (HCC)    Aortic stenosis, moderate    Plan:   Afib with RVR: remained NSR since 2200. Continue Amiodarone 200mg BID and Eliquis 5mg BID. [id number] 3. Recommend a holter monitor at d/c.      AS: moderate per echo. Murmur present.     HTN: controlled.      Acute renal failure: improved with hydration. Creatinine remains  normal today. Nephrology is following.       Further recommendations per Dr. Mckeon     Electronically signed by MRAKELL Simon, 08/13/22, 10:24 AM CDT.

## 2022-08-13 NOTE — PLAN OF CARE
Goal Outcome Evaluation:      Patient stood from recliner with CGA. Ambulated 30' in room with min assist. Worked thru multiple LE exercises actively. Worked on posture during gait. Will benefit from strengthening activities.

## 2022-08-13 NOTE — THERAPY TREATMENT NOTE
Acute Care - Physical Therapy Treatment Note  [facility]     Patient Name: Chito Govea  : 1955  MRN: [id number]  Today's Date: 2022      Visit Dx:     ICD-10-CM ICD-9-CM   1. Sepsis, due to unspecified organism, unspecified whether acute organ dysfunction present ([facility])  A41.9 038.9     995.91   2. CONSUELO (acute kidney injury) ([facility])  N17.9 584.9   3. Hyponatremia  E87.1 276.1   4. Hyperkalemia  E87.5 276.7   5. Atrial fibrillation with RVR ([facility])  I48.91 427.31   6. Leukocytosis, unspecified type  D72.829 288.60   7. Diabetic ketoacidosis without coma associated with type 1 diabetes mellitus ([facility])  E10.10 250.13   8. Impaired functional mobility and activity tolerance  Z74.09 V49.89   9. Impaired mobility and ADLs  Z74.09 V49.89    Z78.9      Patient Active Problem List   Diagnosis   • Paroxysmal atrial fibrillation (HCC)   • Acute renal failure (HCC)   • Hypertension   • Diabetes mellitus (HCC)   • Moderate malnutrition (HCC)   • Aortic stenosis, moderate     Past Medical History:   Diagnosis Date   • Arthritis    • Diabetes mellitus (HCC)    • HLD (hyperlipidemia)    • Hypertension      No past surgical history on file.  PT Assessment (last 12 hours)     PT Evaluation and Treatment     Row Name 22 1142 22 0922       Physical Therapy Time and Intention    Subjective Information complains of;weakness  -DELFIN --    Document Type therapy note (daily note)  -DELFIN --  -DELFIN    Mode of Treatment physical therapy  -DELFIN --  -DELFIN    Session Not Performed -- --  -DELFIN    Comment, Session Not Performed -- --  -DELFIN    Comment Complains of weak arms  -DELFIN --    Row Name 22 1142          General Information    Existing Precautions/Restrictions fall  -     Row Name 22 1142          Pain    Posttreatment Pain Rating 0/10 - no pain  -DELFIN     Row Name 22 1142          Bed Mobility    Supine-Sit [place] (Bed Mobility) contact guard  -     Row Name 22 1142          Transfers    Sit-Stand  [place] (Transfers) verbal cues;contact guard  -     Stand-Sit [place] (Transfers) verbal cues;contact guard  -     Row Name 08/13/22 1142          Gait/Stairs (Locomotion)    [place] Level (Gait) contact guard;minimum assist (75% patient effort)  -     Distance in Feet (Gait) 30  in room  -     Deviations/Abnormal Patterns (Gait) gait speed decreased;stride length decreased  decreased floor clearance  -     Bilateral Gait Deviations forward flexed posture  -     Row Name 08/13/22 1142          Motor Skills    Therapeutic Exercise elbow/forearm;shoulder;hip;knee;ankle  -[facility] Name 08/13/22 1142          Shoulder (Therapeutic Exercise)    Shoulder (Therapeutic Exercise) AROM (active range of motion)  very weak, ltd ROM  -     Shoulder AROM (Therapeutic Exercise) bilateral;flexion  -[facility] Name 08/13/22 1142          Elbow/Forearm (Therapeutic Exercise)    Elbow/Forearm (Therapeutic Exercise) AROM (active range of motion)  -     Elbow/Forearm AROM (Therapeutic Exercise) bilateral;flexion;extension  -[facility] Name 08/13/22 1142          Hip (Therapeutic Exercise)    Hip (Therapeutic Exercise) AROM (active range of motion)  -     Hip AROM (Therapeutic Exercise) bilateral;aBduction;aDduction;internal rotation;external rotation;sitting;supine  -[facility] Name 08/13/22 1142          Knee (Therapeutic Exercise)    Knee (Therapeutic Exercise) AROM (active range of motion)  -     Knee AROM (Therapeutic Exercise) bilateral;LAQ (long arc quad);heel slides;sitting;supine  -[facility] Name 08/13/22 1142          Ankle (Therapeutic Exercise)    Ankle (Therapeutic Exercise) AROM (active range of motion)  -     Ankle AROM (Therapeutic Exercise) bilateral;dorsiflexion;plantarflexion  -           User Key  (r) = Recorded By, (t) = Taken By, (c) = Cosigned By    Initials Name Provider Type    Verenice Webb, PTA Physical Therapist Assistant                Physical Therapy  Education                 Title: PT OT SLP Therapies (In Progress)     Topic: Physical Therapy (In Progress)     Point: Mobility training (In Progress)     Learning Progress Summary           Patient Acceptance, E,D, NR by DELFIN at 8/13/2022 1302    Comment: Posture during gait    Acceptance, E,TB,D, VU,NR by  at 8/12/2022 1349    Comment: Education re: purpose of PT/importance of activity, for safety/falls prevetions, improved tech w/ tfers and gait activity                   Point: Home exercise program (Not Started)     Learner Progress:  Not documented in this visit.          Point: Precautions (Done)     Learning Progress Summary           Patient Acceptance, E,TB,D, VU,NR by  at 8/12/2022 1349    Comment: Education re: purpose of PT/importance of activity, for safety/falls prevetions, improved tech w/ tfers and gait activity                               User Key     Initials Effective Dates Name Provider Type Discipline     08/02/16 -  Verenice Wright PTA Physical Therapist Assistant PT    GRISELDA 08/02/18 -  Lyubov Shay, PT Physical Therapist PT              PT Recommendation and Plan         Outcome Measures     [facility] [place] Name 08/13/22 1300             How much help from another person do you currently need...    Turning from your back to your side while in flat bed without using bedrails? 3  -DELFIN      Moving from lying on back to sitting on the side of a flat bed without bedrails? 3  -DELFIN      Moving to and from a bed to a chair (including a wheelchair)? 3  -DELFIN      Standing up from a chair using your arms (e.g., wheelchair, bedside chair)? 3  -DELFIN      Climbing 3-5 steps with a railing? 3  -DELFIN      To walk in hospital room? 3  -DELFIN      AM-PAC 6 Clicks Score (PT) 18  -DELFIN            User Key  (r) = Recorded By, (t) = Taken By, (c) = Cosigned By    Initials Name Provider Type    Verenice Webb, SAUMYA Physical Therapist Assistant                 Time Calculation:    PT Charges     Row Name 08/13/22 1304              Time Calculation    Start Time 1142  -DELFIN      Stop Time 1207  -DELFIN      Time Calculation (min) 25 min  -DELFIN              Timed Charges    91565 - PT Therapeutic Exercise Minutes 14  -DELFIN      24472 - Gait Training Minutes  11  -DELFIN              Total Minutes    Timed Charges Total Minutes 25  -DELFIN       Total Minutes 25  -DELFIN            User Key  (r) = Recorded By, (t) = Taken By, (c) = Cosigned By    Initials Name Provider Type    Verenice Webb PTA Physical Therapist Assistant              Therapy Charges for Today     Code Description Service Date Service Provider Modifiers Qty    60659585196 HC GAIT TRAINING EA 15 MIN 8/13/2022 Verenice Wright PTA GP 1    95954512917 HC PT THER PROC EA 15 MIN 8/13/2022 Verenice Wright PTA GP 1          PT G-Codes  Outcome Measure Options: AM-PAC 6 Clicks Basic Mobility (PT)  AM-PAC 6 Clicks Score (PT): 18  AM-PAC 6 Clicks Score (OT): 18    Verenice Wright PTA  8/13/2022

## 2022-08-13 NOTE — PLAN OF CARE
Goal Outcome Evaluation:           Progress: no change  Outcome Evaluation: Patient reports intermittent nausea and diarrhea. No episodes of vomiting this shift. Patient reports no pain. Afib , up to 140; converted to NSR at 2217 67-84. Patient is on room air. Safety maintained.

## 2022-08-13 NOTE — PLAN OF CARE
Goal Outcome Evaluation:  Plan of Care Reviewed With: patient        Progress: no change  Outcome Evaluation: SB/SINUS 59-76 PER TELE TODAY. NO C/O PAIN. ZOFRAN GIVEN ONCE FOR NAUSEA. UP IN CHAIR ALL DAY. KIDNEY LEVELS IMPROVING. PATIENT SEEMS TO BE FEELING BETTER TODAY. CONT TO MONITOR.

## 2022-08-14 LAB
ALBUMIN SERPL-MCNC: 2.6 G/DL (ref 3.5–5.2)
ALBUMIN/GLOB SERPL: 1.1 G/DL
ALP SERPL-CCNC: 73 U/L (ref 39–117)
ALT SERPL W P-5'-P-CCNC: 32 U/L (ref 1–41)
ANION GAP SERPL CALCULATED.3IONS-SCNC: 7 MMOL/L (ref 5–15)
AST SERPL-CCNC: 35 U/L (ref 1–40)
BACTERIA SPEC AEROBE CULT: NORMAL
BACTERIA SPEC AEROBE CULT: NORMAL
BILIRUB SERPL-MCNC: 0.3 MG/DL (ref 0–1.2)
BUN SERPL-MCNC: 24 MG/DL (ref 8–23)
BUN/CREAT SERPL: 27.6 (ref 7–25)
CALCIUM SPEC-SCNC: 7.4 MG/DL (ref 8.6–10.5)
CHLORIDE SERPL-SCNC: 103 MMOL/L (ref 98–107)
CO2 SERPL-SCNC: 26 MMOL/L (ref 22–29)
CREAT SERPL-MCNC: 0.87 MG/DL (ref 0.76–1.27)
EGFRCR SERPLBLD CKD-EPI 2021: 94.6 ML/MIN/1.73
GLOBULIN UR ELPH-MCNC: 2.3 GM/DL
GLUCOSE BLDC GLUCOMTR-MCNC: 152 MG/DL (ref 70–130)
GLUCOSE BLDC GLUCOMTR-MCNC: 188 MG/DL (ref 70–130)
GLUCOSE BLDC GLUCOMTR-MCNC: 199 MG/DL (ref 70–130)
GLUCOSE BLDC GLUCOMTR-MCNC: 242 MG/DL (ref 70–130)
GLUCOSE SERPL-MCNC: 147 MG/DL (ref 65–99)
POTASSIUM SERPL-SCNC: 3.6 MMOL/L (ref 3.5–5.2)
PROT SERPL-MCNC: 4.9 G/DL (ref 6–8.5)
SODIUM SERPL-SCNC: 136 MMOL/L (ref 136–145)

## 2022-08-14 PROCEDURE — 99232 SBSQ HOSP IP/OBS MODERATE 35: CPT | Performed by: NURSE PRACTITIONER

## 2022-08-14 PROCEDURE — 80053 COMPREHEN METABOLIC PANEL: CPT | Performed by: INTERNAL MEDICINE

## 2022-08-14 PROCEDURE — 25010000002 ONDANSETRON PER 1 MG: Performed by: INTERNAL MEDICINE

## 2022-08-14 PROCEDURE — 82962 GLUCOSE BLOOD TEST: CPT

## 2022-08-14 PROCEDURE — 63710000001 INSULIN LISPRO (HUMAN) PER 5 UNITS: Performed by: INTERNAL MEDICINE

## 2022-08-14 PROCEDURE — 97110 THERAPEUTIC EXERCISES: CPT

## 2022-08-14 PROCEDURE — 97116 GAIT TRAINING THERAPY: CPT

## 2022-08-14 RX ADMIN — SUCRALFATE 1 G: 1 SUSPENSION ORAL at 09:17

## 2022-08-14 RX ADMIN — PANTOPRAZOLE SODIUM 40 MG: 40 TABLET, DELAYED RELEASE ORAL at 09:19

## 2022-08-14 RX ADMIN — INSULIN LISPRO 2 UNITS: 100 INJECTION, SOLUTION INTRAVENOUS; SUBCUTANEOUS at 09:19

## 2022-08-14 RX ADMIN — ONDANSETRON 4 MG: 2 INJECTION INTRAMUSCULAR; INTRAVENOUS at 04:41

## 2022-08-14 RX ADMIN — PANTOPRAZOLE SODIUM 40 MG: 40 TABLET, DELAYED RELEASE ORAL at 17:00

## 2022-08-14 RX ADMIN — APIXABAN 5 MG: 5 TABLET, FILM COATED ORAL at 09:17

## 2022-08-14 RX ADMIN — AMIODARONE HYDROCHLORIDE 200 MG: 200 TABLET ORAL at 09:17

## 2022-08-14 RX ADMIN — INSULIN LISPRO 2 UNITS: 100 INJECTION, SOLUTION INTRAVENOUS; SUBCUTANEOUS at 11:59

## 2022-08-14 RX ADMIN — Medication 10 ML: at 20:07

## 2022-08-14 RX ADMIN — HYDROXYZINE HYDROCHLORIDE 50 MG: 25 TABLET ORAL at 20:07

## 2022-08-14 RX ADMIN — INSULIN LISPRO 4 UNITS: 100 INJECTION, SOLUTION INTRAVENOUS; SUBCUTANEOUS at 17:02

## 2022-08-14 RX ADMIN — Medication 10 ML: at 09:17

## 2022-08-14 RX ADMIN — SUCRALFATE 1 G: 1 SUSPENSION ORAL at 20:07

## 2022-08-14 RX ADMIN — SUCRALFATE 1 G: 1 SUSPENSION ORAL at 17:00

## 2022-08-14 RX ADMIN — APIXABAN 5 MG: 5 TABLET, FILM COATED ORAL at 20:06

## 2022-08-14 RX ADMIN — SUCRALFATE 1 G: 1 SUSPENSION ORAL at 11:59

## 2022-08-14 RX ADMIN — AMIODARONE HYDROCHLORIDE 200 MG: 200 TABLET ORAL at 20:06

## 2022-08-14 NOTE — PLAN OF CARE
Goal Outcome Evaluation:  Plan of Care Reviewed With: patient        Progress: improving  Outcome Evaluation: VSS.  SB/Sinus 58-72 on tele.  No c/o pain.  Zofran given twice for nausea with good results.  PRN Atarax given for sleep aid at the request of pt.  Pt was able to get a few hours of sleep overnight.  Ambulated to bathroom x 2, gait continues to be very unsteady.  Safety maintained.

## 2022-08-14 NOTE — PLAN OF CARE
Goal Outcome Evaluation:         Patient stands and sits with supervision. Ambulates 100' with Rwx and SBA. Actively works thru LE exercises. States he does them often throughout the day. Complains of bottom of feet pain. Will benefit from strengthening activities.

## 2022-08-14 NOTE — THERAPY TREATMENT NOTE
Acute Care - Physical Therapy Treatment Note  [facility]     Patient Name: Chito Govea  : 1955  MRN: [id number]  Today's Date: 2022      Visit Dx:     ICD-10-CM ICD-9-CM   1. Sepsis, due to unspecified organism, unspecified whether acute organ dysfunction present (HCC)  A41.9 038.9     995.91   2. CONSUELO (acute kidney injury) (HCC)  N17.9 584.9   3. Hyponatremia  E87.1 276.1   4. Hyperkalemia  E87.5 276.7   5. Atrial fibrillation with RVR ([facility])  I48.91 427.31   6. Leukocytosis, unspecified type  D72.829 288.60   7. Diabetic ketoacidosis without coma associated with type 1 diabetes mellitus ([facility])  E10.10 250.13   8. Impaired functional mobility and activity tolerance  Z74.09 V49.89   9. Impaired mobility and ADLs  Z74.09 V49.89    Z78.9      Patient Active Problem List   Diagnosis   • Paroxysmal atrial fibrillation (HCC)   • Acute renal failure (HCC)   • Hypertension   • Diabetes mellitus (HCC)   • Moderate malnutrition (HCC)   • Aortic stenosis, moderate     Past Medical History:   Diagnosis Date   • Arthritis    • Diabetes mellitus (HCC)    • HLD (hyperlipidemia)    • Hypertension      No past surgical history on file.  PT Assessment (last 12 hours)     PT Evaluation and Treatment     Row Name 22 1025          Physical Therapy Time and Intention    Subjective Information complains of;weakness;pain  -DELFIN     Document Type therapy note (daily note)  -DELFIN     Mode of Treatment physical therapy  -     Row Name 22 1025          General Information    Existing Precautions/Restrictions fall  -     Row Name 22 1025          Pain    Posttreatment Pain Rating 4/10  -DELFIN     Pain Location - --  bottom of feet  -     Row Name 22 1025          Transfers    Sit-Stand [place] (Transfers) supervision  -DELFIN     Stand-Sit [place] (Transfers) supervision  -DELFIN     [place] Level (Toilet Transfer) supervision  -     Row Name 22 1025          Toilet Transfer    Type (Toilet  Transfer) sit-stand;stand-sit  -     Comment, (Toilet Transfer) Pt. able to cean self after BM and safely stand at sink and wash hands.  -     Row Name 08/14/22 1025          Gait/Stairs (Locomotion)    [place] Level (Gait) standby assist  -     Assistive Device (Gait) walker, front-wheeled  -     Distance in Feet (Gait) 1 x20, 1 x100  -     Deviations/Abnormal Patterns (Gait) gait speed decreased  decreased foot clearance  -     Row Name 08/14/22 1025          Balance    Static Sitting Balance independent  -     Row Name 08/14/22 1025          Hip (Therapeutic Exercise)    Hip AROM (Therapeutic Exercise) bilateral;aBduction;aDduction;sitting;flexion  -     Row Name 08/14/22 1025          Knee (Therapeutic Exercise)    Knee AROM (Therapeutic Exercise) bilateral;LAQ (long arc quad)  -     Row Name 08/14/22 1025          Ankle (Therapeutic Exercise)    Ankle AROM (Therapeutic Exercise) bilateral;dorsiflexion;plantarflexion  -     Row Name 08/14/22 1025          Positioning and Restraints    Pre-Treatment Position sitting in chair/recliner  -     In Chair sitting;call light within reach;encouraged to call for assist  -           User Key  (r) = Recorded By, (t) = Taken By, (c) = Cosigned By    Initials Name Provider Type    Verenice Webb, PTA Physical Therapist Assistant                Physical Therapy Education                 Title: PT OT SLP Therapies (In Progress)     Topic: Physical Therapy (In Progress)     Point: Mobility training (Done)     Learning Progress Summary           Patient Acceptance, E, DU by DELFIN at 8/14/2022 1059    Comment: Safety with Rwx.    Acceptance, E,D, NR by DELFIN at 8/13/2022 1302    Comment: Posture during gait    Acceptance, E,TB,D, VU,NR by GRISELDA at 8/12/2022 1349    Comment: Education re: purpose of PT/importance of activity, for safety/falls prevetions, improved tech w/ tfers and gait activity                   Point: Home exercise program (Not Started)      Learner Progress:  Not documented in this visit.          Point: Precautions (Done)     Learning Progress Summary           Patient Acceptance, E,TB,D, VU,NR by GRISELDA at 8/12/2022 6289    Comment: Education re: purpose of PT/importance of activity, for safety/falls prevetions, improved tech w/ tfers and gait activity                               User Key     Initials Effective Dates Name Provider Type [facility] 08/02/16 -  Verenice Wright PTA Physical Therapist Assistant PT    GRISELDA 08/02/18 -  Lyubov Shay, PT Physical Therapist PT              PT Recommendation and Plan         Outcome Measures     Row Name 08/14/22 1000 08/13/22 1300          How much help from another person do you currently need...    Turning from your back to your side while in flat bed without using bedrails? 3  -DELFIN 3  -DELFIN     Moving from lying on back to sitting on the side of a flat bed without bedrails? 3  -DELFIN 3  -DELFIN     Moving to and from a bed to a chair (including a wheelchair)? 4  -DELFIN 3  -DELFIN     Standing up from a chair using your arms (e.g., wheelchair, bedside chair)? 4  -DELFIN 3  -DELFIN     Climbing 3-5 steps with a railing? 3  -DELFIN 3  -DELFIN     To walk in hospital room? 3  -DELFIN 3  -DELFIN     AM-PAC 6 Clicks Score (PT) 20  -DELFIN 18  -DELFIN           User Key  (r) = Recorded By, (t) = Taken By, (c) = Cosigned By    Initials Name Provider Type    DELFIN Verenice Wright PTA Physical Therapist Assistant                 Time Calculation:    PT Charges     [facility] Name 08/14/22 1101             Time Calculation    Start Time 1025  -DELFIN      Stop Time 1050  -DELFIN      Time Calculation (min) 25 min  -DELFIN              Timed Charges    32172 - PT Therapeutic Exercise Minutes 11  -DELFIN      91607 - Gait Training Minutes  14  -DELFIN              Total Minutes    Timed Charges Total Minutes 25  -DELFIN       Total Minutes 25  -DELFIN            User Key  (r) = Recorded By, (t) = Taken By, (c) = Cosigned By    Initials Name Provider Type    Verenice Webb PTA  Physical Therapist Assistant              Therapy Charges for Today     Code Description Service Date Service Provider Modifiers Qty    93957380200 HC GAIT TRAINING EA 15 MIN 8/13/2022 Verenice Wright, PTA GP 1    01090194462 HC PT THER PROC EA 15 MIN 8/13/2022 Verenice Wright, PTA GP 1    54468504785 HC GAIT TRAINING EA 15 MIN 8/14/2022 Verenice Wright, PTA GP 1    41062984787 HC PT THER PROC EA 15 MIN 8/14/2022 Verenice Wright, PTA GP 1          PT G-Codes  Outcome Measure Options: AM-PAC 6 Clicks Basic Mobility (PT)  AM-PAC 6 Clicks Score (PT): 20  AM-PAC 6 Clicks Score (OT): 18    Verenice Wright PTA  8/14/2022

## 2022-08-14 NOTE — PLAN OF CARE
Goal Outcome Evaluation:  Plan of Care Reviewed With: patient        Progress: no change  Outcome Evaluation: PATIENT HAS HAD A GOOD DAY. SINUS/SA PER TELE. UP WITH WALKER, SAT IN CHAIR MOST OF THE DAY. NO C/O PAIN OR NAUSEA. CONT TO MONITOR.

## 2022-08-14 NOTE — PROGRESS NOTES
[facility] -  Progress Note     LOS: 5 days   Patient Care Team:  Provider, No Known as PCP - General  Provider, No Known as PCP - Family Medicine    Chief Complaint: tired today     Subjective     Interval History:   Remains NSR per tele. He notes he is weaker than normal but overall feels well.         Review of Systems:   Review of Systems   Constitutional: Negative for chills, diaphoresis, fatigue and unexpected weight change.   HENT: Negative for nosebleeds.    Respiratory: Negative for cough, chest tightness, shortness of breath and wheezing.    Cardiovascular: Negative for chest pain, palpitations and leg swelling.   Gastrointestinal: Negative for anal bleeding, blood in stool, diarrhea and nausea.   Neurological: Negative for dizziness, syncope and light-headedness.   All other systems reviewed and are negative.      Objective     Vital Sign Min/Max for last 24 hours  Temp  Min: 98 °F (36.7 °C)  Max: 99 °F (37.2 °C)   BP  Min: 103/79  Max: 158/63   Pulse  Min: 68  Max: 78   Resp  Min: 18  Max: 18   SpO2  Min: 95 %  Max: 99 %   No data recorded   Weight  Min: 63.7 kg (140 lb 6.4 oz)  Max: 63.7 kg (140 lb 6.4 oz)   Telemetry: NSR 58-72 with frequent PACs.       08/13/22 2040   Weight: 63.7 kg (140 lb 6.4 oz)         Intake/Output Summary (Last 24 hours) at 8/14/2022 1007  Last data filed at 8/14/2022 0900  Gross per 24 hour   Intake 960 ml   Output --   Net 960 ml         Physical Exam:  Vitals reviewed.   Constitutional:       General: Not in acute distress.     Appearance: Healthy appearance. Well-developed. Not diaphoretic.   Eyes:      General: No scleral icterus.     Conjunctiva/sclera: Conjunctivae normal.      Pupils: Pupils are equal, round, and reactive to light.   HENT:      Head: Normocephalic.    Mouth/Throat:      Pharynx: No oropharyngeal exudate.   Neck:      Vascular: No JVR.   Pulmonary:      Effort: Pulmonary effort is normal. No respiratory distress.      Breath  sounds: Normal breath sounds. No wheezing. No rhonchi. No rales.   Chest:      Chest wall: Not tender to palpatation.   Cardiovascular:      Normal rate. Regular rhythm.   Pulses:     Intact distal pulses.   Edema:     Peripheral edema absent.   Abdominal:      General: Bowel sounds are normal. There is no distension.      Palpations: Abdomen is soft.      Tenderness: There is no abdominal tenderness.   Musculoskeletal: Normal range of motion.      Cervical back: Normal range of motion and neck supple. Skin:     General: Skin is warm and dry.      Coloration: Skin is not pale.      Findings: No erythema or rash.   Neurological:      Mental Status: Alert, oriented to person, place, and time and oriented to person, place and time.      Deep Tendon Reflexes: Reflexes are normal and symmetric.   Psychiatric:         Behavior: Behavior normal.        Results Review:   Lab Results (last 72 hours)     Procedure Component Value Units Date/Time    POC Glucose Once [[id number]]  (Abnormal) Collected: 08/14/22 0729    Specimen: Blood Updated: 08/14/22 0748     Glucose 152 mg/dL      Comment: : [id number] Rickey LisaMeter ID: [id number]       Comprehensive Metabolic Panel [[id number]]  (Abnormal) Collected: 08/14/22 0438    Specimen: Blood Updated: 08/14/22 0546     Glucose 147 mg/dL      BUN 24 mg/dL      Creatinine 0.87 mg/dL      Sodium 136 mmol/L      Potassium 3.6 mmol/L      Chloride 103 mmol/L      CO2 26.0 mmol/L      Calcium 7.4 mg/dL      Total Protein 4.9 g/dL      Albumin 2.60 g/dL      ALT (SGPT) 32 U/L      AST (SGOT) 35 U/L      Alkaline Phosphatase 73 U/L      Total Bilirubin 0.3 mg/dL      Globulin 2.3 gm/dL      A/G Ratio 1.1 g/dL      BUN/Creatinine Ratio 27.6     Anion Gap 7.0 mmol/L      eGFR 94.6 mL/min/1.73      Comment: National Kidney Foundation and American Society of Nephrology (ASN) Task Force recommended calculation based on the Chronic Kidney Disease Epidemiology Collaboration (CKD-EPI) equation  refit without adjustment for race.       Narrative:      GFR Normal >60  Chronic Kidney Disease <60  Kidney Failure <15      POC Glucose Once [[id number]]  (Abnormal) Collected: 08/13/22 2046    Specimen: Blood Updated: 08/13/22 2109     Glucose 198 mg/dL      Comment: : [id number] Maynor JacobsenMeter ID: [id number]       Blood Culture - Blood, Arm, Right [[id number]]  (Normal) Collected: 08/09/22 1800    Specimen: Blood from Arm, Right Updated: 08/13/22 1834     Blood Culture No growth at 4 days    POC Glucose Once [[id number]]  (Normal) Collected: 08/13/22 1620    Specimen: Blood Updated: 08/13/22 1710     Glucose 127 mg/dL      Comment: : [id number] Rickey LisaMeter ID: [id number]       Blood Culture - Blood, Arm, Right [[id number]]  (Normal) Collected: 08/09/22 1600    Specimen: Blood from Arm, Right Updated: 08/13/22 1633     Blood Culture No growth at 4 days    POC Glucose Once [[id number]]  (Abnormal) Collected: 08/13/22 1118    Specimen: Blood Updated: 08/13/22 1234     Glucose 229 mg/dL      Comment: : [id number] Rickey LisaMeter ID: [id number]       POC Glucose Once [[id number]]  (Abnormal) Collected: 08/13/22 0839    Specimen: Blood Updated: 08/13/22 0914     Glucose 202 mg/dL      Comment: : [id number] Rickey LisaMeter ID: [id number]       Comprehensive Metabolic Panel [[id number]]  (Abnormal) Collected: 08/13/22 0357    Specimen: Blood Updated: 08/13/22 0427     Glucose 224 mg/dL      BUN 35 mg/dL      Creatinine 0.94 mg/dL      Sodium 135 mmol/L      Potassium 4.2 mmol/L      Chloride 101 mmol/L      CO2 25.0 mmol/L      Calcium 7.3 mg/dL      Total Protein 4.9 g/dL      Albumin 2.40 g/dL      ALT (SGPT) 12 U/L      AST (SGOT) 14 U/L      Alkaline Phosphatase 72 U/L      Total Bilirubin 0.4 mg/dL      Globulin 2.5 gm/dL      A/G Ratio 1.0 g/dL      BUN/Creatinine Ratio 37.2     Anion Gap 9.0 mmol/L      eGFR 88.9 mL/min/1.73      Comment: National Kidney Foundation and American Society of  Nephrology (ASN) Task Force recommended calculation based on the Chronic Kidney Disease Epidemiology Collaboration (CKD-EPI) equation refit without adjustment for race.       Narrative:      GFR Normal >60  Chronic Kidney Disease <60  Kidney Failure <15      CBC & Differential [[id number]]  (Abnormal) Collected: 08/13/22 0357    Specimen: Blood Updated: 08/13/22 0409    Narrative:      The following orders were created for panel order CBC & Differential.  Procedure                               Abnormality         Status                     ---------                               -----------         ------                     CBC Auto Differential[[id number]]        Abnormal            Final result                 Please view results for these tests on the individual orders.    CBC Auto Differential [[id number]]  (Abnormal) Collected: 08/13/22 0357    Specimen: Blood Updated: 08/13/22 0409     WBC 10.76 10*3/mm3      RBC 3.37 10*6/mm3      Hemoglobin 10.0 g/dL      Hematocrit 30.7 %      MCV 91.1 fL      MCH 29.7 pg      MCHC 32.6 g/dL      RDW 14.2 %      RDW-SD 47.6 fl      MPV 11.9 fL      Platelets 123 10*3/mm3      Neutrophil % 82.1 %      Lymphocyte % 7.0 %      Monocyte % 9.2 %      Eosinophil % 0.8 %      Basophil % 0.2 %      Immature Grans % 0.7 %      Neutrophils, Absolute 8.83 10*3/mm3      Lymphocytes, Absolute 0.75 10*3/mm3      Monocytes, Absolute 0.99 10*3/mm3      Eosinophils, Absolute 0.09 10*3/mm3      Basophils, Absolute 0.02 10*3/mm3      Immature Grans, Absolute 0.08 10*3/mm3      nRBC 0.0 /100 WBC     POC Glucose Once [[id number]]  (Abnormal) Collected: 08/13/22 0033    Specimen: Blood Updated: 08/13/22 0102     Glucose 212 mg/dL      Comment: : [id number] O'Creighton IvyMeter ID: [id number]       POC Glucose Once [[id number]]  (Abnormal) Collected: 08/12/22 1621    Specimen: Blood Updated: 08/12/22 1638     Glucose 143 mg/dL      Comment: : [id number] Pilar DavisMeter ID: [id number]       POC  Glucose Once [[id number]]  (Abnormal) Collected: 08/12/22 1140    Specimen: Blood Updated: 08/12/22 1151     Glucose 156 mg/dL      Comment: : [id number] Cole Alvaradoeter ID: [id number]       POC Glucose Once [[id number]]  (Abnormal) Collected: 08/12/22 0805    Specimen: Blood Updated: 08/12/22 0817     Glucose 170 mg/dL      Comment: : [id number] Cole Alvaradoeter ID: [id number]       Comprehensive Metabolic Panel [[id number]]  (Abnormal) Collected: 08/12/22 0231    Specimen: Blood Updated: 08/12/22 0341     Glucose 143 mg/dL      BUN 55 mg/dL      Creatinine 1.15 mg/dL      Sodium 142 mmol/L      Potassium 4.8 mmol/L      Chloride 106 mmol/L      CO2 25.0 mmol/L      Calcium 7.9 mg/dL      Total Protein 5.2 g/dL      Albumin 2.60 g/dL      ALT (SGPT) 11 U/L      AST (SGOT) 13 U/L      Alkaline Phosphatase 73 U/L      Total Bilirubin 0.4 mg/dL      Globulin 2.6 gm/dL      A/G Ratio 1.0 g/dL      BUN/Creatinine Ratio 47.8     Anion Gap 11.0 mmol/L      eGFR 69.8 mL/min/1.73      Comment: National Kidney Foundation and American Society of Nephrology (ASN) Task Force recommended calculation based on the Chronic Kidney Disease Epidemiology Collaboration (CKD-EPI) equation refit without adjustment for race.       Narrative:      GFR Normal >60  Chronic Kidney Disease <60  Kidney Failure <15      Amylase [[id number]]  (Normal) Collected: 08/12/22 0231    Specimen: Blood Updated: 08/12/22 0341     Amylase 42 U/L     Lipase [[id number]]  (Normal) Collected: 08/12/22 0231    Specimen: Blood Updated: 08/12/22 0341     Lipase 39 U/L     Phosphorus [[id number]]  (Abnormal) Collected: 08/12/22 0231    Specimen: Blood Updated: 08/12/22 0341     Phosphorus 2.4 mg/dL     Magnesium [[id number]]  (Normal) Collected: 08/12/22 0231    Specimen: Blood Updated: 08/12/22 0341     Magnesium 1.7 mg/dL     TSH [[id number]]  (Normal) Collected: 08/12/22 0231    Specimen: Blood Updated: 08/12/22 0341     TSH 1.880 uIU/mL     CBC &  Differential [[id number]]  (Abnormal) Collected: 08/12/22 0231    Specimen: Blood Updated: 08/12/22 0323    Narrative:      The following orders were created for panel order CBC & Differential.  Procedure                               Abnormality         Status                     ---------                               -----------         ------                     CBC Auto Differential[[id number]]        Abnormal            Final result                 Please view results for these tests on the individual orders.    CBC Auto Differential [[id number]]  (Abnormal) Collected: 08/12/22 0231    Specimen: Blood Updated: 08/12/22 0323     WBC 13.64 10*3/mm3      RBC 3.91 10*6/mm3      Hemoglobin 11.5 g/dL      Hematocrit 36.2 %      MCV 92.6 fL      MCH 29.4 pg      MCHC 31.8 g/dL      RDW 14.5 %      RDW-SD 48.7 fl      MPV 12.7 fL      Platelets 128 10*3/mm3      Neutrophil % 86.6 %      Lymphocyte % 4.1 %      Monocyte % 8.7 %      Eosinophil % 0.1 %      Basophil % 0.1 %      Immature Grans % 0.4 %      Neutrophils, Absolute 11.79 10*3/mm3      Lymphocytes, Absolute 0.56 10*3/mm3      Monocytes, Absolute 1.19 10*3/mm3      Eosinophils, Absolute 0.02 10*3/mm3      Basophils, Absolute 0.02 10*3/mm3      Immature Grans, Absolute 0.06 10*3/mm3      nRBC 0.0 /100 WBC     POC Glucose Once [[id number]]  (Abnormal) Collected: 08/11/22 2020    Specimen: Blood Updated: 08/11/22 2032     Glucose 209 mg/dL      Comment: : [id number] Anu RIOSeter ID: [id number]       POC Glucose Once [[id number]]  (Normal) Collected: 08/11/22 1615    Specimen: Blood Updated: 08/11/22 1628     Glucose 120 mg/dL      Comment: : [id number] Cole TheodorenMeter ID: [id number]       POC Glucose Once [[id number]]  (Abnormal) Collected: 08/11/22 1107    Specimen: Blood Updated: 08/11/22 1118     Glucose 228 mg/dL      Comment: : COLE MondragononyMeter ID: [id number]                 Echo EF Estimated  Lab Results   Component  Value Date    ECHOEFEST 50 08/11/2022         Cath Ejection Fraction Quantitative  No results found for: CATHEF        Medication Review: yes  Current Facility-Administered Medications   Medication Dose Route Frequency Provider Last Rate Last Admin   • acetaminophen (TYLENOL) tablet 650 mg  650 mg Oral Q4H PRN Ellis Rey MD       • aluminum-magnesium hydroxide-simethicone (MAALOX MAX) 400-400-40 MG/5ML suspension 15 mL  15 mL Oral Q6H PRN Ellis Rey MD       • amiodarone (PACERONE) tablet 200 mg  200 mg Oral Q12H Ellis Rey MD   200 mg at 08/14/22 0917   • apixaban (ELIQUIS) tablet 5 mg  5 mg Oral Q12H Ellis Rey MD   5 mg at 08/14/22 0917   • dextrose (D50W) (25 g/50 mL) IV injection 25 g  25 g Intravenous Q15 Min PRN Ellis Rey MD       • dextrose (GLUTOSE) oral gel 15 g  15 g Oral Q15 Min PRN Ellis Rey MD       • glucagon (human recombinant) (GLUCAGEN DIAGNOSTIC) injection 1 mg  1 mg Intramuscular Q15 Min PRN Ellis Rey MD       • hydrOXYzine (ATARAX) tablet 50 mg  50 mg Oral Nightly PRN Ellis Rey MD   50 mg at 08/13/22 2146   • Insulin Lispro (humaLOG) injection 0-9 Units  0-9 Units Subcutaneous TID  Ellis Rey MD   2 Units at 08/14/22 0919   • ondansetron (ZOFRAN) injection 4 mg  4 mg Intravenous Q6H PRN Ellis Rey MD   4 mg at 08/14/22 0441   • pantoprazole (PROTONIX) EC tablet 40 mg  40 mg Oral BID  Ellis Rey MD   40 mg at 08/14/22 0919   • sennosides-docusate (PERICOLACE) 8.6-50 MG per tablet 1 tablet  1 tablet Oral BID Ellis Rey MD   1 tablet at 08/12/22 1213   • sodium chloride 0.9 % flush 10 mL  10 mL Intravenous Q12H Ellis Rey MD   10 mL at 08/14/22 0917   • sodium chloride 0.9 % flush 10 mL  10 mL Intravenous PRN Ellis Rey MD       • sucralfate (CARAFATE) 1 GM/10ML suspension 1 g  1 g Oral 4x Daily AC & at Bedtime Ellis Rey MD   1 g at 08/14/22 0917          Assessment & Plan       Acute renal failure (HCC)    Paroxysmal atrial fibrillation (HCC)    Hypertension    Diabetes mellitus (HCC)    Moderate malnutrition (HCC)    Aortic stenosis, moderate    Plan:   Afib with RVR: has remained NSR since conversion on 8/12 at 2200. Continue Amiodarone 200mg BID and Eliquis 5mg BID. [id number] 3. Recommend a holter monitor at d/c.      AS: moderate per echo. Murmur present. Per Dr. Mckeon will plan for outpatient referral to the valve clinic      HTN: controlled.      Acute renal failure: improved with hydration. Creatinine remains normal today. Nephrology is following.      Cardiology will sign off at this time. He will need a follow up in my office in 6-8 weeks after discharge. Please let us know if we can be of further assistance.       Further recommendations per Dr. Mckeon     Electronically signed by MARKELL Simon, 08/14/22, 9:22 AM CDT.

## 2022-08-14 NOTE — PROGRESS NOTES
[facility] ER Medicine Services  INPATIENT PROGRESS NOTE    Patient Name: Chito Govea  Date of Admission: 8/9/2022  Today's Date: 08/13/22  Length of Stay: 4  Primary Care Physician: Provider, No Known    Subjective   Chief Complaint: nausea and vomiting  HPI   Patient was seen and examined at bedside.  Patient overall is doing much better today.  The patient was treated with some antiemetics yesterday and now is able to eat and drink better without any significant nausea.  The patient does report having continued weakness still, and has voiced some interest in pursuing rehab placement.  I discussed with him that I would have the  reach out to him and discuss further.    Prior to his recent illness, he indicates that he was able to do anything everything that he needed to do around the house in the yard.  Patient indicated that he mowed weeding without any significant issues as well as climb trees and trims trees.  Currently, the patient indicates that he feels as though he would be too weak to care for himself at home but would like to get back to that point.  He indicates that over his recent 2-month history of illness he has spent a lot of time laying on the couch working on trying to feel better.        Review of Systems   Constitutional: Positive for fatigue. Negative for fever.   Gastrointestinal: Positive for abdominal distention, nausea and vomiting. Negative for abdominal pain and diarrhea.   Neurological: Positive for weakness.        All pertinent negatives and positives are as above. All other systems have been reviewed and are negative unless otherwise stated.     Objective    Temp:  [97.8 °F (36.6 °C)-98.9 °F (37.2 °C)] 98 °F (36.7 °C)  Heart Rate:  [65-75] 68  Resp:  [18] 18  BP: (103-134)/(57-79) 103/79  Physical Exam  Vitals and nursing note reviewed.   Constitutional:       Appearance: He is ill-appearing.   HENT:      Head: Normocephalic and  atraumatic.      Mouth/Throat:      Mouth: Mucous membranes are dry.      Pharynx: Oropharynx is clear.   Eyes:      General: No scleral icterus.     Conjunctiva/sclera: Conjunctivae normal.   Cardiovascular:      Rate and Rhythm: Normal rate and regular rhythm.      Heart sounds: Murmur heard.   Pulmonary:      Effort: Pulmonary effort is normal. No respiratory distress.      Breath sounds: Normal breath sounds. No stridor.   Abdominal:      General: Abdomen is flat. Bowel sounds are normal.      Palpations: Abdomen is soft.   Skin:     General: Skin is warm and dry.      Coloration: Skin is pale.   Neurological:      General: No focal deficit present.      Mental Status: He is alert and oriented to person, place, and time.   Psychiatric:         Mood and Affect: Mood normal.         Behavior: Behavior normal.     Results Review:  I have reviewed the labs, radiology results, and diagnostic studies.    Laboratory Data:   Results from last 7 days   Lab Units 08/13/22  0357 08/12/22  0231 08/11/22  0529   WBC 10*3/mm3 10.76 13.64* 18.86*   HEMOGLOBIN g/dL 10.0* 11.5* 12.1*   HEMATOCRIT % 30.7* 36.2* 36.4*   PLATELETS 10*3/mm3 123* 128* 143        Results from last 7 days   Lab Units 08/13/22  0357 08/12/22  0231 08/11/22  0529   SODIUM mmol/L 135* 142 142   POTASSIUM mmol/L 4.2 4.8 4.9   CHLORIDE mmol/L 101 106 106   CO2 mmol/L 25.0 25.0 19.0*   BUN mg/dL 35* 55* 96*   CREATININE mg/dL 0.94 1.15 1.53*   CALCIUM mg/dL 7.3* 7.9* 7.9*   BILIRUBIN mg/dL 0.4 0.4 0.5   ALK PHOS U/L 72 73 79   ALT (SGPT) U/L 12 11 13   AST (SGOT) U/L 14 13 16   GLUCOSE mg/dL 224* 143* 225*       Culture Data:   Blood Culture   Date Value Ref Range Status   08/09/2022 No growth at 4 days  Preliminary   08/09/2022 No growth at 4 days  Preliminary       Radiology Data:   Imaging Results (Last 24 Hours)     ** No results found for the last 24 hours. **          I have reviewed the patient's current medications.     Assessment/Plan     Active  Hospital Problems    Diagnosis    • **Acute renal failure (HCC)    • Aortic stenosis, moderate    • Moderate malnutrition (HCC)    • Paroxysmal atrial fibrillation (HCC)    • Hypertension    • Diabetes mellitus (HCC)        Plan:  Patient admitted on 8/9 by Dr. Du.  The patient had a 1 month history of poor PO intake due to nausea and vomiting.  Upon arrival patient was acidotic , hyponatremic, elevated creatine.     Patient presented with a Cr of 6.96 and a BUN of 254.  Patient's CONSUELO most likely related to volume depletion.  Patient was treated with bicarbonate containing fluids initially and subsequently fluids were adjusted depending on BMP.  Nephrology was consulted and assisted in the care of the patient.  Cr has returned to normal.     Patient had poorly controlled diabetes mellitus.  A1c was > 9.  Sliding scale insulin with accuchecks.  May add some levemir depending on trends.  Patient required insulin gtt early is he stay.     Patient had an elevated troponin upon arrival.  This is a type 2 MI related to hypotension, CONSUELO, and tachycardia.  Patient was requiring levophed support early in his stay.     Patient was in atrial fibrillation with RVR and they attempted cardioversion x 2 as patient was hypotensive.  This was unsuccessful.  Suspect it was unsuccessful as the patient was significant volume depleted.  Treating the underlying cause of the tachyarrhythmia did show improvement, although controlled rates to acceptable level (<110) was still somewhat difficult.  Cardiology consulted and patient was placed on amiodarone.  Echo showed,  Results for orders placed during the hospital encounter of 08/09/22     Adult Transthoracic Echo Complete W/ Cont if Necessary Per Protocol     Interpretation Summary  · Mild pulmonary hypertension is present.  · Estimated right ventricular systolic pressure from tricuspid regurgitation is mildly elevated (35-45 mmHg).  · There is a small (<1cm) pericardial effusion.  ·  Moderate aortic valve stenosis is present.  · Left ventricular wall thickness is consistent with mild concentric hypertrophy.  · Estimated left ventricular EF = 50% Left ventricular systolic function is normal.  · Left ventricular diastolic dysfunction is noted.     Patient ultimately started on eliquis for empiric stroke reduction related to atrial fibrillation.  SCTXK1CLps 3.  Recommend holter at [place] per cardiology.  HR improved.  Patient now only intermittent atrial fibrillation and mostly sinus.     For the patients nausea and vomiting, he had a CT scan of the abdomen and pelvis that showed, concern for esophagitis.  Otherwise it was unremarkable.  Ordered pantoprazole, carafate and a dose of reglan.  Patient also had not had a BM will start bowel regimen.  I think this nausea, early satiety, and emesis is likely related to severe GERD and esophagitis, he may also have some gastroparesis given his poorly controlled DM.       Patient extremely weak, PT/OT     SW consult, PT/OT recommended SNF placement if patient willing.      Given that patient nausea and vomiting drastically improved with treatment of GERD/esophagiitis, suspect this is the underlying etiology.  Since CT abdomen and pelvis had some thickening of the distal esophagus would recommend outpatient endoscopy.         Discharge Planning: I expect the patient to be discharged to SNF in 2-3 days.    Electronically signed by Ellis Rey MD, 08/13/22, 22:09 CDT.

## 2022-08-15 ENCOUNTER — READMISSION MANAGEMENT (OUTPATIENT)
Dept: CALL CENTER | Facility: HOSPITAL | Age: 67
End: 2022-08-15

## 2022-08-15 VITALS
BODY MASS INDEX: 20.79 KG/M2 | OXYGEN SATURATION: 98 % | TEMPERATURE: 98.2 F | RESPIRATION RATE: 14 BRPM | SYSTOLIC BLOOD PRESSURE: 122 MMHG | HEIGHT: 69 IN | DIASTOLIC BLOOD PRESSURE: 51 MMHG | WEIGHT: 140.4 LBS | HEART RATE: 64 BPM

## 2022-08-15 PROBLEM — R11.2 NAUSEA AND VOMITING: Status: ACTIVE | Noted: 2022-08-15

## 2022-08-15 PROBLEM — I21.A1 TYPE 2 MI (MYOCARDIAL INFARCTION): Status: ACTIVE | Noted: 2022-08-15

## 2022-08-15 PROBLEM — E87.1 HYPONATREMIA: Status: ACTIVE | Noted: 2022-08-15

## 2022-08-15 LAB
ALBUMIN SERPL-MCNC: 2.6 G/DL (ref 3.5–5.2)
ALBUMIN/GLOB SERPL: 1.2 G/DL
ALP SERPL-CCNC: 74 U/L (ref 39–117)
ALT SERPL W P-5'-P-CCNC: 41 U/L (ref 1–41)
ANION GAP SERPL CALCULATED.3IONS-SCNC: 5 MMOL/L (ref 5–15)
AST SERPL-CCNC: 27 U/L (ref 1–40)
BILIRUB SERPL-MCNC: 0.3 MG/DL (ref 0–1.2)
BUN SERPL-MCNC: 18 MG/DL (ref 8–23)
BUN/CREAT SERPL: 21.4 (ref 7–25)
CALCIUM SPEC-SCNC: 7.1 MG/DL (ref 8.6–10.5)
CHLORIDE SERPL-SCNC: 103 MMOL/L (ref 98–107)
CO2 SERPL-SCNC: 27 MMOL/L (ref 22–29)
CREAT SERPL-MCNC: 0.84 MG/DL (ref 0.76–1.27)
EGFRCR SERPLBLD CKD-EPI 2021: 95.6 ML/MIN/1.73
GLOBULIN UR ELPH-MCNC: 2.2 GM/DL
GLUCOSE BLDC GLUCOMTR-MCNC: 166 MG/DL (ref 70–130)
GLUCOSE BLDC GLUCOMTR-MCNC: 170 MG/DL (ref 70–130)
GLUCOSE BLDC GLUCOMTR-MCNC: 308 MG/DL (ref 70–130)
GLUCOSE SERPL-MCNC: 156 MG/DL (ref 65–99)
POTASSIUM SERPL-SCNC: 3.8 MMOL/L (ref 3.5–5.2)
PROT SERPL-MCNC: 4.8 G/DL (ref 6–8.5)
SODIUM SERPL-SCNC: 135 MMOL/L (ref 136–145)

## 2022-08-15 PROCEDURE — 97110 THERAPEUTIC EXERCISES: CPT

## 2022-08-15 PROCEDURE — 97116 GAIT TRAINING THERAPY: CPT

## 2022-08-15 PROCEDURE — 97530 THERAPEUTIC ACTIVITIES: CPT

## 2022-08-15 PROCEDURE — 80053 COMPREHEN METABOLIC PANEL: CPT | Performed by: INTERNAL MEDICINE

## 2022-08-15 PROCEDURE — 63710000001 INSULIN LISPRO (HUMAN) PER 5 UNITS: Performed by: INTERNAL MEDICINE

## 2022-08-15 PROCEDURE — 97535 SELF CARE MNGMENT TRAINING: CPT

## 2022-08-15 PROCEDURE — 82962 GLUCOSE BLOOD TEST: CPT

## 2022-08-15 RX ORDER — PANTOPRAZOLE SODIUM 40 MG/1
40 TABLET, DELAYED RELEASE ORAL
Qty: 60 TABLET | Refills: 2 | Status: SHIPPED | OUTPATIENT
Start: 2022-08-15 | End: 2022-12-14

## 2022-08-15 RX ORDER — SUCRALFATE ORAL 1 G/10ML
2 SUSPENSION ORAL 2 TIMES DAILY
Qty: 120 EACH | Refills: 2 | Status: SHIPPED | OUTPATIENT
Start: 2022-08-15 | End: 2022-09-10 | Stop reason: HOSPADM

## 2022-08-15 RX ORDER — AMIODARONE HYDROCHLORIDE 200 MG/1
200 TABLET ORAL EVERY 12 HOURS SCHEDULED
Qty: 60 TABLET | Refills: 2 | Status: SHIPPED | OUTPATIENT
Start: 2022-08-15 | End: 2022-09-10 | Stop reason: HOSPADM

## 2022-08-15 RX ORDER — SUCRALFATE ORAL 1 G/10ML
2 SUSPENSION ORAL 2 TIMES DAILY
Qty: 120 EACH | Refills: 2 | Status: SHIPPED | OUTPATIENT
Start: 2022-08-15 | End: 2022-08-15 | Stop reason: SDUPTHER

## 2022-08-15 RX ORDER — PANTOPRAZOLE SODIUM 40 MG/1
40 TABLET, DELAYED RELEASE ORAL
Qty: 60 TABLET | Refills: 2 | Status: SHIPPED | OUTPATIENT
Start: 2022-08-15 | End: 2022-08-15 | Stop reason: SDUPTHER

## 2022-08-15 RX ORDER — AMIODARONE HYDROCHLORIDE 200 MG/1
200 TABLET ORAL EVERY 12 HOURS SCHEDULED
Qty: 60 TABLET | Refills: 2 | Status: SHIPPED | OUTPATIENT
Start: 2022-08-15 | End: 2022-08-15 | Stop reason: SDUPTHER

## 2022-08-15 RX ADMIN — SUCRALFATE 1 G: 1 SUSPENSION ORAL at 11:18

## 2022-08-15 RX ADMIN — SUCRALFATE 1 G: 1 SUSPENSION ORAL at 17:29

## 2022-08-15 RX ADMIN — PANTOPRAZOLE SODIUM 40 MG: 40 TABLET, DELAYED RELEASE ORAL at 08:20

## 2022-08-15 RX ADMIN — INSULIN LISPRO 2 UNITS: 100 INJECTION, SOLUTION INTRAVENOUS; SUBCUTANEOUS at 17:29

## 2022-08-15 RX ADMIN — INSULIN LISPRO 2 UNITS: 100 INJECTION, SOLUTION INTRAVENOUS; SUBCUTANEOUS at 08:20

## 2022-08-15 RX ADMIN — Medication 10 ML: at 08:22

## 2022-08-15 RX ADMIN — INSULIN LISPRO 7 UNITS: 100 INJECTION, SOLUTION INTRAVENOUS; SUBCUTANEOUS at 11:18

## 2022-08-15 RX ADMIN — PANTOPRAZOLE SODIUM 40 MG: 40 TABLET, DELAYED RELEASE ORAL at 17:29

## 2022-08-15 RX ADMIN — APIXABAN 5 MG: 5 TABLET, FILM COATED ORAL at 08:22

## 2022-08-15 RX ADMIN — SUCRALFATE 1 G: 1 SUSPENSION ORAL at 08:22

## 2022-08-15 RX ADMIN — AMIODARONE HYDROCHLORIDE 200 MG: 200 TABLET ORAL at 08:21

## 2022-08-15 NOTE — PLAN OF CARE
Goal Outcome Evaluation:      Stands and sits with supervision. Ambulated 250' with SBA and Rwx. Tolerated well.

## 2022-08-15 NOTE — PROGRESS NOTES
[facility] Medicine Services  INPATIENT PROGRESS NOTE    Patient Name: Chito Govea  Date of Admission: 8/9/2022  Today's Date: 08/15/22  Length of Stay: 6  Primary Care Physician: Provider, No Known    Subjective   Chief Complaint: nausea and vomiting  Vomiting   Pertinent negatives include no abdominal pain, chest pain, chills, coughing, diarrhea or fever.   Shortness of Breath  Pertinent negatives include no abdominal pain, chest pain, fever or vomiting.      Patient was seen and examined at bedside.  The patient has been walking around the room with walker, still very weak, and his balance is still off, but he is showing improvement.  Patient's been afebrile and hemodynamically stable.        Review of Systems   Constitutional: Positive for activity change and fatigue. Negative for appetite change, chills and fever.   Respiratory: Negative for cough and shortness of breath.    Cardiovascular: Negative for chest pain and palpitations.   Gastrointestinal: Negative for abdominal distention, abdominal pain, diarrhea, nausea and vomiting.   Neurological: Positive for weakness.        All pertinent negatives and positives are as above. All other systems have been reviewed and are negative unless otherwise stated.     Objective    Temp:  [98.2 °F (36.8 °C)-99.3 °F (37.4 °C)] 99.3 °F (37.4 °C)  Heart Rate:  [64-74] 64  Resp:  [16-18] 16  BP: (111-156)/(50-83) 111/53  Physical Exam  Vitals and nursing note reviewed.   Constitutional:       Appearance: He is ill-appearing.   HENT:      Head: Normocephalic and atraumatic.      Mouth/Throat:      Mouth: Mucous membranes are dry.      Pharynx: Oropharynx is clear.   Eyes:      General: No scleral icterus.     Conjunctiva/sclera: Conjunctivae normal.   Cardiovascular:      Rate and Rhythm: Normal rate and regular rhythm.      Heart sounds: Murmur heard.   Pulmonary:      Effort: Pulmonary effort is normal. No respiratory distress.       Breath sounds: Normal breath sounds. No stridor.   Abdominal:      General: Abdomen is flat. Bowel sounds are normal.      Palpations: Abdomen is soft.   Skin:     General: Skin is warm and dry.      Coloration: Skin is pale.   Neurological:      General: No focal deficit present.      Mental Status: He is alert and oriented to person, place, and time.   Psychiatric:         Mood and Affect: Mood normal.         Behavior: Behavior normal.     Results Review:  I have reviewed the labs, radiology results, and diagnostic studies.    Laboratory Data:   Results from last 7 days   Lab Units 08/13/22  0357 08/12/22  0231 08/11/22  0529   WBC 10*3/mm3 10.76 13.64* 18.86*   HEMOGLOBIN g/dL 10.0* 11.5* 12.1*   HEMATOCRIT % 30.7* 36.2* 36.4*   PLATELETS 10*3/mm3 123* 128* 143        Results from last 7 days   Lab Units 08/15/22  0500 08/14/22  0438 08/13/22  0357   SODIUM mmol/L 135* 136 135*   POTASSIUM mmol/L 3.8 3.6 4.2   CHLORIDE mmol/L 103 103 101   CO2 mmol/L 27.0 26.0 25.0   BUN mg/dL 18 24* 35*   CREATININE mg/dL 0.84 0.87 0.94   CALCIUM mg/dL 7.1* 7.4* 7.3*   BILIRUBIN mg/dL 0.3 0.3 0.4   ALK PHOS U/L 74 73 72   ALT (SGPT) U/L 41 32 12   AST (SGOT) U/L 27 35 14   GLUCOSE mg/dL 156* 147* 224*       Culture Data:   Blood Culture   Date Value Ref Range Status   08/09/2022 No growth at 4 days  Preliminary   08/09/2022 No growth at 4 days  Preliminary       Radiology Data:   Imaging Results (Last 24 Hours)     ** No results found for the last 24 hours. **          I have reviewed the patient's current medications.     Assessment/Plan     Active Hospital Problems    Diagnosis    • **Acute kidney injury (HCC)    • Nausea and vomiting    • Type 2 MI (myocardial infarction) (HCC) due to CONSUELO and atrial fibrillation    • Hyponatremia due to dehydration with associated weakness    • Aortic stenosis, moderate    • Moderate malnutrition (HCC)    • Paroxysmal atrial fibrillation (HCC)    • Hypertension    • Diabetes mellitus (HCC)         Plan:  Patient admitted on 8/9 by Dr. Du.  The patient had a 1 month history of poor PO intake due to nausea and vomiting.  Upon arrival patient was acidotic , hyponatremic, elevated creatine.     Patient presented with a Cr of 6.96 and a BUN of 254.  Patient's CONSUELO most likely related to volume depletion.  Patient was treated with bicarbonate containing fluids initially and subsequently fluids were adjusted depending on BMP.  Nephrology was consulted and assisted in the care of the patient.  Cr has returned to normal.     Patient had poorly controlled diabetes mellitus.  A1c was > 9.  Sliding scale insulin with accuchecks.  May add some levemir depending on trends.  Patient required insulin gtt early is he stay.     Patient had an elevated troponin upon arrival.  This is a type 2 MI related to hypotension, CONSUELO, and tachycardia.  Patient was requiring levophed support early in his stay.     Patient was in atrial fibrillation with RVR and they attempted cardioversion x 2 as patient was hypotensive.  This was unsuccessful.  Suspect it was unsuccessful as the patient was significant volume depleted.  Treating the underlying cause of the tachyarrhythmia did show improvement, although controlled rates to acceptable level (<110) was still somewhat difficult.  Cardiology consulted and patient was placed on amiodarone.  Echo showed,  Results for orders placed during the hospital encounter of 08/09/22     Adult Transthoracic Echo Complete W/ Cont if Necessary Per Protocol     Interpretation Summary  · Mild pulmonary hypertension is present.  · Estimated right ventricular systolic pressure from tricuspid regurgitation is mildly elevated (35-45 mmHg).  · There is a small (<1cm) pericardial effusion.  · Moderate aortic valve stenosis is present.  · Left ventricular wall thickness is consistent with mild concentric hypertrophy.  · Estimated left ventricular EF = 50% Left ventricular systolic function is normal.  ·  Left ventricular diastolic dysfunction is noted.     Patient ultimately started on eliquis for empiric stroke reduction related to atrial fibrillation.  [id number] 3.  Recommend holter at [place] per cardiology.  HR improved.  Patient now only intermittent atrial fibrillation and mostly sinus.     For the patients nausea and vomiting, he had a CT scan of the abdomen and pelvis that showed, concern for esophagitis.  Otherwise it was unremarkable.  Ordered pantoprazole, carafate and a dose of reglan.  Patient also had not had a BM will start bowel regimen.  I think this nausea, early satiety, and emesis is likely related to severe GERD and esophagitis, he may also have some gastroparesis given his poorly controlled DM.       Patient extremely weak, PT/OT     SW consult, PT/OT recommended SNF placement if patient willing.      Given that patient nausea and vomiting drastically improved with treatment of GERD/esophagiitis, suspect this is the underlying etiology.  Since CT abdomen and pelvis had some thickening of the distal esophagus would recommend outpatient endoscopy.     SW has sent referrals.  Patient doing better today and getting around the room with walker.    Discharge Planning: I expect the patient to be discharged to SNF in 2-3 days.    Electronically signed by Ellis Rey MD, 08/15/22, 06:45 CDT.

## 2022-08-15 NOTE — NURSING NOTE
Patient had episode of nose bleeding this morning. Applied pressure to nose and resolved. Continue monitoring.

## 2022-08-15 NOTE — THERAPY TREATMENT NOTE
Acute Care - Physical Therapy Treatment Note  [facility]     Patient Name: Chito Govea  : 1955  MRN: [id number]  Today's Date: 8/15/2022      Visit Dx:     ICD-10-CM ICD-9-CM   1. Sepsis, due to unspecified organism, unspecified whether acute organ dysfunction present ([facility])  A41.9 038.9     995.91   2. CONSUELO (acute kidney injury) ([facility])  N17.9 584.9   3. Hyponatremia  E87.1 276.1   4. Hyperkalemia  E87.5 276.7   5. Atrial fibrillation with RVR ([facility])  I48.91 427.31   6. Leukocytosis, unspecified type  D72.829 288.60   7. Diabetic ketoacidosis without coma associated with type 1 diabetes mellitus ([facility])  E10.10 250.13   8. Impaired functional mobility and activity tolerance  Z74.09 V49.89   9. Impaired mobility and ADLs  Z74.09 V49.89    Z78.9      Patient Active Problem List   Diagnosis   • Paroxysmal atrial fibrillation (HCC)   • Acute kidney injury (HCC)   • Hypertension   • Diabetes mellitus (HCC)   • Moderate malnutrition (HCC)   • Aortic stenosis, moderate   • Nausea and vomiting   • Type 2 MI (myocardial infarction) (HCC) due to CONSUELO and atrial fibrillation   • Hyponatremia due to dehydration with associated weakness     Past Medical History:   Diagnosis Date   • Arthritis    • Diabetes mellitus (HCC)    • HLD (hyperlipidemia)    • Hypertension      No past surgical history on file.  PT Assessment (last 12 hours)     PT Evaluation and Treatment     Row Name 08/15/22 1035          Physical Therapy Time and Intention    Subjective Information no complaints  -DELFIN     Document Type therapy note (daily note)  -     Mode of Treatment physical therapy  -     Row Name 08/15/22 1035          General Information    Existing Precautions/Restrictions fall  -     Row Name 08/15/22 1035          Pain    Pretreatment Pain Rating 0/10 - no pain  -     Row Name 08/15/22 1035          Transfers    Comment, (Transfers) stood x3  -DELFIN     Sit-Stand [place] (Transfers) supervision  -DELFIN     Stand-Sit [place]  (Transfers) supervision  -[facility] Name 08/15/22 1035          Gait/Stairs (Locomotion)    Bangs Level (Gait) standby assist  -     Assistive Device (Gait) walker, front-wheeled  -     Distance in Feet (Gait) 230  -     Deviations/Abnormal Patterns (Gait) gait speed decreased  -     Row Name 08/15/22 1035          Balance    Comment, Balance side steps/ backwards..SBA  -[facility] Name 08/15/22 1035          Shoulder (Therapeutic Exercise)    Shoulder AROM (Therapeutic Exercise) bilateral;flexion;extension;external rotation;internal rotation  -[facility] Name 08/15/22 1035          Elbow/Forearm (Therapeutic Exercise)    Elbow/Forearm AROM (Therapeutic Exercise) flexion;extension  -[facility] Name 08/15/22 1035          Hip (Therapeutic Exercise)    Hip AROM (Therapeutic Exercise) bilateral;flexion  -[facility] Name 08/15/22 1035          Knee (Therapeutic Exercise)    Knee AROM (Therapeutic Exercise) bilateral;LAQ (long arc quad)  -[facility] Name 08/15/22 1035          Ankle (Therapeutic Exercise)    Ankle AROM (Therapeutic Exercise) bilateral;dorsiflexion;plantarflexion  -[facility] Name 08/15/22 1035          Positioning and Restraints    Pre-Treatment Position sitting in chair/recliner  -     In Chair reclined;call light within reach;encouraged to call for assist  -           User Key  (r) = Recorded By, (t) = Taken By, (c) = Cosigned By    Initials Name Provider Type     Verenice Wright, PTA Physical Therapist Assistant                Physical Therapy Education                 Title: PT OT SLP Therapies (In Progress)     Topic: Physical Therapy (Done)     Point: Mobility training (Done)     Learning Progress Summary           Patient Acceptance, E, DU by DELFIN at 8/14/2022 1059    Comment: Safety with Rwx.    Acceptance, E,D, NR by DELFIN at 8/13/2022 1302    Comment: Posture during gait    Acceptance, E,TB,D, VU,NR by GRISELDA at 8/12/2022 1349    Comment: Education re: purpose of PT/importance of  activity, for safety/falls prevetions, improved tech w/ tfers and gait activity                   Point: Home exercise program (Done)     Learning Progress Summary           Patient Acceptance, E, DU,VU by DELFIN at 8/15/2022 1103    Comment: Importance of continued exercising post D/C                   Point: Precautions (Done)     Learning Progress Summary           Patient Acceptance, E,TB,D, VU,NR by GRISELDA at 8/12/2022 1349    Comment: Education re: purpose of PT/importance of activity, for safety/falls prevetions, improved tech w/ tfers and gait activity                               User Key     Initials Effective Dates Name Provider Type Discipline     08/02/16 -  Verenice Wright, PTA Physical Therapist Assistant PT    GRISELDA 08/02/18 -  Lyubov Shay, PT Physical Therapist PT              PT Recommendation and Plan         Outcome Measures     Row Name 08/15/22 1100 08/14/22 1000 08/13/22 1300       How much help from another person do you currently need...    Turning from your back to your side while in flat bed without using bedrails? 4  -DELFIN 3  -DELFIN 3  -DELFIN    Moving from lying on back to sitting on the side of a flat bed without bedrails? 4  -DELFIN 3  -DELFIN 3  -DELFIN    Moving to and from a bed to a chair (including a wheelchair)? 3  -DELFIN 4  -DELFIN 3  -DELFIN    Standing up from a chair using your arms (e.g., wheelchair, bedside chair)? 4  -DELFIN 4  -DELFIN 3  -DELFIN    Climbing 3-5 steps with a railing? 3  -DELFIN 3  -DELFIN 3  -DELFIN    To walk in hospital room? 3  -DELFIN 3  -DELFIN 3  -DELFIN    AM-PAC 6 Clicks Score (PT) 21  -DELFIN 20  -DELFIN 18  -DELFIN          User Key  (r) = Recorded By, (t) = Taken By, (c) = Cosigned By    Initials Name Provider Type    DELFIN Verenice Wright, PTA Physical Therapist Assistant                 Time Calculation:    PT Charges     Row Name 08/15/22 1106             Time Calculation    Start Time 1035  -DELFIN      Stop Time 1058  -DELFIN      Time Calculation (min) 23 min  -DELFIN              Timed Charges    93326 - PT Therapeutic Exercise  Minutes 12  -DELFIN      40517 - Gait Training Minutes  11  -DELFIN              Total Minutes    Timed Charges Total Minutes 23  -DELFIN       Total Minutes 23  -DELFIN            User Key  (r) = Recorded By, (t) = Taken By, (c) = Cosigned By    Initials Name Provider Type    Verenice Webb PTA Physical Therapist Assistant              Therapy Charges for Today     Code Description Service Date Service Provider Modifiers Qty    62020374229 HC GAIT TRAINING EA 15 MIN 8/14/2022 Verenice Wright, SAUMYA GP 1    92915530409 HC PT THER PROC EA 15 MIN 8/14/2022 Verenice Wright, PTA GP 1    79719708555 HC GAIT TRAINING EA 15 MIN 8/15/2022 Verenice Wright, PTA GP 1    98656127106 HC PT THER PROC EA 15 MIN 8/15/2022 Verenice Wright, PTA GP 1          PT G-Codes  Outcome Measure Options: AM-PAC 6 Clicks Basic Mobility (PT)  AM-PAC 6 Clicks Score (PT): 21  AM-PAC 6 Clicks Score (OT): 18    Verenice rWight PTA  8/15/2022

## 2022-08-15 NOTE — PLAN OF CARE
Goal Outcome Evaluation:              Outcome Evaluation: VSS/RA, sinus/sinus jonah 56-87 on tele. pt ambulated in hallway with walker/standby, gait steady. PRN atarax given. BM x1 this shift, no c/o nausea. safety maintained.

## 2022-08-15 NOTE — THERAPY TREATMENT NOTE
Acute Care - Occupational Therapy Treatment Note  [facility]     Patient Name: Chito Govea  : 1955  MRN: [id number]  Today's Date: 8/15/2022             Admit Date: 2022       ICD-10-CM ICD-9-CM   1. Sepsis, due to unspecified organism, unspecified whether acute organ dysfunction present ([facility])  A41.9 038.9     995.91   2. CONSUELO (acute kidney injury) ([facility])  N17.9 584.9   3. Hyponatremia  E87.1 276.1   4. Hyperkalemia  E87.5 276.7   5. Atrial fibrillation with RVR ([facility])  I48.91 427.31   6. Leukocytosis, unspecified type  D72.829 288.60   7. Diabetic ketoacidosis without coma associated with type 1 diabetes mellitus ([facility])  E10.10 250.13   8. Impaired functional mobility and activity tolerance  Z74.09 V49.89   9. Impaired mobility and ADLs  Z74.09 V49.89    Z78.9      Patient Active Problem List   Diagnosis   • Paroxysmal atrial fibrillation (HCC)   • Acute kidney injury (HCC)   • Hypertension   • Diabetes mellitus (HCC)   • Moderate malnutrition (HCC)   • Aortic stenosis, moderate   • Nausea and vomiting   • Type 2 MI (myocardial infarction) (HCC) due to CONSUELO and atrial fibrillation   • Hyponatremia due to dehydration with associated weakness     Past Medical History:   Diagnosis Date   • Arthritis    • Diabetes mellitus (HCC)    • HLD (hyperlipidemia)    • Hypertension      No past surgical history on file.      OT ASSESSMENT FLOWSHEET (last 12 hours)     OT Evaluation and Treatment     Row Name 08/15/22 1420                   OT Time and Intention    Subjective Information no complaints  -LS        Document Type therapy note (daily note)  -LS        Mode of Treatment occupational therapy  -LS        Patient Effort good  -LS                  General Information    Patient Profile Reviewed yes  -LS        Barriers to Rehab medically complex  -LS                  Pain Assessment    Pretreatment Pain Rating 0/10 - no pain  -LS        Posttreatment Pain Rating 0/10 - no pain  -LS                  Cognition     Orientation Status (Cognition) oriented x 4  -LS                  Coping    Observed Emotional State calm;cooperative  -LS        Verbalized Emotional State acceptance  -LS                  Plan of Care Review    Plan of Care Reviewed With patient  -LS        Progress improving  -LS        Outcome Evaluation OT tx completed. Pt completed functional mobility in room with RW from recliner <>bathroom requiring SBA and min V/cs for safety with RW. Pt completed grooming tasks at ambulatory level of shaving, toileting tasks with commode and t/f’s to shower chair with focus on safety with RW requiring SBA for all aspects. Pt reported that he usually does not use a rolling walker, pt educated on benefits of utilizing RW for balance and safety due to decreased sensation and neuropathy in B feet. HH recommended upon discharge. Pt reports that he may want a 4 wheeled rollator rather than rolling walker upon discharge. OT POC to continue.  -LS                  Vital Signs    O2 Delivery Pre Treatment room air  -LS        Pre Patient Position Sitting  -LS        Intra Patient Position Standing  -LS        Post Patient Position Sitting  -LS                  Positioning and Restraints    Pre-Treatment Position sitting in chair/recliner  -LS        Post Treatment Position chair  -LS        In Chair sitting;call light within reach;encouraged to call for assist  -LS                  Therapy Plan Review/Discharge Plan (OT)    Anticipated Discharge Disposition (OT) home with assist  -LS              User Key  (r) = Recorded By, (t) = Taken By, (c) = Cosigned By    Initials Name Effective Dates    Faith Crane COTA 09/15/21 -                  Occupational Therapy Education                 Title: PT OT SLP Therapies (In Progress)     Topic: Occupational Therapy (In Progress)     Point: ADL training (Done)     Description:   Instruct learner(s) on proper safety adaptation and remediation techniques during self care or transfers.    Instruct in proper use of assistive devices.              Learning Progress Summary           Patient Acceptance, E, DU,VU by CHARLI at 8/15/2022 6418    Comment: Pt educated on benefits of utilizing rolling walker for safety at home    Acceptance, E, VU by STEPHAN at 8/12/2022 1317                   Point: Home exercise program (Not Started)     Description:   Instruct learner(s) on appropriate technique for monitoring, assisting and/or progressing therapeutic exercises/activities.              Learner Progress:  Not documented in this visit.          Point: Precautions (Done)     Description:   Instruct learner(s) on prescribed precautions during self-care and functional transfers.              Learning Progress Summary           Patient Acceptance, E, VU by STEPHAN at 8/12/2022 1317                   Point: Body mechanics (Not Started)     Description:   Instruct learner(s) on proper positioning and spine alignment during self-care, functional mobility activities and/or exercises.              Learner Progress:  Not documented in this visit.                      User Key     Initials Effective Dates Name Provider Type Discipline    STEPHAN 11/10/21 -  Radha Nevarez OTR/L, GIOVANNA Occupational Therapist OT     09/15/21 -  Faith Maria COTA Occupational Therapist Assistant THERAPIES                  OT Recommendation and Plan     Plan of Care Review  Plan of Care Reviewed With: patient  Progress: improving  Outcome Evaluation: OT tx completed. Pt completed functional mobility in room with RW from recliner <>bathroom requiring SBA and min V/cs for safety with RW. Pt completed grooming tasks at ambulatory level of shaving, toileting tasks with commode and t/f’s to shower chair with focus on safety with RW requiring SBA for all aspects. Pt reported that he usually does not use a rolling walker, pt educated on benefits of utilizing RW for balance and safety due to decreased sensation and neuropathy in B feet. HH recommended upon  discharge. Pt reports that he may want a 4 wheeled rollator rather than rolling walker upon discharge. OT POC to continue.  Plan of Care Reviewed With: patient  Outcome Evaluation: OT tx completed. Pt completed functional mobility in room with RW from recliner <>bathroom requiring SBA and min V/cs for safety with RW. Pt completed grooming tasks at ambulatory level of shaving, toileting tasks with commode and t/f’s to shower chair with focus on safety with RW requiring SBA for all aspects. Pt reported that he usually does not use a rolling walker, pt educated on benefits of utilizing RW for balance and safety due to decreased sensation and neuropathy in B feet. HH recommended upon discharge. Pt reports that he may want a 4 wheeled rollator rather than rolling walker upon discharge. OT POC to continue.     Outcome Measures     Row Name 08/15/22 1400 08/15/22 1100 08/14/22 1000       How much help from another person do you currently need...    Turning from your back to your side while in flat bed without using bedrails? -- 4  -DELFIN 3  -DELFIN    Moving from lying on back to sitting on the side of a flat bed without bedrails? -- 4  -DELFIN 3  -DELFIN    Moving to and from a bed to a chair (including a wheelchair)? -- 3  -DELFIN 4  -DELFIN    Standing up from a chair using your arms (e.g., wheelchair, bedside chair)? -- 4  -DELFIN 4  -DELFIN    Climbing 3-5 steps with a railing? -- 3  -DELFIN 3  -DELFIN    To walk in hospital room? -- 3  -DELFIN 3  -DELFIN    AM-PAC 6 Clicks Score (PT) -- 21  -DELFIN 20  -DELFIN       How much help from another is currently needed...    Putting on and taking off regular lower body clothing? 4  -LS -- --    Bathing (including washing, rinsing, and drying) 3  -LS -- --    Toileting (which includes using toilet bed pan or urinal) 4  -LS -- --    Putting on and taking off regular upper body clothing 4  -LS -- --    Taking care of personal grooming (such as brushing teeth) 4  -LS -- --    Eating meals 4  -LS -- --    AM-PAC 6 Clicks Score (OT)  23  -LS -- --       Functional Assessment    Outcome Measure Options AM-PAC 6 Clicks Daily Activity (OT)  -LS -- --    Row Name 08/13/22 1300             How much help from another person do you currently need...    Turning from your back to your side while in flat bed without using bedrails? 3  -DELFIN      Moving from lying on back to sitting on the side of a flat bed without bedrails? 3  -DELFIN      Moving to and from a bed to a chair (including a wheelchair)? 3  -DELFIN      Standing up from a chair using your arms (e.g., wheelchair, bedside chair)? 3  -DELFIN      Climbing 3-5 steps with a railing? 3  -DELFIN      To walk in hospital room? 3  -DELFIN      AM-PAC 6 Clicks Score (PT) 18  -DELFIN            User Key  (r) = Recorded By, (t) = Taken By, (c) = Cosigned By    Initials Name Provider Type    Verenice Webb PTA Physical Therapist Assistant    Dereck Crane COTA Occupational Therapist Assistant                Time Calculation:    Time Calculation- OT     Row Name 08/15/22 1450 08/15/22 1417 08/15/22 1106       Time Calculation- OT    OT Start Time 1420  -LS -- --    OT Stop Time 1458  -LS -- --    OT Time Calculation (min) 38 min  -LS -- --    Total Timed Code Minutes- OT 38 minute(s)  -LS -- --    OT Received On 08/15/22  -LS -- --       Timed Charges    19211 - Gait Training Minutes  -- 12  -DELFIN 11  -DELFIN       Total Minutes    Timed Charges Total Minutes -- 12  -DELFIN 11  -DELFIN     Total Minutes -- 12  -DELFIN 11  -DELFIN          User Key  (r) = Recorded By, (t) = Taken By, (c) = Cosigned By    Initials Name Provider Type    Verenice Webb PTA Physical Therapist Assistant     Dereck Maria COTA Occupational Therapist Assistant              Therapy Charges for Today     Code Description Service Date Service Provider Modifiers Qty    92713099052 HC OT SELF CARE/MGMT/TRAIN EA 15 MIN 8/15/2022 Dereck Maria COTA GO 1    11680947870 HC OT THERAPEUTIC ACT EA 15 MIN 8/15/2022 Dereck Maria COTA GO 1               DERECK MARIA  FAULKNER  8/15/2022

## 2022-08-15 NOTE — PROGRESS NOTES
Continued Stay Note   Chris     Patient Name: Chito Govea  MRN: [id number]  Today's Date: 8/15/2022    Admit Date: 8/9/2022     Discharge Plan     Row Name 08/15/22 1329       Plan    Plan Home    Plan Comments Spoke to patient's daughter, Nolvia, re discharge planning/SNF. Nolvia says that they are not interested in SNF placement and that patient will return home with her. Patient was able to walk 225 ft with therapy. Nolvia denies any dc planning needs.                       Expected Discharge Date and Time     Expected Discharge Date Expected Discharge Time    Aug 16, 2022             SIRI Chowdhury

## 2022-08-15 NOTE — PLAN OF CARE
Goal Outcome Evaluation:  Plan of Care Reviewed With: patient        Progress: improving  Outcome Evaluation: OT tx completed. Pt completed functional mobility in room with RW from recliner <>bathroom requiring SBA and min V/cs for safety with RW. Pt completed grooming tasks at ambulatory level of shaving, toileting tasks with commode and t/f’s to shower chair with focus on safety with RW requiring SBA for all aspects. Pt reported that he usually does not use a rolling walker, pt educated on benefits of utilizing RW for balance and safety due to decreased sensation and neuropathy in B feet. HH recommended upon discharge. Pt reports that he may want a 4 wheeled rollator rather than rolling walker upon discharge. OT POC to continue.

## 2022-08-15 NOTE — PLAN OF CARE
Goal Outcome Evaluation:              Outcome Evaluation: NTN follow up. Appetite much improved; average 75% of meals. Pt and RD discussed having ONS on hand to help with meeting EEN and gaining weight. Pt is hoping to get dental implants when discharged to help with chewing. Pt and RD discussed DM meal planning, consistent CHO intake, A1c and factors that increase and decrease level, and weight gain strategy. Pt verbalized understanding. NTN following per protocol.

## 2022-08-15 NOTE — DISCHARGE SUMMARY
[facility]  DISCHARGE SUMMARY       Date of Admission: 8/9/2022  Date of Discharge:  8/15/2022  Primary Care Physician: Provider, No Known    Discharge Diagnoses:  Active Hospital Problems    Diagnosis    • **Acute kidney injury (HCC)    • Nausea and vomiting    • Type 2 MI (myocardial infarction) (HCC) due to CONSUELO and atrial fibrillation    • Hyponatremia due to dehydration with associated weakness    • Aortic stenosis, moderate    • Moderate malnutrition (HCC)    • Paroxysmal atrial fibrillation (HCC)    • Hypertension    • Diabetes mellitus (HCC)          Presenting Problem/History of Present Illness:  Sepsis, due to unspecified organism, unspecified whether acute organ dysfunction present (HCC) [A41.9]     Chief Complaint on Day of Discharge:   No specific complaint    History of Present Illness on Day of Discharge:   The patient is doing well today.  He is anxious for discharge home.  The patient's wife and the patient himself have refused to consider SNF placement.  The patient is stable for discharge home today.  Cardiology has recommended a Zio patch either at discharge or shortly thereafter.  He will also continue on Eliquis.    Hospital Course  This 66-year-old male presents to the emergency department with a chief complaint of nausea, vomiting and abdominal discomfort.  The patient states that his primary complaints have been present for about a month.  He has become significantly worse over the past 2 to 3 days.  He has been unable to keep down food and has consumed only clear liquids including water for the past 3 to 4 days.  He has now no longer able to keep fluids down.  He also complains of generalized weakness and developed chest discomfort today.  He denies any palpitations.  On arrival at the emergency department the patient was found to be in atrial fibrillation with RVR.  Rate was as high as 160.  Because of his medical instability, the  patient was anesthetized and electrically cardioverted x2.  Each time the patient was cardioverted, he subsequently reverted to atrial fibrillation.  At that point, cardioversion attempts were ceased.     Work-up in the emergency department reveals the patient to be acidotic on a venous blood gas with a pH of 7.268, PCO2 31.8 and PO2 of 26.9.  COVID swab is negative.  CMP shows glucose elevated at 329, , creatinine 6.96, sodium 127, potassium 6.5.  Lipase slightly elevated at 61.  Troponin slightly elevated at 0.062 likely secondary to renal clearance issues.  Lactate elevated at 4.1.  Procalcitonin 0.62, magnesium 2.9.  White blood cell count elevated at 35.6 and is likely secondary to stress related demargination due to renal failure.  There is no left shift.  Hemoglobin A1c is elevated at 9.3%.  Osmolality of serum elevated at 381 with uric acid 15.4.  A noncontrast CT scan of the abdomen, pelvis and chest is pending.  Renal ultrasound is pending.  The patient is currently on a bicarbonate drip.  The patient has been seen and evaluated by nephrology.   PLAN:   Admit to critical care unit  Nephrology consultation, done  Fluids including bicarbonate drip per nephrology  Noncontrast CT scan of the abdomen, pelvis, chest pending  Renal ultrasound pending  Daily CBC, CMP, magnesium, phosphorus, lipase      The patient's acute kidney injury was felt secondary to volume depletion.  The patient was treated aggressively with bicarbonate containing fluids initially and subsequently fluids were adjusted based on BMP.  Nephrology was consulted and assisted in renal treatment.  Creatinine gradually returned to normal throughout his hospital stay.  The patient is noted to have poorly controlled diabetes and sliding scale was initiated on admission.  Elevated troponin was noted on arrival noted likely secondary to type II MI related to hypotension, CONSUELO and tachycardia.  Patient initially required Levophed for pressor  support but that was discontinued early in his stay.  Cardiology was consulted given the difficulty in treating the patient's atrial for with RVR.  The patient was placed on amiodarone.  An echocardiogram was performed showing an LVEF of 50%.  RV pressure was slightly elevated at 35 to 45 mmHg.  Ultimately, the patient was started on Eliquis secondary to atrial fibrillation.  Holter monitor was recommended at discharge per cardiology.  The patient's heart rate has stabilized at 100 bpm.  He worked with physical therapy and did quite well.  His recovery has been appropriate and renal function is back to baseline.  The patient is appropriate for discharge home today.    Consults:   Nephrology:  Assessment   1.  Acute kidney injury stage III.  2.  Severe intravascular volume depletion versus ATN.  3.  Severe metabolic acidosis.  4.  Moderate to severe hyperkalemia.  5.  Severe leukocytosis.  6.  Possible DKA.     Plan:  1.  Agree with the care provided thus far.  2.  Agree with IV fluid bolus and maintenance IV fluid.  3.  Repeat BMP this afternoon to look for improvement of hyperkalemia and renal function.  4.  Agree to admit him to ICU.  5.  Baseline renal ultrasound.  6.  I will continue to follow him        Thank you for the consult, we appreciate the opportunity to provide care to your patients.  Feel free to contact me if I can be of any further assistance.         Cardiology:  Assessment & Plan       Acute renal failure (HCC)    Atrial fibrillation with rapid ventricular response (HCC)    Hypertension    Diabetes mellitus (HCC)     New Problems that need treatment:  1. afib with RVR of undetermined onset. Could be chronic. Will add a dose of dig and oral amiodarone. Check an echo to assess LV function and atrial size  2. N/V of ?etiology. Will get amylase and lipase to r/o pancreatits. May also need to consider cholelithiasis  New Problems that are stable:  1. HTN  2, DM  3. COPD    Result Review    Result  "Review:  I have personally reviewed the results from the time of this admission to 8/15/2022 17:28 CDT and agree with these findings:  []  Laboratory  []  Microbiology  []  Radiology  []  EKG/Telemetry   []  Cardiology/Vascular   []  Pathology  []  Old records  []  Other:    Condition on Discharge:    Stable and improved    Physical Exam on Discharge:  /51 (BP Location: Left arm, Patient Position: Sitting)   Pulse 64   Temp 98.2 °F (36.8 °C) (Oral)   Resp 14   Ht 174 cm (68.5\")   Wt 63.7 kg (140 lb 6.4 oz)   SpO2 98%   BMI 21.04 kg/m²   Physical Exam     Constitutional:       Appearance: He is ill-appearing.   HENT:      Head: Normocephalic and atraumatic.      Mouth/Throat:      Mouth: Mucous membranes are dry.      Pharynx: Oropharynx is clear.   Eyes:      General: No scleral icterus.     Conjunctiva/sclera: Conjunctivae normal.   Cardiovascular:      Rate and Rhythm: Normal rate and regular rhythm.      Heart sounds: Murmur heard.   Pulmonary:      Effort: Pulmonary effort is normal. No respiratory distress.      Breath sounds: Normal breath sounds. No stridor.   Abdominal:      General: Abdomen is flat. Bowel sounds are normal.      Palpations: Abdomen is soft.   Skin:     General: Skin is warm and dry.      Coloration: Skin is pale.   Neurological:      General: No focal deficit present.      Mental Status: He is alert and oriented to person, place, and time.   Psychiatric:         Mood and Affect: Mood normal.         Behavior: Behavior normal.     Discharge Disposition:  Home or Self Care    Discharge Medications:     Discharge Medications      New Medications      Instructions Start Date   amiodarone 200 MG tablet  Commonly known as: PACERONE   200 mg, Oral, Every 12 Hours Scheduled      apixaban 5 MG tablet tablet  Commonly known as: ELIQUIS   5 mg, Oral, Every 12 Hours Scheduled      pantoprazole 40 MG EC tablet  Commonly known as: PROTONIX   40 mg, Oral, 2 Times Daily Before Meals    "   sucralfate 1 GM/10ML suspension  Commonly known as: CARAFATE   2 g, Oral, 2 Times Daily         Continue These Medications      Instructions Start Date   atorvastatin 20 MG tablet  Commonly known as: LIPITOR   20 mg, Oral, Daily      glimepiride 2 MG tablet  Commonly known as: AMARYL   2 mg, Oral, Every Morning Before Breakfast         Stop These Medications    amLODIPine 10 MG tablet  Commonly known as: NORVASC     lisinopril-hydrochlorothiazide 20-12.5 MG per tablet  Commonly known as: PRINZIDE,ZESTORETIC            Discharge Diet:   Diet Instructions     Diet: Consistent Carbohydrate; Thin      Discharge Diet: Consistent Carbohydrate    Fluid Consistency: Thin    Diet: Consistent Carbohydrate; Thin      Discharge Diet: Consistent Carbohydrate    Fluid Consistency: Thin          Discharge Care Plan / Instructions:   Discharge home    Activity at Discharge:   Activity Instructions     Activity as Tolerated      Activity as Tolerated            Follow-up Appointments:  Follow-up with primary care physician next week  Follow-up with cardiology in 6 to 8 weeks       Electronically signed by Edwin Du DO, 08/15/22, 17:28 CDT.    Time: Discharge Over 30 min    Part of this note may be an electronic transcription/translation of spoken language to printed text using the Dragon Dictation system.

## 2022-08-16 ENCOUNTER — TELEPHONE (OUTPATIENT)
Dept: CARDIOLOGY | Facility: CLINIC | Age: 67
End: 2022-08-16

## 2022-08-16 NOTE — THERAPY DISCHARGE NOTE
Acute Care - Occupational Therapy Discharge Summary  [facility]     Patient Name: Chito Govea  : 1955  MRN: [id number]    Today's Date: 2022                 Admit Date: 2022        OT Recommendation and Plan    Visit Dx:    ICD-10-CM ICD-9-CM   1. Sepsis, due to unspecified organism, unspecified whether acute organ dysfunction present ([facility])  A41.9 038.9     995.91   2. CONSUELO (acute kidney injury) ([facility])  N17.9 584.9   3. Hyponatremia  E87.1 276.1   4. Hyperkalemia  E87.5 276.7   5. Atrial fibrillation with RVR ([facility])  I48.91 427.31   6. Leukocytosis, unspecified type  D72.829 288.60   7. Diabetic ketoacidosis without coma associated with type 1 diabetes mellitus ([facility])  E10.10 250.13   8. Impaired functional mobility and activity tolerance  Z74.09 V49.89   9. Impaired mobility and ADLs  Z74.09 V49.89    Z78.9    10. Paroxysmal atrial fibrillation ([facility])  I48.0 427.31                OT Rehab Goals     Row Name 22 1600             Dressing Goal 1 (OT)    Activity/Device (Dressing Goal 1, OT) lower body dressing  -CS      [place]/Cues Needed (Dressing Goal 1, OT) minimum assist (75% or more patient effort)  -CS      Time Frame (Dressing Goal 1, OT) long term goal (LTG);by discharge  -CS      Progress/Outcome (Dressing Goal 1, OT) goal not met  -CS              Toileting Goal 1 (OT)    Activity/Device (Toileting Goal 1, OT) toileting skills, all  -CS      [place] Level/Cues Needed (Toileting Goal 1, OT) supervision required  -CS      Time Frame (Toileting Goal 1, OT) long term goal (LTG);by discharge  -CS      Progress/Outcome (Toileting Goal 1, OT) goal not met  -CS            User Key  (r) = Recorded By, (t) = Taken By, (c) = Cosigned By    Initials Name Provider Type Discipline    CS Jaycee Chery, OTR/L, CNT Occupational Therapist OT                 Outcome Measures     Row Name 08/15/22 1400 08/15/22 1100 22 1000       How much help from another person do you currently need...     Turning from your back to your side while in flat bed without using bedrails? -- 4  -DELFIN 3  -DELFIN    Moving from lying on back to sitting on the side of a flat bed without bedrails? -- 4  -DELFIN 3  -DELFIN    Moving to and from a bed to a chair (including a wheelchair)? -- 3  -DELFIN 4  -DELFIN    Standing up from a chair using your arms (e.g., wheelchair, bedside chair)? -- 4  -DELFIN 4  -DELFIN    Climbing 3-5 steps with a railing? -- 3  -DELFIN 3  -DELFIN    To walk in hospital room? -- 3  -DELFIN 3  -DELFIN    AM-PAC 6 Clicks Score (PT) -- 21  -DELFIN 20  -DELFIN       How much help from another is currently needed...    Putting on and taking off regular lower body clothing? 4  -LS -- --    Bathing (including washing, rinsing, and drying) 3  -LS -- --    Toileting (which includes using toilet bed pan or urinal) 4  -LS -- --    Putting on and taking off regular upper body clothing 4  -LS -- --    Taking care of personal grooming (such as brushing teeth) 4  -LS -- --    Eating meals 4  -LS -- --    AM-PAC 6 Clicks Score (OT) 23  -LS -- --       Functional Assessment    Outcome Measure Options AM-PAC 6 Clicks Daily Activity (OT)  -LS -- --          User Key  (r) = Recorded By, (t) = Taken By, (c) = Cosigned By    Initials Name Provider Type    Verenice Webb PTA Physical Therapist Assistant    Faith Crane COTA Occupational Therapist Assistant                        OT Discharge Summary  Anticipated Discharge Disposition (OT): home with assist  Reason for Discharge: Discharge from facility  Outcomes Achieved: Refer to plan of care for updates on goals achieved  Discharge Destination: Home with assist      Jaycee Chery, KAHLILR/L, CNT  8/16/2022

## 2022-08-16 NOTE — OUTREACH NOTE
Prep Survey    Flowsheet Row Responses   [facility] facility patient discharged from? [facility]   Is LACE score < 7 ? No   Emergency Room discharge w/ pulse ox? No   Eligibility Readm Mgmt   Discharge diagnosis Sepsis   Does the patient have one of the following disease processes/diagnoses(primary or secondary)? Sepsis   Does the patient have Home health ordered? No   Is there a DME ordered? No   Prep survey completed? Yes          PEYTON DELEON - Registered Nurse

## 2022-08-17 ENCOUNTER — READMISSION MANAGEMENT (OUTPATIENT)
Dept: CALL CENTER | Facility: HOSPITAL | Age: 67
End: 2022-08-17

## 2022-08-17 NOTE — THERAPY DISCHARGE NOTE
Acute Care - Physical Therapy Discharge Summary  [facility]       Patient Name: Chito Govea  : 1955  MRN: [id number]    Today's Date: 2022                 Admit Date: 2022      PT Recommendation and Plan    Visit Dx:    ICD-10-CM ICD-9-CM   1. Sepsis, due to unspecified organism, unspecified whether acute organ dysfunction present ([facility])  A41.9 038.9     995.91   2. CONSUELO (acute kidney injury) ([facility])  N17.9 584.9   3. Hyponatremia  E87.1 276.1   4. Hyperkalemia  E87.5 276.7   5. Atrial fibrillation with RVR ([facility])  I48.91 427.31   6. Leukocytosis, unspecified type  D72.829 288.60   7. Diabetic ketoacidosis without coma associated with type 1 diabetes mellitus ([facility])  E10.10 250.13   8. Impaired functional mobility and activity tolerance  Z74.09 V49.89   9. Impaired mobility and ADLs  Z74.09 V49.89    Z78.9    10. Paroxysmal atrial fibrillation ([facility])  I48.0 427.31        Outcome Measures     Row Name 08/15/22 1400 08/15/22 1100          How much help from another person do you currently need...    Turning from your back to your side while in flat bed without using bedrails? -- 4  -DELFIN     Moving from lying on back to sitting on the side of a flat bed without bedrails? -- 4  -DELFIN     Moving to and from a bed to a chair (including a wheelchair)? -- 3  -DELFIN     Standing up from a chair using your arms (e.g., wheelchair, bedside chair)? -- 4  -DELFIN     Climbing 3-5 steps with a railing? -- 3  -DELFIN     To walk in hospital room? -- 3  -DELFIN     AM-PAC 6 Clicks Score (PT) -- 21  -DELFIN            How much help from another is currently needed...    Putting on and taking off regular lower body clothing? 4  -LS --     Bathing (including washing, rinsing, and drying) 3  -LS --     Toileting (which includes using toilet bed pan or urinal) 4  -LS --     Putting on and taking off regular upper body clothing 4  -LS --     Taking care of personal grooming (such as brushing teeth) 4  -LS --     Eating meals 4  -LS --     AM-PAC 6  Clicks Score (OT) 23  -LS --            Functional Assessment    Outcome Measure Options AM-PAC 6 Clicks Daily Activity (OT)  -LS --           User Key  (r) = Recorded By, (t) = Taken By, (c) = Cosigned By    Initials Name Provider Type    Verenice Webb PTA Physical Therapist Assistant    Faith Crane COTA Occupational Therapist Assistant                     PT Rehab Goals     Row Name 08/17/22 1007             Bed Mobility Goal 1 (PT)    Activity/Assistive Device (Bed Mobility Goal 1, PT) bed mobility activities, all  -AB      [place] Level/Cues Needed (Bed Mobility Goal 1, PT) independent  -AB      Time Frame (Bed Mobility Goal 1, PT) long term goal (LTG);10 days  -AB      Progress/Outcomes (Bed Mobility Goal 1, PT) goal not met  -AB              Transfer Goal 1 (PT)    Activity/Assistive Device (Transfer Goal 1, PT) sit-to-stand/stand-to-sit;bed-to-chair/chair-to-bed  -AB      [place] Level/Cues Needed (Transfer Goal 1, PT) standby assist  -AB      Time Frame (Transfer Goal 1, PT) long term goal (LTG);10 days  -AB      Progress/Outcome (Transfer Goal 1, PT) goal met  -AB              Gait Training Goal 1 (PT)    Activity/Assistive Device (Gait Training Goal 1, PT) gait (walking locomotion);assistive device use;decrease fall risk;diminish gait deviation;improve balance and speed;increase endurance/gait distance;walker, rolling  -AB      [place] Level (Gait Training Goal 1, PT) contact guard required;standby assist  -AB      Distance (Gait Training Goal 1, PT)  ft  -AB      Time Frame (Gait Training Goal 1, PT) long term goal (LTG);10 days  -AB      Progress/Outcome (Gait Training Goal 1, PT) goal met  -AB            User Key  (r) = Recorded By, (t) = Taken By, (c) = Cosigned By    Initials Name Provider Type Keely Williamson PTA Physical Therapist Assistant PT                    PT Discharge Summary  Anticipated Discharge Disposition (PT): skilled nursing  facility  Reason for Discharge: Discharge from facility  Outcomes Achieved: Refer to plan of care for updates on goals achieved  Discharge Destination: Home with assist      Keely Coppola, PTA   8/17/2022

## 2022-08-17 NOTE — OUTREACH NOTE
Sepsis Week 1 Survey    Flowsheet Row Responses   [facility] patient discharged from? [place]   Does the patient have one of the following disease processes/diagnoses(primary or secondary)? Sepsis   Week 1 attempt successful? Yes   Call start time 1331   Call end time 1333   Discharge diagnosis Sepsis   Meds reviewed with patient/caregiver? Yes   Is the patient having any side effects they believe may be caused by any medication additions or changes? No   Does the patient have all medications related to this admission filled (includes all antibiotics, inhalers, nebulizers,steroids,etc.) No   What is keeping the patient from filling the prescriptions? Financial   Nursing Interventions Nurse provided patient education   Prescription comments Patient states he has not started his Eliquis d/t cost.  States he informed his cardiology office of this and is waiting for a return call.    Is the patient taking all medications as directed (includes completed medication regime)? No   What is preventing the patient from taking all medications as directed? Other   Nursing Interventions Nurse provided patient education   Has the patient kept scheduled appointments due by today? N/A   Psychosocial issues? No   What is the patient's perception of their health status since discharge? Improving   Nursing interventions Nurse provided reassurance to patient   Is patient/caregiver able to teach back steps to recovery at home? Set small, achievable goals for return to baseline health, Rest and regain strength   Is the patient/caregiver able to teach back the hierarchy of who to call/visit for symptoms/problems? PCP, Specialist, Home health nurse, Urgent Care, ED, 911 Yes   Week 1 call completed? Yes   Wrap up additional comments Patient states overall he is doing better-is waiting for a return call from his cardiologist regarding the cost of Eliquis           LUISITO H - Registered Nurse

## 2022-08-23 LAB
ALBUMIN SERPL-MCNC: 3.4 G/DL (ref 3.5–5.2)
ALP BLD-CCNC: 107 U/L (ref 40–130)
ALT SERPL-CCNC: 20 U/L (ref 5–41)
ANION GAP SERPL CALCULATED.3IONS-SCNC: 11 MMOL/L (ref 7–19)
AST SERPL-CCNC: 16 U/L (ref 5–40)
BASOPHILS ABSOLUTE: 0 K/UL (ref 0–0.2)
BASOPHILS RELATIVE PERCENT: 0.7 % (ref 0–1)
BILIRUB SERPL-MCNC: 0.3 MG/DL (ref 0.2–1.2)
BUN BLDV-MCNC: 11 MG/DL (ref 8–23)
CALCIUM SERPL-MCNC: 7.5 MG/DL (ref 8.8–10.2)
CHLORIDE BLD-SCNC: 105 MMOL/L (ref 98–111)
CHOLESTEROL, TOTAL: 134 MG/DL (ref 160–199)
CO2: 26 MMOL/L (ref 22–29)
CREAT SERPL-MCNC: 0.9 MG/DL (ref 0.5–1.2)
EOSINOPHILS ABSOLUTE: 0.1 K/UL (ref 0–0.6)
EOSINOPHILS RELATIVE PERCENT: 1.5 % (ref 0–5)
GFR AFRICAN AMERICAN: >59
GFR NON-AFRICAN AMERICAN: >60
GLUCOSE BLD-MCNC: 184 MG/DL (ref 74–109)
HBA1C MFR BLD: 8.9 % (ref 4–6)
HCT VFR BLD CALC: 32.7 % (ref 42–52)
HDLC SERPL-MCNC: 46 MG/DL (ref 55–121)
HEMOGLOBIN: 10.1 G/DL (ref 14–18)
IMMATURE GRANULOCYTES #: 0 K/UL
LDL CHOLESTEROL CALCULATED: 68 MG/DL
LYMPHOCYTES ABSOLUTE: 0.9 K/UL (ref 1.1–4.5)
LYMPHOCYTES RELATIVE PERCENT: 16 % (ref 20–40)
MCH RBC QN AUTO: 29.1 PG (ref 27–31)
MCHC RBC AUTO-ENTMCNC: 30.9 G/DL (ref 33–37)
MCV RBC AUTO: 94.2 FL (ref 80–94)
MONOCYTES ABSOLUTE: 0.8 K/UL (ref 0–0.9)
MONOCYTES RELATIVE PERCENT: 15.1 % (ref 0–10)
NEUTROPHILS ABSOLUTE: 3.6 K/UL (ref 1.5–7.5)
NEUTROPHILS RELATIVE PERCENT: 66.3 % (ref 50–65)
PDW BLD-RTO: 15.1 % (ref 11.5–14.5)
PLATELET # BLD: 389 K/UL (ref 130–400)
PMV BLD AUTO: 9.9 FL (ref 9.4–12.4)
POTASSIUM SERPL-SCNC: 4 MMOL/L (ref 3.5–5)
RBC # BLD: 3.47 M/UL (ref 4.7–6.1)
SODIUM BLD-SCNC: 142 MMOL/L (ref 136–145)
TOTAL PROTEIN: 6 G/DL (ref 6.6–8.7)
TRIGL SERPL-MCNC: 100 MG/DL (ref 0–149)
WBC # BLD: 5.5 K/UL (ref 4.8–10.8)

## 2022-08-29 ENCOUNTER — HOSPITAL ENCOUNTER (OUTPATIENT)
Dept: NON INVASIVE DIAGNOSTICS | Age: 67
Discharge: HOME | End: 2022-08-29
Payer: MEDICARE

## 2022-08-29 LAB
EKG P AXIS: NORMAL DEGREES
EKG P-R INTERVAL: NORMAL MS
EKG Q-T INTERVAL: 342 MS
EKG QRS DURATION: 94 MS
EKG QTC CALCULATION (BAZETT): 459 MS
EKG T AXIS: -160 DEGREES

## 2022-08-29 PROCEDURE — 93010 ELECTROCARDIOGRAM REPORT: CPT | Performed by: INTERNAL MEDICINE

## 2022-08-29 PROCEDURE — 93005 ELECTROCARDIOGRAM TRACING: CPT | Performed by: FAMILY MEDICINE

## 2022-09-01 ENCOUNTER — HOSPITAL ENCOUNTER (INPATIENT)
Facility: HOSPITAL | Age: 67
LOS: 9 days | Discharge: HOME HEALTH | End: 2022-09-10
Attending: EMERGENCY MEDICINE | Admitting: INTERNAL MEDICINE

## 2022-09-01 ENCOUNTER — APPOINTMENT (OUTPATIENT)
Dept: GENERAL RADIOLOGY | Facility: HOSPITAL | Age: 67
End: 2022-09-01

## 2022-09-01 ENCOUNTER — APPOINTMENT (OUTPATIENT)
Dept: CT IMAGING | Facility: HOSPITAL | Age: 67
End: 2022-09-01

## 2022-09-01 DIAGNOSIS — I50.9 ACUTE ON CHRONIC CONGESTIVE HEART FAILURE, UNSPECIFIED HEART FAILURE TYPE: Primary | ICD-10-CM

## 2022-09-01 DIAGNOSIS — R60.0 BILATERAL LEG EDEMA: ICD-10-CM

## 2022-09-01 DIAGNOSIS — I50.21 ACUTE SYSTOLIC HEART FAILURE: ICD-10-CM

## 2022-09-01 DIAGNOSIS — J44.9 CHRONIC OBSTRUCTIVE PULMONARY DISEASE, UNSPECIFIED COPD TYPE: ICD-10-CM

## 2022-09-01 DIAGNOSIS — Z74.09 IMPAIRED MOBILITY: ICD-10-CM

## 2022-09-01 DIAGNOSIS — Z78.9 IMPAIRED MOBILITY AND ADLS: ICD-10-CM

## 2022-09-01 DIAGNOSIS — J96.01 ACUTE RESPIRATORY FAILURE WITH HYPOXIA: ICD-10-CM

## 2022-09-01 DIAGNOSIS — Z74.09 IMPAIRED MOBILITY AND ADLS: ICD-10-CM

## 2022-09-01 LAB
ALBUMIN SERPL-MCNC: 3.2 G/DL (ref 3.5–5.2)
ALBUMIN/GLOB SERPL: 1 G/DL
ALP SERPL-CCNC: 115 U/L (ref 39–117)
ALT SERPL W P-5'-P-CCNC: 16 U/L (ref 1–41)
ANION GAP SERPL CALCULATED.3IONS-SCNC: 14 MMOL/L (ref 5–15)
APTT PPP: 39.8 SECONDS (ref 24.1–35)
ARTERIAL PATENCY WRIST A: POSITIVE
ARTERIAL PATENCY WRIST A: POSITIVE
AST SERPL-CCNC: 15 U/L (ref 1–40)
ATMOSPHERIC PRESS: 751 MMHG
ATMOSPHERIC PRESS: 752 MMHG
BASE EXCESS BLDA CALC-SCNC: 0.6 MMOL/L (ref 0–2)
BASE EXCESS BLDA CALC-SCNC: 3.9 MMOL/L (ref 0–2)
BASOPHILS # BLD AUTO: 0.03 10*3/MM3 (ref 0–0.2)
BASOPHILS NFR BLD AUTO: 0.4 % (ref 0–1.5)
BDY SITE: ABNORMAL
BDY SITE: ABNORMAL
BILIRUB SERPL-MCNC: 0.5 MG/DL (ref 0–1.2)
BODY TEMPERATURE: 37 C
BODY TEMPERATURE: 37 C
BUN SERPL-MCNC: 23 MG/DL (ref 8–23)
BUN/CREAT SERPL: 20 (ref 7–25)
CALCIUM SPEC-SCNC: 8.4 MG/DL (ref 8.6–10.5)
CHLORIDE SERPL-SCNC: 103 MMOL/L (ref 98–107)
CO2 SERPL-SCNC: 24 MMOL/L (ref 22–29)
CREAT SERPL-MCNC: 1.15 MG/DL (ref 0.76–1.27)
D DIMER PPP FEU-MCNC: 0.88 MCGFEU/ML (ref 0–0.5)
D-LACTATE SERPL-SCNC: 1.6 MMOL/L (ref 0.5–2)
D-LACTATE SERPL-SCNC: 3.2 MMOL/L (ref 0.5–2)
DEPRECATED RDW RBC AUTO: 53 FL (ref 37–54)
EGFRCR SERPLBLD CKD-EPI 2021: 69.8 ML/MIN/1.73
EOSINOPHIL # BLD AUTO: 0.04 10*3/MM3 (ref 0–0.4)
EOSINOPHIL NFR BLD AUTO: 0.5 % (ref 0.3–6.2)
EPAP: 6
ERYTHROCYTE [DISTWIDTH] IN BLOOD BY AUTOMATED COUNT: 15.6 % (ref 12.3–15.4)
GAS FLOW AIRWAY: 7 LPM
GLOBULIN UR ELPH-MCNC: 3.2 GM/DL
GLUCOSE BLDC GLUCOMTR-MCNC: 139 MG/DL (ref 70–130)
GLUCOSE BLDC GLUCOMTR-MCNC: 149 MG/DL (ref 70–130)
GLUCOSE BLDC GLUCOMTR-MCNC: 255 MG/DL (ref 70–130)
GLUCOSE SERPL-MCNC: 266 MG/DL (ref 65–99)
HBA1C MFR BLD: 8.4 % (ref 4.8–5.6)
HCO3 BLDA-SCNC: 23.8 MMOL/L (ref 20–26)
HCO3 BLDA-SCNC: 27.2 MMOL/L (ref 20–26)
HCT VFR BLD AUTO: 36.4 % (ref 37.5–51)
HGB BLD-MCNC: 11.1 G/DL (ref 13–17.7)
IMM GRANULOCYTES # BLD AUTO: 0.04 10*3/MM3 (ref 0–0.05)
IMM GRANULOCYTES NFR BLD AUTO: 0.5 % (ref 0–0.5)
INHALED O2 CONCENTRATION: 40 %
INR PPP: 1.96 (ref 0.91–1.09)
IPAP: 12
LYMPHOCYTES # BLD AUTO: 0.92 10*3/MM3 (ref 0.7–3.1)
LYMPHOCYTES NFR BLD AUTO: 10.8 % (ref 19.6–45.3)
Lab: ABNORMAL
Lab: ABNORMAL
MCH RBC QN AUTO: 29 PG (ref 26.6–33)
MCHC RBC AUTO-ENTMCNC: 30.5 G/DL (ref 31.5–35.7)
MCV RBC AUTO: 95 FL (ref 79–97)
MODALITY: ABNORMAL
MODALITY: ABNORMAL
MONOCYTES # BLD AUTO: 0.63 10*3/MM3 (ref 0.1–0.9)
MONOCYTES NFR BLD AUTO: 7.4 % (ref 5–12)
NEUTROPHILS NFR BLD AUTO: 6.82 10*3/MM3 (ref 1.7–7)
NEUTROPHILS NFR BLD AUTO: 80.4 % (ref 42.7–76)
NRBC BLD AUTO-RTO: 0 /100 WBC (ref 0–0.2)
NT-PROBNP SERPL-MCNC: ABNORMAL PG/ML (ref 0–900)
PCO2 BLDA: 32.5 MM HG (ref 35–45)
PCO2 BLDA: 35.4 MM HG (ref 35–45)
PCO2 TEMP ADJ BLD: 32.5 MM HG (ref 35–45)
PCO2 TEMP ADJ BLD: 35.4 MM HG (ref 35–45)
PH BLDA: 7.47 PH UNITS (ref 7.35–7.45)
PH BLDA: 7.49 PH UNITS (ref 7.35–7.45)
PH, TEMP CORRECTED: 7.47 PH UNITS (ref 7.35–7.45)
PH, TEMP CORRECTED: 7.49 PH UNITS (ref 7.35–7.45)
PLATELET # BLD AUTO: 422 10*3/MM3 (ref 140–450)
PMV BLD AUTO: 10 FL (ref 6–12)
PO2 BLDA: 55.6 MM HG (ref 83–108)
PO2 BLDA: 65.8 MM HG (ref 83–108)
PO2 TEMP ADJ BLD: 55.6 MM HG (ref 83–108)
PO2 TEMP ADJ BLD: 65.8 MM HG (ref 83–108)
POTASSIUM SERPL-SCNC: 4.5 MMOL/L (ref 3.5–5.2)
POTASSIUM SERPL-SCNC: 4.6 MMOL/L (ref 3.5–5.2)
PROT SERPL-MCNC: 6.4 G/DL (ref 6–8.5)
PROTHROMBIN TIME: 21.6 SECONDS (ref 11.9–14.6)
RBC # BLD AUTO: 3.83 10*6/MM3 (ref 4.14–5.8)
SAO2 % BLDCOA: 90.1 % (ref 94–99)
SAO2 % BLDCOA: 94.3 % (ref 94–99)
SARS-COV-2 RNA PNL SPEC NAA+PROBE: NOT DETECTED
SET MECH RESP RATE: 12
SODIUM SERPL-SCNC: 141 MMOL/L (ref 136–145)
TROPONIN T SERPL-MCNC: 0.04 NG/ML (ref 0–0.03)
VENTILATOR MODE: ABNORMAL
VENTILATOR MODE: ABNORMAL
WBC NRBC COR # BLD: 8.48 10*3/MM3 (ref 3.4–10.8)

## 2022-09-01 PROCEDURE — 82803 BLOOD GASES ANY COMBINATION: CPT

## 2022-09-01 PROCEDURE — 84484 ASSAY OF TROPONIN QUANT: CPT | Performed by: EMERGENCY MEDICINE

## 2022-09-01 PROCEDURE — 83036 HEMOGLOBIN GLYCOSYLATED A1C: CPT | Performed by: FAMILY MEDICINE

## 2022-09-01 PROCEDURE — 83880 ASSAY OF NATRIURETIC PEPTIDE: CPT | Performed by: EMERGENCY MEDICINE

## 2022-09-01 PROCEDURE — 85730 THROMBOPLASTIN TIME PARTIAL: CPT | Performed by: EMERGENCY MEDICINE

## 2022-09-01 PROCEDURE — 93005 ELECTROCARDIOGRAM TRACING: CPT | Performed by: EMERGENCY MEDICINE

## 2022-09-01 PROCEDURE — 36600 WITHDRAWAL OF ARTERIAL BLOOD: CPT

## 2022-09-01 PROCEDURE — 0 IOPAMIDOL PER 1 ML: Performed by: EMERGENCY MEDICINE

## 2022-09-01 PROCEDURE — 71045 X-RAY EXAM CHEST 1 VIEW: CPT

## 2022-09-01 PROCEDURE — 82962 GLUCOSE BLOOD TEST: CPT

## 2022-09-01 PROCEDURE — 71275 CT ANGIOGRAPHY CHEST: CPT

## 2022-09-01 PROCEDURE — 63710000001 INSULIN LISPRO (HUMAN) PER 5 UNITS: Performed by: FAMILY MEDICINE

## 2022-09-01 PROCEDURE — 25010000002 FUROSEMIDE PER 20 MG: Performed by: NURSE PRACTITIONER

## 2022-09-01 PROCEDURE — 25010000002 FUROSEMIDE PER 20 MG: Performed by: EMERGENCY MEDICINE

## 2022-09-01 PROCEDURE — 87635 SARS-COV-2 COVID-19 AMP PRB: CPT | Performed by: EMERGENCY MEDICINE

## 2022-09-01 PROCEDURE — 99285 EMERGENCY DEPT VISIT HI MDM: CPT

## 2022-09-01 PROCEDURE — 94799 UNLISTED PULMONARY SVC/PX: CPT

## 2022-09-01 PROCEDURE — 87040 BLOOD CULTURE FOR BACTERIA: CPT | Performed by: EMERGENCY MEDICINE

## 2022-09-01 PROCEDURE — 93010 ELECTROCARDIOGRAM REPORT: CPT | Performed by: INTERNAL MEDICINE

## 2022-09-01 PROCEDURE — 36415 COLL VENOUS BLD VENIPUNCTURE: CPT | Performed by: EMERGENCY MEDICINE

## 2022-09-01 PROCEDURE — 85379 FIBRIN DEGRADATION QUANT: CPT | Performed by: EMERGENCY MEDICINE

## 2022-09-01 PROCEDURE — 84132 ASSAY OF SERUM POTASSIUM: CPT | Performed by: FAMILY MEDICINE

## 2022-09-01 PROCEDURE — 99221 1ST HOSP IP/OBS SF/LOW 40: CPT | Performed by: NURSE PRACTITIONER

## 2022-09-01 PROCEDURE — 85610 PROTHROMBIN TIME: CPT | Performed by: EMERGENCY MEDICINE

## 2022-09-01 PROCEDURE — 94660 CPAP INITIATION&MGMT: CPT

## 2022-09-01 PROCEDURE — 25010000002 FUROSEMIDE PER 20 MG: Performed by: FAMILY MEDICINE

## 2022-09-01 PROCEDURE — 99222 1ST HOSP IP/OBS MODERATE 55: CPT | Performed by: NURSE PRACTITIONER

## 2022-09-01 PROCEDURE — 83605 ASSAY OF LACTIC ACID: CPT | Performed by: EMERGENCY MEDICINE

## 2022-09-01 PROCEDURE — 85025 COMPLETE CBC W/AUTO DIFF WBC: CPT | Performed by: EMERGENCY MEDICINE

## 2022-09-01 PROCEDURE — 80053 COMPREHEN METABOLIC PANEL: CPT | Performed by: EMERGENCY MEDICINE

## 2022-09-01 RX ORDER — SODIUM CHLORIDE 0.9 % (FLUSH) 0.9 %
10 SYRINGE (ML) INJECTION AS NEEDED
Status: DISCONTINUED | OUTPATIENT
Start: 2022-09-01 | End: 2022-09-10 | Stop reason: HOSPADM

## 2022-09-01 RX ORDER — LANOLIN ALCOHOL/MO/W.PET/CERES
6 CREAM (GRAM) TOPICAL NIGHTLY PRN
Status: DISCONTINUED | OUTPATIENT
Start: 2022-09-01 | End: 2022-09-10 | Stop reason: HOSPADM

## 2022-09-01 RX ORDER — FUROSEMIDE 10 MG/ML
60 INJECTION INTRAMUSCULAR; INTRAVENOUS
Status: DISCONTINUED | OUTPATIENT
Start: 2022-09-01 | End: 2022-09-03

## 2022-09-01 RX ORDER — NITROGLYCERIN 20 MG/100ML
10-50 INJECTION INTRAVENOUS
Status: DISCONTINUED | OUTPATIENT
Start: 2022-09-01 | End: 2022-09-05

## 2022-09-01 RX ORDER — AMOXICILLIN 250 MG
2 CAPSULE ORAL 2 TIMES DAILY PRN
Status: DISCONTINUED | OUTPATIENT
Start: 2022-09-01 | End: 2022-09-10 | Stop reason: HOSPADM

## 2022-09-01 RX ORDER — PANTOPRAZOLE SODIUM 40 MG/1
40 TABLET, DELAYED RELEASE ORAL
Status: DISCONTINUED | OUTPATIENT
Start: 2022-09-01 | End: 2022-09-10 | Stop reason: HOSPADM

## 2022-09-01 RX ORDER — FUROSEMIDE 10 MG/ML
20 INJECTION INTRAMUSCULAR; INTRAVENOUS
Status: DISCONTINUED | OUTPATIENT
Start: 2022-09-01 | End: 2022-09-01

## 2022-09-01 RX ORDER — FUROSEMIDE 10 MG/ML
40 INJECTION INTRAMUSCULAR; INTRAVENOUS ONCE
Status: COMPLETED | OUTPATIENT
Start: 2022-09-01 | End: 2022-09-01

## 2022-09-01 RX ORDER — NICOTINE POLACRILEX 4 MG
15 LOZENGE BUCCAL
Status: DISCONTINUED | OUTPATIENT
Start: 2022-09-01 | End: 2022-09-10 | Stop reason: HOSPADM

## 2022-09-01 RX ORDER — AMIODARONE HYDROCHLORIDE 200 MG/1
200 TABLET ORAL EVERY 12 HOURS SCHEDULED
Status: DISCONTINUED | OUTPATIENT
Start: 2022-09-01 | End: 2022-09-04

## 2022-09-01 RX ORDER — FUROSEMIDE 10 MG/ML
40 INJECTION INTRAMUSCULAR; INTRAVENOUS
Status: DISCONTINUED | OUTPATIENT
Start: 2022-09-01 | End: 2022-09-01

## 2022-09-01 RX ORDER — CALCIUM CARBONATE 200(500)MG
2 TABLET,CHEWABLE ORAL
Status: DISCONTINUED | OUTPATIENT
Start: 2022-09-01 | End: 2022-09-10 | Stop reason: HOSPADM

## 2022-09-01 RX ORDER — ATORVASTATIN CALCIUM 10 MG/1
20 TABLET, FILM COATED ORAL DAILY
Status: DISCONTINUED | OUTPATIENT
Start: 2022-09-01 | End: 2022-09-10 | Stop reason: HOSPADM

## 2022-09-01 RX ORDER — INSULIN LISPRO 100 [IU]/ML
0-9 INJECTION, SOLUTION INTRAVENOUS; SUBCUTANEOUS
Status: DISCONTINUED | OUTPATIENT
Start: 2022-09-01 | End: 2022-09-04

## 2022-09-01 RX ORDER — DEXTROSE MONOHYDRATE 25 G/50ML
25 INJECTION, SOLUTION INTRAVENOUS
Status: DISCONTINUED | OUTPATIENT
Start: 2022-09-01 | End: 2022-09-10 | Stop reason: HOSPADM

## 2022-09-01 RX ADMIN — IOPAMIDOL 100 ML: 755 INJECTION, SOLUTION INTRAVENOUS at 01:54

## 2022-09-01 RX ADMIN — FUROSEMIDE 20 MG: 10 INJECTION, SOLUTION INTRAVENOUS at 08:27

## 2022-09-01 RX ADMIN — INSULIN LISPRO 6 UNITS: 100 INJECTION, SOLUTION INTRAVENOUS; SUBCUTANEOUS at 08:27

## 2022-09-01 RX ADMIN — Medication 6 MG: at 23:43

## 2022-09-01 RX ADMIN — AMIODARONE HYDROCHLORIDE 200 MG: 200 TABLET ORAL at 21:04

## 2022-09-01 RX ADMIN — PANTOPRAZOLE SODIUM 40 MG: 40 TABLET, DELAYED RELEASE ORAL at 08:29

## 2022-09-01 RX ADMIN — FUROSEMIDE 40 MG: 10 INJECTION, SOLUTION INTRAVENOUS at 01:25

## 2022-09-01 RX ADMIN — PANTOPRAZOLE SODIUM 40 MG: 40 TABLET, DELAYED RELEASE ORAL at 16:39

## 2022-09-01 RX ADMIN — FUROSEMIDE 60 MG: 10 INJECTION, SOLUTION INTRAMUSCULAR; INTRAVENOUS at 17:55

## 2022-09-01 RX ADMIN — NITROGLYCERIN 10 MCG/MIN: 20 INJECTION INTRAVENOUS at 01:54

## 2022-09-01 RX ADMIN — FUROSEMIDE 60 MG: 10 INJECTION, SOLUTION INTRAMUSCULAR; INTRAVENOUS at 12:34

## 2022-09-01 RX ADMIN — Medication 10 ML: at 08:27

## 2022-09-01 RX ADMIN — AMIODARONE HYDROCHLORIDE 200 MG: 200 TABLET ORAL at 08:27

## 2022-09-01 RX ADMIN — ANTACID TABLETS 2 TABLET: 500 TABLET, CHEWABLE ORAL at 18:44

## 2022-09-01 RX ADMIN — RIVAROXABAN 20 MG: 20 TABLET, FILM COATED ORAL at 08:27

## 2022-09-01 RX ADMIN — ATORVASTATIN CALCIUM 20 MG: 10 TABLET, FILM COATED ORAL at 08:27

## 2022-09-01 NOTE — CONSULTS
Heart Failure Education Consult    Type of Heart Failure: Diastolic     Length of diagnosis: Previous Diagnosis     Current HF knowledge: poor     Have you had HF education/teaching in the past? No  Do you check your weight daily? No    Current weight        09/01/22  0043 09/01/22  0350   Weight: 68 kg (150 lb) 72.1 kg (158 lb 15.6 oz)          Most recent EF:  50%    Date:  08/11/2022      Most recent ProBNP:  16316.0 pg/mL    Date:  08/31/2022  Barriers to learning: None    Materials Provided:HF Action Plan, Daily Weight Monitoring  and 2 Gm Na+ diet     Referral candidate for HF Clinic:Yes      Consulted on patient for heart failure education.  Patient was provided with a daily weight log and self-check plan (see below).  We discussed the importance of weighing daily, reducing sodium in his daily diet, and knowing when to reach out to his provider.  Patient expressed understanding over all topics discussed.          Thank you for this consult. Please let me know if I can be of any assistance with heart failure education for this patient.    30 minutes was spent on this visit    Lacie Davis RN  Heart Failure Nurse Navigator   09/01/22 14:17 CDT

## 2022-09-01 NOTE — CONSULTS
"    [facility]  INPATIENT WOUND CONSULTATION    Today's Date: 09/01/22    Patient Name: Chito Govea  MRN: [id number]  CSN: [id number]  PCP: Chito Rubio MD  Referring Provider:   Consulting Provider (From admission, onward)    Start Ordered     Status Ordering Provider    09/01/22 0422 09/01/22 0422  Wound / Ostomy  Care Provider Consult  Once        Specialty:  Wound Care  Provider:  Isela Parker APRN Acknowledged MOORE, JONATHAN SCOTT         Attending Provider: Edwin Du DO  Length of Stay: 0    SUBJECTIVE   Chief Complaint: \"Skin breakdown on bilateral lower legs and bottom\"    HPI: Chito Govea, a 66 y.o.male, presents with a past medical history of hypertension, diabetes, hyperlipidemia, CHF, renal disorder, and arthritis.  A full past medical history as listed below.  Patient presented to the hospital today with complaints of shortness of air.  Patient has a history of CHF and followed by Dr. Devine of [facility].  O2 saturation of 78%, tachypneic with RR of 30.  BNP was 33,372.  CXR and CTA show pulmonary edema.      Inpatient wound care was consulted due to \"skin breakdown on bilateral lower legs and bottom.\"  Patient presents with DTI of left buttock.  Patient reports swelling of bilateral lower legs which he developed blisters.  These blisters have since ruptured.  His left lower leg appears to be more affected than left with skin breakdown.        Visit Dx:    ICD-10-CM ICD-9-CM   1. Acute on chronic congestive heart failure, unspecified heart failure type (HCC)  I50.9 428.0   2. Acute respiratory failure with hypoxia (HCC)  J96.01 518.81     Patient Active Problem List   Diagnosis   • Paroxysmal atrial fibrillation (HCC)   • Acute kidney injury (HCC)   • Hypertension   • Diabetes mellitus (HCC)   • Moderate malnutrition (HCC)   • Aortic stenosis, moderate   • Nausea and vomiting   • Type 2 MI (myocardial infarction) ([facility]) due to " hypoxia and congestive heart failure   • Hyponatremia due to dehydration with associated weakness   • Acute on chronic congestive heart failure, unspecified heart failure type (HCC)       History:   Past Medical History:   Diagnosis Date   • Arthritis    • CHF (congestive heart failure) (HCC)    • Diabetes mellitus (HCC)    • HLD (hyperlipidemia)    • Hypertension    • Renal disorder      History reviewed. No pertinent surgical history.  Social History     Socioeconomic History   • Marital status:    Tobacco Use   • Smoking status: Former Smoker     Packs/day: 1.50     Years: 50.00     Pack years: 75.00     Types: Cigarettes     Quit date: 2022     Years since quittin.1   • Smokeless tobacco: Never Used   Substance and Sexual Activity   • Alcohol use: Never   • Drug use: Yes     Types: Marijuana   • Sexual activity: Defer     Family History   Problem Relation Age of Onset   • Cancer Mother    • Heart disease Father    • Diabetes Sister    • Cancer Sister    • COPD Brother        Allergies:  No Known Allergies    Medications:    Current Facility-Administered Medications:   •  amiodarone (PACERONE) tablet 200 mg, 200 mg, Oral, Q12H, Deepak Pena MD, 200 mg at 22  •  atorvastatin (LIPITOR) tablet 20 mg, 20 mg, Oral, Daily, Deepak Pena MD, 20 mg at 22  •  dextrose (D50W) (25 g/50 mL) IV injection 25 g, 25 g, Intravenous, Q15 Min PRN, Deepak Pena MD  •  dextrose (GLUTOSE) oral gel 15 g, 15 g, Oral, Q15 Min PRN, Deepak Pena MD  •  furosemide (LASIX) injection 40 mg, 40 mg, Intravenous, BID, Edwin Du DO  •  glucagon (human recombinant) (GLUCAGEN DIAGNOSTIC) injection 1 mg, 1 mg, Intramuscular, Q15 Min PRN, Deepak Pena MD  •  Insulin Lispro (humaLOG) injection 0-9 Units, 0-9 Units, Subcutaneous, TID AC, Deepak Pena MD, 6 Units at 22  •  nitroglycerin (TRIDIL) 200 mcg/ml infusion, 10-50 mcg/min,  Intravenous, Titrated, Deepak Pena MD, Stopped at 09/01/22 0815  •  pantoprazole (PROTONIX) EC tablet 40 mg, 40 mg, Oral, BID AC, Deepak Pena MD, 40 mg at 09/01/22 0829  •  rivaroxaban (XARELTO) tablet 20 mg, 20 mg, Oral, Daily, Deepak Pena MD, 20 mg at 09/01/22 0827  •  [COMPLETED] Insert peripheral IV, , , Once **AND** sodium chloride 0.9 % flush 10 mL, 10 mL, Intravenous, PRN, Deepak Pena MD, 10 mL at 09/01/22 0827    Review of Systems:   Review of Systems   Constitutional: Negative for chills and fever.   HENT: Negative for congestion and rhinorrhea.    Respiratory: Negative for cough and shortness of breath.    Cardiovascular: Positive for leg swelling. Negative for chest pain and palpitations.   Gastrointestinal: Negative for diarrhea, nausea and vomiting.   Genitourinary: Negative for flank pain and hematuria.   Musculoskeletal: Positive for gait problem. Negative for arthralgias and myalgias.   Skin: Positive for color change and wound.   Neurological: Positive for weakness. Negative for dizziness and headaches.   Psychiatric/Behavioral: Negative for agitation, behavioral problems and confusion.         OBJECTIVE     Vitals:    09/01/22 1059   BP:    Pulse: 68   Resp:    Temp:    SpO2: 96%       PHYSICAL EXAM  Physical Exam  Vitals and nursing note reviewed.   Constitutional:       General: He is awake.   HENT:      Head: Normocephalic and atraumatic.   Eyes:      General: Lids are normal. Gaze aligned appropriately.   Cardiovascular:      Rate and Rhythm: Normal rate and regular rhythm.   Pulmonary:      Effort: Pulmonary effort is normal. No respiratory distress.   Abdominal:      General: Abdomen is flat.      Palpations: Abdomen is soft.   Musculoskeletal:      Cervical back: Normal range of motion and neck supple.   Skin:     General: Skin is warm and dry.      Findings: Erythema and wound present.      Comments: Open wound of left lower leg which originated  as blister.  Wound base is moist with a small amount slough.  Small amount of serous drainage.  Edges are irregular. Edema present of lower extremities.    DTI of buttock.  Wound base is purple and non-blanchable.  No broken skin and no drainage present.     Neurological:      Mental Status: He is oriented to person, place, and time. He is lethargic.   Psychiatric:         Attention and Perception: Attention normal.         Mood and Affect: Mood normal.         Behavior: Behavior is cooperative.              Results Review:  Lab Results (last 48 hours)     Procedure Component Value Units Date/Time    POC Glucose Once [[id number]]  (Abnormal) Collected: 09/01/22 0758    Specimen: Blood Updated: 09/01/22 0810     Glucose 255 mg/dL      Comment: : [id number] Saravanan Nath  FILEMONemily ID: [id number]       Blood Gas, Arterial - [[id number]]  (Abnormal) Collected: 09/01/22 0510    Specimen: Arterial Blood Updated: 09/01/22 0507     Site Right Radial     Steve's Test Positive     pH, Arterial 7.494 pH units      Comment: 83 Value above reference range        pCO2, Arterial 35.4 mm Hg      pO2, Arterial 65.8 mm Hg      Comment: 84 Value below reference range        HCO3, Arterial 27.2 mmol/L      Comment: 83 Value above reference range        Base Excess, Arterial 3.9 mmol/L      Comment: 83 Value above reference range        O2 Saturation, Arterial 94.3 %      Temperature 37.0 C      Barometric Pressure for Blood Gas 751 mmHg      Modality BiPap     FIO2 40 %      Ventilator Mode NA     Set Mech Resp Rate 12.0     IPAP 12     Comment: Meter: [id number]     :  [id number]        EPAP 6     Collected by [id number]     pCO2, Temperature Corrected 35.4 mm Hg      pH, Temp Corrected 7.494 pH Units      pO2, Temperature Corrected 65.8 mm Hg     STAT Lactic Acid, Reflex [[id number]]  (Normal) Collected: 09/01/22 0416    Specimen: Blood Updated: 09/01/22 0450     Lactate 1.6 mmol/L     Hemoglobin A1c [[id number]]  (Abnormal)  Collected: 09/01/22 0048    Specimen: Blood Updated: 09/01/22 0418     Hemoglobin A1C 8.40 %     Narrative:      Hemoglobin A1C Ranges:    Increased Risk for Diabetes  5.7% to 6.4%  Diabetes                     >= 6.5%  Diabetic Goal                < 7.0%    COVID-19,[facility] IN-HOUSE,Nasal Swab No Transport Media 3-4 HR TAT - Swab, Nasal Cavity [[id number]]  (Normal) Collected: 09/01/22 0100    Specimen: Swab from Nasal Cavity Updated: 09/01/22 0143     COVID19 Not Detected    Narrative:      Fact sheet for providers: https://www.fda.gov/media/143109/download     Fact sheet for patients: https://www.fda.gov/media/658784/download    Test performed by PCR.    Consider negative results in combination with clinical observations, patient history, and epidemiological information.    Troponin [[id number]]  (Abnormal) Collected: 09/01/22 0048    Specimen: Blood Updated: 09/01/22 0118     Troponin T 0.036 ng/mL     Narrative:      Troponin T Reference Range:  <= 0.03 ng/mL-   Negative for AMI  >0.03 ng/mL-     Abnormal for myocardial necrosis.  Clinicians would have to utilize clinical acumen, EKG, Troponin and serial changes to determine if it is an Acute Myocardial Infarction or myocardial injury due to an underlying chronic condition.       Results may be falsely decreased if patient taking Biotin.      Lactic Acid, Plasma [[id number]]  (Abnormal) Collected: 09/01/22 0048    Specimen: Blood Updated: 09/01/22 0118     Lactate 3.2 mmol/L     Comprehensive Metabolic Panel [[id number]]  (Abnormal) Collected: 09/01/22 0048    Specimen: Blood Updated: 09/01/22 0115     Glucose 266 mg/dL      BUN 23 mg/dL      Creatinine 1.15 mg/dL      Sodium 141 mmol/L      Potassium 4.6 mmol/L      Chloride 103 mmol/L      CO2 24.0 mmol/L      Calcium 8.4 mg/dL      Total Protein 6.4 g/dL      Albumin 3.20 g/dL      ALT (SGPT) 16 U/L      AST (SGOT) 15 U/L      Alkaline Phosphatase 115 U/L      Total Bilirubin 0.5 mg/dL      Globulin 3.2 gm/dL       A/G Ratio 1.0 g/dL      BUN/Creatinine Ratio 20.0     Anion Gap 14.0 mmol/L      eGFR 69.8 mL/min/1.73      Comment: National Kidney Foundation and American Society of Nephrology (ASN) Task Force recommended calculation based on the Chronic Kidney Disease Epidemiology Collaboration (CKD-EPI) equation refit without adjustment for race.       Narrative:      GFR Normal >60  Chronic Kidney Disease <60  Kidney Failure <15      BNP [[id number]]  (Abnormal) Collected: 09/01/22 0048    Specimen: Blood Updated: 09/01/22 0112     proBNP 33,372.0 pg/mL     Narrative:      Among patients with dyspnea, NT-proBNP is highly sensitive for the detection of acute congestive heart failure. In addition NT-proBNP of <300 pg/ml effectively rules out acute congestive heart failure with 99% negative predictive value.    Results may be falsely decreased if patient taking Biotin.      Protime-INR [[id number]]  (Abnormal) Collected: 09/01/22 0048    Specimen: Blood Updated: 09/01/22 0108     Protime 21.6 Seconds      INR 1.96    aPTT [[id number]]  (Abnormal) Collected: 09/01/22 0048    Specimen: Blood Updated: 09/01/22 0108     PTT 39.8 seconds     D-dimer, Quantitative [[id number]]  (Abnormal) Collected: 09/01/22 0048    Specimen: Blood Updated: 09/01/22 0108     D-Dimer, Quantitative 0.88 MCGFEU/mL     Narrative:      Reference Range is 0-0.50 MCGFEU/mL. However, results <0.50 MCGFEU/mL tends to rule out DVT or PE. Results >0.50 MCGFEU/mL are not useful in predicting absence or presence of DVT or PE.      Blood Culture - Blood, Arm, Left [[id number]] Collected: 09/01/22 0056    Specimen: Blood from Arm, Left Updated: 09/01/22 0103    Blood Gas, Arterial - [[id number]]  (Abnormal) Collected: 09/01/22 0104    Specimen: Arterial Blood Updated: 09/01/22 0102     Site Right Radial     Steve's Test Positive     pH, Arterial 7.473 pH units      Comment: 83 Value above reference range        pCO2, Arterial 32.5 mm Hg      Comment: 84 Value below  reference range        pO2, Arterial 55.6 mm Hg      Comment: 84 Value below reference range        HCO3, Arterial 23.8 mmol/L      Base Excess, Arterial 0.6 mmol/L      O2 Saturation, Arterial 90.1 %      Comment: 84 Value below reference range        Temperature 37.0 C      Barometric Pressure for Blood Gas 752 mmHg      Modality Nasal Cannula     Flow Rate 7.0 lpm      Ventilator Mode NA     Collected by [id number]     Comment: Meter: [id number]     :  [id number]        pCO2, Temperature Corrected 32.5 mm Hg      pH, Temp Corrected 7.473 pH Units      pO2, Temperature Corrected 55.6 mm Hg     Blood Culture - Blood, Arm, Right [[id number]] Collected: 09/01/22 0056    Specimen: Blood from Arm, Right Updated: 09/01/22 0102    CBC & Differential [[id number]]  (Abnormal) Collected: 09/01/22 0048    Specimen: Blood Updated: 09/01/22 0056    Narrative:      The following orders were created for panel order CBC & Differential.  Procedure                               Abnormality         Status                     ---------                               -----------         ------                     CBC Auto Differential[[id number]]        Abnormal            Final result                 Please view results for these tests on the individual orders.    CBC Auto Differential [[id number]]  (Abnormal) Collected: 09/01/22 0048    Specimen: Blood Updated: 09/01/22 0056     WBC 8.48 10*3/mm3      RBC 3.83 10*6/mm3      Hemoglobin 11.1 g/dL      Hematocrit 36.4 %      MCV 95.0 fL      MCH 29.0 pg      MCHC 30.5 g/dL      RDW 15.6 %      RDW-SD 53.0 fl      MPV 10.0 fL      Platelets 422 10*3/mm3      Neutrophil % 80.4 %      Lymphocyte % 10.8 %      Monocyte % 7.4 %      Eosinophil % 0.5 %      Basophil % 0.4 %      Immature Grans % 0.5 %      Neutrophils, Absolute 6.82 10*3/mm3      Lymphocytes, Absolute 0.92 10*3/mm3      Monocytes, Absolute 0.63 10*3/mm3      Eosinophils, Absolute 0.04 10*3/mm3      Basophils, Absolute  0.03 10*3/mm3      Immature Grans, Absolute 0.04 10*3/mm3      nRBC 0.0 /100 WBC         Imaging Results (Last 72 Hours)     Procedure Component Value Units Date/Time    CT Angiogram Chest [[id number]] Collected: 09/01/22 0719     Updated: 09/01/22 0731    Narrative:      EXAMINATION: CT ANGIOGRAM CHEST-      9/1/2022 1:23 AM CDT     HISTORY: Pulmonary embolism (PE) suspected, unknown D-dimer     In order to have a CT radiation dose as low as reasonably achievable  Automated Exposure Control was utilized for adjustment of the mA and/or  KV according to patient size.     DLP in mGycm= 204     The CT Angiography of the chest is performed after intravenous contrast  enhancement.     The images are acquired in axial plane with subsequent 2-D  reconstructions coronal and sagittal planes and 3-D maximum intensity  projection reconstruction.     Comparison is made with the previous study dated 8/9/2022.     There is normal opacification of the pulmonary arteries and branches  bilaterally. There are no filling defects in the visualized/opacified  pulmonary arterial bed.     The RV/LV ratio is 30/51 which is normal. No finding to suggest right  heart strain.     Atheromatous changes thoracic aorta is seen. No aneurysmal dilatation.  Atheromatous changes in the coronary arteries are seen.     There is no evidence of mediastinal or hilar mass or lymphadenopathy.  There are large calcified granulomas in the mediastinum and tyesha  bilaterally. This is similar to the previous study.     The limited visualized soft tissues of the neck are unremarkable. The  thyroid gland appear unremarkable.     There is extensive bilateral interstitial infiltrate which was not seen  in the previous study. There is a mild intralobular septal thickening,  particularly in the lower lung which may suggest an element of pulmonary  congestion/edema.     There is a large bibasal pleural effusion, right more than the left.     The tracheobronchial  structures are patent.     Moderately dilated proximal esophagus seen with air-fluid level.     The limited included/visualized abdomen show no significant abnormality.     Images reviewed in bone window show no acute bony abnormality. Chronic  degenerative changes of the thoracolumbar spine is seen.       Impression:      1. No evidence of pulmonary embolism. No aortic aneurysm or dissection.  Moderate atheromatous changes of coronary arteries.  2. Extensive interstitial infiltrate bilaterally representing  inflammatory/infectious process. A mild pulmonary vascular  congestion/pulmonary edema.  3. Large bilateral pleural effusion, right more than the left.     The above study was initially reviewed and reported by [facility]. I do not  find any discrepancies.                                   This report was finalized on 09/01/2022 07:28 by Dr. Ashlyn Cordon MD.    XR Chest 1 View [[id number]] Collected: 09/01/22 0652     Updated: 09/01/22 0657    Narrative:      EXAMINATION: XR CHEST 1 VW-     9/1/2022 1:00 AM CDT     HISTORY: soa     A frontal projection of the chest is compared with the previous study  dated 8/9/2022.     The lungs are poorly expanded.     There is extensive coarse interstitial infiltrate in the lungs  bilaterally.     There is a right lower lung consolidation.     There is bibasal small pleural effusion.     No pneumothorax.     Heart size is not evaluated due to obliteration of the cardiac border by  the adjacent infiltrate.     No acute bony abnormality.       Impression:      1. Extensive bilateral coarse interstitial infiltrate may represent an  inflammatory/infectious process.  2. Right lower lung consolidation.  3. Small bibasal pleural effusion.  This report was finalized on 09/01/2022 06:54 by Dr. Ashlyn Cordon MD.             ASSESSMENT/PLAN       Examination and evaluation of wound(s) was performed.    DIAGNOSIS:   Pressure injury of deep tissue of left buttock  Venous ulcer of left  lower leg with fat layer exposed    Acute systolic heart failure (HCC), EF 36-40% on 9/2    Paroxysmal atrial fibrillation (HCC)    Hypertension    Type 2 diabetes mellitus with hyperglycemia, without long-term current use of insulin (HCC)    Moderate malnutrition (HCC)    Aortic stenosis, moderate    Type 2 MI (myocardial infarction) ([facility]) due to hypoxia and congestive heart failure    Bilateral pleural effusion    COPD (chronic obstructive pulmonary disease) ([facility])    Former smoker, quit 7/2022    Acute kidney injury ([facility]) superimposed on CKD Stage 2    Anemia, chronic disease    Suspected Interstitial lung disease ([facility]), likely due to amiodarone    Acute respiratory failure with hypoxia ([facility])    Acute urinary retention requiring alonzo catheter        PLAN:     Start     Ordered    09/01/22 1147  Compression - Not to be Used for VTE Prophylaxis  Every 12 Hours        Question Answer Comment   Wound Locations BLE    Wound Care Instructions Apply ACE wrap to BLE, from toes to knee, for compression.  Remove with dressing changes and assess.  Reapply wraps.    Compression Device ACE Wrap        09/01/22 1146    09/01/22 1146  Elevate Extremity  Continuous        Comments: Elevate BLE above heart level when possible.   Question:  Elevate Extremity  Answer:  BLE    09/01/22 1146    09/01/22 1143  Elevate Heels Off of Bed  Until Discontinued         09/01/22 1146    09/01/22 1143  Turn Patient  Now Then Every 2 Hours         09/01/22 1146    09/01/22 1143  Use Repositioning Wedge to Position Patient  Continuous        Comments: Use repositioning sheet and wedges to position patient    09/01/22 1146    09/01/22 1143  Use Seat Cushion When Up In Chair  Continuous         09/01/22 1146    Unscheduled  Apply Moisture Barrier After Any Incontinence  As Needed      Comments: For prevention of skin breakdown on buttocks   Question:  Wound Care Instructions  Answer:  Apply Moisture Barrier After Any Incontinence    09/01/22 1146     Unscheduled  Wound Care  As Needed      Question Answer Comment   Wound Locations Bilateral lower legs    Wound Care Instructions Clean wounds with NS.  Apply therahoney to wounds and cover with Vaseline gauze.  Wrap with Kerlix.  Change every 12 hours.    Cleanse Normal Saline    Intervention Thera Honey    Intervention Vaseline Gauze    Dressing: Gauze Roll        09/01/22 1146                  Discussed findings and treatment plan including risks, benefits, and treatment options with patient in detail. Patient agreed with treatment plan.      This document has been electronically signed by MARKELL Hart on 9/1/2022 11:42 CDT

## 2022-09-01 NOTE — PLAN OF CARE
Goal Outcome Evaluation:  Plan of Care Reviewed With: patient        Progress: no change  Outcome Evaluation: Ntn assessment completed. Pt reports reduced appetite over the past month. Pt has had weight gain over the past month, possible fluid. Non healing wounds present. Oral inake 50% of one meal. Cardiac/CCHO. Femi BID and Boost Glucose Control daily with lunch. Encuoraged oral inake. Cont to follow for plan of care.

## 2022-09-01 NOTE — CASE MANAGEMENT/SOCIAL WORK
Discharge Planning Assessment  [facility]     Patient Name: Chito Govea  MRN: [id number]  Today's Date: 9/1/2022    Admit Date: 9/1/2022     Discharge Needs Assessment     Row Name 09/01/22 1139       Living Environment    People in Home child(juana), adult    Name(s) of People in Home Shae and 3 grandsons    Current Living Arrangements home    Primary Care Provided by child(juana)    Provides Primary Care For no one    Family Caregiver if Needed child(juana), adult    Family Caregiver Names Shae    Able to Return to Prior Arrangements yes       Resource/Environmental Concerns    Resource/Environmental Concerns none       Transition Planning    Patient/Family Anticipates Transition to home with family    Patient/Family Anticipated Services at Transition none    Transportation Anticipated family or friend will provide       Discharge Needs Assessment    Readmission Within the Last 30 Days no previous admission in last 30 days    Equipment Currently Used at Home walker, rolling    Concerns to be Addressed no discharge needs identified    Equipment Needed After Discharge none    Discharge Coordination/Progress spoke to patient who lives with daughter; has no RX coverage and has PCP; agrees to [facility] consent signed consent and facesheet  faxed to [facility]; will follow for DC needs               Discharge Plan    No documentation.               Continued Care and Services - Admitted Since 9/1/2022    Coordination has not been started for this encounter.          Demographic Summary    No documentation.                Functional Status    No documentation.                Psychosocial    No documentation.                Abuse/Neglect    No documentation.                Legal    No documentation.                Substance Abuse    No documentation.                Patient Forms    No documentation.                   Sujatha Redmond RN

## 2022-09-01 NOTE — PROGRESS NOTES
This 66-year-old male was admitted this morning by Dr. Pena with a chief complaint of increasing shortness of breath.  The patient was noted to be in congestive heart failure with a BNP of 33,000.  The patient was also hypoxic requiring BiPAP support.  The patient remains on BiPAP this AM.  Blood pressure is stable.  Oxygen saturations are in the high 90s with an FiO2 of 0.40 per BiPAP.  He is currently sleeping at the time of my evaluation.  Lungs have coarse crackles noted bilaterally.  There is edema BLE.  Cardiology will be consulted for evaluation.  Recent echocardiogram on 8/11 shows an EF of 50% with RV pressures of 35 to 45 mmHg and LV diastolic dysfunction.  Possible repeat echocardiogram will be deferred to cardiology.  Case discussed with cardiology NP.  Lasix will be increased to 40 mg IV twice daily.    Electronically signed by Edwin Du DO, 09/01/22, 10:49 CDT.

## 2022-09-01 NOTE — ED PROVIDER NOTES
Subjective   Patient is a 66-year-old male who presents to the ER with shortness of air.  Patient was recently admitted from  to 15.  He was admitted for acute kidney injury at that time.  Patient states for the last week he has had shortness of air that is worse lying flat along with lower extremity edema and cough.  He does not wear oxygen at home.  Patient states his symptoms have been progressively worsening.  He denies any fever, chest pain, abdominal pain, nausea vomiting diarrhea, urinary changes, neurologic changes.  Patient states he did not take his medications that were prescribed to him as prescribed because they made him feel bad.          Review of Systems   Constitutional: Negative.    HENT: Negative.    Eyes: Negative.    Respiratory: Positive for cough and shortness of breath.    Cardiovascular: Positive for leg swelling.   Gastrointestinal: Negative.    Endocrine: Negative.    Genitourinary: Negative.    Musculoskeletal: Negative.    Skin: Negative.    Allergic/Immunologic: Negative.    Neurological: Negative.    Hematological: Negative.    Psychiatric/Behavioral: Negative.    All other systems reviewed and are negative.      Past Medical History:   Diagnosis Date   • Arthritis    • CHF (congestive heart failure) (HCC)    • Diabetes mellitus (HCC)    • HLD (hyperlipidemia)    • Hypertension    • Renal disorder        No Known Allergies    History reviewed. No pertinent surgical history.    Family History   Problem Relation Age of Onset   • Cancer Mother    • Heart disease Father    • Diabetes Sister    • Cancer Sister    • COPD Brother        Social History     Socioeconomic History   • Marital status:    Tobacco Use   • Smoking status: Former Smoker     Packs/day: 1.50     Years: 50.00     Pack years: 75.00     Types: Cigarettes     Quit date: 2022     Years since quittin.1   • Smokeless tobacco: Never Used   Substance and Sexual Activity   • Alcohol use: Never   • Drug  use: Yes     Types: Marijuana   • Sexual activity: Defer           Objective   Physical Exam  Vitals and nursing note reviewed.   Constitutional:       Appearance: He is well-developed.   HENT:      Head: Normocephalic and atraumatic.   Eyes:      Conjunctiva/sclera: Conjunctivae normal.      Pupils: Pupils are equal, round, and reactive to light.   Cardiovascular:      Rate and Rhythm: Regular rhythm. Tachycardia present.      Heart sounds: Normal heart sounds.   Pulmonary:      Effort: Pulmonary effort is normal.      Breath sounds: Rales present.   Abdominal:      Palpations: Abdomen is soft.      Tenderness: There is no abdominal tenderness.   Musculoskeletal:         General: No deformity. Normal range of motion.      Cervical back: Normal range of motion.   Skin:     General: Skin is warm.      Comments: 2+ pitting edema bilateral lower extremities   Neurological:      Mental Status: He is alert and oriented to person, place, and time.   Psychiatric:         Behavior: Behavior normal.         Procedures           ED Course      EKG: Atrial fibrillation with a rate of 96 with PVCs, nonspecific T wave changes    Patient arrived hypoxic.  He was placed on 2 L nasal cannula and improving.     Lab Results (last 24 hours)     Procedure Component Value Units Date/Time    CBC & Differential [[id number]]  (Abnormal) Collected: 09/01/22 0048    Specimen: Blood Updated: 09/01/22 0056    Narrative:      The following orders were created for panel order CBC & Differential.  Procedure                               Abnormality         Status                     ---------                               -----------         ------                     CBC Auto Differential[[id number]]        Abnormal            Final result                 Please view results for these tests on the individual orders.    Comprehensive Metabolic Panel [[id number]]  (Abnormal) Collected: 09/01/22 0048    Specimen: Blood Updated: 09/01/22 0115      Glucose 266 mg/dL      BUN 23 mg/dL      Creatinine 1.15 mg/dL      Sodium 141 mmol/L      Potassium 4.6 mmol/L      Chloride 103 mmol/L      CO2 24.0 mmol/L      Calcium 8.4 mg/dL      Total Protein 6.4 g/dL      Albumin 3.20 g/dL      ALT (SGPT) 16 U/L      AST (SGOT) 15 U/L      Alkaline Phosphatase 115 U/L      Total Bilirubin 0.5 mg/dL      Globulin 3.2 gm/dL      A/G Ratio 1.0 g/dL      BUN/Creatinine Ratio 20.0     Anion Gap 14.0 mmol/L      eGFR 69.8 mL/min/1.73      Comment: National Kidney Foundation and American Society of Nephrology (ASN) Task Force recommended calculation based on the Chronic Kidney Disease Epidemiology Collaboration (CKD-EPI) equation refit without adjustment for race.       Narrative:      GFR Normal >60  Chronic Kidney Disease <60  Kidney Failure <15      Protime-INR [[id number]]  (Abnormal) Collected: 09/01/22 0048    Specimen: Blood Updated: 09/01/22 0108     Protime 21.6 Seconds      INR 1.96    aPTT [[id number]]  (Abnormal) Collected: 09/01/22 0048    Specimen: Blood Updated: 09/01/22 0108     PTT 39.8 seconds     D-dimer, Quantitative [[id number]]  (Abnormal) Collected: 09/01/22 0048    Specimen: Blood Updated: 09/01/22 0108     D-Dimer, Quantitative 0.88 MCGFEU/mL     Narrative:      Reference Range is 0-0.50 MCGFEU/mL. However, results <0.50 MCGFEU/mL tends to rule out DVT or PE. Results >0.50 MCGFEU/mL are not useful in predicting absence or presence of DVT or PE.      BNP [[id number]]  (Abnormal) Collected: 09/01/22 0048    Specimen: Blood Updated: 09/01/22 0112     proBNP 33,372.0 pg/mL     Narrative:      Among patients with dyspnea, NT-proBNP is highly sensitive for the detection of acute congestive heart failure. In addition NT-proBNP of <300 pg/ml effectively rules out acute congestive heart failure with 99% negative predictive value.    Results may be falsely decreased if patient taking Biotin.      Troponin [[id number]]  (Abnormal) Collected: 09/01/22 0048    Specimen:  Blood Updated: 09/01/22 0118     Troponin T 0.036 ng/mL     Narrative:      Troponin T Reference Range:  <= 0.03 ng/mL-   Negative for AMI  >0.03 ng/mL-     Abnormal for myocardial necrosis.  Clinicians would have to utilize clinical acumen, EKG, Troponin and serial changes to determine if it is an Acute Myocardial Infarction or myocardial injury due to an underlying chronic condition.       Results may be falsely decreased if patient taking Biotin.      Lactic Acid, Plasma [[id number]]  (Abnormal) Collected: 09/01/22 0048    Specimen: Blood Updated: 09/01/22 0118     Lactate 3.2 mmol/L     CBC Auto Differential [[id number]]  (Abnormal) Collected: 09/01/22 0048    Specimen: Blood Updated: 09/01/22 0056     WBC 8.48 10*3/mm3      RBC 3.83 10*6/mm3      Hemoglobin 11.1 g/dL      Hematocrit 36.4 %      MCV 95.0 fL      MCH 29.0 pg      MCHC 30.5 g/dL      RDW 15.6 %      RDW-SD 53.0 fl      MPV 10.0 fL      Platelets 422 10*3/mm3      Neutrophil % 80.4 %      Lymphocyte % 10.8 %      Monocyte % 7.4 %      Eosinophil % 0.5 %      Basophil % 0.4 %      Immature Grans % 0.5 %      Neutrophils, Absolute 6.82 10*3/mm3      Lymphocytes, Absolute 0.92 10*3/mm3      Monocytes, Absolute 0.63 10*3/mm3      Eosinophils, Absolute 0.04 10*3/mm3      Basophils, Absolute 0.03 10*3/mm3      Immature Grans, Absolute 0.04 10*3/mm3      nRBC 0.0 /100 WBC     Blood Culture - Blood, Arm, Left [[id number]] Collected: 09/01/22 0056    Specimen: Blood from Arm, Left Updated: 09/01/22 0103    Blood Culture - Blood, Arm, Right [[id number]] Collected: 09/01/22 0056    Specimen: Blood from Arm, Right Updated: 09/01/22 0102    COVID-19,[facility] IN-HOUSE,Nasal Swab No Transport Media 3-4 HR TAT - Swab, Nasal Cavity [[id number]]  (Normal) Collected: 09/01/22 0100    Specimen: Swab from Nasal Cavity Updated: 09/01/22 0143     COVID19 Not Detected    Narrative:      Fact sheet for providers: https://www.fda.gov/media/199059/download     Fact sheet for  patients: https://www.blinkbox.gov/media/371351/download    Test performed by PCR.    Consider negative results in combination with clinical observations, patient history, and epidemiological information.    Blood Gas, Arterial - [[id number]]  (Abnormal) Collected: 09/01/22 0104    Specimen: Arterial Blood Updated: 09/01/22 0102     Site Right Radial     Steve's Test Positive     pH, Arterial 7.473 pH units      Comment: 83 Value above reference range        pCO2, Arterial 32.5 mm Hg      Comment: 84 Value below reference range        pO2, Arterial 55.6 mm Hg      Comment: 84 Value below reference range        HCO3, Arterial 23.8 mmol/L      Base Excess, Arterial 0.6 mmol/L      O2 Saturation, Arterial 90.1 %      Comment: 84 Value below reference range        Temperature 37.0 C      Barometric Pressure for Blood Gas 752 mmHg      Modality Nasal Cannula     Flow Rate 7.0 lpm      Ventilator Mode NA     Collected by [id number]     Comment: Meter: [id number]     :  [id number]        pCO2, Temperature Corrected 32.5 mm Hg      pH, Temp Corrected 7.473 pH Units      pO2, Temperature Corrected 55.6 mm Hg         XR Chest 1 View    (Results Pending)   CT Angiogram Chest    (Results Pending)        Labs showed hyperglycemia, an elevated D-dimer/BNP/troponin/lactate.  Also showed a decreased PO2.  Chest x-ray was concerning for pulmonary edema.  Patient was placed on BiPAP and a nitro drip was started along with Lasix.  Patient had improvement.  CTA of the chest was performed and showed no evidence of pulmonary embolus.  Did show large bilateral pleural effusions as well as pulmonary edema.  Patient was then admitted to the hospitalist service by Dr Pena for CHF exacerbation with acute respiratory failure.                           MDM    Final diagnoses:   Acute on chronic congestive heart failure, unspecified heart failure type (HCC)   Acute respiratory failure with hypoxia (HCC)       ED Disposition  ED Disposition      ED Disposition   Decision to Admit    Condition   --    Comment   --             No follow-up provider specified.       Medication List      No changes were made to your prescriptions during this visit.          Chelsea Hagen MD  09/01/22 0323       Chelsea Hagen MD  09/01/22 0328

## 2022-09-01 NOTE — H&P
[facility] Medicine Services  HISTORY AND PHYSICAL    Date of Admission: 9/1/2022  Primary Care Physician: Chito Rubio MD    Subjective     Chief Complaint: SOA    History of Present Illness  Mr. Govea is a 66 year old gentleman who resides in [place], [place].  He has a history of HTN, HLD, and T2DM.  These three chronic conditions are stable.  He follows with Dr. Chito Rubio for primary care support.  He also has a history of CHF.  He follows with Dr. Devine of the [facility] group.      He presents to the ER with SOA.  This has been going on for a week.  It is constant.  It is severe.  He has associated leg edema and orthopnea.  He has had weight gain.      In the ER, he is hypoxic with ano oxygen saturation of 78%.  He had tachypnea with a respiratory rate of up to 30.  BNP is elevated to 33,372.  CXR and CTA show pulmonary edema.  He is being admitted for further evaluation and treatment.        Review of Systems   Constitutional: Positive for fatigue and unexpected weight change. Negative for fever.   HENT: Negative for congestion and ear pain.    Eyes: Negative for redness and visual disturbance.   Respiratory: Positive for shortness of breath. Negative for cough and wheezing.    Cardiovascular: Positive for leg swelling. Negative for chest pain and palpitations.   Gastrointestinal: Negative for abdominal pain, diarrhea, nausea and vomiting.   Endocrine: Negative for cold intolerance and heat intolerance.   Genitourinary: Negative for dysuria and frequency.   Musculoskeletal: Negative for arthralgias and back pain.   Skin: Negative for rash and wound.   Neurological: Positive for weakness. Negative for dizziness and headaches.   Psychiatric/Behavioral: Negative for confusion. The patient is not nervous/anxious.         Otherwise complete ROS reviewed and negative except as mentioned in the HPI.    Past Medical History:   Past Medical History:  "  Diagnosis Date   • Arthritis    • CHF (congestive heart failure) (HCC)    • Diabetes mellitus (HCC)    • HLD (hyperlipidemia)    • Hypertension    • Renal disorder      Past Surgical History:History reviewed. No pertinent surgical history.  Social History:  reports that he quit smoking about 2 months ago. His smoking use included cigarettes. He has a 75.00 pack-year smoking history. He has never used smokeless tobacco. He reports current drug use. Drug: Marijuana. He reports that he does not drink alcohol.    Family History: family history includes COPD in his brother; Cancer in his mother and sister; Diabetes in his sister; Heart disease in his father.       Allergies:  No Known Allergies    Medications:  Prior to Admission medications    Medication Sig Start Date End Date Taking? Authorizing Provider   amiodarone (PACERONE) 200 MG tablet Take 1 tablet by mouth Every 12 (Twelve) Hours. 8/15/22   Edwin Du DO   apixaban (ELIQUIS) 5 MG tablet tablet Take 1 tablet by mouth Every 12 (Twelve) Hours. Indications: Atrial Fibrillation 8/15/22   Edwin Du DO   atorvastatin (LIPITOR) 20 MG tablet Take 20 mg by mouth Daily.    Provider, MD Michael   glimepiride (AMARYL) 2 MG tablet Take 2 mg by mouth Every Morning Before Breakfast.    ProviderMichael MD   pantoprazole (PROTONIX) 40 MG EC tablet Take 1 tablet by mouth 2 (Two) Times a Day Before Meals. 8/15/22   Edwin Du DO   rivaroxaban (XARELTO) 20 MG tablet Take 20 mg by mouth Daily.    ProviderMichael MD   sucralfate (CARAFATE) 1 GM/10ML suspension Take 20 mL by mouth 2 (Two) Times a Day. 8/15/22   Edwin Du DO     I have utilized all available immediate resources to obtain, update, and review the patient's current medications.    Objective     Vital Signs: /64   Pulse 73   Temp 97.8 °F (36.6 °C) (Oral)   Resp (!) 30   Ht 172.7 cm (68\")   Wt 68 kg (150 lb)   SpO2 97%   BMI 22.81 kg/m²   Physical " Exam  Vitals reviewed.   Constitutional:       Appearance: He is well-developed.   HENT:      Head: Normocephalic and atraumatic.      Right Ear: External ear normal.      Left Ear: External ear normal.      Nose: Nose normal.   Eyes:      General: No scleral icterus.        Right eye: No discharge.         Left eye: No discharge.      Conjunctiva/sclera: Conjunctivae normal.      Pupils: Pupils are equal, round, and reactive to light.   Neck:      Thyroid: No thyromegaly.      Trachea: No tracheal deviation.   Cardiovascular:      Rate and Rhythm: Normal rate and regular rhythm.      Heart sounds: Normal heart sounds. No murmur heard.    No friction rub. No gallop.      Comments: BLE Edema  Pulmonary:      Effort: Tachypnea present. No respiratory distress.      Breath sounds: No stridor. Decreased breath sounds and rales present. No wheezing.   Chest:      Chest wall: No tenderness.   Abdominal:      General: Bowel sounds are normal. There is no distension.      Palpations: Abdomen is soft. There is no mass.      Tenderness: There is no abdominal tenderness. There is no guarding or rebound.      Hernia: No hernia is present.   Musculoskeletal:         General: No deformity. Normal range of motion.      Cervical back: Normal range of motion and neck supple.   Lymphadenopathy:      Cervical: No cervical adenopathy.   Skin:     General: Skin is warm and dry.      Coloration: Skin is not pale.      Findings: No erythema or rash.   Neurological:      Mental Status: He is alert and oriented to person, place, and time.      Cranial Nerves: No cranial nerve deficit.      Motor: No abnormal muscle tone.      Coordination: Coordination normal.      Deep Tendon Reflexes: Reflexes are normal and symmetric. Reflexes normal.   Psychiatric:         Behavior: Behavior normal.         Thought Content: Thought content normal.         Judgment: Judgment normal.              Results Reviewed:  Lab Results (last 24 hours)      Procedure Component Value Units Date/Time    COVID-19,[facility] IN[facility]HOUSE,Nasal Swab No Transport Media 3-4 HR TAT - Swab, Nasal Cavity [[id number]]  (Normal) Collected: 09/01/22 0100    Specimen: Swab from Nasal Cavity Updated: 09/01/22 0143     COVID19 Not Detected    Narrative:      Fact sheet for providers: https://www.fda.gov/media/398751/download     Fact sheet for patients: https://www.fda.gov/media/666142/download    Test performed by PCR.    Consider negative results in combination with clinical observations, patient history, and epidemiological information.    Troponin [[id number]]  (Abnormal) Collected: 09/01/22 0048    Specimen: Blood Updated: 09/01/22 0118     Troponin T 0.036 ng/mL     Narrative:      Troponin T Reference Range:  <= 0.03 ng/mL-   Negative for AMI  >0.03 ng/mL-     Abnormal for myocardial necrosis.  Clinicians would have to utilize clinical acumen, EKG, Troponin and serial changes to determine if it is an Acute Myocardial Infarction or myocardial injury due to an underlying chronic condition.       Results may be falsely decreased if patient taking Biotin.      Lactic Acid, Plasma [[id number]]  (Abnormal) Collected: 09/01/22 0048    Specimen: Blood Updated: 09/01/22 0118     Lactate 3.2 mmol/L     Comprehensive Metabolic Panel [[id number]]  (Abnormal) Collected: 09/01/22 0048    Specimen: Blood Updated: 09/01/22 0115     Glucose 266 mg/dL      BUN 23 mg/dL      Creatinine 1.15 mg/dL      Sodium 141 mmol/L      Potassium 4.6 mmol/L      Chloride 103 mmol/L      CO2 24.0 mmol/L      Calcium 8.4 mg/dL      Total Protein 6.4 g/dL      Albumin 3.20 g/dL      ALT (SGPT) 16 U/L      AST (SGOT) 15 U/L      Alkaline Phosphatase 115 U/L      Total Bilirubin 0.5 mg/dL      Globulin 3.2 gm/dL      A/G Ratio 1.0 g/dL      BUN/Creatinine Ratio 20.0     Anion Gap 14.0 mmol/L      eGFR 69.8 mL/min/1.73      Comment: National Kidney Foundation and American Society of Nephrology (ASN) Task Force recommended  calculation based on the Chronic Kidney Disease Epidemiology Collaboration (CKD-EPI) equation refit without adjustment for race.       Narrative:      GFR Normal >60  Chronic Kidney Disease <60  Kidney Failure <15      BNP [[id number]]  (Abnormal) Collected: 09/01/22 0048    Specimen: Blood Updated: 09/01/22 0112     proBNP 33,372.0 pg/mL     Narrative:      Among patients with dyspnea, NT-proBNP is highly sensitive for the detection of acute congestive heart failure. In addition NT-proBNP of <300 pg/ml effectively rules out acute congestive heart failure with 99% negative predictive value.    Results may be falsely decreased if patient taking Biotin.      Protime-INR [[id number]]  (Abnormal) Collected: 09/01/22 0048    Specimen: Blood Updated: 09/01/22 0108     Protime 21.6 Seconds      INR 1.96    aPTT [[id number]]  (Abnormal) Collected: 09/01/22 0048    Specimen: Blood Updated: 09/01/22 0108     PTT 39.8 seconds     D-dimer, Quantitative [[id number]]  (Abnormal) Collected: 09/01/22 0048    Specimen: Blood Updated: 09/01/22 0108     D-Dimer, Quantitative 0.88 MCGFEU/mL     Narrative:      Reference Range is 0-0.50 MCGFEU/mL. However, results <0.50 MCGFEU/mL tends to rule out DVT or PE. Results >0.50 MCGFEU/mL are not useful in predicting absence or presence of DVT or PE.      Blood Culture - Blood, Arm, Left [[id number]] Collected: 09/01/22 0056    Specimen: Blood from Arm, Left Updated: 09/01/22 0103    Blood Gas, Arterial - [[id number]]  (Abnormal) Collected: 09/01/22 0104    Specimen: Arterial Blood Updated: 09/01/22 0102     Site Right Radial     Steve's Test Positive     pH, Arterial 7.473 pH units      Comment: 83 Value above reference range        pCO2, Arterial 32.5 mm Hg      Comment: 84 Value below reference range        pO2, Arterial 55.6 mm Hg      Comment: 84 Value below reference range        HCO3, Arterial 23.8 mmol/L      Base Excess, Arterial 0.6 mmol/L      O2 Saturation, Arterial 90.1 %      Comment:  84 Value below reference range        Temperature 37.0 C      Barometric Pressure for Blood Gas 752 mmHg      Modality Nasal Cannula     Flow Rate 7.0 lpm      Ventilator Mode NA     Collected by [id number]     Comment: Meter: [id number]     :  [id number]        pCO2, Temperature Corrected 32.5 mm Hg      pH, Temp Corrected 7.473 pH Units      pO2, Temperature Corrected 55.6 mm Hg     Blood Culture - Blood, Arm, Right [[id number]] Collected: 09/01/22 0056    Specimen: Blood from Arm, Right Updated: 09/01/22 0102    CBC & Differential [[id number]]  (Abnormal) Collected: 09/01/22 0048    Specimen: Blood Updated: 09/01/22 0056    Narrative:      The following orders were created for panel order CBC & Differential.  Procedure                               Abnormality         Status                     ---------                               -----------         ------                     CBC Auto Differential[[id number]]        Abnormal            Final result                 Please view results for these tests on the individual orders.    CBC Auto Differential [[id number]]  (Abnormal) Collected: 09/01/22 0048    Specimen: Blood Updated: 09/01/22 0056     WBC 8.48 10*3/mm3      RBC 3.83 10*6/mm3      Hemoglobin 11.1 g/dL      Hematocrit 36.4 %      MCV 95.0 fL      MCH 29.0 pg      MCHC 30.5 g/dL      RDW 15.6 %      RDW-SD 53.0 fl      MPV 10.0 fL      Platelets 422 10*3/mm3      Neutrophil % 80.4 %      Lymphocyte % 10.8 %      Monocyte % 7.4 %      Eosinophil % 0.5 %      Basophil % 0.4 %      Immature Grans % 0.5 %      Neutrophils, Absolute 6.82 10*3/mm3      Lymphocytes, Absolute 0.92 10*3/mm3      Monocytes, Absolute 0.63 10*3/mm3      Eosinophils, Absolute 0.04 10*3/mm3      Basophils, Absolute 0.03 10*3/mm3      Immature Grans, Absolute 0.04 10*3/mm3      nRBC 0.0 /100 WBC         Imaging Results (Last 24 Hours)     Procedure Component Value Units Date/Time    XR Chest 1 View [[id number]] Resulted: 09/01/22  0323     Updated: 09/01/22 0324    CT Angiogram Chest [[id number]] Resulted: 09/01/22 0124     Updated: 09/01/22 0155        I have personally reviewed and interpreted the radiology studies and ECG obtained at time of admission.     Assessment / Plan     Assessment:   Active Hospital Problems    Diagnosis    • Acute on chronic congestive heart failure, unspecified heart failure type (HCC)    • Aortic stenosis, moderate    • Moderate malnutrition (HCC)    • Paroxysmal atrial fibrillation (HCC)    • Hypertension    • Diabetes mellitus (HCC)      1.  Acute on chronic diastolic CHF  -IV diuretics  -Strict I's and O's  -Daily weights  -fluid restrictions    2.  Acute hypoxic respiratory failure  -IV diuretics  -Strict I's and O's  -Daily weights  -fluid restrictions    3.  A-fib  -Amiodarone  -Xarelto      4.  HTN  -monitor    5.  T2DM  -SSI  -Check A1C    Code Status/Advanced Care Plan: full code    The patient's surrogate decision maker are his daughters    I discussed my findings and recommendations with the patient    Estimated length of stay is 3-4 days    The patient was seen and examined by me on 9/1/22 at 0345    Electronically signed by Deepak Pena MD, 09/01/22, 03:34 CDT.

## 2022-09-01 NOTE — CONSULTS
Patient Care Team:  Chito Rubio MD as PCP - General (Family Medicine)  Deepak Pena MD  REASON FOR REFERRAL: CHF  Chief complaint: Shortness of breath    Subjective     Patient is a 66 y.o. male presents with shortness of breath.  He reports an approximate 3-day history of worsening weakness, dyspnea on exertion, dry cough, orthopnea, PND and lower extremity edema.  He denies any chest pain, palpitations, syncope or presyncope.  He believes he has actually lost weight.  Work-up revealed acute congestive heart failure with a BNP of nearly 34,000.  He has bilateral large pleural effusions.  PE was ruled out.  He denies any fever or chills.  IV diuresis has been initiated per the ER and admitting physician-has received a total of 60 Lasix IV so far and has had some improvement but is still on the BiPAP - oxygenating around 90% on 40% O2.  PO2 was 55 on admission.    EKG is poor quality but appears to show sinus arrhythmia with T wave inversions in lateral leads and V4.    Troponin is slightly elevated at 0.036-decrease a little bit/flat trending overall compared to last admission.    He was recently discharged on 8/15/2022 following a hospitalization when he presented with nausea and vomiting and was found to be volume depleted, hypotensive requiring Levophed short-term, and an acute renal failure with a creatinine of nearly 7 and hyponatremic.  At that time he was also diagnosed with rapid atrial fibrillation and flutter and Dr. Mckeon saw the patient in consultation.  He failed a couple of attempts at cardioversion in the ER that admission.  Ultimately he was started on amiodarone and converted to normal sinus rhythm and was transitioned to oral amiodarone at the time of discharge.  He was sent home on anticoagulation as well-he felt as though he was having some adverse effects from Eliquis so he was transitioned to Xarelto by his PCP.  He did have a 2D echocardiogram that revealed a low normal LVEF  and moderate aortic stenosis on -see below.    It appears he has a follow-up scheduled with Dr. Devine (?this may have been planned/referral placed prior to his hospitalization earlier this month), but the patient states he has never seen a cardiologist on an outpatient basis.      Review of Systems   Review of Systems   Constitutional: Positive for fatigue. Negative for diaphoresis, fever and unexpected weight change.   HENT: Negative for nosebleeds.    Respiratory: Positive for cough and shortness of breath (orthopnea, PND ). Negative for apnea, chest tightness and wheezing.    Cardiovascular: Positive for leg swelling. Negative for chest pain and palpitations.   Gastrointestinal: Negative for abdominal distention, nausea and vomiting.   Genitourinary: Negative for hematuria.   Musculoskeletal: Negative for gait problem.   Skin: Negative for color change.   Neurological: Positive for weakness. Negative for dizziness, syncope and light-headedness.       History  Past Medical History:   Diagnosis Date   • Arthritis    • CHF (congestive heart failure) (HCC)    • Diabetes mellitus (HCC)    • HLD (hyperlipidemia)    • Hypertension    • Renal disorder      History reviewed. No pertinent surgical history.  Family History   Problem Relation Age of Onset   • Cancer Mother    • Heart disease Father    • Diabetes Sister    • Cancer Sister    • COPD Brother      Social History     Tobacco Use   • Smoking status: Former Smoker     Packs/day: 1.50     Years: 50.00     Pack years: 75.00     Types: Cigarettes     Quit date: 2022     Years since quittin.1   • Smokeless tobacco: Never Used   Substance Use Topics   • Alcohol use: Never   • Drug use: Yes     Types: Marijuana     Medications Prior to Admission   Medication Sig Dispense Refill Last Dose   • amiodarone (PACERONE) 200 MG tablet Take 1 tablet by mouth Every 12 (Twelve) Hours. 60 tablet 2    • apixaban (ELIQUIS) 5 MG tablet tablet Take 1 tablet by mouth  Every 12 (Twelve) Hours. Indications: Atrial Fibrillation 60 tablet 2    • atorvastatin (LIPITOR) 20 MG tablet Take 20 mg by mouth Daily.      • glimepiride (AMARYL) 2 MG tablet Take 2 mg by mouth Every Morning Before Breakfast.      • pantoprazole (PROTONIX) 40 MG EC tablet Take 1 tablet by mouth 2 (Two) Times a Day Before Meals. 60 tablet 2    • rivaroxaban (XARELTO) 20 MG tablet Take 20 mg by mouth Daily.      • sucralfate (CARAFATE) 1 GM/10ML suspension Take 20 mL by mouth 2 (Two) Times a Day. 120 each 2        Current Facility-Administered Medications:   •  amiodarone (PACERONE) tablet 200 mg, 200 mg, Oral, Q12H, Deepak Pena MD, 200 mg at 09/01/22 0827  •  atorvastatin (LIPITOR) tablet 20 mg, 20 mg, Oral, Daily, Deepak Pena MD, 20 mg at 09/01/22 0827  •  dextrose (D50W) (25 g/50 mL) IV injection 25 g, 25 g, Intravenous, Q15 Min PRN, Deepak Pena MD  •  dextrose (GLUTOSE) oral gel 15 g, 15 g, Oral, Q15 Min PRN, Deepak Pena MD  •  furosemide (LASIX) injection 60 mg, 60 mg, Intravenous, BID, Knees, Rhiannon E, APRN  •  glucagon (human recombinant) (GLUCAGEN DIAGNOSTIC) injection 1 mg, 1 mg, Intramuscular, Q15 Min PRN, Deepak Pena MD  •  Insulin Lispro (humaLOG) injection 0-9 Units, 0-9 Units, Subcutaneous, TID ACJean Jonathan Scott, MD, 6 Units at 09/01/22 0827  •  nitroglycerin (TRIDIL) 200 mcg/ml infusion, 10-50 mcg/min, Intravenous, Titrated, Deepak Pena MD, Stopped at 09/01/22 0815  •  pantoprazole (PROTONIX) EC tablet 40 mg, 40 mg, Oral, BID AC, Deepak Pena MD, 40 mg at 09/01/22 0829  •  rivaroxaban (XARELTO) tablet 20 mg, 20 mg, Oral, Daily, Deepak Pena MD, 20 mg at 09/01/22 0827  •  [COMPLETED] Insert peripheral IV, , , Once **AND** sodium chloride 0.9 % flush 10 mL, 10 mL, Intravenous, PRN, Deepak Pena MD, 10 mL at 09/01/22 0827  Allergies:  Patient has no known allergies.    Objective     Vital Signs  Temp:   [97.8 °F (36.6 °C)-98.7 °F (37.1 °C)] 98.4 °F (36.9 °C)  Heart Rate:  [] 68  Resp:  [14-30] 19  BP: ()/(58-94) 112/60    Physical Exam:   Vitals and nursing note reviewed.   Constitutional:       General: Not in acute distress.     Appearance: Well-developed and not in distress. Chronically ill-appearing and acutely ill-appearing. Not diaphoretic.   Pulmonary:      Effort: Pulmonary effort is normal. No respiratory distress.      Breath sounds: Decreased breath sounds (bases) present.      Comments: BIPAP in place   Cardiovascular:      Normal rate. Regular rhythm.      Murmurs: There is a grade 3/6 systolic murmur.   Edema:     Peripheral edema (3-4+ BLE edema ) present.  Abdominal:      Tenderness: There is no abdominal tenderness.   Skin:     General: Skin is warm and dry.   Neurological:      Mental Status: Alert and oriented to person, place, and time.       Results Review:     Lab Results (last 72 hours)     Procedure Component Value Units Date/Time    POC Glucose Once [[id number]]  (Abnormal) Collected: 09/01/22 0758    Specimen: Blood Updated: 09/01/22 0810     Glucose 255 mg/dL      Comment: : [id number] Saravanan Nath  JMeter ID: [id number]       Blood Gas, Arterial - [[id number]]  (Abnormal) Collected: 09/01/22 0510    Specimen: Arterial Blood Updated: 09/01/22 0507     Site Right Radial     Steve's Test Positive     pH, Arterial 7.494 pH units      Comment: 83 Value above reference range        pCO2, Arterial 35.4 mm Hg      pO2, Arterial 65.8 mm Hg      Comment: 84 Value below reference range        HCO3, Arterial 27.2 mmol/L      Comment: 83 Value above reference range        Base Excess, Arterial 3.9 mmol/L      Comment: 83 Value above reference range        O2 Saturation, Arterial 94.3 %      Temperature 37.0 C      Barometric Pressure for Blood Gas 751 mmHg      Modality BiPap     FIO2 40 %      Ventilator Mode NA     Set [facility] Resp Rate 12.0     IPAP 12     Comment: Meter:  [id number]     :  [id number]        EPAP 6     Collected by [id number]     pCO2, Temperature Corrected 35.4 mm Hg      pH, Temp Corrected 7.494 pH Units      pO2, Temperature Corrected 65.8 mm Hg     STAT Lactic Acid, Reflex [[id number]]  (Normal) Collected: 09/01/22 0416    Specimen: Blood Updated: 09/01/22 0450     Lactate 1.6 mmol/L     Hemoglobin A1c [[id number]]  (Abnormal) Collected: 09/01/22 0048    Specimen: Blood Updated: 09/01/22 0418     Hemoglobin A1C 8.40 %     Narrative:      Hemoglobin A1C Ranges:    Increased Risk for Diabetes  5.7% to 6.4%  Diabetes                     >= 6.5%  Diabetic Goal                < 7.0%    COVID-19,[facility] IN-HOUSE,Nasal Swab No Transport Media 3-4 HR TAT - Swab, Nasal Cavity [[id number]]  (Normal) Collected: 09/01/22 0100    Specimen: Swab from Nasal Cavity Updated: 09/01/22 0143     COVID19 Not Detected    Narrative:      Fact sheet for providers: https://www.fda.gov/media/279489/download     Fact sheet for patients: https://www.fda.gov/media/164886/download    Test performed by PCR.    Consider negative results in combination with clinical observations, patient history, and epidemiological information.    Troponin [[id number]]  (Abnormal) Collected: 09/01/22 0048    Specimen: Blood Updated: 09/01/22 0118     Troponin T 0.036 ng/mL     Narrative:      Troponin T Reference Range:  <= 0.03 ng/mL-   Negative for AMI  >0.03 ng/mL-     Abnormal for myocardial necrosis.  Clinicians would have to utilize clinical acumen, EKG, Troponin and serial changes to determine if it is an Acute Myocardial Infarction or myocardial injury due to an underlying chronic condition.       Results may be falsely decreased if patient taking Biotin.      Lactic Acid, Plasma [[id number]]  (Abnormal) Collected: 09/01/22 0048    Specimen: Blood Updated: 09/01/22 0118     Lactate 3.2 mmol/L     Comprehensive Metabolic Panel [[id number]]  (Abnormal) Collected: 09/01/22 0048    Specimen: Blood  Updated: 09/01/22 0115     Glucose 266 mg/dL      BUN 23 mg/dL      Creatinine 1.15 mg/dL      Sodium 141 mmol/L      Potassium 4.6 mmol/L      Chloride 103 mmol/L      CO2 24.0 mmol/L      Calcium 8.4 mg/dL      Total Protein 6.4 g/dL      Albumin 3.20 g/dL      ALT (SGPT) 16 U/L      AST (SGOT) 15 U/L      Alkaline Phosphatase 115 U/L      Total Bilirubin 0.5 mg/dL      Globulin 3.2 gm/dL      A/G Ratio 1.0 g/dL      BUN/Creatinine Ratio 20.0     Anion Gap 14.0 mmol/L      eGFR 69.8 mL/min/1.73      Comment: National Kidney Foundation and American Society of Nephrology (ASN) Task Force recommended calculation based on the Chronic Kidney Disease Epidemiology Collaboration (CKD-EPI) equation refit without adjustment for race.       Narrative:      GFR Normal >60  Chronic Kidney Disease <60  Kidney Failure <15      BNP [[id number]]  (Abnormal) Collected: 09/01/22 0048    Specimen: Blood Updated: 09/01/22 0112     proBNP 33,372.0 pg/mL     Narrative:      Among patients with dyspnea, NT-proBNP is highly sensitive for the detection of acute congestive heart failure. In addition NT-proBNP of <300 pg/ml effectively rules out acute congestive heart failure with 99% negative predictive value.    Results may be falsely decreased if patient taking Biotin.      Protime-INR [[id number]]  (Abnormal) Collected: 09/01/22 0048    Specimen: Blood Updated: 09/01/22 0108     Protime 21.6 Seconds      INR 1.96    aPTT [[id number]]  (Abnormal) Collected: 09/01/22 0048    Specimen: Blood Updated: 09/01/22 0108     PTT 39.8 seconds     D-dimer, Quantitative [[id number]]  (Abnormal) Collected: 09/01/22 0048    Specimen: Blood Updated: 09/01/22 0108     D-Dimer, Quantitative 0.88 MCGFEU/mL     Narrative:      Reference Range is 0-0.50 MCGFEU/mL. However, results <0.50 MCGFEU/mL tends to rule out DVT or PE. Results >0.50 MCGFEU/mL are not useful in predicting absence or presence of DVT or PE.      Blood Culture - Blood, Arm, Left [[id number]]  Collected: 09/01/22 0056    Specimen: Blood from Arm, Left Updated: 09/01/22 0103    Blood Gas, Arterial - [[id number]]  (Abnormal) Collected: 09/01/22 0104    Specimen: Arterial Blood Updated: 09/01/22 0102     Site Right Radial     Steve's Test Positive     pH, Arterial 7.473 pH units      Comment: 83 Value above reference range        pCO2, Arterial 32.5 mm Hg      Comment: 84 Value below reference range        pO2, Arterial 55.6 mm Hg      Comment: 84 Value below reference range        HCO3, Arterial 23.8 mmol/L      Base Excess, Arterial 0.6 mmol/L      O2 Saturation, Arterial 90.1 %      Comment: 84 Value below reference range        Temperature 37.0 C      Barometric Pressure for Blood Gas 752 mmHg      Modality Nasal Cannula     Flow Rate 7.0 lpm      Ventilator Mode NA     Collected by [id number]     Comment: Meter: [id number]     :  [id number]        pCO2, Temperature Corrected 32.5 mm Hg      pH, Temp Corrected 7.473 pH Units      pO2, Temperature Corrected 55.6 mm Hg     Blood Culture - Blood, Arm, Right [[id number]] Collected: 09/01/22 0056    Specimen: Blood from Arm, Right Updated: 09/01/22 0102    CBC & Differential [[id number]]  (Abnormal) Collected: 09/01/22 0048    Specimen: Blood Updated: 09/01/22 0056    Narrative:      The following orders were created for panel order CBC & Differential.  Procedure                               Abnormality         Status                     ---------                               -----------         ------                     CBC Auto Differential[[id number]]        Abnormal            Final result                 Please view results for these tests on the individual orders.    CBC Auto Differential [[id number]]  (Abnormal) Collected: 09/01/22 0048    Specimen: Blood Updated: 09/01/22 0056     WBC 8.48 10*3/mm3      RBC 3.83 10*6/mm3      Hemoglobin 11.1 g/dL      Hematocrit 36.4 %      MCV 95.0 fL      MCH 29.0 pg      MCHC 30.5 g/dL      RDW 15.6 %       RDW-SD 53.0 fl      MPV 10.0 fL      Platelets 422 10*3/mm3      Neutrophil % 80.4 %      Lymphocyte % 10.8 %      Monocyte % 7.4 %      Eosinophil % 0.5 %      Basophil % 0.4 %      Immature Grans % 0.5 %      Neutrophils, Absolute 6.82 10*3/mm3      Lymphocytes, Absolute 0.92 10*3/mm3      Monocytes, Absolute 0.63 10*3/mm3      Eosinophils, Absolute 0.04 10*3/mm3      Basophils, Absolute 0.03 10*3/mm3      Immature Grans, Absolute 0.04 10*3/mm3      nRBC 0.0 /100 WBC          Lab Results   Component Value Date    HGBA1C 8.40 (H) 09/01/2022     Lab Results   Component Value Date    CHLPL 134 (L) 08/23/2022    TRIG 100 08/23/2022    HDL 46 (L) 08/23/2022    LDL 68 08/23/2022 8/11/2022 echo:    · Mild pulmonary hypertension is present.  · Estimated right ventricular systolic pressure from tricuspid regurgitation is mildly elevated (35-45 mmHg).  · There is a small (<1cm) pericardial effusion.  · Moderate aortic valve stenosis is present.  · Left ventricular wall thickness is consistent with mild concentric hypertrophy.  · Estimated left ventricular EF = 50% Left ventricular systolic function is normal.  · Left ventricular diastolic dysfunction is noted.        Assessment & Plan     1.  Acute congestive heart failure with acute hypoxic respiratory failure: Remains decompensated but improving.  Presumably diastolic given low normal LVEF at 50% per echocardiogram performed last admission on 8/11.  He also has large bilateral pleural effusions greater on the right.  BNP is nearly 34,000.  -Continue IV diuresis.  So far he has received 2 doses of IV Lasix-a 20 mg dose and a 40 mg dose.  Output documentation seems to be inaccurate. he is still requiring BiPAP (40% O2) but he is hemodynamically stable and reports his breathing is improving.  -Increase Lasix to 60 mg IV twice daily with next dose to be given now  -Monitor intake, output, renal function, electrolytes and daily weights  -As he just had a 2D  echocardiogram within the past 2 to 3 weeks I will discuss the need for a repeat with Dr. Hagen.    2.  Paroxysmal atrial fibrillation and flutter: Diagnosed during his recent hospitalization earlier this month in the setting of nausea, vomiting, dehydration, and acute kidney injury.  He was placed on amiodarone by Dr. Mckeon during that hospitalization and remains on this.  -Continue amiodarone 200 mg twice daily for rhythm control.  Although the EKG on admission was poor quality, this appears to be sinus arrhythmia.  Telemetry review reveals he has been maintaining normal sinus rhythm.  -Continue anticoagulation for stroke prophylaxis-on Xarelto 20 mg daily with food.  [id number] is 3.  -Continue to monitor telemetry.    3.  Moderate aortic valve stenosis noted on recent echocardiogram    4.  Diabetes mellitus type 2: A1c 8.4.  On glimepiride at home.    5.  Hypertension: It appears he previously took amlodipine and lisinopril/HCTZ following his recent hospitalization for volume depletion, hypotension and acute kidney injury those were discontinued.  At present blood pressures are normal without any antihypertensives    6.  Previous tobacco abuse: He states he quit smoking couple of months ago    7.  Elevated troponin: 0.036.  Without chest pain.  Likely secondary to myocardial injury in the setting of acute CHF-this has trended downward slightly compared to his troponins during his recent admission.    I discussed the patient's findings and my recommendations with patient and nursing staff.     Electronically signed by MARKELL Enriquez, 09/01/22, 11:50 AM CDT.

## 2022-09-02 LAB
ANION GAP SERPL CALCULATED.3IONS-SCNC: 11 MMOL/L (ref 5–15)
BUN SERPL-MCNC: 29 MG/DL (ref 8–23)
BUN/CREAT SERPL: 24.4 (ref 7–25)
CALCIUM SPEC-SCNC: 7.6 MG/DL (ref 8.6–10.5)
CHLORIDE SERPL-SCNC: 101 MMOL/L (ref 98–107)
CO2 SERPL-SCNC: 28 MMOL/L (ref 22–29)
CREAT SERPL-MCNC: 1.19 MG/DL (ref 0.76–1.27)
EGFRCR SERPLBLD CKD-EPI 2021: 67 ML/MIN/1.73
GLUCOSE BLDC GLUCOMTR-MCNC: 219 MG/DL (ref 70–130)
GLUCOSE BLDC GLUCOMTR-MCNC: 295 MG/DL (ref 70–130)
GLUCOSE BLDC GLUCOMTR-MCNC: 308 MG/DL (ref 70–130)
GLUCOSE SERPL-MCNC: 183 MG/DL (ref 65–99)
POTASSIUM SERPL-SCNC: 4.1 MMOL/L (ref 3.5–5.2)
SODIUM SERPL-SCNC: 140 MMOL/L (ref 136–145)

## 2022-09-02 PROCEDURE — 82962 GLUCOSE BLOOD TEST: CPT

## 2022-09-02 PROCEDURE — 80048 BASIC METABOLIC PNL TOTAL CA: CPT | Performed by: NURSE PRACTITIONER

## 2022-09-02 PROCEDURE — 63710000001 INSULIN LISPRO (HUMAN) PER 5 UNITS: Performed by: FAMILY MEDICINE

## 2022-09-02 PROCEDURE — 93005 ELECTROCARDIOGRAM TRACING: CPT | Performed by: INTERNAL MEDICINE

## 2022-09-02 PROCEDURE — 25010000002 FUROSEMIDE PER 20 MG: Performed by: NURSE PRACTITIONER

## 2022-09-02 PROCEDURE — 94799 UNLISTED PULMONARY SVC/PX: CPT

## 2022-09-02 PROCEDURE — 99232 SBSQ HOSP IP/OBS MODERATE 35: CPT | Performed by: NURSE PRACTITIONER

## 2022-09-02 PROCEDURE — 93010 ELECTROCARDIOGRAM REPORT: CPT | Performed by: EMERGENCY MEDICINE

## 2022-09-02 RX ADMIN — ATORVASTATIN CALCIUM 20 MG: 10 TABLET, FILM COATED ORAL at 08:13

## 2022-09-02 RX ADMIN — AMIODARONE HYDROCHLORIDE 200 MG: 200 TABLET ORAL at 21:51

## 2022-09-02 RX ADMIN — PANTOPRAZOLE SODIUM 40 MG: 40 TABLET, DELAYED RELEASE ORAL at 08:13

## 2022-09-02 RX ADMIN — ANTACID TABLETS 2 TABLET: 500 TABLET, CHEWABLE ORAL at 17:12

## 2022-09-02 RX ADMIN — INSULIN LISPRO 4 UNITS: 100 INJECTION, SOLUTION INTRAVENOUS; SUBCUTANEOUS at 11:34

## 2022-09-02 RX ADMIN — FUROSEMIDE 60 MG: 10 INJECTION, SOLUTION INTRAMUSCULAR; INTRAVENOUS at 08:13

## 2022-09-02 RX ADMIN — ANTACID TABLETS 2 TABLET: 500 TABLET, CHEWABLE ORAL at 08:32

## 2022-09-02 RX ADMIN — INSULIN LISPRO 7 UNITS: 100 INJECTION, SOLUTION INTRAVENOUS; SUBCUTANEOUS at 17:20

## 2022-09-02 RX ADMIN — FUROSEMIDE 60 MG: 10 INJECTION, SOLUTION INTRAMUSCULAR; INTRAVENOUS at 17:11

## 2022-09-02 RX ADMIN — INSULIN LISPRO 6 UNITS: 100 INJECTION, SOLUTION INTRAVENOUS; SUBCUTANEOUS at 08:13

## 2022-09-02 RX ADMIN — RIVAROXABAN 20 MG: 20 TABLET, FILM COATED ORAL at 08:13

## 2022-09-02 RX ADMIN — AMIODARONE HYDROCHLORIDE 200 MG: 200 TABLET ORAL at 08:13

## 2022-09-02 RX ADMIN — PANTOPRAZOLE SODIUM 40 MG: 40 TABLET, DELAYED RELEASE ORAL at 17:12

## 2022-09-02 NOTE — PROGRESS NOTES
[facility] Medicine Services  INPATIENT PROGRESS NOTE    Length of Stay: 1  Date of Admission: 9/1/2022  Primary Care Physician: Chito Rubio MD    Subjective     Chief Complaint:     Shortness of breath    HPI     The patient is less short of breath today.  He has been weaned from BiPAP and is currently on nasal cannula at 4 L with oxygen saturations in the mid 90s.  He has not yet back to his baseline.  Cardiology has seen and evaluated the patient and does not recommend repeat echocardiogram at this point.  He continues on amiodarone and Xarelto.  Output has exceeded intake by 2700 cc so far.  The patient is stable for transfer to the medical surgical floor today.  BMP this a.m. was unremarkable and will be repeated daily until aggressive diuresis can be de-escalated.      Review of Systems     All pertinent negatives and positives are as above. All other systems have been reviewed and are negative unless otherwise stated.     Objective    Temp:  [97.6 °F (36.4 °C)-98.3 °F (36.8 °C)] 97.6 °F (36.4 °C)  Heart Rate:  [] 71  Resp:  [13-22] 20  BP: (100-142)/(54-82) 100/54    Lab Results (last 24 hours)     Procedure Component Value Units Date/Time    POC Glucose Once [[id number]]  (Abnormal) Collected: 09/02/22 1128    Specimen: Blood Updated: 09/02/22 1140     Glucose 219 mg/dL      Comment: : ROQUE Kent The Food Trusteter ID: [id number]       POC Glucose Once [[id number]]  (Abnormal) Collected: 09/02/22 0757    Specimen: Blood Updated: 09/02/22 0808     Glucose 295 mg/dL      Comment: : [email or web address] ComEderMeter ID: [id number]       Basic Metabolic Panel [[id number]]  (Abnormal) Collected: 09/02/22 0356    Specimen: Blood Updated: 09/02/22 0426     Glucose 183 mg/dL      BUN 29 mg/dL      Creatinine 1.19 mg/dL      Sodium 140 mmol/L      Potassium 4.1 mmol/L      Chloride 101 mmol/L      CO2 28.0 mmol/L      Calcium 7.6 mg/dL      BUN/Creatinine Ratio  24.4     Anion Gap 11.0 mmol/L      eGFR 67.0 mL/min/1.73      Comment: National Kidney Foundation and American Society of Nephrology (ASN) Task Force recommended calculation based on the Chronic Kidney Disease Epidemiology Collaboration (CKD-EPI) equation refit without adjustment for race.       Narrative:      GFR Normal >60  Chronic Kidney Disease <60  Kidney Failure <15      Blood Culture - Blood, Arm, Left [[id number]]  (Normal) Collected: 09/01/22 0056    Specimen: Blood from Arm, Left Updated: 09/02/22 0117     Blood Culture No growth at 24 hours    Blood Culture - Blood, Arm, Right [[id number]]  (Normal) Collected: 09/01/22 0056    Specimen: Blood from Arm, Right Updated: 09/02/22 0117     Blood Culture No growth at 24 hours    Potassium [[id number]]  (Normal) Collected: 09/01/22 1938    Specimen: Blood Updated: 09/01/22 2025     Potassium 4.5 mmol/L     POC Glucose Once [[id number]]  (Abnormal) Collected: 09/01/22 1719    Specimen: Blood Updated: 09/01/22 1730     Glucose 139 mg/dL      Comment: : ROQUE Jesusace Story [employer] CollegeerMeter ID: [id number]             Imaging Results (Last 24 Hours)     ** No results found for the last 24 hours. **             Intake/Output Summary (Last 24 hours) at 9/2/2022 1401  Last data filed at 9/2/2022 1200  Gross per 24 hour   Intake 1200 ml   Output 3000 ml   Net -1800 ml       Physical Exam  Constitutional:       General: He is not in acute distress.     Appearance: Normal appearance.      Interventions: Nasal cannula in place.   HENT:      Head: Normocephalic and atraumatic.      Right Ear: External ear normal.      Left Ear: External ear normal.      Mouth/Throat:      Mouth: Mucous membranes are moist.      Pharynx: Oropharynx is clear.   Eyes:      General: No scleral icterus.     Conjunctiva/sclera: Conjunctivae normal.   Cardiovascular:      Rate and Rhythm: Normal rate and regular rhythm.      Pulses: Normal pulses.      Heart sounds: Normal heart sounds. No murmur  heard.  Pulmonary:      Effort: Pulmonary effort is normal. No respiratory distress.      Breath sounds: Normal breath sounds.   Abdominal:      General: Abdomen is flat. Bowel sounds are normal.      Palpations: Abdomen is soft. There is no mass.      Tenderness: There is no abdominal tenderness.   Musculoskeletal:         General: Normal range of motion.      Right lower leg: Edema present.      Left lower leg: Edema present.   Skin:     General: Skin is warm and dry.      Coloration: Skin is not pale.   Neurological:      General: No focal deficit present.      Mental Status: He is alert and oriented to person, place, and time. Mental status is at baseline.   Psychiatric:         Mood and Affect: Mood normal.         Judgment: Judgment normal.       Results Review:  I have reviewed the labs, radiology results, and diagnostic studies since my last progress note and made treatment changes reflective of the results.   I have reviewed the current medications.    Assessment/Plan     Active Hospital Problems    Diagnosis    • **Acute on chronic congestive heart failure, unspecified heart failure type (HCC)    • Aortic stenosis, moderate    • Moderate malnutrition (HCC)    • Paroxysmal atrial fibrillation (HCC)    • Hypertension    • Diabetes mellitus (HCC)        PLAN:  Continue IV Lasix every 12 hours  Transfer to the medical surgical floor  Continue daily BMP  Discontinue Barbosa catheter    Electronically signed by Edwin Du DO, 09/02/22, 14:01 CDT.

## 2022-09-02 NOTE — PROGRESS NOTES
"[facility] -  Progress Note     LOS: 1 day   Patient Care Team:  Chito Rubio MD as PCP - General (Family Medicine)    Chief Complaint: follow up CHF    Subjective     Interval History:     Patient Complaints: The patient has been transferred to the telemetry floor.  He is off of BiPAP and is now on 4 L nasal cannula with oxygen saturations in the mid 90s.    He is net negative between 2 and 3 L according to intake and output documentation.    He tells me his shortness of breath has improved significantly-\"at least 60 to 70%.\"    He still has some dyspnea and mildly labored breathing at rest but he is much improved.  No PND.  Orthopnea significantly improved.   peripheral edema has improved significantly.    He complains of heartburn which he describes as a sensation of something coming up in his throat and this occasionally makes him cough.  He states this has been a problem for about a month and the only thing that seems to help is Tums.  He denies any chest discomfort otherwise.    He is maintaining normal sinus rhythm on telemetry.    Review of Systems:     Review of Systems   Constitutional: Negative for diaphoresis, fatigue, fever and unexpected weight change.   HENT: Negative for nosebleeds.    Respiratory: Positive for cough and shortness of breath. Negative for apnea, chest tightness and wheezing.    Cardiovascular: Positive for leg swelling. Negative for chest pain and palpitations.   Gastrointestinal: Negative for abdominal distention, nausea and vomiting.        Heartburn   Genitourinary: Negative for hematuria.   Musculoskeletal: Negative for gait problem.   Skin: Negative for color change.   Neurological: Negative for dizziness, syncope, weakness and light-headedness.     Objective     Vital Sign Min/Max for last 24 hours  Temp  Min: 97.6 °F (36.4 °C)  Max: 98.5 °F (36.9 °C)   BP  Min: 100/54  Max: 142/79   Pulse  Min: 71  Max: 120   Resp  Min: 13  Max: 22   SpO2  Min: 90 % "  Max: 100 %   No data recorded   Weight  Min: 68.2 kg (150 lb 6.4 oz)  Max: 72.3 kg (159 lb 6.4 oz)         09/02/22  1455   Weight: 68.2 kg (150 lb 6.4 oz)       Physical Exam:    Vitals and nursing note reviewed.   Constitutional:       General: Not in acute distress.     Appearance: Well-developed and not in distress. Not diaphoretic.   Neck:      Vascular: No JVD.   Pulmonary:      Effort: No respiratory distress.      Breath sounds: Decreased breath sounds (bases) present.      Comments: Mildly labored breathing, no acute distress, 4 L nasal cannula in place  Cardiovascular:      Normal rate. Regular rhythm.      Murmurs: There is a grade 3/6 systolic murmur.   Edema:     Peripheral edema (1-2+ BLE edema ) present.  Abdominal:      Tenderness: There is no abdominal tenderness.   Skin:     General: Skin is warm and dry.   Neurological:      Mental Status: Alert and oriented to person, place, and time.       Results Review:   Lab Results (last 72 hours)     Procedure Component Value Units Date/Time    POC Glucose Once [[id number]]  (Abnormal) Collected: 09/02/22 1128    Specimen: Blood Updated: 09/02/22 1140     Glucose 219 mg/dL      Comment: : VputiJOSELet's TalkKent Find That FileerMeter ID: [id number]       POC Glucose Once [[id number]]  (Abnormal) Collected: 09/02/22 0757    Specimen: Blood Updated: 09/02/22 0808     Glucose 295 mg/dL      Comment: : CamiantACE1 ClouterMeter ID: [id number]       Basic Metabolic Panel [[id number]]  (Abnormal) Collected: 09/02/22 0356    Specimen: Blood Updated: 09/02/22 0426     Glucose 183 mg/dL      BUN 29 mg/dL      Creatinine 1.19 mg/dL      Sodium 140 mmol/L      Potassium 4.1 mmol/L      Chloride 101 mmol/L      CO2 28.0 mmol/L      Calcium 7.6 mg/dL      BUN/Creatinine Ratio 24.4     Anion Gap 11.0 mmol/L      eGFR 67.0 mL/min/1.73      Comment: National Kidney Foundation and American Society of Nephrology (ASN) Task Force recommended calculation based on the Chronic  Kidney Disease Epidemiology Collaboration (CKD-EPI) equation refit without adjustment for race.       Narrative:      GFR Normal >60  Chronic Kidney Disease <60  Kidney Failure <15      Blood Culture - Blood, Arm, Left [[id number]]  (Normal) Collected: 09/01/22 0056    Specimen: Blood from Arm, Left Updated: 09/02/22 0117     Blood Culture No growth at 24 hours    Blood Culture - Blood, Arm, Right [[id number]]  (Normal) Collected: 09/01/22 0056    Specimen: Blood from Arm, Right Updated: 09/02/22 0117     Blood Culture No growth at 24 hours    Potassium [[id number]]  (Normal) Collected: 09/01/22 1938    Specimen: Blood Updated: 09/01/22 2025     Potassium 4.5 mmol/L     POC Glucose Once [[id number]]  (Abnormal) Collected: 09/01/22 1719    Specimen: Blood Updated: 09/01/22 1730     Glucose 139 mg/dL      Comment: : ROQUE MartinerMeter ID: [id number]       POC Glucose Once [[id number]]  (Abnormal) Collected: 09/01/22 1156    Specimen: Blood Updated: 09/01/22 1213     Glucose 149 mg/dL      Comment: : [id number] Saravananpiter [place]  JMeter ID: [id number]       POC Glucose Once [[id number]]  (Abnormal) Collected: 09/01/22 0758    Specimen: Blood Updated: 09/01/22 0810     Glucose 255 mg/dL      Comment: : [id number] Saravananpiter [place]  JMeter ID: [id number]       Blood Gas, Arterial - [[id number]]  (Abnormal) Collected: 09/01/22 0510    Specimen: Arterial Blood Updated: 09/01/22 0507     Site Right Radial     Steve's Test Positive     pH, Arterial 7.494 pH units      Comment: 83 Value above reference range        pCO2, Arterial 35.4 mm Hg      pO2, Arterial 65.8 mm Hg      Comment: 84 Value below reference range        HCO3, Arterial 27.2 mmol/L      Comment: 83 Value above reference range        Base Excess, Arterial 3.9 mmol/L      Comment: 83 Value above reference range        O2 Saturation, Arterial 94.3 %      Temperature 37.0 C      Barometric Pressure for Blood Gas 751 mmHg      Modality BiPap     FIO2 40  %      Ventilator Mode NA     Set [facility] Resp Rate 12.0     IPAP 12     Comment: Meter: [id number]     :  [id number]        EPAP 6     Collected by [id number]     pCO2, Temperature Corrected 35.4 mm Hg      pH, Temp Corrected 7.494 pH Units      pO2, Temperature Corrected 65.8 mm Hg     STAT Lactic Acid, Reflex [[id number]]  (Normal) Collected: 09/01/22 0416    Specimen: Blood Updated: 09/01/22 0450     Lactate 1.6 mmol/L     Hemoglobin A1c [[id number]]  (Abnormal) Collected: 09/01/22 0048    Specimen: Blood Updated: 09/01/22 0418     Hemoglobin A1C 8.40 %     Narrative:      Hemoglobin A1C Ranges:    Increased Risk for Diabetes  5.7% to 6.4%  Diabetes                     >= 6.5%  Diabetic Goal                < 7.0%    COVID-19,[facility] IN-HOUSE,Nasal Swab No Transport Media 3-4 HR TAT - Swab, Nasal Cavity [[id number]]  (Normal) Collected: 09/01/22 0100    Specimen: Swab from Nasal Cavity Updated: 09/01/22 0143     COVID19 Not Detected    Narrative:      Fact sheet for providers: https://www.fda.gov/media/241926/download     Fact sheet for patients: https://www.fda.gov/media/110382/download    Test performed by PCR.    Consider negative results in combination with clinical observations, patient history, and epidemiological information.    Troponin [[id number]]  (Abnormal) Collected: 09/01/22 0048    Specimen: Blood Updated: 09/01/22 0118     Troponin T 0.036 ng/mL     Narrative:      Troponin T Reference Range:  <= 0.03 ng/mL-   Negative for AMI  >0.03 ng/mL-     Abnormal for myocardial necrosis.  Clinicians would have to utilize clinical acumen, EKG, Troponin and serial changes to determine if it is an Acute Myocardial Infarction or myocardial injury due to an underlying chronic condition.       Results may be falsely decreased if patient taking Biotin.      Lactic Acid, Plasma [[id number]]  (Abnormal) Collected: 09/01/22 0048    Specimen: Blood Updated: 09/01/22 0118     Lactate 3.2 mmol/L     Comprehensive  Metabolic Panel [[id number]]  (Abnormal) Collected: 09/01/22 0048    Specimen: Blood Updated: 09/01/22 0115     Glucose 266 mg/dL      BUN 23 mg/dL      Creatinine 1.15 mg/dL      Sodium 141 mmol/L      Potassium 4.6 mmol/L      Chloride 103 mmol/L      CO2 24.0 mmol/L      Calcium 8.4 mg/dL      Total Protein 6.4 g/dL      Albumin 3.20 g/dL      ALT (SGPT) 16 U/L      AST (SGOT) 15 U/L      Alkaline Phosphatase 115 U/L      Total Bilirubin 0.5 mg/dL      Globulin 3.2 gm/dL      A/G Ratio 1.0 g/dL      BUN/Creatinine Ratio 20.0     Anion Gap 14.0 mmol/L      eGFR 69.8 mL/min/1.73      Comment: National Kidney Foundation and American Society of Nephrology (ASN) Task Force recommended calculation based on the Chronic Kidney Disease Epidemiology Collaboration (CKD-EPI) equation refit without adjustment for race.       Narrative:      GFR Normal >60  Chronic Kidney Disease <60  Kidney Failure <15      BNP [[id number]]  (Abnormal) Collected: 09/01/22 0048    Specimen: Blood Updated: 09/01/22 0112     proBNP 33,372.0 pg/mL     Narrative:      Among patients with dyspnea, NT-proBNP is highly sensitive for the detection of acute congestive heart failure. In addition NT-proBNP of <300 pg/ml effectively rules out acute congestive heart failure with 99% negative predictive value.    Results may be falsely decreased if patient taking Biotin.      Protime-INR [[id number]]  (Abnormal) Collected: 09/01/22 0048    Specimen: Blood Updated: 09/01/22 0108     Protime 21.6 Seconds      INR 1.96    aPTT [[id number]]  (Abnormal) Collected: 09/01/22 0048    Specimen: Blood Updated: 09/01/22 0108     PTT 39.8 seconds     D-dimer, Quantitative [[id number]]  (Abnormal) Collected: 09/01/22 0048    Specimen: Blood Updated: 09/01/22 0108     D-Dimer, Quantitative 0.88 MCGFEU/mL     Narrative:      Reference Range is 0-0.50 MCGFEU/mL. However, results <0.50 MCGFEU/mL tends to rule out DVT or PE. Results >0.50 MCGFEU/mL are not useful in predicting  absence or presence of DVT or PE.      Blood Gas, Arterial - [[id number]]  (Abnormal) Collected: 09/01/22 0104    Specimen: Arterial Blood Updated: 09/01/22 0102     Site Right Radial     Steve's Test Positive     pH, Arterial 7.473 pH units      Comment: 83 Value above reference range        pCO2, Arterial 32.5 mm Hg      Comment: 84 Value below reference range        pO2, Arterial 55.6 mm Hg      Comment: 84 Value below reference range        HCO3, Arterial 23.8 mmol/L      Base Excess, Arterial 0.6 mmol/L      O2 Saturation, Arterial 90.1 %      Comment: 84 Value below reference range        Temperature 37.0 C      Barometric Pressure for Blood Gas 752 mmHg      Modality Nasal Cannula     Flow Rate 7.0 lpm      Ventilator Mode NA     Collected by [id number]     Comment: Meter: [id number]     :  [id number]        pCO2, Temperature Corrected 32.5 mm Hg      pH, Temp Corrected 7.473 pH Units      pO2, Temperature Corrected 55.6 mm Hg     CBC & Differential [[id number]]  (Abnormal) Collected: 09/01/22 0048    Specimen: Blood Updated: 09/01/22 0056    Narrative:      The following orders were created for panel order CBC & Differential.  Procedure                               Abnormality         Status                     ---------                               -----------         ------                     CBC Auto Differential[[id number]]        Abnormal            Final result                 Please view results for these tests on the individual orders.    CBC Auto Differential [[id number]]  (Abnormal) Collected: 09/01/22 0048    Specimen: Blood Updated: 09/01/22 0056     WBC 8.48 10*3/mm3      RBC 3.83 10*6/mm3      Hemoglobin 11.1 g/dL      Hematocrit 36.4 %      MCV 95.0 fL      MCH 29.0 pg      MCHC 30.5 g/dL      RDW 15.6 %      RDW-SD 53.0 fl      MPV 10.0 fL      Platelets 422 10*3/mm3      Neutrophil % 80.4 %      Lymphocyte % 10.8 %      Monocyte % 7.4 %      Eosinophil % 0.5 %      Basophil % 0.4 %       Immature Grans % 0.5 %      Neutrophils, Absolute 6.82 10*3/mm3      Lymphocytes, Absolute 0.92 10*3/mm3      Monocytes, Absolute 0.63 10*3/mm3      Eosinophils, Absolute 0.04 10*3/mm3      Basophils, Absolute 0.03 10*3/mm3      Immature Grans, Absolute 0.04 10*3/mm3      nRBC 0.0 /100 WBC               Echo EF Estimated  Lab Results   Component Value Date    ECHOEFEST 50 08/11/2022         Cath Ejection Fraction Quantitative  No results found for: CATHEF        Medication Review: yes  Current Facility-Administered Medications   Medication Dose Route Frequency Provider Last Rate Last Admin   • amiodarone (PACERONE) tablet 200 mg  200 mg Oral Q12H Deepak Pena MD   200 mg at 09/02/22 0813   • atorvastatin (LIPITOR) tablet 20 mg  20 mg Oral Daily Deepak Pena MD   20 mg at 09/02/22 0813   • calcium carbonate (TUMS) chewable tablet 500 mg (200 mg elemental)  2 tablet Oral Q2H PRN Edwin Du DO   2 tablet at 09/02/22 0832   • dextrose (D50W) (25 g/50 mL) IV injection 25 g  25 g Intravenous Q15 Min PRN Deepak Pena MD       • dextrose (GLUTOSE) oral gel 15 g  15 g Oral Q15 Min PRN Deepak Pena MD       • furosemide (LASIX) injection 60 mg  60 mg Intravenous BID Carmina, Rhiannon E, APRN   60 mg at 09/02/22 0813   • glucagon (human recombinant) (GLUCAGEN DIAGNOSTIC) injection 1 mg  1 mg Intramuscular Q15 Min PRN Deepak Pena MD       • Insulin Lispro (humaLOG) injection 0-9 Units  0-9 Units Subcutaneous TID AC Deepak Pena MD   4 Units at 09/02/22 1134   • melatonin tablet 6 mg  6 mg Oral Nightly PRN Brian Cha DO   6 mg at 09/01/22 2343   • nitroglycerin (TRIDIL) 200 mcg/ml infusion  10-50 mcg/min Intravenous Titrated Deepak Pena MD   Stopped at 09/01/22 0815   • pantoprazole (PROTONIX) EC tablet 40 mg  40 mg Oral BID AC Deepak Pena MD   40 mg at 09/02/22 0813   • rivaroxaban (XARELTO) tablet 20 mg  20 mg Oral Daily Jean  Deepak Iverson MD   20 mg at 09/02/22 0813   • sennosides-docusate (PERICOLACE) 8.6-50 MG per tablet 2 tablet  2 tablet Oral BID PRN Edwin Du DO       • sodium chloride 0.9 % flush 10 mL  10 mL Intravenous PRN Deepak Pena MD   10 mL at 09/01/22 0827         Assessment & Plan     1.  Acute congestive heart failure with acute hypoxic respiratory failure: Remains decompensated but improving.  Presumably diastolic given low normal LVEF at 50% per echocardiogram performed last admission on 8/11.  He also has large bilateral pleural effusions greater on the right.  BNP is nearly 34,000.  -Continue IV diuresis-I increased the dose yesterday.    He has now received approximately 4 doses of Lasix 60 mg total this admission and according to documentation is net negative around 2 to 3 L.  The patient tells me he feels much better but is not back to baseline-he reports he is 60 to 70% better in terms of his breathing.  Renal function remains stable so we will continue Lasix 60 mg IV twice daily for now.  He has been weaned to 4 L nasal cannula.  Continue to wean oxygen as tolerated.  -Monitor intake, output, renal function, electrolytes and daily weights  -No plans for repeat 2D echocardiogram as he just had one within the past 4 weeks.     2.  Paroxysmal atrial fibrillation and flutter: Diagnosed during his recent hospitalization last month in the setting of nausea, vomiting, dehydration, and acute kidney injury.  He was placed on amiodarone by Dr. Mckeon during that hospitalization and remains on this.  -Continue amiodarone 200 mg twice daily for rhythm control.  Although the EKG on admission was poor quality, this appears to be sinus arrhythmia.  Telemetry review reveals he has been maintaining normal sinus rhythm.  -Continue anticoagulation for stroke prophylaxis-on Xarelto 20 mg daily with food.  [id number] is 3.  -Continue to monitor telemetry.     3.  Moderate aortic valve stenosis noted on recent  echocardiogram     4.  Diabetes mellitus type 2: A1c 8.4.  On glimepiride at home.     5.  Hypertension: It appears he previously took amlodipine and lisinopril/HCTZ following his recent hospitalization for volume depletion, hypotension and acute kidney injury those were discontinued.  At present blood pressures are normal without any antihypertensives     6.  Previous tobacco abuse: He states he quit smoking couple of months ago     7.  Elevated troponin: 0.036.  Without chest pain concerning for angina.  Likely secondary to myocardial injury in the setting of acute CHF-this has trended downward slightly compared to his troponins during his recent admission.  He does describe some heartburn over the past month-see his description above.  He states the only thing that seems to help with this is Tums.    Rhiannon Grewal, APRN  09/02/22  16:33 CDT

## 2022-09-02 NOTE — NURSING NOTE
Patient transferred to floor from ICU via wc, patient Alert and orientated, no confusion, no complaints of pain, transferred to chair with standby assist, orientated to room.  NC on with 02 at 4l, continuous pulse ox and telemetry on. Will continue to monitor.

## 2022-09-02 NOTE — CASE MANAGEMENT/SOCIAL WORK
Continued Stay Note  JONAS Perez     Patient Name: Chito Govea  MRN: [id number]  Today's Date: 9/2/2022    Admit Date: 9/1/2022     Discharge Plan     Row Name 09/02/22 1014       Plan    Plan Home    Plan Comments At this time patient plans to return home with his daughter.  [facility]               Discharge Codes    No documentation.                     FELICITY Chan

## 2022-09-03 ENCOUNTER — APPOINTMENT (OUTPATIENT)
Dept: GENERAL RADIOLOGY | Facility: HOSPITAL | Age: 67
End: 2022-09-03

## 2022-09-03 LAB
ANION GAP SERPL CALCULATED.3IONS-SCNC: 11 MMOL/L (ref 5–15)
ANION GAP SERPL CALCULATED.3IONS-SCNC: 6 MMOL/L (ref 5–15)
ARTERIAL PATENCY WRIST A: POSITIVE
ATMOSPHERIC PRESS: 750 MMHG
BASE EXCESS BLDA CALC-SCNC: 8.2 MMOL/L (ref 0–2)
BASOPHILS # BLD AUTO: 0.02 10*3/MM3 (ref 0–0.2)
BASOPHILS NFR BLD AUTO: 0.2 % (ref 0–1.5)
BDY SITE: ABNORMAL
BODY TEMPERATURE: 37 C
BUN SERPL-MCNC: 39 MG/DL (ref 8–23)
BUN SERPL-MCNC: 39 MG/DL (ref 8–23)
BUN/CREAT SERPL: 32.8 (ref 7–25)
BUN/CREAT SERPL: 34.5 (ref 7–25)
CALCIUM SPEC-SCNC: 8.1 MG/DL (ref 8.6–10.5)
CALCIUM SPEC-SCNC: 8.4 MG/DL (ref 8.6–10.5)
CHLORIDE SERPL-SCNC: 98 MMOL/L (ref 98–107)
CHLORIDE SERPL-SCNC: 99 MMOL/L (ref 98–107)
CO2 SERPL-SCNC: 32 MMOL/L (ref 22–29)
CO2 SERPL-SCNC: 32 MMOL/L (ref 22–29)
CREAT SERPL-MCNC: 1.13 MG/DL (ref 0.76–1.27)
CREAT SERPL-MCNC: 1.19 MG/DL (ref 0.76–1.27)
DEPRECATED RDW RBC AUTO: 52.5 FL (ref 37–54)
EGFRCR SERPLBLD CKD-EPI 2021: 67 ML/MIN/1.73
EGFRCR SERPLBLD CKD-EPI 2021: 71.2 ML/MIN/1.73
EOSINOPHIL # BLD AUTO: 0.02 10*3/MM3 (ref 0–0.4)
EOSINOPHIL NFR BLD AUTO: 0.2 % (ref 0.3–6.2)
ERYTHROCYTE [DISTWIDTH] IN BLOOD BY AUTOMATED COUNT: 15.5 % (ref 12.3–15.4)
GAS FLOW AIRWAY: 14 LPM
GLUCOSE BLDC GLUCOMTR-MCNC: 166 MG/DL (ref 70–130)
GLUCOSE BLDC GLUCOMTR-MCNC: 175 MG/DL (ref 70–130)
GLUCOSE BLDC GLUCOMTR-MCNC: 191 MG/DL (ref 70–130)
GLUCOSE BLDC GLUCOMTR-MCNC: 253 MG/DL (ref 70–130)
GLUCOSE SERPL-MCNC: 199 MG/DL (ref 65–99)
GLUCOSE SERPL-MCNC: 92 MG/DL (ref 65–99)
HCO3 BLDA-SCNC: 31.5 MMOL/L (ref 20–26)
HCT VFR BLD AUTO: 34.1 % (ref 37.5–51)
HGB BLD-MCNC: 10.5 G/DL (ref 13–17.7)
IMM GRANULOCYTES # BLD AUTO: 0.05 10*3/MM3 (ref 0–0.05)
IMM GRANULOCYTES NFR BLD AUTO: 0.6 % (ref 0–0.5)
LYMPHOCYTES # BLD AUTO: 0.5 10*3/MM3 (ref 0.7–3.1)
LYMPHOCYTES NFR BLD AUTO: 5.9 % (ref 19.6–45.3)
Lab: ABNORMAL
MCH RBC QN AUTO: 28.8 PG (ref 26.6–33)
MCHC RBC AUTO-ENTMCNC: 30.8 G/DL (ref 31.5–35.7)
MCV RBC AUTO: 93.4 FL (ref 79–97)
MODALITY: ABNORMAL
MONOCYTES # BLD AUTO: 0.62 10*3/MM3 (ref 0.1–0.9)
MONOCYTES NFR BLD AUTO: 7.3 % (ref 5–12)
NEUTROPHILS NFR BLD AUTO: 7.27 10*3/MM3 (ref 1.7–7)
NEUTROPHILS NFR BLD AUTO: 85.8 % (ref 42.7–76)
NRBC BLD AUTO-RTO: 0 /100 WBC (ref 0–0.2)
PCO2 BLDA: 38.4 MM HG (ref 35–45)
PCO2 TEMP ADJ BLD: 38.4 MM HG (ref 35–45)
PH BLDA: 7.52 PH UNITS (ref 7.35–7.45)
PH, TEMP CORRECTED: 7.52 PH UNITS (ref 7.35–7.45)
PLATELET # BLD AUTO: 344 10*3/MM3 (ref 140–450)
PMV BLD AUTO: 10.4 FL (ref 6–12)
PO2 BLDA: 89 MM HG (ref 83–108)
PO2 TEMP ADJ BLD: 89 MM HG (ref 83–108)
POTASSIUM SERPL-SCNC: 3.7 MMOL/L (ref 3.5–5.2)
POTASSIUM SERPL-SCNC: 3.8 MMOL/L (ref 3.5–5.2)
QT INTERVAL: 414 MS
QTC INTERVAL: 523 MS
RBC # BLD AUTO: 3.65 10*6/MM3 (ref 4.14–5.8)
SAO2 % BLDCOA: 98.4 % (ref 94–99)
SODIUM SERPL-SCNC: 137 MMOL/L (ref 136–145)
SODIUM SERPL-SCNC: 141 MMOL/L (ref 136–145)
VENTILATOR MODE: ABNORMAL
WBC NRBC COR # BLD: 8.48 10*3/MM3 (ref 3.4–10.8)

## 2022-09-03 PROCEDURE — 94799 UNLISTED PULMONARY SVC/PX: CPT

## 2022-09-03 PROCEDURE — 25010000002 AZITHROMYCIN PER 500 MG: Performed by: FAMILY MEDICINE

## 2022-09-03 PROCEDURE — 82962 GLUCOSE BLOOD TEST: CPT

## 2022-09-03 PROCEDURE — 94761 N-INVAS EAR/PLS OXIMETRY MLT: CPT

## 2022-09-03 PROCEDURE — 25010000002 FUROSEMIDE PER 20 MG: Performed by: NURSE PRACTITIONER

## 2022-09-03 PROCEDURE — 99232 SBSQ HOSP IP/OBS MODERATE 35: CPT | Performed by: NURSE PRACTITIONER

## 2022-09-03 PROCEDURE — 80048 BASIC METABOLIC PNL TOTAL CA: CPT | Performed by: NURSE PRACTITIONER

## 2022-09-03 PROCEDURE — 71045 X-RAY EXAM CHEST 1 VIEW: CPT

## 2022-09-03 PROCEDURE — 99222 1ST HOSP IP/OBS MODERATE 55: CPT | Performed by: INTERNAL MEDICINE

## 2022-09-03 PROCEDURE — 25010000002 AMIODARONE IN DEXTROSE 5% 360-4.14 MG/200ML-% SOLUTION: Performed by: INTERNAL MEDICINE

## 2022-09-03 PROCEDURE — 36600 WITHDRAWAL OF ARTERIAL BLOOD: CPT

## 2022-09-03 PROCEDURE — 25010000002 CEFTRIAXONE PER 250 MG: Performed by: FAMILY MEDICINE

## 2022-09-03 PROCEDURE — 94664 DEMO&/EVAL PT USE INHALER: CPT

## 2022-09-03 PROCEDURE — 85025 COMPLETE CBC W/AUTO DIFF WBC: CPT | Performed by: FAMILY MEDICINE

## 2022-09-03 PROCEDURE — 82803 BLOOD GASES ANY COMBINATION: CPT

## 2022-09-03 PROCEDURE — 94640 AIRWAY INHALATION TREATMENT: CPT

## 2022-09-03 PROCEDURE — 25010000002 METHYLPREDNISOLONE PER 40 MG: Performed by: INTERNAL MEDICINE

## 2022-09-03 PROCEDURE — 63710000001 INSULIN LISPRO (HUMAN) PER 5 UNITS: Performed by: FAMILY MEDICINE

## 2022-09-03 RX ORDER — METHYLPREDNISOLONE SODIUM SUCCINATE 40 MG/ML
40 INJECTION, POWDER, LYOPHILIZED, FOR SOLUTION INTRAMUSCULAR; INTRAVENOUS EVERY 8 HOURS
Status: DISCONTINUED | OUTPATIENT
Start: 2022-09-03 | End: 2022-09-04

## 2022-09-03 RX ORDER — BUDESONIDE AND FORMOTEROL FUMARATE DIHYDRATE 160; 4.5 UG/1; UG/1
2 AEROSOL RESPIRATORY (INHALATION)
Status: DISCONTINUED | OUTPATIENT
Start: 2022-09-03 | End: 2022-09-10 | Stop reason: HOSPADM

## 2022-09-03 RX ORDER — FUROSEMIDE 10 MG/ML
60 INJECTION INTRAMUSCULAR; INTRAVENOUS 3 TIMES DAILY
Status: DISCONTINUED | OUTPATIENT
Start: 2022-09-03 | End: 2022-09-04

## 2022-09-03 RX ORDER — BISACODYL 10 MG
10 SUPPOSITORY, RECTAL RECTAL DAILY
Status: DISCONTINUED | OUTPATIENT
Start: 2022-09-03 | End: 2022-09-10 | Stop reason: HOSPADM

## 2022-09-03 RX ADMIN — INSULIN LISPRO 6 UNITS: 100 INJECTION, SOLUTION INTRAVENOUS; SUBCUTANEOUS at 12:10

## 2022-09-03 RX ADMIN — RIVAROXABAN 20 MG: 20 TABLET, FILM COATED ORAL at 08:54

## 2022-09-03 RX ADMIN — METHYLPREDNISOLONE SODIUM SUCCINATE 40 MG: 40 INJECTION, POWDER, FOR SOLUTION INTRAMUSCULAR; INTRAVENOUS at 19:15

## 2022-09-03 RX ADMIN — ATORVASTATIN CALCIUM 20 MG: 10 TABLET, FILM COATED ORAL at 08:54

## 2022-09-03 RX ADMIN — AZITHROMYCIN DIHYDRATE 500 MG: 500 INJECTION, POWDER, LYOPHILIZED, FOR SOLUTION INTRAVENOUS at 12:11

## 2022-09-03 RX ADMIN — FUROSEMIDE 60 MG: 10 INJECTION, SOLUTION INTRAMUSCULAR; INTRAVENOUS at 16:50

## 2022-09-03 RX ADMIN — IPRATROPIUM BROMIDE 0.5 MG: 0.5 SOLUTION RESPIRATORY (INHALATION) at 20:20

## 2022-09-03 RX ADMIN — INSULIN LISPRO 2 UNITS: 100 INJECTION, SOLUTION INTRAVENOUS; SUBCUTANEOUS at 16:49

## 2022-09-03 RX ADMIN — AMIODARONE HYDROCHLORIDE 200 MG: 200 TABLET ORAL at 20:32

## 2022-09-03 RX ADMIN — FUROSEMIDE 60 MG: 10 INJECTION, SOLUTION INTRAMUSCULAR; INTRAVENOUS at 20:32

## 2022-09-03 RX ADMIN — CEFTRIAXONE 1 G: 1 INJECTION, POWDER, FOR SOLUTION INTRAMUSCULAR; INTRAVENOUS at 12:10

## 2022-09-03 RX ADMIN — BISACODYL 10 MG: 10 SUPPOSITORY RECTAL at 17:41

## 2022-09-03 RX ADMIN — BUDESONIDE AND FORMOTEROL FUMARATE DIHYDRATE 2 PUFF: 160; 4.5 AEROSOL RESPIRATORY (INHALATION) at 21:45

## 2022-09-03 RX ADMIN — PANTOPRAZOLE SODIUM 40 MG: 40 TABLET, DELAYED RELEASE ORAL at 08:55

## 2022-09-03 RX ADMIN — AMIODARONE HYDROCHLORIDE 200 MG: 200 TABLET ORAL at 08:54

## 2022-09-03 RX ADMIN — FUROSEMIDE 60 MG: 10 INJECTION, SOLUTION INTRAMUSCULAR; INTRAVENOUS at 08:55

## 2022-09-03 RX ADMIN — AMIODARONE HYDROCHLORIDE 1 MG/MIN: 1.8 INJECTION, SOLUTION INTRAVENOUS at 02:49

## 2022-09-03 RX ADMIN — INSULIN LISPRO 2 UNITS: 100 INJECTION, SOLUTION INTRAVENOUS; SUBCUTANEOUS at 08:55

## 2022-09-03 RX ADMIN — PANTOPRAZOLE SODIUM 40 MG: 40 TABLET, DELAYED RELEASE ORAL at 16:50

## 2022-09-03 NOTE — PLAN OF CARE
Goal Outcome Evaluation:  Plan of Care Reviewed With: patient        Progress: no change  Outcome Evaluation: Pt did not c/o pain this shift.  Pts heart rhythm converted from NSR to AFib to NSR three times during the night from 2319 to 2358, 0201 to 0220, and 0238 to 0331.  HR S84-91 and -132 with PVCs, couplets, three and four in a row per tele.  MD called, and Amio gtt started at 0249.  Pts O2 sats were also lower 82-84, during the times he was in AF.  RN called respirated and pt was put on a high-flow, humidified NC at 10L.  Once Amio gtt was running, pt converted back to NSR and O2 sats were above 85.  Pt was able to sleep after these episodes.  Will continue to monitor and notify MD of any changes.     Pt converted to AF at 0532.    Pt converted to NSR at 0654

## 2022-09-03 NOTE — NURSING NOTE
This AM the patient desat into the 60s. I had to bump patient from 4L NC to 15L NC. Discussed with Dr. Du. Asked if he wanted steroids or ABGs ordered. He did not want any ABGs or steroids ordered at this time. He was started on IV antibiotics and a chest xray was obtained. Cardiology saw patient and increased IV lasix to Q8H. Patient has not complained of any shortness of breath, but I have struggled to keep his sats above 90% even on 15L high flow NC.

## 2022-09-03 NOTE — PROGRESS NOTES
[facility]  INPATIENT PROGRESS NOTE    Length of Stay: 2  Date of Admission: 9/1/2022  Primary Care Physician: Chito Rubio MD    Subjective     Chief Complaint:     Shortness of breath    HPI     The patient is actually more dyspneic today.  He was placed on high flow per nasal cannula at 8 L and now has escalated to 15 L to maintain saturations in the mid 90s.  He has diuresed 4000 cc since admission.  Because of increased oxygen requirement and persistent cough with clear mucus production, IV antibiotics will be started empirically.  A repeat CBC will be obtained and a stat chest x-ray will be ordered.  Case was discussed with cardiology.  Glucose remains reasonably well controlled.    Review of Systems     All pertinent negatives and positives are as above. All other systems have been reviewed and are negative unless otherwise stated.     Objective    Temp:  [97.3 °F (36.3 °C)-99 °F (37.2 °C)] 97.3 °F (36.3 °C)  Heart Rate:  [] 120  Resp:  [18-21] 18  BP: (100-129)/(53-93) 103/65    Lab Results (last 24 hours)     Procedure Component Value Units Date/Time    POC Glucose Once [[id number]]  (Abnormal) Collected: 09/03/22 0749    Specimen: Blood Updated: 09/03/22 0801     Glucose 191 mg/dL      Comment: : [id number] Micah Schaefer ID: [id number]       Basic Metabolic Panel [[id number]]  (Abnormal) Collected: 09/03/22 0519    Specimen: Blood Updated: 09/03/22 0637     Glucose 199 mg/dL      BUN 39 mg/dL      Creatinine 1.13 mg/dL      Sodium 137 mmol/L      Potassium 3.8 mmol/L      Chloride 99 mmol/L      CO2 32.0 mmol/L      Calcium 8.1 mg/dL      BUN/Creatinine Ratio 34.5     Anion Gap 6.0 mmol/L      eGFR 71.2 mL/min/1.73      Comment: National Kidney Foundation and American Society of Nephrology (ASN) Task Force recommended calculation based on the Chronic Kidney Disease Epidemiology Collaboration (CKD-EPI) equation refit without adjustment  for race.       Narrative:      GFR Normal >60  Chronic Kidney Disease <60  Kidney Failure <15      Blood Culture - Blood, Arm, Left [[id number]]  (Normal) Collected: 09/01/22 0056    Specimen: Blood from Arm, Left Updated: 09/03/22 0115     Blood Culture No growth at 2 days    Blood Culture - Blood, Arm, Right [[id number]]  (Normal) Collected: 09/01/22 0056    Specimen: Blood from Arm, Right Updated: 09/03/22 0115     Blood Culture No growth at 2 days    POC Glucose Once [[id number]]  (Abnormal) Collected: 09/02/22 1711    Specimen: Blood Updated: 09/02/22 1722     Glucose 308 mg/dL      Comment: : [employer] [place] ShaunaMeter ID: [id number]       POC Glucose Once [[id number]]  (Abnormal) Collected: 09/02/22 1128    Specimen: Blood Updated: 09/02/22 1140     Glucose 219 mg/dL      Comment: : ROQUE Kent TylerMeter ID: [id number]             Imaging Results (Last 24 Hours)     ** No results found for the last 24 hours. **             Intake/Output Summary (Last 24 hours) at 9/3/2022 1111  Last data filed at 9/3/2022 1100  Gross per 24 hour   Intake 960 ml   Output 2425 ml   Net -1465 ml       Physical Exam  Constitutional:       General: He is not in acute distress.     Appearance: Normal appearance.      Interventions: Nasal cannula in place.   HENT:      Head: Normocephalic and atraumatic.      Right Ear: External ear normal.      Left Ear: External ear normal.      Mouth: Mucous membranes are moist.      Pharynx: Oropharynx is clear.   Eyes:      General: No scleral icterus.     Conjunctiva/sclera: Conjunctivae normal.   Cardiovascular:      Rate and Rhythm: Normal rate and regular rhythm.      Pulses: Normal pulses.      Heart sounds: Normal heart sounds. No murmur heard.  Pulmonary:      Effort: Pulmonary effort is normal. No respiratory distress.      Breath sounds: A few basilar rales noted.  Abdominal:      General: Abdomen is flat. Bowel sounds are normal.      Palpations: Abdomen is soft.  There is no mass.      Tenderness: There is no abdominal tenderness.   Musculoskeletal:         General: Normal range of motion.      Right lower leg: Edema present.      Left lower leg: Edema present.   Skin:     General: Skin is warm and dry.      Coloration: Skin is not pale.   Neurological:      General: No focal deficit present.      Mental Status: He is alert and oriented to person, place, and time. Mental status is at baseline.   Psychiatric:         Mood and Affect: Mood normal.         Judgment: Judgment normal.     Results Review:  I have reviewed the labs, radiology results, and diagnostic studies since my last progress note and made treatment changes reflective of the results.   I have reviewed the current medications.    Assessment/Plan     Active Hospital Problems    Diagnosis    • **Acute on chronic congestive heart failure, unspecified heart failure type (HCC)    • Aortic stenosis, moderate    • Moderate malnutrition (HCC)    • Paroxysmal atrial fibrillation (HCC)    • Hypertension    • Diabetes mellitus (HCC)        PLAN:  Chest x-ray  CBC  Rocephin 1 g IV every 24 hours  Azithromycin 500milligrams IV every 24 hours  Continue oxygen supplementation as needed    Electronically signed by Edwin Du DO, 09/03/22, 11:11 CDT.

## 2022-09-03 NOTE — PROGRESS NOTES
[facility] -  Progress Note     LOS: 2 days   Patient Care Team:  Chito Rubio MD as PCP - General (Family Medicine)    Chief Complaint:CHF Follow up     Subjective     Interval History: Intermittent episodes of atrial fibrillation overnight.  He has been on oral amiodarone however IV amiodarone was ordered after atrial fibrillation recurrence last night by the primary service.  Later this was discontinued.  Despite stabilization of his arrhythmia he also had difficulty oxygenating requiring increase amounts of oxygen.  He is now on high flow oxygen at 15 L.  Currently stable without complaints of shortness of breath.  He reports a productive cough with clear sputum.  He denies fever or chills.  He denies any chest pain or chest pressure.  He describes orthopnea.  He has obvious lower extremity swelling.  He denies any abdominal pain or discomfort and reports good urinary output.  He had Barbosa catheter yesterday which was discontinued but has had stable output since that time.  Bladder scan this morning without residual.    He remains on IV diuretics.  He has had intermittent atrial fibrillation overnight as previously stated.  It appears the longest recurrence was around an hour. currently in sinus rhythm.      Review of Systems:     Review of Systems   Constitutional: Positive for activity change. Negative for chills and fever.   Eyes: Negative for visual disturbance.   Respiratory: Positive for cough. Negative for apnea, chest tightness and wheezing.         +orthopnea   Cardiovascular: Positive for leg swelling. Negative for chest pain and palpitations.   Gastrointestinal: Negative for abdominal distention and abdominal pain.   Genitourinary: Negative for difficulty urinating and dysuria.   Neurological: Negative for dizziness and light-headedness.   Psychiatric/Behavioral: Negative for agitation.     Objective     Vital Sign Min/Max for last 24 hours  Temp  Min: 97.3 °F (36.3 °C)   Max: 99 °F (37.2 °C)   BP  Min: 100/54  Max: 129/56   Pulse  Min: 71  Max: 129   Resp  Min: 18  Max: 20   SpO2  Min: 89 %  Max: 95 %   No data recorded   Weight  Min: 68.2 kg (150 lb 6.4 oz)  Max: 68.2 kg (150 lb 6.4 oz)         09/02/22  1455   Weight: 68.2 kg (150 lb 6.4 oz)       Physical Exam:    Vitals reviewed.   Constitutional:       General: Awake.      Appearance: Normal appearance. Well-developed and well-groomed. Ill-appearing and acutely ill-appearing.      Interventions: Nasal cannula in place.   HENT:      Head: Normocephalic and atraumatic.   Pulmonary:      Effort: Increased respiratory effort.      Breath sounds: Examination of the right-lower field reveals rales. Examination of the left-lower field reveals rales. Bibasilar Rales present.   Cardiovascular:      Normal rate. Regular rhythm.      Murmurs: There is a systolic murmur.   Edema:     Peripheral edema present.     Pretibial: bilateral 2+ edema of the pretibial area.     Ankle: bilateral 2+ edema of the ankle.     Feet: bilateral edema of the feet.  Abdominal:      Palpations: Abdomen is soft.      Tenderness: There is no abdominal tenderness.   Musculoskeletal:      Cervical back: Normal range of motion and neck supple. Skin:     General: Skin is dry.      Findings: Abrasion present.      Comments: Lower extremity abrasion LLE   Neurological:      Mental Status: Alert, oriented to person, place, and time and oriented to person, place and time.   Psychiatric:         Attention and Perception: Attention normal.         Mood and Affect: Mood normal.         Speech: Speech normal.         Behavior: Behavior normal. Behavior is cooperative.         Thought Content: Thought content normal.         Cognition and Memory: Cognition and memory normal.       Results Review:   Lab Results (last 72 hours)     Procedure Component Value Units Date/Time    CBC & Differential [[id number]]  (Abnormal) Collected: 09/03/22 1111    Specimen: Blood Updated:  09/03/22 1141    Narrative:      The following orders were created for panel order CBC & Differential.  Procedure                               Abnormality         Status                     ---------                               -----------         ------                     CBC Auto Differential[[id number]]        Abnormal            Final result                 Please view results for these tests on the individual orders.    CBC Auto Differential [[id number]]  (Abnormal) Collected: 09/03/22 1111    Specimen: Blood Updated: 09/03/22 1141     WBC 8.48 10*3/mm3      RBC 3.65 10*6/mm3      Hemoglobin 10.5 g/dL      Hematocrit 34.1 %      MCV 93.4 fL      MCH 28.8 pg      MCHC 30.8 g/dL      RDW 15.5 %      RDW-SD 52.5 fl      MPV 10.4 fL      Platelets 344 10*3/mm3      Neutrophil % 85.8 %      Lymphocyte % 5.9 %      Monocyte % 7.3 %      Eosinophil % 0.2 %      Basophil % 0.2 %      Immature Grans % 0.6 %      Neutrophils, Absolute 7.27 10*3/mm3      Lymphocytes, Absolute 0.50 10*3/mm3      Monocytes, Absolute 0.62 10*3/mm3      Eosinophils, Absolute 0.02 10*3/mm3      Basophils, Absolute 0.02 10*3/mm3      Immature Grans, Absolute 0.05 10*3/mm3      nRBC 0.0 /100 WBC     POC Glucose Once [[id number]]  (Abnormal) Collected: 09/03/22 1119    Specimen: Blood Updated: 09/03/22 1130     Glucose 253 mg/dL      Comment: : [id number] SensumMeter ID: [id number]       POC Glucose Once [[id number]]  (Abnormal) Collected: 09/03/22 0749    Specimen: Blood Updated: 09/03/22 0801     Glucose 191 mg/dL      Comment: : [id number] Micah ActionFlowiaMeter ID: [id number]       Basic Metabolic Panel [[id number]]  (Abnormal) Collected: 09/03/22 0519    Specimen: Blood Updated: 09/03/22 0637     Glucose 199 mg/dL      BUN 39 mg/dL      Creatinine 1.13 mg/dL      Sodium 137 mmol/L      Potassium 3.8 mmol/L      Chloride 99 mmol/L      CO2 32.0 mmol/L      Calcium 8.1 mg/dL      BUN/Creatinine Ratio 34.5     Anion Gap 6.0 mmol/L      eGFR  71.2 mL/min/1.73      Comment: National Kidney Foundation and American Society of Nephrology (ASN) Task Force recommended calculation based on the Chronic Kidney Disease Epidemiology Collaboration (CKD-EPI) equation refit without adjustment for race.       Narrative:      GFR Normal >60  Chronic Kidney Disease <60  Kidney Failure <15      Blood Culture - Blood, Arm, Left [[id number]]  (Normal) Collected: 09/01/22 0056    Specimen: Blood from Arm, Left Updated: 09/03/22 0115     Blood Culture No growth at 2 days    Blood Culture - Blood, Arm, Right [[id number]]  (Normal) Collected: 09/01/22 0056    Specimen: Blood from Arm, Right Updated: 09/03/22 0115     Blood Culture No growth at 2 days    POC Glucose Once [[id number]]  (Abnormal) Collected: 09/02/22 1711    Specimen: Blood Updated: 09/02/22 1722     Glucose 308 mg/dL      Comment: : [employer]ual[employer]eds [place] ShaunaMeter ID: [id number]       POC Glucose Once [[id number]]  (Abnormal) Collected: 09/02/22 1128    Specimen: Blood Updated: 09/02/22 1140     Glucose 219 mg/dL      Comment: : [employer] TylerMeter ID: [id number]       POC Glucose Once [[id number]]  (Abnormal) Collected: 09/02/22 0757    Specimen: Blood Updated: 09/02/22 0808     Glucose 295 mg/dL      Comment: : [employer] [id number] Kent TylerMeter ID: [id number]       Basic Metabolic Panel [[id number]]  (Abnormal) Collected: 09/02/22 0356    Specimen: Blood Updated: 09/02/22 0426     Glucose 183 mg/dL      BUN 29 mg/dL      Creatinine 1.19 mg/dL      Sodium 140 mmol/L      Potassium 4.1 mmol/L      Chloride 101 mmol/L      CO2 28.0 mmol/L      Calcium 7.6 mg/dL      BUN/Creatinine Ratio 24.4     Anion Gap 11.0 mmol/L      eGFR 67.0 mL/min/1.73      Comment: National Kidney Foundation and American Society of Nephrology (ASN) Task Force recommended calculation based on the Chronic Kidney Disease Epidemiology Collaboration (CKD-EPI) equation refit without adjustment for race.       Narrative:       GFR Normal >60  Chronic Kidney Disease <60  Kidney Failure <15      Potassium [[id number]]  (Normal) Collected: 09/01/22 1938    Specimen: Blood Updated: 09/01/22 2025     Potassium 4.5 mmol/L     POC Glucose Once [[id number]]  (Abnormal) Collected: 09/01/22 1719    Specimen: Blood Updated: 09/01/22 1730     Glucose 139 mg/dL      Comment: : RHYSJayden MartinerMeter ID: [id number]       POC Glucose Once [[id number]]  (Abnormal) Collected: 09/01/22 1156    Specimen: Blood Updated: 09/01/22 1213     Glucose 149 mg/dL      Comment: : [id number] Saravanan Washingtons  JMeter ID: [id number]       POC Glucose Once [[id number]]  (Abnormal) Collected: 09/01/22 0758    Specimen: Blood Updated: 09/01/22 0810     Glucose 255 mg/dL      Comment: : [id number] Saravanan Washingtons  JMeter ID: [id number]       Blood Gas, Arterial - [[id number]]  (Abnormal) Collected: 09/01/22 0510    Specimen: Arterial Blood Updated: 09/01/22 0507     Site Right Radial     Steve's Test Positive     pH, Arterial 7.494 pH units      Comment: 83 Value above reference range        pCO2, Arterial 35.4 mm Hg      pO2, Arterial 65.8 mm Hg      Comment: 84 Value below reference range        HCO3, Arterial 27.2 mmol/L      Comment: 83 Value above reference range        Base Excess, Arterial 3.9 mmol/L      Comment: 83 Value above reference range        O2 Saturation, Arterial 94.3 %      Temperature 37.0 C      Barometric Pressure for Blood Gas 751 mmHg      Modality BiPap     FIO2 40 %      Ventilator Mode NA     Set [facility] Resp Rate 12.0     IPAP 12     Comment: Meter: [id number]     :  [id number]        EPAP 6     Collected by [id number]     pCO2, Temperature Corrected 35.4 mm Hg      pH, Temp Corrected 7.494 pH Units      pO2, Temperature Corrected 65.8 mm Hg     STAT Lactic Acid, Reflex [[id number]]  (Normal) Collected: 09/01/22 0416    Specimen: Blood Updated: 09/01/22 0450     Lactate 1.6 mmol/L     Hemoglobin A1c [[id number]]  (Abnormal)  Collected: 09/01/22 0048    Specimen: Blood Updated: 09/01/22 0418     Hemoglobin A1C 8.40 %     Narrative:      Hemoglobin A1C Ranges:    Increased Risk for Diabetes  5.7% to 6.4%  Diabetes                     >= 6.5%  Diabetic Goal                < 7.0%    COVID-19,[facility] IN-HOUSE,Nasal Swab No Transport Media 3-4 HR TAT - Swab, Nasal Cavity [[id number]]  (Normal) Collected: 09/01/22 0100    Specimen: Swab from Nasal Cavity Updated: 09/01/22 0143     COVID19 Not Detected    Narrative:      Fact sheet for providers: https://www.fda.gov/media/791930/download     Fact sheet for patients: https://www.fda.gov/media/161695/download    Test performed by PCR.    Consider negative results in combination with clinical observations, patient history, and epidemiological information.    Troponin [[id number]]  (Abnormal) Collected: 09/01/22 0048    Specimen: Blood Updated: 09/01/22 0118     Troponin T 0.036 ng/mL     Narrative:      Troponin T Reference Range:  <= 0.03 ng/mL-   Negative for AMI  >0.03 ng/mL-     Abnormal for myocardial necrosis.  Clinicians would have to utilize clinical acumen, EKG, Troponin and serial changes to determine if it is an Acute Myocardial Infarction or myocardial injury due to an underlying chronic condition.       Results may be falsely decreased if patient taking Biotin.      Lactic Acid, Plasma [[id number]]  (Abnormal) Collected: 09/01/22 0048    Specimen: Blood Updated: 09/01/22 0118     Lactate 3.2 mmol/L     Comprehensive Metabolic Panel [[id number]]  (Abnormal) Collected: 09/01/22 0048    Specimen: Blood Updated: 09/01/22 0115     Glucose 266 mg/dL      BUN 23 mg/dL      Creatinine 1.15 mg/dL      Sodium 141 mmol/L      Potassium 4.6 mmol/L      Chloride 103 mmol/L      CO2 24.0 mmol/L      Calcium 8.4 mg/dL      Total Protein 6.4 g/dL      Albumin 3.20 g/dL      ALT (SGPT) 16 U/L      AST (SGOT) 15 U/L      Alkaline Phosphatase 115 U/L      Total Bilirubin 0.5 mg/dL      Globulin 3.2 gm/dL       A/G Ratio 1.0 g/dL      BUN/Creatinine Ratio 20.0     Anion Gap 14.0 mmol/L      eGFR 69.8 mL/min/1.73      Comment: National Kidney Foundation and American Society of Nephrology (ASN) Task Force recommended calculation based on the Chronic Kidney Disease Epidemiology Collaboration (CKD-EPI) equation refit without adjustment for race.       Narrative:      GFR Normal >60  Chronic Kidney Disease <60  Kidney Failure <15      BNP [[id number]]  (Abnormal) Collected: 09/01/22 0048    Specimen: Blood Updated: 09/01/22 0112     proBNP 33,372.0 pg/mL     Narrative:      Among patients with dyspnea, NT-proBNP is highly sensitive for the detection of acute congestive heart failure. In addition NT-proBNP of <300 pg/ml effectively rules out acute congestive heart failure with 99% negative predictive value.    Results may be falsely decreased if patient taking Biotin.      Protime-INR [[id number]]  (Abnormal) Collected: 09/01/22 0048    Specimen: Blood Updated: 09/01/22 0108     Protime 21.6 Seconds      INR 1.96    aPTT [[id number]]  (Abnormal) Collected: 09/01/22 0048    Specimen: Blood Updated: 09/01/22 0108     PTT 39.8 seconds     D-dimer, Quantitative [[id number]]  (Abnormal) Collected: 09/01/22 0048    Specimen: Blood Updated: 09/01/22 0108     D-Dimer, Quantitative 0.88 MCGFEU/mL     Narrative:      Reference Range is 0-0.50 MCGFEU/mL. However, results <0.50 MCGFEU/mL tends to rule out DVT or PE. Results >0.50 MCGFEU/mL are not useful in predicting absence or presence of DVT or PE.      Blood Gas, Arterial - [[id number]]  (Abnormal) Collected: 09/01/22 0104    Specimen: Arterial Blood Updated: 09/01/22 0102     Site Right Radial     Steve's Test Positive     pH, Arterial 7.473 pH units      Comment: 83 Value above reference range        pCO2, Arterial 32.5 mm Hg      Comment: 84 Value below reference range        pO2, Arterial 55.6 mm Hg      Comment: 84 Value below reference range        HCO3, Arterial 23.8 mmol/L       Base Excess, Arterial 0.6 mmol/L      O2 Saturation, Arterial 90.1 %      Comment: 84 Value below reference range        Temperature 37.0 C      Barometric Pressure for Blood Gas 752 mmHg      Modality Nasal Cannula     Flow Rate 7.0 lpm      Ventilator Mode NA     Collected by [id number]     Comment: Meter: [id number]     :  [id number]        pCO2, Temperature Corrected 32.5 mm Hg      pH, Temp Corrected 7.473 pH Units      pO2, Temperature Corrected 55.6 mm Hg     CBC & Differential [[id number]]  (Abnormal) Collected: 09/01/22 0048    Specimen: Blood Updated: 09/01/22 0056    Narrative:      The following orders were created for panel order CBC & Differential.  Procedure                               Abnormality         Status                     ---------                               -----------         ------                     CBC Auto Differential[[id number]]        Abnormal            Final result                 Please view results for these tests on the individual orders.    CBC Auto Differential [[id number]]  (Abnormal) Collected: 09/01/22 0048    Specimen: Blood Updated: 09/01/22 0056     WBC 8.48 10*3/mm3      RBC 3.83 10*6/mm3      Hemoglobin 11.1 g/dL      Hematocrit 36.4 %      MCV 95.0 fL      MCH 29.0 pg      MCHC 30.5 g/dL      RDW 15.6 %      RDW-SD 53.0 fl      MPV 10.0 fL      Platelets 422 10*3/mm3      Neutrophil % 80.4 %      Lymphocyte % 10.8 %      Monocyte % 7.4 %      Eosinophil % 0.5 %      Basophil % 0.4 %      Immature Grans % 0.5 %      Neutrophils, Absolute 6.82 10*3/mm3      Lymphocytes, Absolute 0.92 10*3/mm3      Monocytes, Absolute 0.63 10*3/mm3      Eosinophils, Absolute 0.04 10*3/mm3      Basophils, Absolute 0.03 10*3/mm3      Immature Grans, Absolute 0.04 10*3/mm3      nRBC 0.0 /100 WBC               Echo EF Estimated  Lab Results   Component Value Date    ECHOEFEST 50 08/11/2022     From previous admission - 8/11/2022  Results for orders placed during the hospital  encounter of 08/09/22    Adult Transthoracic Echo Complete W/ Cont if Necessary Per Protocol    Interpretation Summary  · Mild pulmonary hypertension is present.  · Estimated right ventricular systolic pressure from tricuspid regurgitation is mildly elevated (35-45 mmHg).  · There is a small (<1cm) pericardial effusion.  · Moderate aortic valve stenosis is present.  · Left ventricular wall thickness is consistent with mild concentric hypertrophy.  · Estimated left ventricular EF = 50% Left ventricular systolic function is normal.  · Left ventricular diastolic dysfunction is noted.      Medication Review: yes  Current Facility-Administered Medications   Medication Dose Route Frequency Provider Last Rate Last Admin   • amiodarone (PACERONE) tablet 200 mg  200 mg Oral Q12H Deepak Pena MD   200 mg at 09/03/22 0854   • atorvastatin (LIPITOR) tablet 20 mg  20 mg Oral Daily Deepak Pena MD   20 mg at 09/03/22 0854   • azithromycin (ZITHROMAX) 500 mg in sodium chloride 0.9 % 250 mL IVPB-VTB  500 mg Intravenous Q24H Edwin Du DO       • calcium carbonate (TUMS) chewable tablet 500 mg (200 mg elemental)  2 tablet Oral Q2H PRN Edwin Du DO   2 tablet at 09/02/22 1712   • cefTRIAXone (ROCEPHIN) 1 g in sodium chloride 0.9 % 100 mL IVPB-VTB  1 g Intravenous Q24H Edwin Du DO       • dextrose (D50W) (25 g/50 mL) IV injection 25 g  25 g Intravenous Q15 Min PRN Deepak Pena MD       • dextrose (GLUTOSE) oral gel 15 g  15 g Oral Q15 Min PRN Deepak Pena MD       • furosemide (LASIX) injection 60 mg  60 mg Intravenous TID Keely Carey, APRN       • glucagon (human recombinant) (GLUCAGEN DIAGNOSTIC) injection 1 mg  1 mg Intramuscular Q15 Min PRN Deepak Pena MD       • Insulin Lispro (humaLOG) injection 0-9 Units  0-9 Units Subcutaneous TID AC Deepak Pena MD   2 Units at 09/03/22 0855   • melatonin tablet 6 mg  6 mg Oral Nightly PRN  Brian Cha, DO   6 mg at 09/01/22 2343   • nitroglycerin (TRIDIL) 200 mcg/ml infusion  10-50 mcg/min Intravenous Titrated Deepak Pena MD   Stopped at 09/01/22 0815   • pantoprazole (PROTONIX) EC tablet 40 mg  40 mg Oral BID AC Deepak Pena MD   40 mg at 09/03/22 0855   • rivaroxaban (XARELTO) tablet 20 mg  20 mg Oral Daily Deepak Pena MD   20 mg at 09/03/22 0854   • sennosides-docusate (PERICOLACE) 8.6-50 MG per tablet 2 tablet  2 tablet Oral BID PRN Edwin Du DO       • sodium chloride 0.9 % flush 10 mL  10 mL Intravenous PRN Deepak Pena MD   10 mL at 09/01/22 0827         Assessment & Plan     1. Acute Congestive Heart Failure: Decompensated with worsening oxygenation overnight.  He has been had good output with net negative balance on IV diuretics.  He was receiving IV Lasix 60 mg twice daily yesterday with stable renal function.  Given his need for increased oxygenation we will increase his diuretic therapy to every 8 hours and increase the monitoring of his renal function and electrolytes.  Continue documentation of intake and output.  Continue with daily weights.  Previous echocardiogram noted normal left ventricular ejection fraction estimated to be 50%.  He also has valvular heart disease noted on his last echo.  Continues to struggle with volume overload.  He has intermittent episodes of atrial fibrillation but is not persistently in atrial fibrillation or with persistent tachycardia which would further contribute to his decompensation.  We will repeat echocardiogram to further evaluate his left ventricular ejection fraction and valvular function.  He has a known aortic stenosis.  He has a systolic murmur noted at the right upper sternal border as well as a murmur on exam noted at the apex.    2.  Acute hypoxic respiratory failure: Currently requiring supplemental oxygen at high flow.  Initially, at the time of presentation he was requiring BiPAP  and then improved with use of nasal cannula down to 4 L yesterday.  Overnight and this morning he was having worsening issues oxygenating requiring increased levels of oxygen currently on high flow at 15 L.    3.  Valvular heart disease: Previously noted to have moderate aortic valve stenosis on echocardiogram from August.  Systolic murmur noted at the right upper sternal border however he also has a murmur at the apex.  Repeat echo has been ordered this admission.    4.  Paroxysmal atrial fibrillation and flutter: This was diagnosed during his recent hospitalization in August.  During that hospitalization he was placed on amiodarone which was continued at his discharge and was continued on admission.  Overnight he was given IV amiodarone once again due to recurrent atrial fibrillation noted on the monitor.  He is currently in sinus rhythm.  He is also anticoagulated for stroke risk reduction with Xarelto 20 mg daily.  Chads vascular score is 4.    5.  Hypertension: Currently stable    6.  Elevated troponin: Elevated this admission without concerns for acute coronary syndrome.  Troponin elevation is felt to be secondary to myocardial injury in the setting of acute congestive heart failure.    7.  Diabetes mellitus, type II: Hemoglobin A1c 8.4%.  The patient is managed on glimepiride at home.       -Increase BMP to twice daily  -Replace electrolytes as needed  -increase diuretics to Lasix 60 mg from twice daily to 3 times daily  -Daily weights  -Documentation of intake and output  -Low-sodium diet, 2 L fluid restriction  -Repeat 2D echocardiogram  -Continue oral amiodarone  - Antibiotics have been added by the primary service given his need for increased oxygenation          Electronically signed by MARKELL Salas, 09/03/22, 12:02 PM TIFFANYT.

## 2022-09-03 NOTE — CONSULTS
PULMONARY & CRITICAL CARE CONSULT - [facility]    22, 17:26 CDT  Patient Care Team:  Chito Rubio MD as PCP - General (Family Medicine)  Name: Chito Govea  : 1955  MRN: [id number]  Contact Serial Number [id number]    Chief complaint: Shortness of breath, acute hypoxic respiratory failure, bilateral pulmonary infiltrate and pleural effusion, history of tobacco abuse, congestive diastolic heart failure, former smoker    HPI:  We have been consulted by Edwin Du DO to see this 66 y.o. male born on 1955.      Patient is a pleasant gentleman who is a retired  and currently lives with his daughter and grandchildren.  He has a long history of medical problems with hypertension, hyperlipidemia, diabetes mellitus type 2, coronary artery disease and congestive diastolic heart failure.  He is a former smoker heavy smoker and smoked 2 to 3 packs/day for almost 50 years but did not have a diagnosis of chronic obstructive pulmonary disease.  He is not on any inhalers or oxygen at home.  He reported has chronic kidney disease.      He had 2 hospital admissions recently.  Last month he was admitted to the hospital with acute kidney injury and dehydration with severe metabolic acidosis and hyperkalemia.  He was also noted to have sepsis with possible diabetic ketoacidosis and treated with antibiotics, and bicarbonate drip.  His renal function was later stabilized and he was discharged home.  He also had atrial fibrillation with rapid ventricular response which was well controlled with amiodarone.  The patient had echocardiogram done which showed he had left ventricular ejection fraction 50% and right ventricular pressure was elevated suggesting mild pulmonary hypertension.  He was started on Eliquis for long-term anticoagulation.  He follows up with Dr. Devine for his cardiac issues and had never seen a pulmonary physician.    This time he is readmitted 2 days  ago with increasing shortness of breath and was noted to have orthopnea and bilateral leg edema.  He was noted to have weight gain and he was started on furosemide.  Today at 1 point he was requiring now 15 L high flow oxygen and was still having some shortness of breath.  The chest CT showed he has bilateral dense pulmonary infiltrate with bilateral pleural effusion larger on the right side.  He is empirically getting treated with ceftriaxone and azithromycin but he is not on any respiratory medications or inhalers.  He is also on amiodarone.  He is on Xarelto for chronic anticoagulation pulmonary consult was requested for increasing oxygen needs and abnormal imaging studies.    Patient is not vaccinated for COVID but is tested negative for COVID.  He did not have any fever but is white cell count is normal and renal function is stable.  The patient is a smoker and quit smoking 2 months ago.  He also used marijuana but did not have any history of recent drug abuse.  He does not drink alcohol.  He was on 1 to 2 L oxygen yesterday but reports 8 to 10 L oxygen this morning and went up to 15 L but back to 8 L oxygen now.  His CT scan of the chest done on day of admission showed significant worsening from the prior CT chest done last month with bilateral pulm infiltrate pleural effusion and emphysematous changes in the lung.  No pulmonary embolism was noted.  Patient is currently getting furosemide 40 mg 3 times a day with some diuresis but his shortness of breath has not changed much and his oxygen requirement remains high.    Past Medical History:   has a past medical history of Arthritis, CHF (congestive heart failure) ([facility]), Diabetes mellitus ([facility]), HLD (hyperlipidemia), Hypertension, and Renal disorder.   has no past surgical history on file.  No Known Allergies  Medications:  amiodarone, 200 mg, Oral, Q12H  atorvastatin, 20 mg, Oral, Daily  azithromycin, 500 mg, Intravenous, Q24H  cefTRIAXone, 1 g, Intravenous,  Q24H  furosemide, 60 mg, Intravenous, TID  insulin lispro, 0-9 Units, Subcutaneous, TID AC  pantoprazole, 40 mg, Oral, BID AC  rivaroxaban, 20 mg, Oral, Daily      nitroglycerin, 10-50 mcg/min, Last Rate: Stopped (09/01/22 0815)      Family History:  Family History   Problem Relation Age of Onset   • Cancer Mother    • Heart disease Father    • Diabetes Sister    • Cancer Sister    • COPD Brother      Social History:   reports that he quit smoking about 2 months ago. His smoking use included cigarettes. He has a 75.00 pack-year smoking history. He has never used smokeless tobacco. He reports current drug use. Drug: Marijuana. He reports that he does not drink alcohol.  Review of Systems:  Review of Systems   Constitutional: Positive for fatigue and unexpected weight change.   HENT: Positive for congestion and postnasal drip.    Eyes: Negative.    Respiratory: Positive for cough and shortness of breath.    Cardiovascular: Positive for leg swelling. Negative for chest pain.   Gastrointestinal: Positive for abdominal distention. Negative for abdominal pain.   Endocrine: Negative.    Genitourinary: Negative.    Musculoskeletal: Positive for arthralgias and back pain.   Skin: Negative.    Allergic/Immunologic: Positive for environmental allergies.   Neurological: Negative.    Hematological: Negative.    Psychiatric/Behavioral: Negative.       Physical Exam:  Temp:  [97.3 °F (36.3 °C)-99 °F (37.2 °C)] 98 °F (36.7 °C)  Heart Rate:  [] 74  Resp:  [18] 18  BP: (103-129)/(50-93) 109/50    Intake/Output Summary (Last 24 hours) at 9/3/2022 1726  Last data filed at 9/3/2022 1451  Gross per 24 hour   Intake 840 ml   Output 2225 ml   Net -1385 ml         09/01/22  0350 09/02/22  0400 09/02/22  1455   Weight: 72.1 kg (158 lb 15.6 oz) 72.3 kg (159 lb 6.4 oz) 68.2 kg (150 lb 6.4 oz)     SpO2 Percentage    09/03/22 0742 09/03/22 1114 09/03/22 1451   SpO2: 95% 93% 93%     Body mass index is 21.83 kg/m².   Physical Exam  Vitals  and nursing note reviewed.   Constitutional:       General: He is not in acute distress.     Appearance: He is ill-appearing.      Comments: Middle-aged  gentleman looks tired and exhausted.   HENT:      Head: Normocephalic and atraumatic.      Right Ear: Tympanic membrane normal. There is no impacted cerumen.      Left Ear: Tympanic membrane normal. There is no impacted cerumen.      Nose: Congestion present. No rhinorrhea.      Mouth/Throat:      Mouth: Mucous membranes are moist.      Pharynx: Oropharynx is clear. No oropharyngeal exudate or posterior oropharyngeal erythema.   Eyes:      General: No scleral icterus.        Right eye: No discharge.         Left eye: No discharge.      Conjunctiva/sclera: Conjunctivae normal.      Pupils: Pupils are equal, round, and reactive to light.   Cardiovascular:      Rate and Rhythm: Normal rate and regular rhythm.      Pulses: Normal pulses.      Heart sounds: Normal heart sounds. No murmur heard.    No friction rub. No gallop.   Pulmonary:      Effort: Respiratory distress present.      Breath sounds: Wheezing and rales present.      Comments: Decreased breath sound bilaterally  Abdominal:      General: Abdomen is flat. Bowel sounds are normal. There is no distension.      Palpations: There is no mass.      Tenderness: There is no abdominal tenderness.   Genitourinary:     Comments: Not examined  Musculoskeletal:         General: No swelling, tenderness or deformity.      Cervical back: Normal range of motion and neck supple. No rigidity or tenderness.      Right lower leg: Edema present.      Left lower leg: Edema present.   Lymphadenopathy:      Cervical: No cervical adenopathy.   Skin:     General: Skin is warm.      Capillary Refill: Capillary refill takes less than 2 seconds.      Coloration: Skin is not jaundiced or pale.      Findings: No bruising.   Neurological:      General: No focal deficit present.      Mental Status: He is alert and oriented to  person, place, and time.      Cranial Nerves: No cranial nerve deficit.      Sensory: No sensory deficit.      Motor: Weakness present.      Deep Tendon Reflexes: Reflexes normal.   Psychiatric:         Mood and Affect: Mood normal.         Behavior: Behavior normal.         Thought Content: Thought content normal.       Results from last 7 days   Lab Units 09/03/22  1111 09/01/22  0048   WBC 10*3/mm3 8.48 8.48   HEMOGLOBIN g/dL 10.5* 11.1*   PLATELETS 10*3/mm3 344 422     Results from last 7 days   Lab Units 09/03/22  0519 09/02/22  0356 09/01/22  1938 09/01/22  0048   SODIUM mmol/L 137 140  --  141   POTASSIUM mmol/L 3.8 4.1 4.5 4.6   CO2 mmol/L 32.0* 28.0  --  24.0   BUN mg/dL 39* 29*  --  23   CREATININE mg/dL 1.13 1.19  --  1.15   GLUCOSE mg/dL 199* 183*  --  266*     Results from last 7 days   Lab Units 09/01/22  0510 09/01/22  0104   PH, ARTERIAL pH units 7.494* 7.473*   PCO2, ARTERIAL mm Hg 35.4 32.5*   PO2 ART mm Hg 65.8* 55.6*   FIO2 % 40  --      Lab Results   Component Value Date    PROBNP 33,372.0 (H) 09/01/2022     Blood Culture   Date Value Ref Range Status   09/01/2022 No growth at 2 days  Preliminary   09/01/2022 No growth at 2 days  Preliminary     Recent radiology:   Imaging Results (Last 72 Hours)     Procedure Component Value Units Date/Time    XR Chest 1 View [[id number]] Collected: 09/03/22 1301     Updated: 09/03/22 1305    Narrative:      EXAM/TECHNIQUE: XR CHEST 1 VW-     INDICATION: Dyspnea, pulm edema, pleural eff; I50.9-Heart failure,  unspecified; J96.01-Acute respiratory failure with hypoxia     COMPARISON: 09/01/2022     FINDINGS:     Cardiac silhouette is within normal limits and stable. No significant  change in diffuse bilateral interstitial and airspace opacity. Small  bilateral pleural effusions appear unchanged. Granulomatous  calcifications in both hilar regions are noted. No pneumothorax. No  acute osseous finding.       Impression:         No significant interval change in  moderate pulmonary edema and small  bilateral pleural effusions.  This report was finalized on 09/03/2022 13:02 by Dr. Kishor Escobedo MD.    CT Angiogram Chest [[id number]] Collected: 09/01/22 0719     Updated: 09/01/22 0731    Narrative:      EXAMINATION: CT ANGIOGRAM CHEST-      9/1/2022 1:23 AM CDT     HISTORY: Pulmonary embolism (PE) suspected, unknown D-dimer     In order to have a CT radiation dose as low as reasonably achievable  Automated Exposure Control was utilized for adjustment of the mA and/or  KV according to patient size.     DLP in mGycm= 204     The CT Angiography of the chest is performed after intravenous contrast  enhancement.     The images are acquired in axial plane with subsequent 2-D  reconstructions coronal and sagittal planes and 3-D maximum intensity  projection reconstruction.     Comparison is made with the previous study dated 8/9/2022.     There is normal opacification of the pulmonary arteries and branches  bilaterally. There are no filling defects in the visualized/opacified  pulmonary arterial bed.     The RV/LV ratio is 30/51 which is normal. No finding to suggest right  heart strain.     Atheromatous changes thoracic aorta is seen. No aneurysmal dilatation.  Atheromatous changes in the coronary arteries are seen.     There is no evidence of mediastinal or hilar mass or lymphadenopathy.  There are large calcified granulomas in the mediastinum and tyesha  bilaterally. This is similar to the previous study.     The limited visualized soft tissues of the neck are unremarkable. The  thyroid gland appear unremarkable.     There is extensive bilateral interstitial infiltrate which was not seen  in the previous study. There is a mild intralobular septal thickening,  particularly in the lower lung which may suggest an element of pulmonary  congestion/edema.     There is a large bibasal pleural effusion, right more than the left.     The tracheobronchial structures are patent.     Moderately  dilated proximal esophagus seen with air-fluid level.     The limited included/visualized abdomen show no significant abnormality.     Images reviewed in bone window show no acute bony abnormality. Chronic  degenerative changes of the thoracolumbar spine is seen.       Impression:      1. No evidence of pulmonary embolism. No aortic aneurysm or dissection.  Moderate atheromatous changes of coronary arteries.  2. Extensive interstitial infiltrate bilaterally representing  inflammatory/infectious process. A mild pulmonary vascular  congestion/pulmonary edema.  3. Large bilateral pleural effusion, right more than the left.     The above study was initially reviewed and reported by [facility]. I do not  find any discrepancies.                                   This report was finalized on 09/01/2022 07:28 by Dr. Ashlyn Cordon MD.    XR Chest 1 View [[id number]] Collected: 09/01/22 0652     Updated: 09/01/22 0657    Narrative:      EXAMINATION: XR CHEST 1 VW-     9/1/2022 1:00 AM CDT     HISTORY: soa     A frontal projection of the chest is compared with the previous study  dated 8/9/2022.     The lungs are poorly expanded.     There is extensive coarse interstitial infiltrate in the lungs  bilaterally.     There is a right lower lung consolidation.     There is bibasal small pleural effusion.     No pneumothorax.     Heart size is not evaluated due to obliteration of the cardiac border by  the adjacent infiltrate.     No acute bony abnormality.       Impression:      1. Extensive bilateral coarse interstitial infiltrate may represent an  inflammatory/infectious process.  2. Right lower lung consolidation.  3. Small bibasal pleural effusion.  This report was finalized on 09/01/2022 06:54 by Dr. Ashlyn Cordon MD.        My radiograph interpretation/independent review of imaging: Reviewed and agree with current interpretation patient had bilateral pulmonary infiltrate which is most likely inflammatory or infectious in  nature, there is a right lower lobe pneumonia noted.  There was bilateral pleural effusion noted larger on the right side, there is cardiomegaly noted.  Other test results (not lab or imaging): Results for orders placed during the hospital encounter of 08/09/22    Adult Transthoracic Echo Complete W/ Cont if Necessary Per Protocol    Interpretation Summary  · Mild pulmonary hypertension is present.  · Estimated right ventricular systolic pressure from tricuspid regurgitation is mildly elevated (35-45 mmHg).  · There is a small (<1cm) pericardial effusion.  · Moderate aortic valve stenosis is present.  · Left ventricular wall thickness is consistent with mild concentric hypertrophy.  · Estimated left ventricular EF = 50% Left ventricular systolic function is normal.  · Left ventricular diastolic dysfunction is noted.  Reviewed.  Patient is pulmonary hypertension and moderate aortic valve stenosis.  Independent review of ekg: Reviewed.  Problem List as identified by Epic (may contain historical, inactive problems)  Patient Active Problem List   Diagnosis   • Paroxysmal atrial fibrillation (HCC)   • Acute kidney injury (HCC)   • Hypertension   • Diabetes mellitus (HCC)   • Moderate malnutrition (HCC)   • Aortic stenosis, moderate   • Nausea and vomiting   • Type 2 MI (myocardial infarction) (HCC) due to hypoxia and congestive heart failure   • Hyponatremia due to dehydration with associated weakness   • Acute on chronic congestive heart failure, unspecified heart failure type (HCC)     Pulmonary Assessment:  New problem (to me), with additional workup planned: Acute hypoxic respiratory failure, bilateral pulmonary infiltrate likely pneumonia versus acute interstitial pneumonitis or bronchiolitis obliterans obstructive pneumonia, COPD, bilateral pleural effusion and atelectasis, history of tobacco abuse, congestive diastolic heart failure, mild pulmonary hypertension  New problem (to me), no additional workup planned:  Hypertension, hyperlipidemia, coronary artery disease, atrial fibrillation rapid ventricular response, moderate aortic stenosis, diabetes mellitus type 2, hyponatremia, history of acute kidney injury, chronic kidney disease stage III,  Other problems either stable, failing to improve or worsenin. Acute hypoxic respiratory failure  2. Abnormal CT chest with bilateral pulmonary infiltrate pleural effusion and atelectasis  3. Likely inflammatory pneumonitis versus community-acquired pneumonia  4. Congestive diastolic heart failure and bilateral pleural effusion  5. Chronic obstructive pulmonary disease  6. History of tobacco abuse  7. Atrial fibrillation rapid ventricular response  8. Acute kidney injury and metabolic acidosis recently  9. Chronic kidney disease stage III  10. Hypertension  11. Diabetes mellitus type 2  12. Aortic stenosis moderate  13. Mild pulmonary hypertension  14. Hyponatremia  15. On chronic anticoagulation    Recommend/plan:   · The patient has bilateral reticulonodular pulmonary infiltrate which is most likely inflammatory or infectious in nature.  He also has underlying COPD and had long history of tobacco abuse and he just quit 2 months ago.  · Patient is already on ceftriaxone and azithromycin and has a normal white cell count and he is afebrile.  · I will continue the same antibiotic coverage and requested Legionella pneumococcal antigen and sputum culture.  · I will start the patient on steroids 40 mg 3 times a day with close monitoring of blood glucose and adjust insulin because he is a diabetic.  · Steroids will be added for inflammatory lung disease.  He may have underlying bronchitis obliterans obstructive pneumonia or cryptogenic organizing pneumonia or amiodarone induced lung toxicity  · His CT scan of the chest is significantly worse compared to CT scan of the chest done last month.  · He has bilateral pleural effusion which is moderate in size but larger on the right side and  he may need a thoracentesis  · However he is on Xarelto and I will try to hold off on thoracentesis for now and will try to manage him with diuresis now.  · He will be started on Symbicort and Atrovent for his underlying COPD and will need outpatient pulmonary function test when he is more stable due to his long smoking history and underlying COPD.  · Titrate oxygen and maintain oxygen saturation more than 92%.  He will need home oxygen evaluation prior to the discharge.  · Encourage incentive spirometry to improve pulmonary compliance.  Patient has mild pulmonary hypertension noted in the echocardiogram and he also has moderate aortic stenosis which is addressed by cardiology.  · He had renal failure and was treated for acute kidney injury last month and was seen and followed by nephrology.  He will need diuresis for his heart failure but will need close monitoring of renal function electrolytes.  · PT/OT/nutritional support.  DVT and stress ulcer prophylaxis.  He is already on Eliquis.  His heart rate is better controlled but has history of atrial fibrillation and rapid ventricular response.  · Repeat labs and imaging studies from time to time.  I ordered a blood gas for now and will use high flow oxygen or BiPAP if needed.  · CODE STATUS: Full.  Overall prognosis: Guarded.  · Discussed with Dr. Du.  We appreciate the consult and we will follow.  · Total time spent in seeing this patient as a pulmonary consult was 45 minutes.    Thank you for this consult.  We will follow along.    Electronically signed by     Stew Dawson MD,   Pulmonologist/Intensivist   09/03/22, 5:26 PM CDT.

## 2022-09-03 NOTE — PLAN OF CARE
Goal Outcome Evaluation:  Plan of Care Reviewed With: patient        Progress: no change  Outcome Evaluation: No complaints of pain. Amio discontinued this AM. Patient started to desat to 60s this AM, had to bump patient up to 15L high flow. Patient does not complain of SOA. IV lasix 60mg TID. Started on IV azithromycin and IV rocephin. Repeat CXR results pending. LLE wound dressing applied. Barrier cream applied to gluteal pressure injury. Safety maintained, no falls.

## 2022-09-04 ENCOUNTER — APPOINTMENT (OUTPATIENT)
Dept: CARDIOLOGY | Facility: HOSPITAL | Age: 67
End: 2022-09-04

## 2022-09-04 ENCOUNTER — APPOINTMENT (OUTPATIENT)
Dept: GENERAL RADIOLOGY | Facility: HOSPITAL | Age: 67
End: 2022-09-04

## 2022-09-04 LAB
ANION GAP SERPL CALCULATED.3IONS-SCNC: 12 MMOL/L (ref 5–15)
ANION GAP SERPL CALCULATED.3IONS-SCNC: 14 MMOL/L (ref 5–15)
ARTERIAL PATENCY WRIST A: ABNORMAL
ATMOSPHERIC PRESS: 748 MMHG
BACTERIA UR QL AUTO: ABNORMAL /HPF
BASE EXCESS BLDA CALC-SCNC: 6.6 MMOL/L (ref 0–2)
BDY SITE: ABNORMAL
BH CV ECHO MEAS - AO MAX PG: 20.1 MMHG
BH CV ECHO MEAS - AO MEAN PG: 12 MMHG
BH CV ECHO MEAS - AO V2 MAX: 224 CM/SEC
BH CV ECHO MEAS - AO V2 VTI: 42.9 CM
BH CV ECHO MEAS - AVA(I,D): 1.7 CM2
BH CV ECHO MEAS - EDV(CUBED): 118.4 ML
BH CV ECHO MEAS - EDV(MOD-SP2): 56.8 ML
BH CV ECHO MEAS - EDV(MOD-SP4): 53.5 ML
BH CV ECHO MEAS - EF(MOD-BP): 37.9 %
BH CV ECHO MEAS - EF(MOD-SP2): 31.7 %
BH CV ECHO MEAS - EF(MOD-SP4): 42.6 %
BH CV ECHO MEAS - ESV(CUBED): 50.7 ML
BH CV ECHO MEAS - ESV(MOD-SP2): 38.8 ML
BH CV ECHO MEAS - ESV(MOD-SP4): 30.7 ML
BH CV ECHO MEAS - FS: 24.6 %
BH CV ECHO MEAS - IVS/LVPW: 0.93 CM
BH CV ECHO MEAS - IVSD: 1.05 CM
BH CV ECHO MEAS - LA DIMENSION: 4.8 CM
BH CV ECHO MEAS - LAT PEAK E' VEL: 16.6 CM/SEC
BH CV ECHO MEAS - LV DIASTOLIC VOL/BSA (35-75): 29.5 CM2
BH CV ECHO MEAS - LV MASS(C)D: 198.6 GRAMS
BH CV ECHO MEAS - LV MAX PG: 6.8 MMHG
BH CV ECHO MEAS - LV MEAN PG: 4 MMHG
BH CV ECHO MEAS - LV SYSTOLIC VOL/BSA (12-30): 16.9 CM2
BH CV ECHO MEAS - LV V1 MAX: 129 CM/SEC
BH CV ECHO MEAS - LV V1 VTI: 23.2 CM
BH CV ECHO MEAS - LVIDD: 4.9 CM
BH CV ECHO MEAS - LVIDS: 3.7 CM
BH CV ECHO MEAS - LVOT AREA: 3.1 CM2
BH CV ECHO MEAS - LVOT DIAM: 2 CM
BH CV ECHO MEAS - LVPWD: 1.13 CM
BH CV ECHO MEAS - MR MAX PG: 114.5 MMHG
BH CV ECHO MEAS - MR MAX VEL: 535 CM/SEC
BH CV ECHO MEAS - MV DEC SLOPE: 819 CM/SEC2
BH CV ECHO MEAS - MV E MAX VEL: 134 CM/SEC
BH CV ECHO MEAS - MV P1/2T: 57.2 MSEC
BH CV ECHO MEAS - MVA(P1/2T): 3.8 CM2
BH CV ECHO MEAS - PA V2 MAX: 102 CM/SEC
BH CV ECHO MEAS - RAP SYSTOLE: 10 MMHG
BH CV ECHO MEAS - RVDD: 2.6 CM
BH CV ECHO MEAS - RVSP: 43.4 MMHG
BH CV ECHO MEAS - SI(MOD-SP2): 9.9 ML/M2
BH CV ECHO MEAS - SI(MOD-SP4): 12.6 ML/M2
BH CV ECHO MEAS - SV(LVOT): 72.9 ML
BH CV ECHO MEAS - SV(MOD-SP2): 18 ML
BH CV ECHO MEAS - SV(MOD-SP4): 22.8 ML
BH CV ECHO MEAS - TAPSE (>1.6): 1.38 CM
BH CV ECHO MEAS - TR MAX PG: 33.4 MMHG
BH CV ECHO MEAS - TR MAX VEL: 289 CM/SEC
BH CV XLRA - RV BASE: 2.9 CM
BILIRUB UR QL STRIP: NEGATIVE
BODY TEMPERATURE: 37 C
BUN SERPL-MCNC: 39 MG/DL (ref 8–23)
BUN SERPL-MCNC: 51 MG/DL (ref 8–23)
BUN/CREAT SERPL: 31 (ref 7–25)
BUN/CREAT SERPL: 31.9 (ref 7–25)
CALCIUM SPEC-SCNC: 8.3 MG/DL (ref 8.6–10.5)
CALCIUM SPEC-SCNC: 8.5 MG/DL (ref 8.6–10.5)
CHLORIDE SERPL-SCNC: 95 MMOL/L (ref 98–107)
CHLORIDE SERPL-SCNC: 95 MMOL/L (ref 98–107)
CLARITY UR: CLEAR
CO2 SERPL-SCNC: 30 MMOL/L (ref 22–29)
CO2 SERPL-SCNC: 31 MMOL/L (ref 22–29)
COLOR UR: ABNORMAL
CREAT SERPL-MCNC: 1.26 MG/DL (ref 0.76–1.27)
CREAT SERPL-MCNC: 1.6 MG/DL (ref 0.76–1.27)
EGFRCR SERPLBLD CKD-EPI 2021: 46.9 ML/MIN/1.73
EGFRCR SERPLBLD CKD-EPI 2021: 62.5 ML/MIN/1.73
EPAP: 8
GLUCOSE BLDC GLUCOMTR-MCNC: 352 MG/DL (ref 70–130)
GLUCOSE BLDC GLUCOMTR-MCNC: 367 MG/DL (ref 70–130)
GLUCOSE BLDC GLUCOMTR-MCNC: 389 MG/DL (ref 70–130)
GLUCOSE BLDC GLUCOMTR-MCNC: 443 MG/DL (ref 70–130)
GLUCOSE BLDC GLUCOMTR-MCNC: 472 MG/DL (ref 70–130)
GLUCOSE BLDC GLUCOMTR-MCNC: 522 MG/DL (ref 70–130)
GLUCOSE SERPL-MCNC: 318 MG/DL (ref 65–99)
GLUCOSE SERPL-MCNC: 341 MG/DL (ref 65–99)
GLUCOSE UR STRIP-MCNC: ABNORMAL MG/DL
HCO3 BLDA-SCNC: 30.5 MMOL/L (ref 20–26)
HGB UR QL STRIP.AUTO: NEGATIVE
HYALINE CASTS UR QL AUTO: ABNORMAL /LPF
INHALED O2 CONCENTRATION: 100 %
IPAP: 16
KETONES UR QL STRIP: NEGATIVE
LEFT ATRIUM VOLUME INDEX: 38.8 ML/M2
LEFT ATRIUM VOLUME: 65.4 ML
LEUKOCYTE ESTERASE UR QL STRIP.AUTO: NEGATIVE
Lab: ABNORMAL
MAXIMAL PREDICTED HEART RATE: 153 BPM
MODALITY: ABNORMAL
NITRITE UR QL STRIP: NEGATIVE
PCO2 BLDA: 40.3 MM HG (ref 35–45)
PCO2 TEMP ADJ BLD: 40.3 MM HG (ref 35–45)
PH BLDA: 7.49 PH UNITS (ref 7.35–7.45)
PH UR STRIP.AUTO: <=5 [PH] (ref 5–8)
PH, TEMP CORRECTED: 7.49 PH UNITS (ref 7.35–7.45)
PO2 BLDA: 279 MM HG (ref 83–108)
PO2 TEMP ADJ BLD: 279 MM HG (ref 83–108)
POTASSIUM SERPL-SCNC: 4.2 MMOL/L (ref 3.5–5.2)
POTASSIUM SERPL-SCNC: 4.7 MMOL/L (ref 3.5–5.2)
PROT UR QL STRIP: ABNORMAL
RBC # UR STRIP: ABNORMAL /HPF
REF LAB TEST METHOD: ABNORMAL
SAO2 % BLDCOA: >100.1 % (ref 94–99)
SET MECH RESP RATE: 20
SODIUM SERPL-SCNC: 138 MMOL/L (ref 136–145)
SODIUM SERPL-SCNC: 139 MMOL/L (ref 136–145)
SP GR UR STRIP: 1.02 (ref 1–1.03)
SQUAMOUS #/AREA URNS HPF: ABNORMAL /HPF
STRESS TARGET HR: 130 BPM
UROBILINOGEN UR QL STRIP: ABNORMAL
VENTILATOR MODE: ABNORMAL
WBC # UR STRIP: ABNORMAL /HPF

## 2022-09-04 PROCEDURE — 93306 TTE W/DOPPLER COMPLETE: CPT | Performed by: INTERNAL MEDICINE

## 2022-09-04 PROCEDURE — 36600 WITHDRAWAL OF ARTERIAL BLOOD: CPT

## 2022-09-04 PROCEDURE — 94799 UNLISTED PULMONARY SVC/PX: CPT

## 2022-09-04 PROCEDURE — 80048 BASIC METABOLIC PNL TOTAL CA: CPT | Performed by: NURSE PRACTITIONER

## 2022-09-04 PROCEDURE — 25010000002 FUROSEMIDE PER 20 MG: Performed by: NURSE PRACTITIONER

## 2022-09-04 PROCEDURE — 99233 SBSQ HOSP IP/OBS HIGH 50: CPT | Performed by: NURSE PRACTITIONER

## 2022-09-04 PROCEDURE — 63710000001 INSULIN LISPRO (HUMAN) PER 5 UNITS: Performed by: FAMILY MEDICINE

## 2022-09-04 PROCEDURE — 99232 SBSQ HOSP IP/OBS MODERATE 35: CPT | Performed by: INTERNAL MEDICINE

## 2022-09-04 PROCEDURE — 94660 CPAP INITIATION&MGMT: CPT

## 2022-09-04 PROCEDURE — 25010000002 METHYLPREDNISOLONE PER 40 MG: Performed by: INTERNAL MEDICINE

## 2022-09-04 PROCEDURE — 82803 BLOOD GASES ANY COMBINATION: CPT

## 2022-09-04 PROCEDURE — 71045 X-RAY EXAM CHEST 1 VIEW: CPT

## 2022-09-04 PROCEDURE — 25010000002 METHYLPREDNISOLONE PER 40 MG: Performed by: NURSE PRACTITIONER

## 2022-09-04 PROCEDURE — 63710000001 INSULIN LISPRO (HUMAN) PER 5 UNITS: Performed by: INTERNAL MEDICINE

## 2022-09-04 PROCEDURE — 82962 GLUCOSE BLOOD TEST: CPT

## 2022-09-04 PROCEDURE — 25010000002 CEFTRIAXONE PER 250 MG: Performed by: FAMILY MEDICINE

## 2022-09-04 PROCEDURE — 81001 URINALYSIS AUTO W/SCOPE: CPT | Performed by: FAMILY MEDICINE

## 2022-09-04 PROCEDURE — 25010000002 AZITHROMYCIN PER 500 MG: Performed by: FAMILY MEDICINE

## 2022-09-04 PROCEDURE — 93306 TTE W/DOPPLER COMPLETE: CPT

## 2022-09-04 RX ORDER — METHYLPREDNISOLONE SODIUM SUCCINATE 40 MG/ML
40 INJECTION, POWDER, LYOPHILIZED, FOR SOLUTION INTRAMUSCULAR; INTRAVENOUS EVERY 12 HOURS
Status: DISCONTINUED | OUTPATIENT
Start: 2022-09-04 | End: 2022-09-06

## 2022-09-04 RX ORDER — INSULIN LISPRO 100 [IU]/ML
0-24 INJECTION, SOLUTION INTRAVENOUS; SUBCUTANEOUS
Status: DISCONTINUED | OUTPATIENT
Start: 2022-09-04 | End: 2022-09-09

## 2022-09-04 RX ORDER — INSULIN LISPRO 100 [IU]/ML
10 INJECTION, SOLUTION INTRAVENOUS; SUBCUTANEOUS ONCE
Status: COMPLETED | OUTPATIENT
Start: 2022-09-04 | End: 2022-09-04

## 2022-09-04 RX ORDER — AZITHROMYCIN 250 MG/1
500 TABLET, FILM COATED ORAL
Status: DISCONTINUED | OUTPATIENT
Start: 2022-09-05 | End: 2022-09-05

## 2022-09-04 RX ORDER — INSULIN LISPRO 100 [IU]/ML
0-14 INJECTION, SOLUTION INTRAVENOUS; SUBCUTANEOUS
Status: DISCONTINUED | OUTPATIENT
Start: 2022-09-04 | End: 2022-09-04

## 2022-09-04 RX ADMIN — IPRATROPIUM BROMIDE 0.5 MG: 0.5 SOLUTION RESPIRATORY (INHALATION) at 19:02

## 2022-09-04 RX ADMIN — FUROSEMIDE 60 MG: 10 INJECTION, SOLUTION INTRAMUSCULAR; INTRAVENOUS at 16:01

## 2022-09-04 RX ADMIN — IPRATROPIUM BROMIDE 0.5 MG: 0.5 SOLUTION RESPIRATORY (INHALATION) at 06:11

## 2022-09-04 RX ADMIN — FUROSEMIDE 60 MG: 10 INJECTION, SOLUTION INTRAMUSCULAR; INTRAVENOUS at 09:57

## 2022-09-04 RX ADMIN — METHYLPREDNISOLONE SODIUM SUCCINATE 40 MG: 40 INJECTION, POWDER, FOR SOLUTION INTRAMUSCULAR; INTRAVENOUS at 02:51

## 2022-09-04 RX ADMIN — RIVAROXABAN 20 MG: 20 TABLET, FILM COATED ORAL at 09:57

## 2022-09-04 RX ADMIN — BUDESONIDE AND FORMOTEROL FUMARATE DIHYDRATE 2 PUFF: 160; 4.5 AEROSOL RESPIRATORY (INHALATION) at 19:02

## 2022-09-04 RX ADMIN — INSULIN LISPRO 7 UNITS: 100 INJECTION, SOLUTION INTRAVENOUS; SUBCUTANEOUS at 09:58

## 2022-09-04 RX ADMIN — ATORVASTATIN CALCIUM 20 MG: 10 TABLET, FILM COATED ORAL at 09:57

## 2022-09-04 RX ADMIN — CEFTRIAXONE 1 G: 1 INJECTION, POWDER, FOR SOLUTION INTRAMUSCULAR; INTRAVENOUS at 11:47

## 2022-09-04 RX ADMIN — PANTOPRAZOLE SODIUM 40 MG: 40 TABLET, DELAYED RELEASE ORAL at 16:44

## 2022-09-04 RX ADMIN — FUROSEMIDE 60 MG: 10 INJECTION, SOLUTION INTRAMUSCULAR; INTRAVENOUS at 21:10

## 2022-09-04 RX ADMIN — Medication 6 MG: at 23:36

## 2022-09-04 RX ADMIN — AZITHROMYCIN DIHYDRATE 500 MG: 500 INJECTION, POWDER, LYOPHILIZED, FOR SOLUTION INTRAVENOUS at 11:47

## 2022-09-04 RX ADMIN — PANTOPRAZOLE SODIUM 40 MG: 40 TABLET, DELAYED RELEASE ORAL at 09:58

## 2022-09-04 RX ADMIN — IPRATROPIUM BROMIDE 0.5 MG: 0.5 SOLUTION RESPIRATORY (INHALATION) at 10:30

## 2022-09-04 RX ADMIN — IPRATROPIUM BROMIDE 0.5 MG: 0.5 SOLUTION RESPIRATORY (INHALATION) at 15:01

## 2022-09-04 RX ADMIN — BUDESONIDE AND FORMOTEROL FUMARATE DIHYDRATE 2 PUFF: 160; 4.5 AEROSOL RESPIRATORY (INHALATION) at 06:18

## 2022-09-04 RX ADMIN — ANTACID TABLETS 2 TABLET: 500 TABLET, CHEWABLE ORAL at 23:36

## 2022-09-04 RX ADMIN — INSULIN LISPRO 10 UNITS: 100 INJECTION, SOLUTION INTRAVENOUS; SUBCUTANEOUS at 14:09

## 2022-09-04 RX ADMIN — INSULIN LISPRO 14 UNITS: 100 INJECTION, SOLUTION INTRAVENOUS; SUBCUTANEOUS at 11:46

## 2022-09-04 RX ADMIN — METHYLPREDNISOLONE SODIUM SUCCINATE 40 MG: 40 INJECTION, POWDER, FOR SOLUTION INTRAMUSCULAR; INTRAVENOUS at 14:12

## 2022-09-04 RX ADMIN — AMIODARONE HYDROCHLORIDE 200 MG: 200 TABLET ORAL at 09:57

## 2022-09-04 RX ADMIN — INSULIN LISPRO 20 UNITS: 100 INJECTION, SOLUTION INTRAVENOUS; SUBCUTANEOUS at 16:45

## 2022-09-04 NOTE — PLAN OF CARE
Goal Outcome Evaluation:           Progress: improving  Outcome Evaluation: Pt c/o coccyx pain  pt turns self pt stated he felt better this am ,no acute resp difficulty noted , pt on lasix  cont  cont to monitor no falls or injuries. no new areas skin breakdown .

## 2022-09-04 NOTE — PROGRESS NOTES
Automatic IV to PO Pharmacy Note - Antimicrobial    Chito Govea is a 66 y.o. male who meets the following criteria for IV to PO therapy conversion :     Tolerating oral fluids or 40ml/hour of enteral nutrition and oral route not otherwise compromised  Receiving other oral medications on a scheduled basis  Afebrile (temperature less than 100.4 degrees F) for at least 24 hours  WBC within/decreasing toward normal range    Lab Results   Component Value Date    WBC 8.48 09/03/2022     Temp Readings from Last 1 Encounters:   09/04/22 97.9 °F (36.6 °C) (Oral)       Assessment/Plan  Based on this criteria, Azithromycin 500 mg IV Q24H has been changed to Azithromycin 500 mg PO Q24H per the directives and guidelines established by [facility] Committee and [facility] Executive Committee .       Kishor Dia, PharmD  09/04/2213:26 CDT

## 2022-09-04 NOTE — PROGRESS NOTES
PULMONARY AND CRITICAL CARE PROGRESS NOTE - [facility]    Patient: Chito Govea  1955   MR# [id number]   Acct# [id number]  09/04/22   08:01 CDT  Referring Provider: Edwin Du DO    Chief Complaint: Hypoxia, congestive heart failure    Interval history: He has just returned from having an echocardiogram done.  His glucose has been up and I explained to him it is secondary to the steroids.  He is currently on 15 L high flow with a sat of 95%.  He tells me his cough is sometimes productive and he continues to be short of breath with activity.  He is somewhat anxious and reports that he has never had any of the current problems that he has prior to this hospitalization.  He seems to have very poor insight into his chronic medical issues.  I told him we would decrease his steroid today and work to decrease the amount of oxygen that he is currently requiring.  He continues getting Lasix for diuresis.  No overnight events reported.    Meds:  amiodarone, 200 mg, Oral, Q12H  atorvastatin, 20 mg, Oral, Daily  azithromycin, 500 mg, Intravenous, Q24H  bisacodyl, 10 mg, Rectal, Daily  budesonide-formoterol, 2 puff, Inhalation, BID - RT  cefTRIAXone, 1 g, Intravenous, Q24H  furosemide, 60 mg, Intravenous, TID  insulin lispro, 0-9 Units, Subcutaneous, TID AC  ipratropium, 0.5 mg, Nebulization, 4x Daily - RT  methylPREDNISolone sodium succinate, 40 mg, Intravenous, Q8H  pantoprazole, 40 mg, Oral, BID AC  rivaroxaban, 20 mg, Oral, Daily      nitroglycerin, 10-50 mcg/min, Last Rate: Stopped (09/01/22 0815)      Review of Systems:   Review of Systems   Constitutional: Negative.    HENT: Negative.    Respiratory: Positive for cough and shortness of breath.    Cardiovascular: Positive for leg swelling.   Gastrointestinal: Negative.    Skin: Negative.    Neurological: Negative.    Psychiatric/Behavioral: Negative.      Physical Exam:  SpO2 Percentage    09/04/22 0612 09/04/22 0618 09/04/22 0753   SpO2:  96% 96% 90%     Body mass index is 21.45 kg/m².   Temp:  [97.8 °F (36.6 °C)-98.7 °F (37.1 °C)] 97.8 °F (36.6 °C)  Heart Rate:  [] 112  Resp:  [18-20] 18  BP: ()/(50-86) 103/69    Intake/Output Summary (Last 24 hours) at 9/4/2022 0801  Last data filed at 9/4/2022 0200  Gross per 24 hour   Intake 840 ml   Output 1700 ml   Net -860 ml     Physical Exam  Vitals and nursing note reviewed.   Constitutional:       Appearance: He is well-developed.      Interventions: Nasal cannula in place.      Comments: High flow oxygen in place   HENT:      Head: Normocephalic and atraumatic.   Eyes:      Pupils: Pupils are equal, round, and reactive to light.   Cardiovascular:      Rate and Rhythm: Normal rate and regular rhythm.   Pulmonary:      Effort: No tachypnea or respiratory distress.      Breath sounds: Decreased breath sounds present. No wheezing.   Abdominal:      General: Bowel sounds are normal. There is no distension.      Palpations: Abdomen is soft.   Musculoskeletal:         General: Normal range of motion.      Cervical back: Normal range of motion and neck supple.      Right lower leg: Edema present.      Left lower leg: Edema present.   Skin:     General: Skin is warm and dry.      Nails: There is clubbing (Severe).   Neurological:      Mental Status: He is alert and oriented to person, place, and time.   Psychiatric:      Comments: Somewhat anxious       Electronically signed by MARKELL Meyer, 9/4/2022, 08:01 CDT        Physician Substantive Portion: Medical Decision Making    Laboratory Data:  Results from last 7 days   Lab Units 09/03/22  1111 09/01/22  0048   WBC 10*3/mm3 8.48 8.48   HEMOGLOBIN g/dL 10.5* 11.1*   PLATELETS 10*3/mm3 344 422     Results from last 7 days   Lab Units 09/04/22  0510 09/03/22  1910 09/03/22  0519 09/01/22  1938 09/01/22  0048   SODIUM mmol/L 139 141 137   < > 141   POTASSIUM mmol/L 4.2 3.7 3.8   < > 4.6   BUN mg/dL 39* 39* 39*   < > 23   CREATININE mg/dL  1.26 1.19 1.13   < > 1.15   INR   --   --   --   --  1.96*    < > = values in this interval not displayed.     Results from last 7 days   Lab Units 09/03/22  1827 09/01/22  0510 09/01/22  0104   PH, ARTERIAL pH units 7.523* 7.494* 7.473*   PCO2, ARTERIAL mm Hg 38.4 35.4 32.5*   PO2 ART mm Hg 89.0 65.8* 55.6*   FIO2 %  --  40  --      Blood Culture   Date Value Ref Range Status   09/01/2022 No growth at 3 days  Preliminary   09/01/2022 No growth at 3 days  Preliminary     Recent films:  XR Chest 1 View    Result Date: 9/4/2022  EXAMINATION: Chest 1 view 09/04/2022  HISTORY: Pneumonia and CHF  FINDINGS: Today's exam is compared to previous study one day earlier. There is persistent diffuse mixed interstitial and alveolar disease within both lungs with a small right-sided effusion with blunting of the costophrenic angle. No change from the previous exam. There is no free air beneath the hemidiaphragms.      Impression: 1.. Stable 1 day appearance of the chest. This report was finalized on 09/04/2022 07:55 by Dr. Yonis Polo MD.    XR Chest 1 View    Result Date: 9/3/2022  EXAM/TECHNIQUE: XR CHEST 1 VW-  INDICATION: Dyspnea, pulm edema, pleural eff; I50.9-Heart failure, unspecified; J96.01-Acute respiratory failure with hypoxia  COMPARISON: 09/01/2022  FINDINGS:  Cardiac silhouette is within normal limits and stable. No significant change in diffuse bilateral interstitial and airspace opacity. Small bilateral pleural effusions appear unchanged. Granulomatous calcifications in both hilar regions are noted. No pneumothorax. No acute osseous finding.      Impression:  No significant interval change in moderate pulmonary edema and small bilateral pleural effusions. This report was finalized on 09/03/2022 13:02 by Dr. Kishor Escobedo MD.     My radiograph interpretation/independent review of imaging: Reviewed and agree with current interpretation.  Patient has bilateral pulmonary infiltrate less likely from pulmonary  edema but more like interstitial pneumonitis and bilateral pleural effusion    Pulmonary Assessment:  1. Acute hypoxic respiratory failure  2. Abnormal CT chest with bilateral pulmonary infiltrate pleural effusion and atelectasis  3. Likely inflammatory pneumonitis versus community-acquired pneumonia  4. Congestive diastolic heart failure and bilateral pleural effusion  5. Chronic obstructive pulmonary disease  6. History of tobacco abuse  7. Atrial fibrillation rapid ventricular response  8. Acute kidney injury and metabolic acidosis recently  9. Chronic kidney disease stage III  10. Hypertension  11. Diabetes mellitus type 2  12. Aortic stenosis moderate  13. Mild pulmonary hypertension  14. Hyponatremia  15. On chronic anticoagulation     Recommend/plan:   · Patient was seen in the follow-up visit in pulmonary rounds today.  · He went down to the echocardiogram on minimal oxygen and his oxygen saturation dropped to 70s  · He had echocardiogram done this morning and is back in his room and is requiring 15 L oxygen now although he denies any shortness of breath.  · His blood was more than 500 and insulin dose has been increased.  This is most likely from steroids.  · I again discussed his current status with Dr. Du   · In reviewing the CT scan it appears patient has bilateral pulmonary infiltrate is reticulonodular with bilateral pleural effusion.  This is most likely interstitial pneumonitis and less likely from heart failure.  · Continue steroid for interstitial lung disease.  Amiodarone could be offending agent and may need to be changed.  · Continue to respiratory care and bronchodilator treatment.  He started on Symbicort and Atrovent.  · Plan for outpatient pulmonary clinic visit with PFT for underlying COPD.  · Continue Lasix for diuresis and monitor renal function electrolytes.  · Repeat chest x-ray unchanged.  · He is already on Xarelto.  Continue stress ulcer prophylaxis.  · PT/OT/nutritional  support.  · Tight glycemic control at is getting increased dose of steroid.  · Repeat labs and imaging studies from time to time  · Code status: Full.  Overall prognosis: Guarded.  · We will follow.  ·   This visit was performed by both a physician and an [facility] RN.  I personally evaluated and examined the patient.  I performed all aspects of the medical decision making as documented.    Electronically signed by     Stew Dawson MD,  Pulmonologist/Intensivist   9/4/2022, 12:44 CDT

## 2022-09-04 NOTE — PROGRESS NOTES
[facility] Medicine Services  INPATIENT PROGRESS NOTE    Length of Stay: 3  Date of Admission: 9/1/2022  Primary Care Physician: Chito Rubio MD    Subjective     Chief Complaint:     Shortness of breath    HPI     The patient is more dyspneic today than yesterday.  He was transition from high flow nasal O2 to Vapotherm.  He is currently on 40 L at 100% with saturations at 93%.  He is receiving a breathing treatment.  He will be transitioned to BiPAP and transferred to the critical care unit for closer observation.  Case discussed with Dr. Dawson.  Echocardiogram was reviewed showing a reduction in ejection fraction to 36 to 40% with multiple wall motion abnormalities.  This represents a significant reduction in LV function since his last echocardiogram performed on 8/11/2022.  BMP this a.m. shows glucose 341 but is otherwise unremarkable.  He continues to receive sliding scale insulin.  ABGs will be obtained today.  Despite the patient's hypoxia, he does not appear to be in acute distress.  Output has exceeded 4800 cc since diuresis initiated.    Review of Systems     All pertinent negatives and positives are as above. All other systems have been reviewed and are negative unless otherwise stated.     Objective    Temp:  [97.8 °F (36.6 °C)-98.7 °F (37.1 °C)] 97.9 °F (36.6 °C)  Heart Rate:  [] 116  Resp:  [18-20] 20  BP: ()/(61-86) 121/69    Lab Results (last 24 hours)     Procedure Component Value Units Date/Time    POC Glucose Once [[id number]]  (Abnormal) Collected: 09/04/22 1449    Specimen: Blood Updated: 09/04/22 1500     Glucose 389 mg/dL      Comment: : [id number] Martinez DicksonaMeamberly ID: [id number]       POC Glucose Once [[id number]]  (Abnormal) Collected: 09/04/22 1340    Specimen: Blood Updated: 09/04/22 1351     Glucose 443 mg/dL      Comment: : [id number] John (PPoole) TerraianMeter ID: [id number]       POC Glucose Once [[id number]]  (Abnormal)  Collected: 09/04/22 1129    Specimen: Blood Updated: 09/04/22 1141     Glucose 522 mg/dL      Comment: : [id number] Jose Luis EuraisaMeter ID: [id number]       POC Glucose Once [[id number]]  (Abnormal) Collected: 09/04/22 1128    Specimen: Blood Updated: 09/04/22 1141     Glucose 472 mg/dL      Comment: : [id number] Jose Luis EuraisaMeter ID: [id number]       POC Glucose Once [[id number]]  (Abnormal) Collected: 09/04/22 0759    Specimen: Blood Updated: 09/04/22 0842     Glucose 367 mg/dL      Comment: : [id number] Jose Luis EuraisaMeter ID: [id number]       Basic Metabolic Panel [[id number]]  (Abnormal) Collected: 09/04/22 0510    Specimen: Blood Updated: 09/04/22 0607     Glucose 341 mg/dL      BUN 39 mg/dL      Creatinine 1.26 mg/dL      Sodium 139 mmol/L      Potassium 4.2 mmol/L      Chloride 95 mmol/L      CO2 30.0 mmol/L      Calcium 8.3 mg/dL      BUN/Creatinine Ratio 31.0     Anion Gap 14.0 mmol/L      eGFR 62.5 mL/min/1.73      Comment: National Kidney Foundation and American Society of Nephrology (ASN) Task Force recommended calculation based on the Chronic Kidney Disease Epidemiology Collaboration (CKD-EPI) equation refit without adjustment for race.       Narrative:      GFR Normal >60  Chronic Kidney Disease <60  Kidney Failure <15      Blood Culture - Blood, Arm, Left [[id number]]  (Normal) Collected: 09/01/22 0056    Specimen: Blood from Arm, Left Updated: 09/04/22 0115     Blood Culture No growth at 3 days    Blood Culture - Blood, Arm, Right [[id number]]  (Normal) Collected: 09/01/22 0056    Specimen: Blood from Arm, Right Updated: 09/04/22 0115     Blood Culture No growth at 3 days    POC Glucose Once [[id number]]  (Abnormal) Collected: 09/03/22 1959    Specimen: Blood Updated: 09/03/22 2029     Glucose 166 mg/dL      Comment: : [id number] Ameyaailin NegroniferMeter ID: [id number]       Basic Metabolic Panel [[id number]]  (Abnormal) Collected: 09/03/22 1910    Specimen: Blood Updated: 09/03/22 1940      Glucose 92 mg/dL      BUN 39 mg/dL      Creatinine 1.19 mg/dL      Sodium 141 mmol/L      Potassium 3.7 mmol/L      Chloride 98 mmol/L      CO2 32.0 mmol/L      Calcium 8.4 mg/dL      BUN/Creatinine Ratio 32.8     Anion Gap 11.0 mmol/L      eGFR 67.0 mL/min/1.73      Comment: National Kidney Foundation and American Society of Nephrology (ASN) Task Force recommended calculation based on the Chronic Kidney Disease Epidemiology Collaboration (CKD-EPI) equation refit without adjustment for race.       Narrative:      GFR Normal >60  Chronic Kidney Disease <60  Kidney Failure <15      Blood Gas, Arterial - [[id number]]  (Abnormal) Collected: 09/03/22 1827    Specimen: Arterial Blood Updated: 09/03/22 1820     Site Right Brachial     Steve's Test Positive     pH, Arterial 7.523 pH units      Comment: 83 Value above reference range        pCO2, Arterial 38.4 mm Hg      pO2, Arterial 89.0 mm Hg      HCO3, Arterial 31.5 mmol/L      Comment: 83 Value above reference range        Base Excess, Arterial 8.2 mmol/L      Comment: 83 Value above reference range        O2 Saturation, Arterial 98.4 %      Temperature 37.0 C      Barometric Pressure for Blood Gas 750 mmHg      Modality HFNC     Flow Rate 14.0 lpm      Ventilator Mode NA     Collected by [id number]     Comment: Meter: [id number]     :  [id number]        pCO2, Temperature Corrected 38.4 mm Hg      pH, Temp Corrected 7.523 pH Units      pO2, Temperature Corrected 89.0 mm Hg     POC Glucose Once [[id number]]  (Abnormal) Collected: 09/03/22 1628    Specimen: Blood Updated: 09/03/22 1716     Glucose 175 mg/dL      Comment: : [id number] Rickey LisaMeter ID: [id number]             Imaging Results (Last 24 Hours)     Procedure Component Value Units Date/Time    XR Chest 1 View [[id number]] Collected: 09/04/22 0754     Updated: 09/04/22 0758    Narrative:      EXAMINATION: Chest 1 view 09/04/2022     HISTORY: Pneumonia and CHF     FINDINGS: Today's exam is compared to  previous study one day earlier.  There is persistent diffuse mixed interstitial and alveolar disease  within both lungs with a small right-sided effusion with blunting of the  costophrenic angle. No change from the previous exam. There is no free  air beneath the hemidiaphragms.       Impression:      1.. Stable 1 day appearance of the chest.  This report was finalized on 09/04/2022 07:55 by Dr. Yonis Polo MD.             Intake/Output Summary (Last 24 hours) at 9/4/2022 1510  Last data filed at 9/4/2022 1400  Gross per 24 hour   Intake 600 ml   Output 1650 ml   Net -1050 ml       Physical Exam  Constitutional:       General: He is not in acute distress.     Appearance: Normal appearance.      Interventions: Nasal cannula in place.   HENT:      Head: Normocephalic and atraumatic.      Right Ear: External ear normal.      Left Ear: External ear normal.      Mouth: Mucous membranes are moist.      Pharynx: Oropharynx is clear.   Eyes:      General: No scleral icterus.     Conjunctiva/sclera: Conjunctivae normal.   Cardiovascular:      Rate and Rhythm: Normal rate and regular rhythm.      Pulses: Normal pulses.      Heart sounds: Normal heart sounds. No murmur heard.  Pulmonary:      Effort: Pulmonary effort is normal. No respiratory distress.      Breath sounds: A few basilar rales noted.  Abdominal:      General: Abdomen is flat. Bowel sounds are normal.      Palpations: Abdomen is soft. There is no mass.      Tenderness: There is no abdominal tenderness.   Musculoskeletal:         General: Normal range of motion.      Right lower leg: Edema present.      Left lower leg: Edema present.   Skin:     General: Skin is warm and dry.      Coloration: Skin is not pale.   Neurological:      General: No focal deficit present.      Mental Status: He is alert and oriented to person, place, and time. Mental status is at baseline.   Psychiatric:         Mood and Affect: Mood normal.         Judgment: Judgment normal.      Results Review:  I have reviewed the labs, radiology results, and diagnostic studies since my last progress note and made treatment changes reflective of the results.   I have reviewed the current medications.    Assessment/Plan     Active Hospital Problems    Diagnosis    • **Acute on chronic congestive heart failure, unspecified heart failure type (HCC)    • Aortic stenosis, moderate    • Moderate malnutrition (HCC)    • Paroxysmal atrial fibrillation (HCC)    • Hypertension    • Diabetes mellitus (HCC)        PLAN:  Transfer to CCU on BiPAP  Stat ABGs  Continue broad-spectrum IV antibiotics  High-dose sliding scale insulin  Diuresis per cardiology  Continue Solu-Medrol 40 mg every 12 hours    Electronically signed by Edwin Du DO, 09/04/22, 15:10 CDT.  I spent 40 minutes providing critical care management to this patient. This excludes time spent in performing separately billed procedures.

## 2022-09-05 PROBLEM — J96.01 ACUTE RESPIRATORY FAILURE WITH HYPOXIA: Status: ACTIVE | Noted: 2022-09-05

## 2022-09-05 PROBLEM — D63.8 ANEMIA, CHRONIC DISEASE: Status: ACTIVE | Noted: 2022-09-05

## 2022-09-05 PROBLEM — J44.9 COPD (CHRONIC OBSTRUCTIVE PULMONARY DISEASE): Status: ACTIVE | Noted: 2022-09-05

## 2022-09-05 PROBLEM — J90 BILATERAL PLEURAL EFFUSION: Status: ACTIVE | Noted: 2022-09-05

## 2022-09-05 PROBLEM — N17.9 ACUTE KIDNEY INJURY: Status: ACTIVE | Noted: 2022-09-05

## 2022-09-05 PROBLEM — I50.21 ACUTE SYSTOLIC HEART FAILURE: Status: ACTIVE | Noted: 2022-09-05

## 2022-09-05 PROBLEM — J84.9 INTERSTITIAL LUNG DISEASE: Status: ACTIVE | Noted: 2022-09-05

## 2022-09-05 PROBLEM — R33.8 ACUTE URINARY RETENTION: Status: ACTIVE | Noted: 2022-09-05

## 2022-09-05 PROBLEM — Z87.891 FORMER SMOKER: Status: ACTIVE | Noted: 2022-09-05

## 2022-09-05 LAB
ALBUMIN SERPL-MCNC: 3 G/DL (ref 3.5–5.2)
ALBUMIN/GLOB SERPL: 1.1 G/DL
ALP SERPL-CCNC: 98 U/L (ref 39–117)
ALT SERPL W P-5'-P-CCNC: 14 U/L (ref 1–41)
ANION GAP SERPL CALCULATED.3IONS-SCNC: 10 MMOL/L (ref 5–15)
ANION GAP SERPL CALCULATED.3IONS-SCNC: 11 MMOL/L (ref 5–15)
AST SERPL-CCNC: 20 U/L (ref 1–40)
BASOPHILS # BLD AUTO: 0.01 10*3/MM3 (ref 0–0.2)
BASOPHILS NFR BLD AUTO: 0.1 % (ref 0–1.5)
BILIRUB SERPL-MCNC: 0.3 MG/DL (ref 0–1.2)
BUN SERPL-MCNC: 54 MG/DL (ref 8–23)
BUN SERPL-MCNC: 60 MG/DL (ref 8–23)
BUN/CREAT SERPL: 36.5 (ref 7–25)
BUN/CREAT SERPL: 37.5 (ref 7–25)
CALCIUM SPEC-SCNC: 8.1 MG/DL (ref 8.6–10.5)
CALCIUM SPEC-SCNC: 8.5 MG/DL (ref 8.6–10.5)
CHLORIDE SERPL-SCNC: 95 MMOL/L (ref 98–107)
CHLORIDE SERPL-SCNC: 96 MMOL/L (ref 98–107)
CO2 SERPL-SCNC: 32 MMOL/L (ref 22–29)
CO2 SERPL-SCNC: 32 MMOL/L (ref 22–29)
CREAT SERPL-MCNC: 1.48 MG/DL (ref 0.76–1.27)
CREAT SERPL-MCNC: 1.6 MG/DL (ref 0.76–1.27)
CRP SERPL-MCNC: 1.88 MG/DL (ref 0–0.5)
DEPRECATED RDW RBC AUTO: 50.8 FL (ref 37–54)
EGFRCR SERPLBLD CKD-EPI 2021: 46.9 ML/MIN/1.73
EGFRCR SERPLBLD CKD-EPI 2021: 51.5 ML/MIN/1.73
EOSINOPHIL # BLD AUTO: 0 10*3/MM3 (ref 0–0.4)
EOSINOPHIL NFR BLD AUTO: 0 % (ref 0.3–6.2)
ERYTHROCYTE [DISTWIDTH] IN BLOOD BY AUTOMATED COUNT: 15.4 % (ref 12.3–15.4)
ERYTHROCYTE [SEDIMENTATION RATE] IN BLOOD: 42 MM/HR (ref 0–20)
GLOBULIN UR ELPH-MCNC: 2.8 GM/DL
GLUCOSE BLDC GLUCOMTR-MCNC: 168 MG/DL (ref 70–130)
GLUCOSE BLDC GLUCOMTR-MCNC: 221 MG/DL (ref 70–130)
GLUCOSE SERPL-MCNC: 324 MG/DL (ref 65–99)
GLUCOSE SERPL-MCNC: 411 MG/DL (ref 65–99)
HCT VFR BLD AUTO: 33.2 % (ref 37.5–51)
HGB BLD-MCNC: 10.3 G/DL (ref 13–17.7)
IMM GRANULOCYTES # BLD AUTO: 0.09 10*3/MM3 (ref 0–0.05)
IMM GRANULOCYTES NFR BLD AUTO: 0.7 % (ref 0–0.5)
LYMPHOCYTES # BLD AUTO: 0.29 10*3/MM3 (ref 0.7–3.1)
LYMPHOCYTES NFR BLD AUTO: 2.2 % (ref 19.6–45.3)
MCH RBC QN AUTO: 28.4 PG (ref 26.6–33)
MCHC RBC AUTO-ENTMCNC: 31 G/DL (ref 31.5–35.7)
MCV RBC AUTO: 91.5 FL (ref 79–97)
MONOCYTES # BLD AUTO: 0.6 10*3/MM3 (ref 0.1–0.9)
MONOCYTES NFR BLD AUTO: 4.5 % (ref 5–12)
NEUTROPHILS NFR BLD AUTO: 12.42 10*3/MM3 (ref 1.7–7)
NEUTROPHILS NFR BLD AUTO: 92.5 % (ref 42.7–76)
NRBC BLD AUTO-RTO: 0 /100 WBC (ref 0–0.2)
NT-PROBNP SERPL-MCNC: ABNORMAL PG/ML (ref 0–900)
PLATELET # BLD AUTO: 397 10*3/MM3 (ref 140–450)
PMV BLD AUTO: 10.7 FL (ref 6–12)
POTASSIUM SERPL-SCNC: 4.6 MMOL/L (ref 3.5–5.2)
POTASSIUM SERPL-SCNC: 4.7 MMOL/L (ref 3.5–5.2)
PROT SERPL-MCNC: 5.8 G/DL (ref 6–8.5)
RBC # BLD AUTO: 3.63 10*6/MM3 (ref 4.14–5.8)
SODIUM SERPL-SCNC: 138 MMOL/L (ref 136–145)
SODIUM SERPL-SCNC: 138 MMOL/L (ref 136–145)
WBC NRBC COR # BLD: 13.41 10*3/MM3 (ref 3.4–10.8)

## 2022-09-05 PROCEDURE — 25010000002 ACETAZOLAMIDE PER 500 MG: Performed by: INTERNAL MEDICINE

## 2022-09-05 PROCEDURE — 85652 RBC SED RATE AUTOMATED: CPT | Performed by: INTERNAL MEDICINE

## 2022-09-05 PROCEDURE — 86140 C-REACTIVE PROTEIN: CPT | Performed by: INTERNAL MEDICINE

## 2022-09-05 PROCEDURE — 94799 UNLISTED PULMONARY SVC/PX: CPT

## 2022-09-05 PROCEDURE — 82962 GLUCOSE BLOOD TEST: CPT

## 2022-09-05 PROCEDURE — 94664 DEMO&/EVAL PT USE INHALER: CPT

## 2022-09-05 PROCEDURE — 94761 N-INVAS EAR/PLS OXIMETRY MLT: CPT

## 2022-09-05 PROCEDURE — 85025 COMPLETE CBC W/AUTO DIFF WBC: CPT | Performed by: FAMILY MEDICINE

## 2022-09-05 PROCEDURE — 83880 ASSAY OF NATRIURETIC PEPTIDE: CPT | Performed by: HOSPITALIST

## 2022-09-05 PROCEDURE — 25010000002 CEFTRIAXONE PER 250 MG: Performed by: FAMILY MEDICINE

## 2022-09-05 PROCEDURE — 94660 CPAP INITIATION&MGMT: CPT

## 2022-09-05 PROCEDURE — 80053 COMPREHEN METABOLIC PANEL: CPT | Performed by: FAMILY MEDICINE

## 2022-09-05 PROCEDURE — 99233 SBSQ HOSP IP/OBS HIGH 50: CPT | Performed by: INTERNAL MEDICINE

## 2022-09-05 PROCEDURE — 25010000002 FUROSEMIDE PER 20 MG: Performed by: HOSPITALIST

## 2022-09-05 PROCEDURE — 25010000002 METHYLPREDNISOLONE PER 40 MG: Performed by: NURSE PRACTITIONER

## 2022-09-05 PROCEDURE — 63710000001 INSULIN LISPRO (HUMAN) PER 5 UNITS: Performed by: FAMILY MEDICINE

## 2022-09-05 PROCEDURE — 99233 SBSQ HOSP IP/OBS HIGH 50: CPT | Performed by: HOSPITALIST

## 2022-09-05 PROCEDURE — 63710000001 INSULIN DETEMIR PER 5 UNITS: Performed by: INTERNAL MEDICINE

## 2022-09-05 RX ORDER — ACETAZOLAMIDE 500 MG/5ML
500 INJECTION, POWDER, LYOPHILIZED, FOR SOLUTION INTRAVENOUS ONCE
Status: COMPLETED | OUTPATIENT
Start: 2022-09-05 | End: 2022-09-05

## 2022-09-05 RX ORDER — DOXYCYCLINE 100 MG/1
100 TABLET ORAL EVERY 12 HOURS SCHEDULED
Status: COMPLETED | OUTPATIENT
Start: 2022-09-05 | End: 2022-09-09

## 2022-09-05 RX ORDER — FUROSEMIDE 10 MG/ML
60 INJECTION INTRAMUSCULAR; INTRAVENOUS ONCE
Status: COMPLETED | OUTPATIENT
Start: 2022-09-05 | End: 2022-09-05

## 2022-09-05 RX ADMIN — RIVAROXABAN 20 MG: 20 TABLET, FILM COATED ORAL at 08:44

## 2022-09-05 RX ADMIN — INSULIN DETEMIR 20 UNITS: 100 INJECTION, SOLUTION SUBCUTANEOUS at 22:04

## 2022-09-05 RX ADMIN — CEFTRIAXONE 1 G: 1 INJECTION, POWDER, FOR SOLUTION INTRAMUSCULAR; INTRAVENOUS at 11:00

## 2022-09-05 RX ADMIN — AZITHROMYCIN 500 MG: 250 TABLET, FILM COATED ORAL at 08:44

## 2022-09-05 RX ADMIN — Medication 10 ML: at 22:36

## 2022-09-05 RX ADMIN — PANTOPRAZOLE SODIUM 40 MG: 40 TABLET, DELAYED RELEASE ORAL at 08:44

## 2022-09-05 RX ADMIN — INSULIN LISPRO 24 UNITS: 100 INJECTION, SOLUTION INTRAVENOUS; SUBCUTANEOUS at 11:31

## 2022-09-05 RX ADMIN — PANTOPRAZOLE SODIUM 40 MG: 40 TABLET, DELAYED RELEASE ORAL at 16:48

## 2022-09-05 RX ADMIN — FUROSEMIDE 60 MG: 10 INJECTION, SOLUTION INTRAMUSCULAR; INTRAVENOUS at 10:59

## 2022-09-05 RX ADMIN — IPRATROPIUM BROMIDE 0.5 MG: 0.5 SOLUTION RESPIRATORY (INHALATION) at 10:38

## 2022-09-05 RX ADMIN — INSULIN LISPRO 8 UNITS: 100 INJECTION, SOLUTION INTRAVENOUS; SUBCUTANEOUS at 16:48

## 2022-09-05 RX ADMIN — METHYLPREDNISOLONE SODIUM SUCCINATE 40 MG: 40 INJECTION, POWDER, FOR SOLUTION INTRAMUSCULAR; INTRAVENOUS at 03:11

## 2022-09-05 RX ADMIN — METOPROLOL TARTRATE 12.5 MG: 25 TABLET, FILM COATED ORAL at 10:59

## 2022-09-05 RX ADMIN — METHYLPREDNISOLONE SODIUM SUCCINATE 40 MG: 40 INJECTION, POWDER, FOR SOLUTION INTRAMUSCULAR; INTRAVENOUS at 15:17

## 2022-09-05 RX ADMIN — DOXYCYCLINE 100 MG: 100 TABLET, FILM COATED ORAL at 22:36

## 2022-09-05 RX ADMIN — METOPROLOL TARTRATE 12.5 MG: 25 TABLET, FILM COATED ORAL at 22:34

## 2022-09-05 RX ADMIN — ACETAZOLAMIDE 500 MG: 500 INJECTION, POWDER, LYOPHILIZED, FOR SOLUTION INTRAVENOUS at 11:02

## 2022-09-05 RX ADMIN — BUDESONIDE AND FORMOTEROL FUMARATE DIHYDRATE 2 PUFF: 160; 4.5 AEROSOL RESPIRATORY (INHALATION) at 06:33

## 2022-09-05 RX ADMIN — IPRATROPIUM BROMIDE 0.5 MG: 0.5 SOLUTION RESPIRATORY (INHALATION) at 06:33

## 2022-09-05 RX ADMIN — DOXYCYCLINE 100 MG: 100 TABLET, FILM COATED ORAL at 15:16

## 2022-09-05 RX ADMIN — ATORVASTATIN CALCIUM 20 MG: 10 TABLET, FILM COATED ORAL at 08:44

## 2022-09-05 RX ADMIN — BUDESONIDE AND FORMOTEROL FUMARATE DIHYDRATE 2 PUFF: 160; 4.5 AEROSOL RESPIRATORY (INHALATION) at 20:53

## 2022-09-05 RX ADMIN — IPRATROPIUM BROMIDE 0.5 MG: 0.5 SOLUTION RESPIRATORY (INHALATION) at 14:28

## 2022-09-05 RX ADMIN — IPRATROPIUM BROMIDE 0.5 MG: 0.5 SOLUTION RESPIRATORY (INHALATION) at 20:53

## 2022-09-05 RX ADMIN — INSULIN LISPRO 16 UNITS: 100 INJECTION, SOLUTION INTRAVENOUS; SUBCUTANEOUS at 08:43

## 2022-09-05 NOTE — PROGRESS NOTES
PULMONARY AND CRITICAL CARE PROGRESS NOTE - [facility]    Patient: Chito Govea  1955   MR# [id number]   Acct# [id number]  09/05/22   09:01 CDT  Referring Provider: Ellis Rey MD    Chief Complaint: Hypoxia, shortness of breath     Interval history: He is up in the chair.  Breathing comfortably on Vapotherm 40 L and FiO2 1.0 with a saturation 92.  BiPAP 16/8 on standby.  Pulse irregular.  Rate 117.  Case discussed with cardiology.  Amiodarone discontinued for concerns over toxicity/pneumonitis.  Plans to add beta-blocker today for rate control.  QTc has been prolonged. He is on zithromax and rocephin for ? Pneumonia. His only complaint is nasal dryness.  No chest x-ray for review.  Will order follow-up x-ray for tomorrow.  Labs stable.    Meds:  atorvastatin, 20 mg, Oral, Daily  azithromycin, 500 mg, Oral, Q24H  bisacodyl, 10 mg, Rectal, Daily  budesonide-formoterol, 2 puff, Inhalation, BID - RT  cefTRIAXone, 1 g, Intravenous, Q24H  furosemide, 60 mg, Intravenous, Once  insulin lispro, 0-24 Units, Subcutaneous, TID AC  ipratropium, 0.5 mg, Nebulization, 4x Daily - RT  methylPREDNISolone sodium succinate, 40 mg, Intravenous, Q12H  metoprolol tartrate, 12.5 mg, Oral, Q12H  pantoprazole, 40 mg, Oral, BID AC  rivaroxaban, 20 mg, Oral, Daily      nitroglycerin, 10-50 mcg/min, Last Rate: Stopped (09/01/22 0815)      Review of Systems:   Review of Systems     Constitutional: Negative.    HENT: Negative.    Respiratory: Positive for cough and shortness of breath.    Cardiovascular: Positive for leg swelling.   Gastrointestinal: Negative.    Skin: Negative.    Neurological: Negative.    Psychiatric/Behavioral: Negative.      Physical Exam:  SpO2 Percentage    09/05/22 0640 09/05/22 0700 09/05/22 0800   SpO2: 94% 100% 93%     Body mass index is 22.19 kg/m².   Temp:  [97.9 °F (36.6 °C)-98.3 °F (36.8 °C)] 98 °F (36.7 °C)  Heart Rate:  [] 125  Resp:  [18-28] 22  BP: (103-141)/(61-96)  119/85    Intake/Output Summary (Last 24 hours) at 9/5/2022 0901  Last data filed at 9/5/2022 0400  Gross per 24 hour   Intake 240 ml   Output 1950 ml   Net -1710 ml     Physical Exam     Vitals and nursing note reviewed.   Constitutional:       Appearance: He is well-developed.  Sitting up in chair     Interventions: Nasal cannula in place.      Comments: High flow oxygen in place   HENT:      Head: Normocephalic and atraumatic.   Eyes:      Pupils: Pupils are equal, round, and reactive to light.   Cardiovascular:      Rate and Rhythm: Normal rate and regular rhythm.   Pulmonary:      Effort: No tachypnea or respiratory distress.      Breath sounds: Decreased breath sounds present. No wheezing.   Abdominal:      General: Bowel sounds are normal. There is no distension.      Palpations: Abdomen is soft.   Musculoskeletal:         General: Normal range of motion.      Cervical back: Normal range of motion and neck supple.      Right lower leg: Edema present.      Left lower leg: Edema present.   Skin:     General: Skin is warm and dry.      Nails: There is clubbing  Neurological:      Mental Status: He is alert and oriented to person, place, and time.   Psychiatric:      Comments: Calm, pleasant    Electronically signed by MARKELL Lai, 9/5/2022, 09:01 CDT      Physician Substantive Portion: Medical Decision Making    Laboratory Data:  Results from last 7 days   Lab Units 09/05/22  0513 09/03/22  1111 09/01/22  0048   WBC 10*3/mm3 13.41* 8.48 8.48   HEMOGLOBIN g/dL 10.3* 10.5* 11.1*   PLATELETS 10*3/mm3 397 344 422     Results from last 7 days   Lab Units 09/05/22  0513 09/04/22  1737 09/04/22  0510 09/01/22  1938 09/01/22  0048   SODIUM mmol/L 138 138 139   < > 141   POTASSIUM mmol/L 4.7 4.7 4.2   < > 4.6   BUN mg/dL 54* 51* 39*   < > 23   CREATININE mg/dL 1.48* 1.60* 1.26   < > 1.15   INR   --   --   --   --  1.96*    < > = values in this interval not displayed.     Results from last 7 days   Lab Units  09/04/22  1610 09/03/22  1827 09/01/22  0510   PH, ARTERIAL pH units 7.487* 7.523* 7.494*   PCO2, ARTERIAL mm Hg 40.3 38.4 35.4   PO2 ART mm Hg 279.0* 89.0 65.8*   FIO2 % 100  --  40     Blood Culture   Date Value Ref Range Status   09/01/2022 No growth at 4 days  Preliminary   09/01/2022 No growth at 4 days  Preliminary     Recent films:  Adult Transthoracic Echo Complete W/ Cont if Necessary Per Protocol    Result Date: 9/4/2022  · Estimated left ventricular EF was in agreement with the calculated left ventricular EF. Left ventricular ejection fraction appears to be 36 - 40%. Left ventricular systolic function is moderately decreased. · The following left ventricular wall segments are hypokinetic: mid anterior, apical anterior, apical lateral, apical inferior, mid inferior, apical septal, basal inferoseptal, mid inferoseptal, apex hypokinetic, mid anteroseptal, basal inferior and basal inferoseptal. · Moderate aortic valve stenosis is present. · Left ventricular wall thickness is consistent with mild concentric hypertrophy. · Left atrial volume is moderately increased. · Estimated right ventricular systolic pressure from tricuspid regurgitation is mildly elevated (35-45 mmHg). · Normal size and function of the right ventricle. · There is a small (<1cm) pericardial effusion. · Compared to recent exam from 8/11/22, LVEF has declined and new wall motion abnormalities are present.      XR Chest 1 View    Result Date: 9/4/2022  EXAMINATION: Chest 1 view 09/04/2022  HISTORY: Pneumonia and CHF  FINDINGS: Today's exam is compared to previous study one day earlier. There is persistent diffuse mixed interstitial and alveolar disease within both lungs with a small right-sided effusion with blunting of the costophrenic angle. No change from the previous exam. There is no free air beneath the hemidiaphragms.      Impression: 1.. Stable 1 day appearance of the chest. This report was finalized on 09/04/2022 07:55 by Dr. Somers  MD Christ.    XR Chest 1 View    Result Date: 9/3/2022  EXAM/TECHNIQUE: XR CHEST 1 VW-  INDICATION: Dyspnea, pulm edema, pleural eff; I50.9-Heart failure, unspecified; J96.01-Acute respiratory failure with hypoxia  COMPARISON: 09/01/2022  FINDINGS:  Cardiac silhouette is within normal limits and stable. No significant change in diffuse bilateral interstitial and airspace opacity. Small bilateral pleural effusions appear unchanged. Granulomatous calcifications in both hilar regions are noted. No pneumothorax. No acute osseous finding.      Impression:  No significant interval change in moderate pulmonary edema and small bilateral pleural effusions. This report was finalized on 09/03/2022 13:02 by Dr. Kishor Escobedo MD.     My radiograph interpretation/independent review of imaging: Reviewed and agree with interpretation.  Still has bilateral pulm infiltrate and pleural effusion    Pulmonary Assessment:  1. Acute hypoxic respiratory failure  2. Abnormal CT chest with bilateral pulmonary infiltrate pleural effusion and atelectasis  3. Likely inflammatory pneumonitis versus community-acquired pneumonia  4. Congestive diastolic heart failure and bilateral pleural effusion  5. Chronic obstructive pulmonary disease  6. History of tobacco abuse  7. Atrial fibrillation rapid ventricular response  8. Acute kidney injury and metabolic acidosis recently  9. Chronic kidney disease stage III  10. Hypertension  11. Diabetes mellitus type 2  12. Aortic stenosis moderate  13. Mild pulmonary hypertension  14. Hyponatremia  15. On chronic anticoagulation    Recommend/plan:   · Patient moved from [place] to ICU yesterday due to increased oxygen need and high blood glucose  · He had high heart rate which is better controlled with beta-blockers and currently getting metoprolol.  Heart rate is in 60s now.  · He had increased oxygen requirement and switched from 15 L high flow to Vapotherm and currently on 40 L With 80% FiO2.  · Is  sitting comfortably in the bed and did not have any acute respiratory complaints.   · He is currently getting high-dose sliding scale insulin.  Levemir insulin added.  · Continue current dose of steroid.  On methylprednisone 40 mg twice a day.  · Continue bronchodilator treatment routine respiratory care.  Incentive spirometry to improve pulmonary compliance.  · Pain and anxiety control.  DVT and stress ulcer prophylaxis.  ·  Repeat labs and imaging studies from time to time.  · Plan for outpatient pulmonary clinic follow-up and work-up when he is more stable for underlying COPD due to long history of tobacco use.  · Discussed care plan with MILES Andrea.  · CODE STATUS: Full.  Overall prognosis: Guarded.  · We will follow.    This visit was performed by both a physician and an [facility] RN.  I personally evaluated and examined the patient.  I performed all aspects of the medical decision making as documented.    Electronically signed by     Stew Dawson MD,  Pulmonologist/Intensivist   9/5/2022, 12:42 CDT

## 2022-09-05 NOTE — PROGRESS NOTES
"    [facility] Medicine Services  INPATIENT PROGRESS NOTE    Patient Name: Chito Govea  Date of Admission: 9/1/2022  Today's Date: 09/05/22  Length of Stay: 4  Primary Care Physician: Chito Rubio MD    Subjective   Chief Complaint: shortness of breath  HPI     Patient was seen and examined at bedside.  The patient was sitting up in his chair and on Vapotherm.  Patient is in no respiratory distress at this point in time.  Patient does remain on high settings of Vapotherm.  Patient indicates that he feels much better sitting up, he gets worsening shortness of breath when he attempts to lay down.  Patient doing BiPAP at night sometimes.  Patient has had no fever or chills.  Patient denies nausea or vomiting.  Patient having adequate p.o. intake.  [place] catheter remains in place.  Patient indicates that he attempted to urinate when he got here, and could not get \"it to come out\".      Point-of-care ultrasound was performed, patient has curly B-lines bilateral lungs, large pleural effusions, IVC caliber is noted to be plump, without significant distention with sniffing.    Review of Systems   Constitutional: Positive for fatigue. Negative for chills and fever.   Respiratory: Positive for shortness of breath. Negative for cough.    Cardiovascular: Negative for chest pain and palpitations.   Gastrointestinal: Negative for abdominal distention, abdominal pain, diarrhea, nausea and vomiting.   Genitourinary: Positive for difficulty urinating.        All pertinent negatives and positives are as above. All other systems have been reviewed and are negative unless otherwise stated.     Objective    Temp:  [97.9 °F (36.6 °C)-98.5 °F (36.9 °C)] 98.5 °F (36.9 °C)  Heart Rate:  [] 88  Resp:  [18-28] 22  BP: ()/(61-96) 96/62  Physical Exam  Vitals and nursing note reviewed.   Constitutional:       Appearance: Normal appearance. He is ill-appearing.   HENT:      Head: Normocephalic " and atraumatic.      Mouth/Throat:      Mouth: Mucous membranes are dry.      Pharynx: Oropharynx is clear.   Eyes:      General: No scleral icterus.     Conjunctiva/sclera: Conjunctivae normal.   Cardiovascular:      Rate and Rhythm: Tachycardia present. Rhythm irregular.      Comments: No signficant JVD; no HPJ reflux  Pulmonary:      Effort: Pulmonary effort is normal.      Breath sounds: Rales (coarse, mild) present.      Comments: Very diminished at bases   Abdominal:      General: Abdomen is flat. Bowel sounds are normal. There is no distension.      Palpations: Abdomen is soft. There is no mass.      Tenderness: There is no abdominal tenderness.   Musculoskeletal:      Right lower leg: Edema present.      Left lower leg: Edema present.   Skin:     General: Skin is warm and dry.      Findings: No rash.   Neurological:      General: No focal deficit present.      Mental Status: He is alert and oriented to person, place, and time.   Psychiatric:         Mood and Affect: Mood normal.         Behavior: Behavior normal.             Results Review:  I have reviewed the labs, radiology results, and diagnostic studies.    Laboratory Data:   Results from last 7 days   Lab Units 09/05/22  0513 09/03/22  1111 09/01/22  0048   WBC 10*3/mm3 13.41* 8.48 8.48   HEMOGLOBIN g/dL 10.3* 10.5* 11.1*   HEMATOCRIT % 33.2* 34.1* 36.4*   PLATELETS 10*3/mm3 397 344 422        Results from last 7 days   Lab Units 09/05/22  0513 09/04/22  1737 09/04/22  0510 09/01/22  1938 09/01/22  0048   SODIUM mmol/L 138 138 139   < > 141   POTASSIUM mmol/L 4.7 4.7 4.2   < > 4.6   CHLORIDE mmol/L 95* 95* 95*   < > 103   CO2 mmol/L 32.0* 31.0* 30.0*   < > 24.0   BUN mg/dL 54* 51* 39*   < > 23   CREATININE mg/dL 1.48* 1.60* 1.26   < > 1.15   CALCIUM mg/dL 8.5* 8.5* 8.3*   < > 8.4*   BILIRUBIN mg/dL 0.3  --   --   --  0.5   ALK PHOS U/L 98  --   --   --  115   ALT (SGPT) U/L 14  --   --   --  16   AST (SGOT) U/L 20  --   --   --  15   GLUCOSE mg/dL 324*  318* 341*   < > 266*    < > = values in this interval not displayed.       Culture Data:   Blood Culture   Date Value Ref Range Status   09/01/2022 No growth at 4 days  Preliminary   09/01/2022 No growth at 4 days  Preliminary       Radiology Data:   Imaging Results (Last 24 Hours)     ** No results found for the last 24 hours. **          I have reviewed the patient's current medications.     Assessment/Plan     Active Hospital Problems    Diagnosis    • **Acute systolic heart failure (HCC), EF 36-40% on 9/2    • Bilateral pleural effusion    • COPD (chronic obstructive pulmonary disease) (HCC)    • Former smoker, quit 7/2022    • Acute kidney injury (HCC) superimposed on CKD Stage 2    • Anemia, chronic disease    • Suspected Interstitial lung disease (HCC), likely due to amiodarone    • Acute respiratory failure with hypoxia (HCC)    • Acute urinary retention requiring alonzo catheter    • Type 2 MI (myocardial infarction) (HCC) due to hypoxia and congestive heart failure    • Aortic stenosis, moderate    • Moderate malnutrition (HCC)    • Paroxysmal atrial fibrillation (HCC)    • Hypertension    • Type 2 diabetes mellitus with hyperglycemia, without long-term current use of insulin ([facility])        Plan:  Patient was admitted on 9/1 by Dr. Pena for shortness of breath.  Patient follows with Dr. Rubio for PCP and Dr. Devine for CHF.  Patient was recently admitted on 8/9-8/15 for CONSUELO, atrial fibrillation.  Patient was found during that admission to have a Cr of 6.96 and BUN >200 with hyperkalemia.  At that time, the patient was felt to have significant dehydration during that admission and was treated with IVF and patient had difficulty controlling HR and was started on amiodarone.    Upon admission, patient was noted to have a BNP of 33,372 and was noted to have tachypnea and hypoxia per Dr. Pena's note with O2 sat 78% upon presentation to ER.  Patient was admitted for suspected Acute on Chronic diastolic heart  failure likely related to aortic stenosis and atrial fibrillation with RVR.  The patient's echo in 8/11/2022 showed normal EF and moderate AS.  Patient has been diuresed with IV diuretics since admission.  Patient is net negative 6.2 L for the hospitalization.  Requested standing weight.  Cardiology has been consulted, appreciate assistance.  Repeat echo 9/4/2022,Left ventricular ejection fraction appears to be 36 - 40%. Left ventricular systolic function is moderately decreased.  The following left ventricular wall segments are hypokinetic: mid anterior, apical anterior, apical lateral, apical inferior, mid inferior, apical septal, basal inferoseptal, mid inferoseptal, apex hypokinetic, mid anteroseptal, basal inferior and basal inferoseptal. Moderate aortic valve stenosis is present.  Left atrial volume is moderately increased.  Estimated right ventricular systolic pressure from tricuspid regurgitation is mildly elevated (35-45 mmHg).  Cardiology gave patient IV lasix 60 mg today.  Will give the patient 500 mg of diamox as well.      Patient has a bump in his Cr and meets criteria for CONSUELO.  Patients baseline Cr is ~1.  Patient Cr bumped to 1.6 on 9/4/2022.  This is likely fluctuation due to diuresis, patient also noted to be more alkalotic (likely contraction alkalosis).  Baseline GFR places patient in CKD Stage 2.  Monitor renal function closely.    Pulmonary consulted for concern for lung disease.  Discussed the case with Tomas GUILLAUME.  We both agree with Dr. Uriostegui who is concerned about amiodarone toxicity.  Although amiodarone toxicity typically occurs with continue cumulative dose of medication over time, there is some literature, however, supporting acute toxicity.  Patient has only been on this medication since mid-August 2022.  The distribution of the imaging and the clinical examination do raise concern for ILD.  Often eosinophils will be elevated, patient has been on steroids which can interfere with this  result.  The patient does have an elevated CRP and sedimentation rate.  Treatment essentially is to remove the offending agent and PO steroid treatment and taper.      There is significant change compared to CT scan performed just a month ago.  Do not think this is all related to pulmonary edema.  New CT scan (CT angiogram 9/1) compared with old CT scan (CT wo contrast 8/9):    Upper lobs:      At the level of the sondra:      Lower lobes:     The bilateral distribution is somewhat perivascular but has some honey-combing and appears almost fibrotic as opposed to just pulmonary edema.  The other finding is bilateral pleural effusions.  May consider thoracentesis.      Although low suspicion for bacterial pneumonia, continue antibiotics.  Previously on ceftriaxone and azithromycin.  D/C azithromycin due to QTc prolongation properties.  Will add doxycycline for atypical coverage.        The patient is on solumedrol 40 mg BID and atrovent nebs.  No albuterol due to HR.      Patient does have symptoms of orthopnea, but does not have significant JVD.  Suspect that patients bilateral pleural effusions layer out when he lays flat covering more area causing shortness of breath and causes increased work of breath.  Patient does not have hypoxia or significant JVD when laying flat.      Metoprolol 12.5 mg for rate control for his atrial fibrillation.  Continue home xarelto for stroke prophylaxis in the setting of atrial fibrillation.      For the patient's diabetes, it has been poorly controlled.  Patient had a hemoglobin A1c of 8.40 upon admission.  Patient's sugars have been >200-300.  Will add levemir 20 units qhs and increase intensity of his sliding scale insulin.     PT/OT    Continue alonzo for now.  Patient had some urinary retention upon arrival.  Continue flomax.  Will attempt to remove tomorrow.            Discharge Planning: I expect the patient to be discharged to  in >2 days.    Electronically signed by Ellis Jacobsen  MD Krissy, 09/05/22, 10:23 CDT.

## 2022-09-06 ENCOUNTER — APPOINTMENT (OUTPATIENT)
Dept: GENERAL RADIOLOGY | Facility: HOSPITAL | Age: 67
End: 2022-09-06

## 2022-09-06 LAB
ALBUMIN SERPL-MCNC: 3.1 G/DL (ref 3.5–5.2)
ALBUMIN/GLOB SERPL: 1 G/DL
ALP SERPL-CCNC: 111 U/L (ref 39–117)
ALT SERPL W P-5'-P-CCNC: 19 U/L (ref 1–41)
ANION GAP SERPL CALCULATED.3IONS-SCNC: 10 MMOL/L (ref 5–15)
ANION GAP SERPL CALCULATED.3IONS-SCNC: 12 MMOL/L (ref 5–15)
AST SERPL-CCNC: 26 U/L (ref 1–40)
BACTERIA SPEC AEROBE CULT: NORMAL
BACTERIA SPEC AEROBE CULT: NORMAL
BASOPHILS # BLD AUTO: 0.01 10*3/MM3 (ref 0–0.2)
BASOPHILS NFR BLD AUTO: 0.1 % (ref 0–1.5)
BILIRUB SERPL-MCNC: 0.2 MG/DL (ref 0–1.2)
BUN SERPL-MCNC: 68 MG/DL (ref 8–23)
BUN SERPL-MCNC: 68 MG/DL (ref 8–23)
BUN/CREAT SERPL: 43 (ref 7–25)
BUN/CREAT SERPL: 48.2 (ref 7–25)
CALCIUM SPEC-SCNC: 8.7 MG/DL (ref 8.6–10.5)
CALCIUM SPEC-SCNC: 9 MG/DL (ref 8.6–10.5)
CHLORIDE SERPL-SCNC: 98 MMOL/L (ref 98–107)
CHLORIDE SERPL-SCNC: 99 MMOL/L (ref 98–107)
CO2 SERPL-SCNC: 30 MMOL/L (ref 22–29)
CO2 SERPL-SCNC: 31 MMOL/L (ref 22–29)
CREAT SERPL-MCNC: 1.41 MG/DL (ref 0.76–1.27)
CREAT SERPL-MCNC: 1.58 MG/DL (ref 0.76–1.27)
DEPRECATED RDW RBC AUTO: 52.5 FL (ref 37–54)
EGFRCR SERPLBLD CKD-EPI 2021: 47.6 ML/MIN/1.73
EGFRCR SERPLBLD CKD-EPI 2021: 54.6 ML/MIN/1.73
EOSINOPHIL # BLD AUTO: 0 10*3/MM3 (ref 0–0.4)
EOSINOPHIL NFR BLD AUTO: 0 % (ref 0.3–6.2)
ERYTHROCYTE [DISTWIDTH] IN BLOOD BY AUTOMATED COUNT: 15.1 % (ref 12.3–15.4)
GLOBULIN UR ELPH-MCNC: 3 GM/DL
GLUCOSE BLDC GLUCOMTR-MCNC: 125 MG/DL (ref 70–130)
GLUCOSE BLDC GLUCOMTR-MCNC: 155 MG/DL (ref 70–130)
GLUCOSE BLDC GLUCOMTR-MCNC: 165 MG/DL (ref 70–130)
GLUCOSE BLDC GLUCOMTR-MCNC: 234 MG/DL (ref 70–130)
GLUCOSE BLDC GLUCOMTR-MCNC: 304 MG/DL (ref 70–130)
GLUCOSE SERPL-MCNC: 191 MG/DL (ref 65–99)
GLUCOSE SERPL-MCNC: 194 MG/DL (ref 65–99)
HCT VFR BLD AUTO: 33.9 % (ref 37.5–51)
HGB BLD-MCNC: 10.3 G/DL (ref 13–17.7)
IMM GRANULOCYTES # BLD AUTO: 0.11 10*3/MM3 (ref 0–0.05)
IMM GRANULOCYTES NFR BLD AUTO: 0.7 % (ref 0–0.5)
LYMPHOCYTES # BLD AUTO: 0.32 10*3/MM3 (ref 0.7–3.1)
LYMPHOCYTES NFR BLD AUTO: 2 % (ref 19.6–45.3)
MCH RBC QN AUTO: 28.7 PG (ref 26.6–33)
MCHC RBC AUTO-ENTMCNC: 30.4 G/DL (ref 31.5–35.7)
MCV RBC AUTO: 94.4 FL (ref 79–97)
MONOCYTES # BLD AUTO: 0.65 10*3/MM3 (ref 0.1–0.9)
MONOCYTES NFR BLD AUTO: 4.1 % (ref 5–12)
NEUTROPHILS NFR BLD AUTO: 14.68 10*3/MM3 (ref 1.7–7)
NEUTROPHILS NFR BLD AUTO: 93.1 % (ref 42.7–76)
NRBC BLD AUTO-RTO: 0 /100 WBC (ref 0–0.2)
PLATELET # BLD AUTO: 401 10*3/MM3 (ref 140–450)
PMV BLD AUTO: 10.7 FL (ref 6–12)
POTASSIUM SERPL-SCNC: 3.9 MMOL/L (ref 3.5–5.2)
POTASSIUM SERPL-SCNC: 4.3 MMOL/L (ref 3.5–5.2)
PROCALCITONIN SERPL-MCNC: 0.07 NG/ML (ref 0–0.25)
PROT SERPL-MCNC: 6.1 G/DL (ref 6–8.5)
QT INTERVAL: 338 MS
QTC INTERVAL: 504 MS
RBC # BLD AUTO: 3.59 10*6/MM3 (ref 4.14–5.8)
SODIUM SERPL-SCNC: 138 MMOL/L (ref 136–145)
SODIUM SERPL-SCNC: 142 MMOL/L (ref 136–145)
WBC NRBC COR # BLD: 15.77 10*3/MM3 (ref 3.4–10.8)

## 2022-09-06 PROCEDURE — 94761 N-INVAS EAR/PLS OXIMETRY MLT: CPT

## 2022-09-06 PROCEDURE — 99233 SBSQ HOSP IP/OBS HIGH 50: CPT | Performed by: NURSE PRACTITIONER

## 2022-09-06 PROCEDURE — 99232 SBSQ HOSP IP/OBS MODERATE 35: CPT | Performed by: HOSPITALIST

## 2022-09-06 PROCEDURE — 25010000002 FUROSEMIDE PER 20 MG: Performed by: INTERNAL MEDICINE

## 2022-09-06 PROCEDURE — 25010000002 METHYLPREDNISOLONE PER 40 MG: Performed by: INTERNAL MEDICINE

## 2022-09-06 PROCEDURE — 94799 UNLISTED PULMONARY SVC/PX: CPT

## 2022-09-06 PROCEDURE — 63710000001 INSULIN LISPRO (HUMAN) PER 5 UNITS: Performed by: FAMILY MEDICINE

## 2022-09-06 PROCEDURE — 25010000002 CEFTRIAXONE PER 250 MG: Performed by: FAMILY MEDICINE

## 2022-09-06 PROCEDURE — 85025 COMPLETE CBC W/AUTO DIFF WBC: CPT | Performed by: INTERNAL MEDICINE

## 2022-09-06 PROCEDURE — 82962 GLUCOSE BLOOD TEST: CPT

## 2022-09-06 PROCEDURE — 80053 COMPREHEN METABOLIC PANEL: CPT | Performed by: INTERNAL MEDICINE

## 2022-09-06 PROCEDURE — 94664 DEMO&/EVAL PT USE INHALER: CPT

## 2022-09-06 PROCEDURE — 94660 CPAP INITIATION&MGMT: CPT

## 2022-09-06 PROCEDURE — 25010000002 ACETAZOLAMIDE PER 500 MG: Performed by: INTERNAL MEDICINE

## 2022-09-06 PROCEDURE — 84145 PROCALCITONIN (PCT): CPT | Performed by: INTERNAL MEDICINE

## 2022-09-06 PROCEDURE — 63710000001 INSULIN DETEMIR PER 5 UNITS: Performed by: INTERNAL MEDICINE

## 2022-09-06 PROCEDURE — 99233 SBSQ HOSP IP/OBS HIGH 50: CPT | Performed by: INTERNAL MEDICINE

## 2022-09-06 PROCEDURE — 25010000002 METHYLPREDNISOLONE PER 40 MG: Performed by: NURSE PRACTITIONER

## 2022-09-06 PROCEDURE — 71045 X-RAY EXAM CHEST 1 VIEW: CPT

## 2022-09-06 RX ORDER — FUROSEMIDE 10 MG/ML
60 INJECTION INTRAMUSCULAR; INTRAVENOUS ONCE
Status: COMPLETED | OUTPATIENT
Start: 2022-09-06 | End: 2022-09-06

## 2022-09-06 RX ORDER — TAMSULOSIN HYDROCHLORIDE 0.4 MG/1
0.4 CAPSULE ORAL DAILY
Status: DISCONTINUED | OUTPATIENT
Start: 2022-09-06 | End: 2022-09-10 | Stop reason: HOSPADM

## 2022-09-06 RX ORDER — METHYLPREDNISOLONE SODIUM SUCCINATE 40 MG/ML
40 INJECTION, POWDER, LYOPHILIZED, FOR SOLUTION INTRAMUSCULAR; INTRAVENOUS EVERY 8 HOURS
Status: DISCONTINUED | OUTPATIENT
Start: 2022-09-06 | End: 2022-09-07

## 2022-09-06 RX ORDER — LIDOCAINE HYDROCHLORIDE 10 MG/ML
10 INJECTION, SOLUTION INFILTRATION; PERINEURAL ONCE
Status: DISCONTINUED | OUTPATIENT
Start: 2022-09-06 | End: 2022-09-08

## 2022-09-06 RX ORDER — ACETAZOLAMIDE 500 MG/5ML
500 INJECTION, POWDER, LYOPHILIZED, FOR SOLUTION INTRAVENOUS ONCE
Status: COMPLETED | OUTPATIENT
Start: 2022-09-06 | End: 2022-09-06

## 2022-09-06 RX ADMIN — IPRATROPIUM BROMIDE 0.5 MG: 0.5 SOLUTION RESPIRATORY (INHALATION) at 14:41

## 2022-09-06 RX ADMIN — METHYLPREDNISOLONE SODIUM SUCCINATE 40 MG: 40 INJECTION, POWDER, FOR SOLUTION INTRAMUSCULAR; INTRAVENOUS at 03:49

## 2022-09-06 RX ADMIN — PANTOPRAZOLE SODIUM 40 MG: 40 TABLET, DELAYED RELEASE ORAL at 17:03

## 2022-09-06 RX ADMIN — BUDESONIDE AND FORMOTEROL FUMARATE DIHYDRATE 2 PUFF: 160; 4.5 AEROSOL RESPIRATORY (INHALATION) at 06:38

## 2022-09-06 RX ADMIN — METOPROLOL TARTRATE 12.5 MG: 25 TABLET, FILM COATED ORAL at 14:38

## 2022-09-06 RX ADMIN — RIVAROXABAN 15 MG: 15 TABLET, FILM COATED ORAL at 17:23

## 2022-09-06 RX ADMIN — ACETAZOLAMIDE 500 MG: 500 INJECTION, POWDER, LYOPHILIZED, FOR SOLUTION INTRAVENOUS at 08:47

## 2022-09-06 RX ADMIN — IPRATROPIUM BROMIDE 0.5 MG: 0.5 SOLUTION RESPIRATORY (INHALATION) at 06:38

## 2022-09-06 RX ADMIN — DOXYCYCLINE 100 MG: 100 TABLET, FILM COATED ORAL at 08:46

## 2022-09-06 RX ADMIN — INSULIN LISPRO 16 UNITS: 100 INJECTION, SOLUTION INTRAVENOUS; SUBCUTANEOUS at 17:02

## 2022-09-06 RX ADMIN — METOPROLOL TARTRATE 12.5 MG: 25 TABLET, FILM COATED ORAL at 08:46

## 2022-09-06 RX ADMIN — BUDESONIDE AND FORMOTEROL FUMARATE DIHYDRATE 2 PUFF: 160; 4.5 AEROSOL RESPIRATORY (INHALATION) at 20:00

## 2022-09-06 RX ADMIN — ATORVASTATIN CALCIUM 20 MG: 10 TABLET, FILM COATED ORAL at 08:46

## 2022-09-06 RX ADMIN — IPRATROPIUM BROMIDE 0.5 MG: 0.5 SOLUTION RESPIRATORY (INHALATION) at 20:00

## 2022-09-06 RX ADMIN — Medication 10 ML: at 20:56

## 2022-09-06 RX ADMIN — ANTACID TABLETS 2 TABLET: 500 TABLET, CHEWABLE ORAL at 21:59

## 2022-09-06 RX ADMIN — METOPROLOL TARTRATE 12.5 MG: 25 TABLET, FILM COATED ORAL at 20:55

## 2022-09-06 RX ADMIN — METHYLPREDNISOLONE SODIUM SUCCINATE 40 MG: 40 INJECTION, POWDER, FOR SOLUTION INTRAMUSCULAR; INTRAVENOUS at 20:53

## 2022-09-06 RX ADMIN — Medication 6 MG: at 21:59

## 2022-09-06 RX ADMIN — CEFTRIAXONE 1 G: 1 INJECTION, POWDER, FOR SOLUTION INTRAMUSCULAR; INTRAVENOUS at 12:07

## 2022-09-06 RX ADMIN — INSULIN LISPRO 8 UNITS: 100 INJECTION, SOLUTION INTRAVENOUS; SUBCUTANEOUS at 12:07

## 2022-09-06 RX ADMIN — TAMSULOSIN HYDROCHLORIDE 0.4 MG: 0.4 CAPSULE ORAL at 10:49

## 2022-09-06 RX ADMIN — METHYLPREDNISOLONE SODIUM SUCCINATE 40 MG: 40 INJECTION, POWDER, FOR SOLUTION INTRAMUSCULAR; INTRAVENOUS at 12:07

## 2022-09-06 RX ADMIN — PANTOPRAZOLE SODIUM 40 MG: 40 TABLET, DELAYED RELEASE ORAL at 07:21

## 2022-09-06 RX ADMIN — INSULIN DETEMIR 20 UNITS: 100 INJECTION, SOLUTION SUBCUTANEOUS at 20:59

## 2022-09-06 RX ADMIN — FUROSEMIDE 60 MG: 10 INJECTION, SOLUTION INTRAMUSCULAR; INTRAVENOUS at 08:47

## 2022-09-06 RX ADMIN — DOXYCYCLINE 100 MG: 100 TABLET, FILM COATED ORAL at 20:53

## 2022-09-06 NOTE — PROGRESS NOTES
"Pharmacy Dosing Service  Automatic Renal Adjustment  Xarelto    Assessment/Action/Plan:  Based on prescribing information and current renal function, Xarelto 20 mg PO every 24 hours has been changed to Xarelto 15 mg PO every 24 hours. Pharmacy will continue to monitor daily and make further adjustment(s) accordingly.     Subjective:  Cihto Govea is a 66 y.o. male     Additional Factors Considered:  Patient disposition per documentation  Disease state or condition being treated    Objective:  Ht: 176.8 cm (69.61\"); Wt: 65 kg (143 lb 6.4 oz)  Estimated Creatinine Clearance: 41.7 mL/min (A) (by C-G formula based on SCr of 1.58 mg/dL (H)).   Creatinine   Date Value Ref Range Status   09/06/2022 1.58 (H) 0.76 - 1.27 mg/dL Final   09/05/2022 1.60 (H) 0.76 - 1.27 mg/dL Final   09/05/2022 1.48 (H) 0.76 - 1.27 mg/dL Final   08/23/2022 0.9 0.5 - 1.2 mg/dL Final   03/16/2022 1 0.5 - 1.2 mg/dL Final   10/02/2020 1.1 0.5 - 1.2 mg/dL Final       LACEY URIBE, PharmD  09/06/22 11:43 CDT    "

## 2022-09-06 NOTE — PROGRESS NOTES
"    [facility]  INPATIENT WOUND CARE    PROGRESS NOTE    Today's Date: 09/06/22    Patient Name: Chito Govea  MRN: [id number]  [facility]: [id number]  PCP: Chito Rubio MD  Referring Provider: URBANO HELLER  Attending Provider: Ellis Rey MD  Length of Stay: 5    SUBJECTIVE   Chief Complaint: \"Skin breakdown on bilateral lower legs and bottom\"    HPI: Chito Govea continues care in CCU room 7.  Patient is sitting on edge of bed with feet on floor.  Patient denies pain at this time.  Patient has been up out of bed.  Patient is easily short of breath with activity.       Visit Dx:    ICD-10-CM ICD-9-CM   1. Acute on chronic congestive heart failure, unspecified heart failure type (HCC)  I50.9 428.0   2. Acute respiratory failure with hypoxia ([facility])  J96.01 518.81     Patient Active Problem List   Diagnosis   • Paroxysmal atrial fibrillation (HCC)   • Acute kidney injury (HCC)   • Hypertension   • Type 2 diabetes mellitus with hyperglycemia, without long-term current use of insulin (HCC)   • Moderate malnutrition (HCC)   • Aortic stenosis, moderate   • Nausea and vomiting   • Type 2 MI (myocardial infarction) (HCC) due to hypoxia and congestive heart failure   • Hyponatremia due to dehydration with associated weakness   • Bilateral pleural effusion   • COPD (chronic obstructive pulmonary disease) (HCC)   • Former smoker, quit 7/2022   • Acute kidney injury (HCC) superimposed on CKD Stage 2   • Anemia, chronic disease   • Suspected Interstitial lung disease (HCC), likely due to amiodarone   • Acute systolic heart failure (HCC), EF 36-40% on 9/2   • Acute respiratory failure with hypoxia (HCC)   • Acute urinary retention requiring alonzo catheter       History:   Past Medical History:   Diagnosis Date   • Arthritis    • CHF (congestive heart failure) (HCC)    • Diabetes mellitus (HCC)    • HLD (hyperlipidemia)    • Hypertension    • Renal disorder      History reviewed. No pertinent " surgical history.  Social History     Socioeconomic History   • Marital status:    Tobacco Use   • Smoking status: Former Smoker     Packs/day: 1.50     Years: 50.00     Pack years: 75.00     Types: Cigarettes     Quit date: 2022     Years since quittin.1   • Smokeless tobacco: Never Used   Substance and Sexual Activity   • Alcohol use: Never   • Drug use: Yes     Types: Marijuana   • Sexual activity: Defer       Allergies:  No Known Allergies    Medications:    Current Facility-Administered Medications:   •  atorvastatin (LIPITOR) tablet 20 mg, 20 mg, Oral, Daily, Deepak Pena MD, 20 mg at 22 0846  •  bisacodyl (DULCOLAX) suppository 10 mg, 10 mg, Rectal, Daily, Edwin Du DO, 10 mg at 22 1741  •  budesonide-formoterol (SYMBICORT) 160-4.5 MCG/ACT inhaler 2 puff, 2 puff, Inhalation, BID - RT, Stew Dawson MD, 2 puff at 22 0638  •  calcium carbonate (TUMS) chewable tablet 500 mg (200 mg elemental), 2 tablet, Oral, Q2H PRN, Edwin Du DO, 2 tablet at 22 2336  •  cefTRIAXone (ROCEPHIN) 1 g in sodium chloride 0.9 % 100 mL IVPB-VTB, 1 g, Intravenous, Q24H, Edwin Du DO, Last Rate: 200 mL/hr at 22 1100, 1 g at 22 1100  •  dextrose (D50W) (25 g/50 mL) IV injection 25 g, 25 g, Intravenous, Q15 Min PRN, Deepak Pena MD  •  dextrose (GLUTOSE) oral gel 15 g, 15 g, Oral, Q15 Min PRN, Deepak Pena MD  •  doxycycline (ADOXA) tablet 100 mg, 100 mg, Oral, Q12H, Ellis Rey MD, 100 mg at 22 0846  •  glucagon (human recombinant) (GLUCAGEN DIAGNOSTIC) injection 1 mg, 1 mg, Intramuscular, Q15 Min PRN, Deepak Pena MD  •  HOLD ANTICOAGULANT FOR 24H PRIOR TO THORACENTESIS, 1 each, Does not apply, Continuous PRN, Ellis Rey MD  •  insulin detemir (LEVEMIR) injection 20 Units, 20 Units, Subcutaneous, Nightly, Ellis Rey MD, 20 Units at 22  •  Insulin Lispro (humaLOG)  injection 0-24 Units, 0-24 Units, Subcutaneous, TID AC, Edwin Du DO, 8 Units at 09/05/22 1648  •  ipratropium (ATROVENT) nebulizer solution 0.5 mg, 0.5 mg, Nebulization, 4x Daily - RT, Stew Dawson MD, 0.5 mg at 09/06/22 0638  •  lidocaine (XYLOCAINE) 1 % injection 10 mL, 10 mL, Infiltration, Once, Ellis Rey MD  •  melatonin tablet 6 mg, 6 mg, Oral, Nightly PRN, Brian Cha DO, 6 mg at 09/04/22 2336  •  methylPREDNISolone sodium succinate (SOLU-Medrol) injection 40 mg, 40 mg, Intravenous, Q8H, Ellis Rey MD  •  metoprolol tartrate (LOPRESSOR) tablet 12.5 mg, 12.5 mg, Oral, Q6H, Michael Uriostegui MD, 12.5 mg at 09/06/22 0846  •  pantoprazole (PROTONIX) EC tablet 40 mg, 40 mg, Oral, BID Jean URENA Jonathan Scott, MD, 40 mg at 09/06/22 0721  •  rivaroxaban (XARELTO) tablet 20 mg, 20 mg, Oral, Daily With Dinner, Ellis Rey MD  •  sennosides-docusate (PERICOLACE) 8.6-50 MG per tablet 2 tablet, 2 tablet, Oral, BID PRN, Edwin Du DO  •  [COMPLETED] Insert peripheral IV, , , Once **AND** sodium chloride 0.9 % flush 10 mL, 10 mL, Intravenous, PRN, Deepak Pena MD, 10 mL at 09/05/22 2236  •  tamsulosin (FLOMAX) 24 hr capsule 0.4 mg, 0.4 mg, Oral, Daily, Ellis Rey MD    Review of Systems:  Review of Systems   Constitutional: Negative for chills and fever.   HENT: Negative for congestion and rhinorrhea.    Respiratory: Negative for cough and shortness of breath.    Cardiovascular: Positive for leg swelling. Negative for chest pain and palpitations.   Gastrointestinal: Negative for diarrhea, nausea and vomiting.   Genitourinary: Negative for flank pain and hematuria.   Musculoskeletal: Positive for gait problem. Negative for arthralgias and myalgias.   Skin: Positive for color change and wound.   Neurological: Positive for weakness. Negative for dizziness and headaches.   Psychiatric/Behavioral: Negative for agitation, behavioral problems and  confusion.    OBJECTIVE     Vitals:    09/06/22 0900   BP: 102/90   Pulse: (!) 133   Resp:    Temp:    SpO2: 95%       PHYSICAL EXAM  Physical Exam  Vitals and nursing note reviewed.   Constitutional:       General: He is awake.   HENT:      Head: Normocephalic and atraumatic.   Eyes:      General: Lids are normal. Gaze aligned appropriately.   Cardiovascular:      Rate and Rhythm: Normal rate and regular rhythm.   Pulmonary:      Effort: Pulmonary effort is normal. No respiratory distress.   Abdominal:      General: Abdomen is flat.      Palpations: Abdomen is soft.   Musculoskeletal:      Cervical back: Normal range of motion and neck supple.   Skin:     General: Skin is warm and dry.      Findings: Erythema and wound present.      Comments: Open wound of left lower leg which originated as blister.  Wound base is moist with a small amount slough.  Small amount of serous drainage.  Edges are irregular. Edema present of lower extremities.    DTI of buttock.  Wound base is purple and non-blanchable.  No broken skin and no drainage present.     Neurological:      Mental Status: He is oriented to person, place, and time. He is lethargic.   Psychiatric:         Attention and Perception: Attention normal.         Mood and Affect: Mood normal.         Behavior: Behavior is cooperative.           Results Review:  Lab Results (last 48 hours)     Procedure Component Value Units Date/Time    POC Glucose Once [[id number]]  (Normal) Collected: 09/06/22 0802    Specimen: Blood Updated: 09/06/22 0813     Glucose 125 mg/dL      Comment: : [id number] Raissa HerrmannFrancesca ID: [id number]       Procalcitonin [[id number]]  (Normal) Collected: 09/06/22 0354    Specimen: Blood Updated: 09/06/22 0433     Procalcitonin 0.07 ng/mL     Narrative:      As a Marker for Sepsis (Non-Neonates):    1. <0.5 ng/mL represents a low risk of severe sepsis and/or septic shock.  2. >2 ng/mL represents a high risk of severe sepsis and/or septic  "shock.    As a Marker for Lower Respiratory Tract Infections that require antibiotic therapy:    PCT on Admission    Antibiotic Therapy       6-12 Hrs later    >0.5                Strongly Recommended  >0.25 - <0.5        Recommended   0.1 - 0.25          Discouraged              Remeasure/reassess PCT  <0.1                Strongly Discouraged     Remeasure/reassess PCT    As 28 day mortality risk marker: \"Change in Procalcitonin Result\" (>80% or <=80%) if Day 0 (or Day 1) and Day 4 values are available. Refer to [email or web address]    Change in PCT <=80%  A decrease of PCT levels below or equal to 80% defines a positive change in PCT test result representing a higher risk for 28-day all-cause mortality of patients diagnosed with severe sepsis for septic shock.    Change in PCT >80%  A decrease of PCT levels of more than 80% defines a negative change in PCT result representing a lower risk for 28-day all-cause mortality of patients diagnosed with severe sepsis or septic shock.       Comprehensive Metabolic Panel [[id number]]  (Abnormal) Collected: 09/06/22 0354    Specimen: Blood Updated: 09/06/22 0430     Glucose 194 mg/dL      BUN 68 mg/dL      Creatinine 1.58 mg/dL      Sodium 138 mmol/L      Potassium 3.9 mmol/L      Comment: Slight hemolysis detected by analyzer. Results may be affected.        Chloride 98 mmol/L      CO2 30.0 mmol/L      Calcium 8.7 mg/dL      Total Protein 6.1 g/dL      Albumin 3.10 g/dL      ALT (SGPT) 19 U/L      AST (SGOT) 26 U/L      Alkaline Phosphatase 111 U/L      Total Bilirubin 0.2 mg/dL      Globulin 3.0 gm/dL      A/G Ratio 1.0 g/dL      BUN/Creatinine Ratio 43.0     Anion Gap 10.0 mmol/L      eGFR 47.6 mL/min/1.73      Comment: National Kidney Foundation and American Society of Nephrology (ASN) Task Force recommended calculation based on the Chronic Kidney Disease Epidemiology Collaboration (CKD-EPI) equation refit without adjustment for race.       Narrative:      " GFR Normal >60  Chronic Kidney Disease <60  Kidney Failure <15      CBC & Differential [[id number]]  (Abnormal) Collected: 09/06/22 0354    Specimen: Blood Updated: 09/06/22 0407    Narrative:      The following orders were created for panel order CBC & Differential.  Procedure                               Abnormality         Status                     ---------                               -----------         ------                     CBC Auto Differential[[id number]]        Abnormal            Final result                 Please view results for these tests on the individual orders.    CBC Auto Differential [[id number]]  (Abnormal) Collected: 09/06/22 0354    Specimen: Blood Updated: 09/06/22 0407     WBC 15.77 10*3/mm3      RBC 3.59 10*6/mm3      Hemoglobin 10.3 g/dL      Hematocrit 33.9 %      MCV 94.4 fL      MCH 28.7 pg      MCHC 30.4 g/dL      RDW 15.1 %      RDW-SD 52.5 fl      MPV 10.7 fL      Platelets 401 10*3/mm3      Neutrophil % 93.1 %      Lymphocyte % 2.0 %      Monocyte % 4.1 %      Eosinophil % 0.0 %      Basophil % 0.1 %      Immature Grans % 0.7 %      Neutrophils, Absolute 14.68 10*3/mm3      Lymphocytes, Absolute 0.32 10*3/mm3      Monocytes, Absolute 0.65 10*3/mm3      Eosinophils, Absolute 0.00 10*3/mm3      Basophils, Absolute 0.01 10*3/mm3      Immature Grans, Absolute 0.11 10*3/mm3      nRBC 0.0 /100 WBC     Blood Culture - Blood, Arm, Left [[id number]]  (Normal) Collected: 09/01/22 0056    Specimen: Blood from Arm, Left Updated: 09/06/22 0117     Blood Culture No growth at 5 days    Blood Culture - Blood, Arm, Right [[id number]]  (Normal) Collected: 09/01/22 0056    Specimen: Blood from Arm, Right Updated: 09/06/22 0117     Blood Culture No growth at 5 days    POC Glucose Once [[id number]]  (Abnormal) Collected: 09/05/22 2158    Specimen: Blood Updated: 09/05/22 2210     Glucose 168 mg/dL      Comment: : [id number] Chavo BradshawMeter ID: [id number]       POC Glucose Once [[id number]]   (Abnormal) Collected: 09/05/22 1644    Specimen: Blood Updated: 09/05/22 1655     Glucose 221 mg/dL      Comment: : [id number] Kashmir Gillis ID: [id number]       Basic Metabolic Panel [[id number]]  (Abnormal) Collected: 09/05/22 1027    Specimen: Blood Updated: 09/05/22 1111     Glucose 411 mg/dL      BUN 60 mg/dL      Creatinine 1.60 mg/dL      Sodium 138 mmol/L      Potassium 4.6 mmol/L      Comment: Slight hemolysis detected by analyzer. Results may be affected.        Chloride 96 mmol/L      CO2 32.0 mmol/L      Calcium 8.1 mg/dL      BUN/Creatinine Ratio 37.5     Anion Gap 10.0 mmol/L      eGFR 46.9 mL/min/1.73      Comment: National Kidney Foundation and American Society of Nephrology (ASN) Task Force recommended calculation based on the Chronic Kidney Disease Epidemiology Collaboration (CKD-EPI) equation refit without adjustment for race.       Narrative:      GFR Normal >60  Chronic Kidney Disease <60  Kidney Failure <15      C-reactive Protein [[id number]]  (Abnormal) Collected: 09/05/22 1027    Specimen: Blood Updated: 09/05/22 1059     C-Reactive Protein 1.88 mg/dL     Sedimentation Rate [[id number]]  (Abnormal) Collected: 09/05/22 1028    Specimen: Blood Updated: 09/05/22 1052     Sed Rate 42 mm/hr     BNP [[id number]]  (Abnormal) Collected: 09/05/22 0513    Specimen: Blood Updated: 09/05/22 0927     proBNP 18,930.0 pg/mL     Narrative:      Among patients with dyspnea, NT-proBNP is highly sensitive for the detection of acute congestive heart failure. In addition NT-proBNP of <300 pg/ml effectively rules out acute congestive heart failure with 99% negative predictive value.    Results may be falsely decreased if patient taking Biotin.      CBC & Differential [[id number]]  (Abnormal) Collected: 09/05/22 0513    Specimen: Blood Updated: 09/05/22 0608    Narrative:      The following orders were created for panel order CBC & Differential.  Procedure                               Abnormality          Status                     ---------                               -----------         ------                     CBC Auto Differential[[id number]]        Abnormal            Final result                 Please view results for these tests on the individual orders.    CBC Auto Differential [[id number]]  (Abnormal) Collected: 09/05/22 0513    Specimen: Blood Updated: 09/05/22 0608     WBC 13.41 10*3/mm3      RBC 3.63 10*6/mm3      Hemoglobin 10.3 g/dL      Hematocrit 33.2 %      MCV 91.5 fL      MCH 28.4 pg      MCHC 31.0 g/dL      RDW 15.4 %      RDW-SD 50.8 fl      MPV 10.7 fL      Platelets 397 10*3/mm3      Neutrophil % 92.5 %      Lymphocyte % 2.2 %      Monocyte % 4.5 %      Eosinophil % 0.0 %      Basophil % 0.1 %      Immature Grans % 0.7 %      Neutrophils, Absolute 12.42 10*3/mm3      Lymphocytes, Absolute 0.29 10*3/mm3      Monocytes, Absolute 0.60 10*3/mm3      Eosinophils, Absolute 0.00 10*3/mm3      Basophils, Absolute 0.01 10*3/mm3      Immature Grans, Absolute 0.09 10*3/mm3      nRBC 0.0 /100 WBC     Comprehensive Metabolic Panel [[id number]]  (Abnormal) Collected: 09/05/22 0513    Specimen: Blood Updated: 09/05/22 0600     Glucose 324 mg/dL      BUN 54 mg/dL      Creatinine 1.48 mg/dL      Sodium 138 mmol/L      Potassium 4.7 mmol/L      Chloride 95 mmol/L      CO2 32.0 mmol/L      Calcium 8.5 mg/dL      Total Protein 5.8 g/dL      Albumin 3.00 g/dL      ALT (SGPT) 14 U/L      AST (SGOT) 20 U/L      Alkaline Phosphatase 98 U/L      Total Bilirubin 0.3 mg/dL      Globulin 2.8 gm/dL      A/G Ratio 1.1 g/dL      BUN/Creatinine Ratio 36.5     Anion Gap 11.0 mmol/L      eGFR 51.5 mL/min/1.73      Comment: National Kidney Foundation and American Society of Nephrology (ASN) Task Force recommended calculation based on the Chronic Kidney Disease Epidemiology Collaboration (CKD-EPI) equation refit without adjustment for race.       Narrative:      GFR Normal >60  Chronic Kidney Disease <60  Kidney Failure <15       Basic Metabolic Panel [[id number]]  (Abnormal) Collected: 09/04/22 1737    Specimen: Blood Updated: 09/04/22 1829     Glucose 318 mg/dL      BUN 51 mg/dL      Creatinine 1.60 mg/dL      Sodium 138 mmol/L      Potassium 4.7 mmol/L      Chloride 95 mmol/L      CO2 31.0 mmol/L      Calcium 8.5 mg/dL      BUN/Creatinine Ratio 31.9     Anion Gap 12.0 mmol/L      eGFR 46.9 mL/min/1.73      Comment: National Kidney Foundation and American Society of Nephrology (ASN) Task Force recommended calculation based on the Chronic Kidney Disease Epidemiology Collaboration (CKD-EPI) equation refit without adjustment for race.       Narrative:      GFR Normal >60  Chronic Kidney Disease <60  Kidney Failure <15      Urinalysis, Microscopic Only - Urine, Clean Catch [[id number]]  (Abnormal) Collected: 09/04/22 1622    Specimen: Urine, Clean Catch Updated: 09/04/22 1653     RBC, UA None Seen /HPF      WBC, UA 0-2 /HPF      Bacteria, UA None Seen /HPF      Squamous Epithelial Cells, UA None Seen /HPF      Hyaline Casts, UA 31-50 /LPF      Methodology Manual Light Microscopy    Urinalysis With Microscopic If Indicated (No Culture) - Urine, Clean Catch [[id number]]  (Abnormal) Collected: 09/04/22 1622    Specimen: Urine, Clean Catch Updated: 09/04/22 1643     Color, UA Dark Yellow     Appearance, UA Clear     pH, UA <=5.0     Specific Gravity, UA 1.016     Glucose,  mg/dL (Trace)     Ketones, UA Negative     Bilirubin, UA Negative     Blood, UA Negative     Protein, UA 30 mg/dL (1+)     Leuk Esterase, UA Negative     Nitrite, UA Negative     Urobilinogen, UA 1.0 E.U./dL    POC Glucose Once [[id number]]  (Abnormal) Collected: 09/04/22 1600    Specimen: Blood Updated: 09/04/22 1611     Glucose 352 mg/dL      Comment: : ROSALINDA Duran ID: [id number]       Blood Gas, Arterial - [[id number]]  (Abnormal) Collected: 09/04/22 1610    Specimen: Arterial Blood Updated: 09/04/22 1604     Site Right Brachial     Steve's Test N/A      pH, Arterial 7.487 pH units      Comment: 83 Value above reference range        pCO2, Arterial 40.3 mm Hg      pO2, Arterial 279.0 mm Hg      Comment: 83 Value above reference range        HCO3, Arterial 30.5 mmol/L      Comment: 83 Value above reference range        Base Excess, Arterial 6.6 mmol/L      Comment: 83 Value above reference range        O2 Saturation, Arterial >100.1 %      Comment: 93 Value above reportable range > 100.1        Temperature 37.0 C      Barometric Pressure for Blood Gas 748 mmHg      Modality BiPap     FIO2 100 %      Ventilator Mode NA     Set [facility] Resp Rate 20.0     IPAP 16     Comment: Meter: [id number]     :  [id number]        EPAP 8     Collected by [id number]     pCO2, Temperature Corrected 40.3 mm Hg      pH, Temp Corrected 7.487 pH Units      pO2, Temperature Corrected 279 mm Hg     POC Glucose Once [[id number]]  (Abnormal) Collected: 09/04/22 1449    Specimen: Blood Updated: 09/04/22 1500     Glucose 389 mg/dL      Comment: : [id number] Martinez DicksonaMeter ID: [id number]       POC Glucose Once [[id number]]  (Abnormal) Collected: 09/04/22 1340    Specimen: Blood Updated: 09/04/22 1351     Glucose 443 mg/dL      Comment: : [id number] John (PPoole) AlexysllianMeter ID: [id number]       POC Glucose Once [[id number]]  (Abnormal) Collected: 09/04/22 1129    Specimen: Blood Updated: 09/04/22 1141     Glucose 522 mg/dL      Comment: : [id number] Jose Luis MartínezaisaMeter ID: [id number]       POC Glucose Once [[id number]]  (Abnormal) Collected: 09/04/22 1128    Specimen: Blood Updated: 09/04/22 1141     Glucose 472 mg/dL      Comment: : [id number] Jose Luis EuraisaMeter ID: [id number]           Imaging Results (Last 72 Hours)     Procedure Component Value Units Date/Time    XR Chest 1 View [[id number]] Collected: 09/06/22 0930     Updated: 09/06/22 0935    Narrative:      EXAMINATION:  XR CHEST 1 VW-  9/6/2022 9:18 AM CDT     HISTORY: Repeat CXR to evaluate effusion; I50.9-Heart  failure,  unspecified; J96.01-Acute respiratory failure with hypoxia.     COMPARISON: 9/4/2022.     TECHNIQUE: Single view AP image.     FINDINGS:  There are diffuse bilateral lung infiltrates. There is  minimal blunting of the costophrenic angles bilaterally. There is less  blunting on the right side on the current study. There is borderline  cardiomegaly. There are multiple calcified hilar and mediastinal lymph  nodes. The thoracic aorta is atheromatous. There are degenerative  changes of the spine and bilateral shoulders.       Impression:      1. Diffuse bilateral lung infiltrates may represent pulmonary edema or  pneumonia. No significant change.  2. Minimal blunting of the costophrenic angles bilaterally suggesting  small effusions. The blunting is decreased slightly on the right side.  3. Heart size upper limits of normal. Atheromatous thoracic aorta.        This report was finalized on 09/06/2022 09:32 by Dr. Wilmer Murphy MD.    XR Chest 1 View [[id number]] Collected: 09/04/22 0754     Updated: 09/04/22 0758    Narrative:      EXAMINATION: Chest 1 view 09/04/2022     HISTORY: Pneumonia and CHF     FINDINGS: Today's exam is compared to previous study one day earlier.  There is persistent diffuse mixed interstitial and alveolar disease  within both lungs with a small right-sided effusion with blunting of the  costophrenic angle. No change from the previous exam. There is no free  air beneath the hemidiaphragms.       Impression:      1.. Stable 1 day appearance of the chest.  This report was finalized on 09/04/2022 07:55 by Dr. Yonis Polo MD.    XR Chest 1 View [[id number]] Collected: 09/03/22 1301     Updated: 09/03/22 1305    Narrative:      EXAM/TECHNIQUE: XR CHEST 1 VW-     INDICATION: Dyspnea, pulm edema, pleural eff; I50.9-Heart failure,  unspecified; J96.01-Acute respiratory failure with hypoxia     COMPARISON: 09/01/2022     FINDINGS:     Cardiac silhouette is within normal limits and stable. No  significant  change in diffuse bilateral interstitial and airspace opacity. Small  bilateral pleural effusions appear unchanged. Granulomatous  calcifications in both hilar regions are noted. No pneumothorax. No  acute osseous finding.       Impression:         No significant interval change in moderate pulmonary edema and small  bilateral pleural effusions.  This report was finalized on 09/03/2022 13:02 by Dr. Kishor Escobedo MD.             ASSESSMENT/PLAN       Examination and evaluation of wound(s) was performed.    DIAGNOSIS:   Pressure injury of deep tissue of left buttock  Venous ulcer of left lower leg with fat layer exposed    Acute systolic heart failure (HCC), EF 36-40% on 9/2    Paroxysmal atrial fibrillation (HCC)    Hypertension    Type 2 diabetes mellitus with hyperglycemia, without long-term current use of insulin (HCC)    Moderate malnutrition (HCC)    Aortic stenosis, moderate    Type 2 MI (myocardial infarction) ([facility]) due to hypoxia and congestive heart failure    Bilateral pleural effusion    COPD (chronic obstructive pulmonary disease) ([facility])    Former smoker, quit 7/2022    Acute kidney injury (HCC) superimposed on CKD Stage 2    Anemia, chronic disease    Suspected Interstitial lung disease (HCC), likely due to amiodarone    Acute respiratory failure with hypoxia ([facility])    Acute urinary retention requiring alonzo catheter       PLAN:   Patient reports he feels his legs were doing better until he started sitting up and they seemed to be more edematous currently.    Continue with orders previously placed with attention to elevation of legs.    Patient is aware of DTI of buttock and recommendation of pressure to be off this area as much as possible.  He denies pain at this site currently.      Discussed findings and treatment plan including risks, benefits, and treatment options with patient in detail. Patient agreed with treatment plan.      This document has been electronically signed by Isela BERNSTEIN  MARKELL Parker on 9/6/2022 09:48 CDT     Time spent in face-to-face evaluation, chart review, planning and education 35 minutes with greater than 50% of time spent with patient and/or family and in coordination of care.  Counseling of patient and/or family includes discussing wound diagnosis and etiology , discussing risks and benefits, counseling on risk factor reduction, giving instructions including follow-up management and Importance of compliance with chosen management options. No procedures were completed during this visit.

## 2022-09-06 NOTE — PROGRESS NOTES
"    PULMONARY AND CRITICAL CARE PROGRESS NOTE - [facility]    Patient: Chito Govea  1955   MR# [id number]   Acct# [id number]  09/06/22   07:26 CDT  Referring Provider: Ellis Rey MD    Chief Complaint: Hypoxia    Interval history: He is afebrile.  Heart rate remains elevated despite adding beta-blocker yesterday.  Saturation 94 on Vapotherm 40 L, FiO2 0.95 and a nonrebreather.  He reports having some issues with shortness of breath overnight.  Indicates the BiPAP was actually more helpful and however \"it kept alarming all night.  People were out.  Working on it but could not figure it out\".  He is not coughing.  No swallowing, nausea or vomiting issues.  Some orthopnea.  Attending inquiring about benefit/risk of thoracentesis.  He has not had updated x-ray imaging today.  Labs stable.       Meds:  atorvastatin, 20 mg, Oral, Daily  bisacodyl, 10 mg, Rectal, Daily  budesonide-formoterol, 2 puff, Inhalation, BID - RT  cefTRIAXone, 1 g, Intravenous, Q24H  doxycycline, 100 mg, Oral, Q12H  insulin detemir, 20 Units, Subcutaneous, Nightly  insulin lispro, 0-24 Units, Subcutaneous, TID AC  ipratropium, 0.5 mg, Nebulization, 4x Daily - RT  methylPREDNISolone sodium succinate, 40 mg, Intravenous, Q12H  metoprolol tartrate, 12.5 mg, Oral, Q12H  pantoprazole, 40 mg, Oral, BID AC  rivaroxaban, 20 mg, Oral, Daily         Review of Systems:   Review of Systems    Constitutional: Negative.    HENT: Negative.    Respiratory: Positive for cough and shortness of breath.    Cardiovascular: Positive for leg swelling.   Gastrointestinal: Negative.    Skin: Negative.    Neurological: Negative.    Psychiatric/Behavioral: Negative.      Physical Exam:  SpO2 Percentage    09/06/22 0600 09/06/22 0638 09/06/22 0645   SpO2: 98% 93% 93%     Body mass index is 20.81 kg/m².   Temp:  [97.2 °F (36.2 °C)-98.5 °F (36.9 °C)] 97.8 °F (36.6 °C)  Heart Rate:  [] 109  Resp:  [18-28] 22  BP: ()/() " 148/76    Intake/Output Summary (Last 24 hours) at 9/6/2022 0726  Last data filed at 9/6/2022 0400  Gross per 24 hour   Intake 100 ml   Output 1225 ml   Net -1125 ml     Physical Exam     Vitals and nursing note reviewed.   Constitutional:       Appearance: He is well-developed.  Sitting up in chair     Interventions: Nasal cannula in place.      Comments: Vapotherm 40 L, FiO2 0.95, nonrebreather  HENT:      Head: Normocephalic and atraumatic.   Eyes:      Pupils: Pupils are equal, round, and reactive to light.   Cardiovascular:      Rate and Rhythm: Normal rate and regular rhythm.   Pulmonary:      Effort: No tachypnea or respiratory distress.      Breath sounds: Decreased breath sounds present. No wheezing.   Abdominal:      General: Bowel sounds are normal. There is no distension.      Palpations: Abdomen is soft.   Musculoskeletal:         General: Normal range of motion.      Cervical back: Normal range of motion and neck supple.      Right lower leg: Edema present.      Left lower leg: Edema present.   Skin:     General: Skin is warm and dry.      Nails: There is clubbing  Neurological:      Mental Status: He is alert and oriented to person, place, and time.   Psychiatric:      Comments: Calm, pleasant     Electronically signed by MARKELL Lai, 9/6/2022, 07:26 CDT      Physician Substantive Portion: Medical Decision Making    Laboratory Data:  Results from last 7 days   Lab Units 09/06/22  0354 09/05/22  0513 09/03/22  1111   WBC 10*3/mm3 15.77* 13.41* 8.48   HEMOGLOBIN g/dL 10.3* 10.3* 10.5*   PLATELETS 10*3/mm3 401 397 344     Results from last 7 days   Lab Units 09/06/22  0354 09/05/22  1027 09/05/22  0513 09/01/22  1938 09/01/22  0048   SODIUM mmol/L 138 138 138   < > 141   POTASSIUM mmol/L 3.9 4.6 4.7   < > 4.6   BUN mg/dL 68* 60* 54*   < > 23   CREATININE mg/dL 1.58* 1.60* 1.48*   < > 1.15   INR   --   --   --   --  1.96*    < > = values in this interval not displayed.     Results from last 7  days   Lab Units 09/04/22  1610 09/03/22  1827 09/01/22  0510   PH, ARTERIAL pH units 7.487* 7.523* 7.494*   PCO2, ARTERIAL mm Hg 40.3 38.4 35.4   PO2 ART mm Hg 279.0* 89.0 65.8*   FIO2 % 100  --  40     Blood Culture   Date Value Ref Range Status   09/01/2022 No growth at 5 days  Final   09/01/2022 No growth at 5 days  Final     Recent films:  Adult Transthoracic Echo Complete W/ Cont if Necessary Per Protocol    Result Date: 9/4/2022  · Estimated left ventricular EF was in agreement with the calculated left ventricular EF. Left ventricular ejection fraction appears to be 36 - 40%. Left ventricular systolic function is moderately decreased. · The following left ventricular wall segments are hypokinetic: mid anterior, apical anterior, apical lateral, apical inferior, mid inferior, apical septal, basal inferoseptal, mid inferoseptal, apex hypokinetic, mid anteroseptal, basal inferior and basal inferoseptal. · Moderate aortic valve stenosis is present. · Left ventricular wall thickness is consistent with mild concentric hypertrophy. · Left atrial volume is moderately increased. · Estimated right ventricular systolic pressure from tricuspid regurgitation is mildly elevated (35-45 mmHg). · Normal size and function of the right ventricle. · There is a small (<1cm) pericardial effusion. · Compared to recent exam from 8/11/22, LVEF has declined and new wall motion abnormalities are present.       My radiograph interpretation/independent review of imaging: Reviewed and agree with current interpretation.  Today's chest x-ray shows bilateral diffuse pulmonary infiltrate and small bilateral pleural effusion which is unchanged.  Heart size is upper limit of normal.    Pulmonary Assessment:    1. Acute hypoxic respiratory failure  2. Bilateral pulmonary infiltrate, small pleural effusion and atelectasis  3. Likely inflammatory pneumonitis versus community-acquired pneumonia  4. Congestive diastolic heart failure and bilateral  pleural effusion  5. Chronic obstructive pulmonary disease  6. History of tobacco abuse  7. Atrial fibrillation rapid ventricular response  8. Acute kidney injury and metabolic acidosis recently  9. Chronic kidney disease stage III  10. Hypertension  11. Diabetes mellitus type 2  12. Aortic stenosis moderate  13. Mild pulmonary hypertension  14. Hyponatremia  15. On chronic anticoagulation     Recommend/plan:   · Patient overall stable but his oxygen requirement remains high and he is on Vapotherm with liters flow and 75% FiO2.  He appears to be comfortable and did not have any acute shortness of breath.  · Chest x-ray showed stable lung infiltrate and atelectasis and small effusions.  No need of thoracentesis.  · He was on nonrebreather mask with oxygen this morning but currently nonrebreather is off.  He did use BiPAP last night and had some difficulty tolerating it but is agreeable to use it again tonight.  · Chest x-ray done today shows bilateral stable pulmonary infiltrate and small effusions and atelectasis but no other acute changes noted.  · Patient will be continued on current antibiotic coverage with ceftriaxone and doxycycline for pneumonia  · Continue bronchodilator treatment and routine respiratory care and pulmonary toilet.  · Encourage incentive spirometry to improve pulmonary compliance and clearance.  · Titrate oxygen to keep oxygen more 90%.  Patient will need home oxygen evaluation prior to the discharge.  · Continue incentive spirometry and flutter valve.  Continue diuresis and monitor renal function CLOSELY.  · Physical activity as tolerated.  Pain and anxiety control.  DVT and stress ulcer prophylaxis.  · Repeat labs and imaging studies from time to time.  Nutritional support.  · CODE STATUS: Full.  Overall prognosis: Guarded.  · We will follow.     This visit was performed by both a physician and an Advanced Practice RN.  I personally evaluated and examined the patient.  I performed all  aspects of the medical decision making as documented.    Electronically signed by     Stew Dawson MD,  Pulmonologist/Intensivist   9/6/2022, 14:52 CDT

## 2022-09-06 NOTE — PROGRESS NOTES
Malnutrition Severity Assessment    Patient Name:  Chito Govea  YOB: 1955  MRN: [id number]  Admit Date:  9/1/2022    Patient meets criteria for : Moderate (non-severe) Malnutrition (secondary signs: skin breakdown, weakness, pale/dry skin, SOB)        Malnutrition Severity Assessment  Malnutrition Type: Chronic Disease - Related Malnutrition  Malnutrition Type (last 8 hours)     Malnutrition Severity Assessment     Row Name 09/06/22 1536       Malnutrition Severity Assessment    Malnutrition Type Chronic Disease - Related Malnutrition    Row Name 09/06/22 1536       Insufficient Energy Intake     Insufficient Energy Intake Findings Moderate    Insufficient Energy Intake  <75% of est. energy requirement for > or equal to 1 month  Reduced appetite last month and for the week prior to admission d/t SOB. He reports smaller portions being consumed.    Row Name 09/06/22 1536       Unintentional Weight Loss     Unintentional Weight Loss Findings --  Weight gain noted d/t edema, CHF. Currently on a fluid restriction and being diuresed.    Row Name 09/06/22 1536       Muscle Loss    Loss of Muscle Mass Findings Moderate    [place] Moderate - slight depression    Clavicle Bone Region Moderate - some protrusion in females, visible in males    Acromion Bone Region Moderate - acromion may slightly protrude    Scapular Bone Region Moderate - mild depression, bones may show slightly    Patellar Region Moderate - patella more prominent, less muscle definition around patella    Row Name 09/06/22 1536       Fat Loss    Subcutaneous Fat Loss Findings Moderate    Orbital Region  Moderate -  somewhat hollowness, slightly dark circles    Upper Arm Region Moderate - some fat tissue, not ample    Row Name 09/06/22 1536       Fluid Accumulation (Edema)    Fluid Acumulation Findings Moderate  2-3+ edema per nursing flowsheets    Fluid Accumulation  Moderate equals 2+ pitting edema    Row Name 09/06/22 1536        Declining Functional Status    Declining Functional Status Findings Measurably Reduced    Row Name 09/06/22 1536       Criteria Met (Must meet criteria for severity in at least 2 of these categories: M Wasting, Fat Loss, Fluid, Secondary Signs, Wt. Status, Intake)    Patient meets criteria for  Moderate (non-severe) Malnutrition  secondary signs: skin breakdown, weakness, pale/dry skin, SOB                Electronically signed by:  Shakila Nur RDN, LD  09/06/22 15:40 CDT

## 2022-09-06 NOTE — PLAN OF CARE
Goal Outcome Evaluation:  Plan of Care Reviewed With: patient           Outcome Evaluation: Patient continues to desat with any activity.  Up and down to chair this shift, Non-rebreather placed to aide with recovery.  Vapotherm increased to 40/100%, decreased to 40/95%.  Pt did not rest well this shift due to alarms in the unit and on bipap.  Diuresing continues, UOP good.  Safety maintained, care plan updated.

## 2022-09-06 NOTE — PROGRESS NOTES
[facility] Medicine Services  INPATIENT PROGRESS NOTE    Patient Name: Chito Govea  Date of Admission: 9/1/2022  Today's Date: 09/06/22  Length of Stay: 5  Primary Care Physician: Chito Rubio MD    Subjective   Chief Complaint: shortness of breath  HPI     Patient was seen and examined at bedside.    Patient is requiring more oxygen today, when I was in the room, he was on Vapotherm as well as nonrebreather.  Patient is more tachypneic and does complain of more respiratory issues.  Patient continues to be unable to lie flat.  Patient had good diuresis yesterday with the dose of acetazolamide as well as furosemide.           Intake/Output Summary (Last 24 hours) at 9/6/2022 0813  Last data filed at 9/6/2022 0800  Gross per 24 hour   Intake 100 ml   Output 1575 ml   Net -1475 ml      Was considering thoracentesis.  Will evaluate with CXR and US at bedside.      Review of Systems   Constitutional: Positive for fatigue. Negative for activity change, appetite change, chills and fever.   Respiratory: Negative for cough and shortness of breath.    Cardiovascular: Negative for palpitations.   Gastrointestinal: Negative for abdominal distention, abdominal pain, diarrhea, nausea and vomiting.   Genitourinary: Positive for difficulty urinating.        All pertinent negatives and positives are as above. All other systems have been reviewed and are negative unless otherwise stated.     Objective    Temp:  [97.2 °F (36.2 °C)-98.5 °F (36.9 °C)] 97.8 °F (36.6 °C)  Heart Rate:  [] 108  Resp:  [17-28] 17  BP: ()/() 152/92  Physical Exam  Vitals and nursing note reviewed.   Constitutional:       Appearance: Normal appearance. He is ill-appearing.   HENT:      Head: Normocephalic and atraumatic.      Mouth/Throat:      Mouth: Mucous membranes are dry.      Pharynx: Oropharynx is clear.   Eyes:      General: No scleral icterus.     Conjunctiva/sclera: Conjunctivae normal.    Cardiovascular:      Rate and Rhythm: Tachycardia present. Rhythm irregular.   Pulmonary:      Effort: Respiratory distress present.      Breath sounds: Rales (coarse, mild) present.      Comments: Diminished at bases   Abdominal:      General: Abdomen is flat. Bowel sounds are normal. There is no distension.      Palpations: Abdomen is soft. There is no mass.      Tenderness: There is no abdominal tenderness.   Musculoskeletal:      Right lower leg: Edema present.      Left lower leg: Edema present.   Skin:     General: Skin is warm and dry.      Findings: No rash.   Neurological:      General: No focal deficit present.      Mental Status: He is alert and oriented to person, place, and time.   Psychiatric:         Mood and Affect: Mood normal.         Behavior: Behavior normal.             Results Review:  I have reviewed the labs, radiology results, and diagnostic studies.    Laboratory Data:   Results from last 7 days   Lab Units 09/06/22  0354 09/05/22  0513 09/03/22  1111   WBC 10*3/mm3 15.77* 13.41* 8.48   HEMOGLOBIN g/dL 10.3* 10.3* 10.5*   HEMATOCRIT % 33.9* 33.2* 34.1*   PLATELETS 10*3/mm3 401 397 344        Results from last 7 days   Lab Units 09/06/22  0354 09/05/22  1027 09/05/22  0513 09/01/22  1938 09/01/22  0048   SODIUM mmol/L 138 138 138   < > 141   POTASSIUM mmol/L 3.9 4.6 4.7   < > 4.6   CHLORIDE mmol/L 98 96* 95*   < > 103   CO2 mmol/L 30.0* 32.0* 32.0*   < > 24.0   BUN mg/dL 68* 60* 54*   < > 23   CREATININE mg/dL 1.58* 1.60* 1.48*   < > 1.15   CALCIUM mg/dL 8.7 8.1* 8.5*   < > 8.4*   BILIRUBIN mg/dL 0.2  --  0.3  --  0.5   ALK PHOS U/L 111  --  98  --  115   ALT (SGPT) U/L 19  --  14  --  16   AST (SGOT) U/L 26  --  20  --  15   GLUCOSE mg/dL 194* 411* 324*   < > 266*    < > = values in this interval not displayed.       Culture Data:   Blood Culture   Date Value Ref Range Status   09/01/2022 No growth at 4 days  Preliminary   09/01/2022 No growth at 4 days  Preliminary       Radiology Data:    Imaging Results (Last 24 Hours)     ** No results found for the last 24 hours. **          I have reviewed the patient's current medications.     Assessment/Plan     Active Hospital Problems    Diagnosis    • **Acute systolic heart failure (HCC), EF 36-40% on 9/2    • Bilateral pleural effusion    • COPD (chronic obstructive pulmonary disease) (HCC)    • Former smoker, quit 7/2022    • Acute kidney injury (HCC) superimposed on CKD Stage 2    • Anemia, chronic disease    • Suspected Interstitial lung disease (HCC), likely due to amiodarone    • Acute respiratory failure with hypoxia (HCC)    • Acute urinary retention requiring alonzo catheter    • Type 2 MI (myocardial infarction) (HCC) due to hypoxia and congestive heart failure    • Aortic stenosis, moderate    • Moderate malnutrition (HCC)    • Paroxysmal atrial fibrillation (HCC)    • Hypertension    • Type 2 diabetes mellitus with hyperglycemia, without long-term current use of insulin (HCC)        Plan:  Patient was admitted on 9/1 by Dr. Pena for shortness of breath.  Patient follows with Dr. Rubio for PCP and Dr. Devine for CHF.  Patient was recently admitted on 8/9-8/15 for CONSUELO, atrial fibrillation.  Patient was found during that admission to have a Cr of 6.96 and BUN >200 with hyperkalemia.  At that time, the patient was felt to have significant dehydration during that admission and was treated with IVF and patient had difficulty controlling HR and was started on amiodarone.    Upon admission, patient was noted to have a BNP of 33,372 and was noted to have tachypnea and hypoxia per Dr. Pena's note with O2 sat 78% upon presentation to ER.  Patient was admitted for suspected Acute on Chronic diastolic heart failure likely related to aortic stenosis and atrial fibrillation with RVR.  The patient's echo in 8/11/2022 showed normal EF and moderate AS.  Patient has been diuresed with IV diuretics since admission.  Patient is net negative 7.6 L for the  hospitalization. Cardiology has been consulted, appreciate assistance.  Repeat echo 9/4/2022,Left ventricular ejection fraction appears to be 36 - 40%. Left ventricular systolic function is moderately decreased.  The following left ventricular wall segments are hypokinetic: mid anterior, apical anterior, apical lateral, apical inferior, mid inferior, apical septal, basal inferoseptal, mid inferoseptal, apex hypokinetic, mid anteroseptal, basal inferior and basal inferoseptal. Moderate aortic valve stenosis is present.  Left atrial volume is moderately increased.  Estimated right ventricular systolic pressure from tricuspid regurgitation is mildly elevated (35-45 mmHg).  Patient given 60 mg IV lasix and acetazolamide 500 mg on 9/6, will repeat again today.    Patient has a bump in his Cr and meets criteria for CONSUELO.  Patients baseline Cr is ~1.  Patient Cr bumped to 1.6 on 9/4/2022.  This is likely fluctuation due to diuresis, patient also noted to be more alkalotic (likely contraction alkalosis).  Baseline GFR places patient in CKD Stage 2.  Monitor renal function closely.    Pulmonary consulted for concern for lung disease.  Discussed the case with Tomas GUILLAUME.  We both agree with Dr. Uriostegui who is concerned about amiodarone toxicity.  Although amiodarone toxicity typically occurs with continue cumulative dose of medication over time, there is some literature, however, supporting acute toxicity.  Patient has only been on this medication since mid-August 2022.  The distribution of the imaging and the clinical examination do raise concern for ILD.  Often eosinophils will be elevated, patient has been on steroids which can interfere with this result.  The patient does have an elevated CRP and sedimentation rate.  Treatment essentially is to remove the offending agent and PO steroid treatment and taper.      There is significant change compared to CT scan performed just a month ago.  Do not think this is all related to  pulmonary edema.  New CT scan (CT angiogram 9/1) compared with old CT scan (CT wo contrast 8/9):    Upper lobs:      At the level of the sondra:      Lower lobes:     The bilateral distribution is somewhat perivascular but has some honey-combing and appears almost fibrotic as opposed to just pulmonary edema.  The other finding is bilateral pleural effusions.    Patient having worsening hypoxia, will perform right-sided thoracentesis.  Perform studies as well.       Although low suspicion for bacterial pneumonia, continue antibiotics.  Previously on ceftriaxone and azithromycin.  D/C azithromycin due to QTc prolongation properties.  Continue doxycycline for atypical coverage.        The patient is on solumedrol 40 mg TID and atrovent nebs.  No albuterol due to HR.      Patient does have symptoms of orthopnea, but does not have significant JVD.  Suspect that patients bilateral pleural effusions layer out when he lays flat covering more area causing shortness of breath and causes increased work of breath.  Patient does not have hypoxia or significant JVD when laying flat.      Metoprolol 12.5 mg TID for rate control for his atrial fibrillation.  Continue home xarelto for stroke prophylaxis in the setting of atrial fibrillation.      For the patient's diabetes, it has been poorly controlled.  Patient had a hemoglobin A1c of 8.40 upon admission.  Patient's sugars have been >200-300.  Will add levemir 20 units qhs and increase intensity of his sliding scale insulin.     PT/OT    Discontinue alonzo catheter this afternoon after lasix/diamox.            US at bedside showed patient still has mild to moderate pleural fluid, but chest x-ray shows improvement of pleural fluid will hold off.    Discharge Planning: I expect the patient to be discharged to  in >2 days.    Electronically signed by Ellis Rey MD, 09/06/22, 08:11 CDT.

## 2022-09-06 NOTE — PROGRESS NOTES
"Chief Complaint: Follow-up CHF    S: Overall, patient continues to report improvement in his respiratory status.  He still has mild orthopnea and leg edema, but these are improving.  He denies chest pain or palpitations.    Medications: Reviewed    Review of Systems: All pertinent negative and positives as noted above.  Otherwise, all systems reviewed and found to be negative.    Telemetry: Paroxysmal atrial fibrillation with heart rates 70s while in sinus and 100s to 120s while in A. fib    O:  /67 (BP Location: Right arm, Patient Position: Sitting)   Pulse 67   Temp 97.6 °F (36.4 °C) (Oral)   Resp 18   Ht 176.8 cm (69.61\")   Wt 65 kg (143 lb 6.4 oz)   SpO2 96%   BMI 20.81 kg/m²   Temp:  [97.2 °F (36.2 °C)-97.8 °F (36.6 °C)] 97.6 °F (36.4 °C)  Heart Rate:  [] 67  Resp:  [17-28] 18  BP: ()/() 130/67    Gen: male sitting in chair in NAD  HEENT: NC/AT, sclera anicteric  Neck: Supple, JVD 2 cm above the clavicle while seated at 70 degrees  CV: irregularly irregular rhythm, borderline tachycardia, no m/r/g  Pulm: Decreased left lower lobe breath sounds with subtle crackles (overall improved respiratory exam compared to yesterday), NWOB  Abd: Soft, ND/NT  Ext: WWP, 1+ bilateral pretibial edema  Neuro: Aox3, grossly nonfocal  Psych: Appropriate mood and affect    Diagnostic Data:  Review of data for the past 24 hours notable for chest x-ray with overall stable interstitial findings and slightly improved pleural effusion.  Labs notable for stable creatinine at 1.58, potassium now 3.9, WBC slightly up to 15.8, hemoglobin and platelets stable.    ASSESSMENT/PLAN:    1.  Acute hypoxic respiratory failure: Likely multifactorial with possible amiodarone related pneumonitis, infection, COPD, and mild CHF.  - Appreciate pulmonary and hospitalist management  - CHF management as below new    2.  Acute systolic heart failure: TTE with newly reduced EF this admission (36 to 40%) with some wall motion " abnormalities.  Differential includes stress-induced cardiomyopathy, CAD, among other etiologies.  Patient remains volume up with very slight JVD elevation and peripheral edema.  - Strict I's/O's and daily weights  - 2 L fluid restriction and at least 2 g Na restricted diet  - Diuretic: IV Lasix 60 mg this morning with second dose as needed to achieve I/O goal of net -500 cc to 1 L  HFrEF GDMT  - BB: Metoprolol as below  - RAAS inhibitor: Consider initiation in the coming days  - SGLT2 inhibitor: Consider starting prior to discharge or as outpatient  - MRA: Consider starting prior to discharge or as outpatient  Defer potential ischemic evaluation to outpatient discussion    3.  Paroxysmal atrial fibrillation: In and out of atrial fibrillation with improved heart rate control on metoprolol, but may benefit from increase metoprolol dose.  - Continue Xarelto for CVA prophylaxis  - Increasing metoprolol to 12.5 mg every 6 hours    4.  Valvular heart disease (moderate aortic stenosis): Stable.  No further evaluation or treatment needed at this time.  Plan to follow-up as an outpatient.    Michael Uriostegui MD

## 2022-09-06 NOTE — CASE MANAGEMENT/SOCIAL WORK
Continued Stay Note   [place]     Patient Name: Chito Govea  MRN: [id number]  Today's Date: 9/6/2022    Admit Date: 9/1/2022     Discharge Plan     Row Name 09/06/22 1010       Plan    Plan unclear    Plan Comments Patient continues in CCU.  Patient is on vapotherm 40/95%.               Discharge Codes    No documentation.               Expected Discharge Date and Time     Expected Discharge Date Expected Discharge Time    Sep 9, 2022             FELICITY Chan

## 2022-09-06 NOTE — PLAN OF CARE
Goal Outcome Evaluation:  Plan of Care Reviewed With: patient        Progress: improving  Outcome Evaluation: Patient now on 40/70 vapotherm. Oxygen saturation ranging from 92-97%.  Continue current tx. Patient encourage to do IS. Dressing changed done and applied barrier oinment to buttocks. Continue education on elevating legs. BP wnl. Afib/Sinus per tele. Continue monitoring.

## 2022-09-06 NOTE — PLAN OF CARE
Goal Outcome Evaluation:  Plan of Care Reviewed With: patient        Progress: improving  Outcome Evaluation: Ntn follow up. Pt is receiving a soft texture, ground meat, C.CHO, cardiac diet for ease of chewing. He has no teeth. He is on a 2000mL fluid restriction. He reports his appetite has improved. He has consumed 100% of breakfast and lunch today. He likes the fran Boost gluc control, but does not like Femi. Will d/c Femi and change Boost Gluc control to BID with breakfast and dinner. Due to fluid restriction, will only send supplement for fluid on breakfast and dinner tray and one 240mL fluid beverage for lunch. Obtained specific food preferences and advised pt of alternate food selections if needed. Will cont to follow.

## 2022-09-07 LAB
ANION GAP SERPL CALCULATED.3IONS-SCNC: 6 MMOL/L (ref 5–15)
ANION GAP SERPL CALCULATED.3IONS-SCNC: 8 MMOL/L (ref 5–15)
BUN SERPL-MCNC: 71 MG/DL (ref 8–23)
BUN SERPL-MCNC: 72 MG/DL (ref 8–23)
BUN/CREAT SERPL: 54.1 (ref 7–25)
BUN/CREAT SERPL: 60.2 (ref 7–25)
CALCIUM SPEC-SCNC: 8.2 MG/DL (ref 8.6–10.5)
CALCIUM SPEC-SCNC: 8.6 MG/DL (ref 8.6–10.5)
CHLORIDE SERPL-SCNC: 100 MMOL/L (ref 98–107)
CHLORIDE SERPL-SCNC: 102 MMOL/L (ref 98–107)
CO2 SERPL-SCNC: 31 MMOL/L (ref 22–29)
CO2 SERPL-SCNC: 31 MMOL/L (ref 22–29)
CREAT SERPL-MCNC: 1.18 MG/DL (ref 0.76–1.27)
CREAT SERPL-MCNC: 1.33 MG/DL (ref 0.76–1.27)
EGFRCR SERPLBLD CKD-EPI 2021: 58.6 ML/MIN/1.73
EGFRCR SERPLBLD CKD-EPI 2021: 67.6 ML/MIN/1.73
GLUCOSE BLDC GLUCOMTR-MCNC: 211 MG/DL (ref 70–130)
GLUCOSE BLDC GLUCOMTR-MCNC: 214 MG/DL (ref 70–130)
GLUCOSE BLDC GLUCOMTR-MCNC: 228 MG/DL (ref 70–130)
GLUCOSE BLDC GLUCOMTR-MCNC: 266 MG/DL (ref 70–130)
GLUCOSE SERPL-MCNC: 209 MG/DL (ref 65–99)
GLUCOSE SERPL-MCNC: 210 MG/DL (ref 65–99)
POTASSIUM SERPL-SCNC: 3.9 MMOL/L (ref 3.5–5.2)
POTASSIUM SERPL-SCNC: 3.9 MMOL/L (ref 3.5–5.2)
SODIUM SERPL-SCNC: 139 MMOL/L (ref 136–145)
SODIUM SERPL-SCNC: 139 MMOL/L (ref 136–145)

## 2022-09-07 PROCEDURE — 99232 SBSQ HOSP IP/OBS MODERATE 35: CPT | Performed by: INTERNAL MEDICINE

## 2022-09-07 PROCEDURE — 63710000001 INSULIN LISPRO (HUMAN) PER 5 UNITS: Performed by: FAMILY MEDICINE

## 2022-09-07 PROCEDURE — 63710000001 INSULIN DETEMIR PER 5 UNITS: Performed by: INTERNAL MEDICINE

## 2022-09-07 PROCEDURE — 99232 SBSQ HOSP IP/OBS MODERATE 35: CPT | Performed by: HOSPITALIST

## 2022-09-07 PROCEDURE — 25010000002 FUROSEMIDE PER 20 MG: Performed by: INTERNAL MEDICINE

## 2022-09-07 PROCEDURE — 94799 UNLISTED PULMONARY SVC/PX: CPT

## 2022-09-07 PROCEDURE — 94761 N-INVAS EAR/PLS OXIMETRY MLT: CPT

## 2022-09-07 PROCEDURE — 97162 PT EVAL MOD COMPLEX 30 MIN: CPT

## 2022-09-07 PROCEDURE — 82962 GLUCOSE BLOOD TEST: CPT

## 2022-09-07 PROCEDURE — 80048 BASIC METABOLIC PNL TOTAL CA: CPT | Performed by: NURSE PRACTITIONER

## 2022-09-07 PROCEDURE — 25010000002 ACETAZOLAMIDE PER 500 MG: Performed by: INTERNAL MEDICINE

## 2022-09-07 PROCEDURE — 29580 STRAPPING UNNA BOOT: CPT

## 2022-09-07 PROCEDURE — 25010000002 METHYLPREDNISOLONE PER 40 MG: Performed by: INTERNAL MEDICINE

## 2022-09-07 PROCEDURE — 97166 OT EVAL MOD COMPLEX 45 MIN: CPT | Performed by: OCCUPATIONAL THERAPIST

## 2022-09-07 PROCEDURE — 25010000002 CEFTRIAXONE PER 250 MG: Performed by: FAMILY MEDICINE

## 2022-09-07 RX ORDER — ACETAZOLAMIDE 500 MG/5ML
500 INJECTION, POWDER, LYOPHILIZED, FOR SOLUTION INTRAVENOUS ONCE
Status: COMPLETED | OUTPATIENT
Start: 2022-09-07 | End: 2022-09-07

## 2022-09-07 RX ORDER — FUROSEMIDE 10 MG/ML
60 INJECTION INTRAMUSCULAR; INTRAVENOUS ONCE
Status: COMPLETED | OUTPATIENT
Start: 2022-09-07 | End: 2022-09-07

## 2022-09-07 RX ORDER — LOSARTAN POTASSIUM 50 MG/1
25 TABLET ORAL
Status: DISCONTINUED | OUTPATIENT
Start: 2022-09-07 | End: 2022-09-10 | Stop reason: HOSPADM

## 2022-09-07 RX ORDER — METHYLPREDNISOLONE SODIUM SUCCINATE 40 MG/ML
20 INJECTION, POWDER, LYOPHILIZED, FOR SOLUTION INTRAMUSCULAR; INTRAVENOUS EVERY 8 HOURS
Status: DISCONTINUED | OUTPATIENT
Start: 2022-09-07 | End: 2022-09-08

## 2022-09-07 RX ADMIN — METHYLPREDNISOLONE SODIUM SUCCINATE 40 MG: 40 INJECTION, POWDER, FOR SOLUTION INTRAMUSCULAR; INTRAVENOUS at 04:17

## 2022-09-07 RX ADMIN — BUDESONIDE AND FORMOTEROL FUMARATE DIHYDRATE 2 PUFF: 160; 4.5 AEROSOL RESPIRATORY (INHALATION) at 19:54

## 2022-09-07 RX ADMIN — METHYLPREDNISOLONE SODIUM SUCCINATE 20 MG: 40 INJECTION, POWDER, FOR SOLUTION INTRAMUSCULAR; INTRAVENOUS at 12:31

## 2022-09-07 RX ADMIN — PANTOPRAZOLE SODIUM 40 MG: 40 TABLET, DELAYED RELEASE ORAL at 17:04

## 2022-09-07 RX ADMIN — CEFTRIAXONE 1 G: 1 INJECTION, POWDER, FOR SOLUTION INTRAMUSCULAR; INTRAVENOUS at 12:31

## 2022-09-07 RX ADMIN — ATORVASTATIN CALCIUM 20 MG: 10 TABLET, FILM COATED ORAL at 08:05

## 2022-09-07 RX ADMIN — DOXYCYCLINE 100 MG: 100 TABLET, FILM COATED ORAL at 08:05

## 2022-09-07 RX ADMIN — INSULIN LISPRO 8 UNITS: 100 INJECTION, SOLUTION INTRAVENOUS; SUBCUTANEOUS at 17:04

## 2022-09-07 RX ADMIN — LOSARTAN POTASSIUM 25 MG: 50 TABLET, FILM COATED ORAL at 12:31

## 2022-09-07 RX ADMIN — TAMSULOSIN HYDROCHLORIDE 0.4 MG: 0.4 CAPSULE ORAL at 08:05

## 2022-09-07 RX ADMIN — METOPROLOL TARTRATE 12.5 MG: 25 TABLET, FILM COATED ORAL at 08:05

## 2022-09-07 RX ADMIN — PANTOPRAZOLE SODIUM 40 MG: 40 TABLET, DELAYED RELEASE ORAL at 08:05

## 2022-09-07 RX ADMIN — IPRATROPIUM BROMIDE 0.5 MG: 0.5 SOLUTION RESPIRATORY (INHALATION) at 19:54

## 2022-09-07 RX ADMIN — METOPROLOL TARTRATE 12.5 MG: 25 TABLET, FILM COATED ORAL at 14:46

## 2022-09-07 RX ADMIN — IPRATROPIUM BROMIDE 0.5 MG: 0.5 SOLUTION RESPIRATORY (INHALATION) at 14:15

## 2022-09-07 RX ADMIN — IPRATROPIUM BROMIDE 0.5 MG: 0.5 SOLUTION RESPIRATORY (INHALATION) at 10:18

## 2022-09-07 RX ADMIN — INSULIN DETEMIR 25 UNITS: 100 INJECTION, SOLUTION SUBCUTANEOUS at 20:38

## 2022-09-07 RX ADMIN — METOPROLOL TARTRATE 12.5 MG: 25 TABLET, FILM COATED ORAL at 04:17

## 2022-09-07 RX ADMIN — RIVAROXABAN 20 MG: 20 TABLET, FILM COATED ORAL at 17:04

## 2022-09-07 RX ADMIN — METHYLPREDNISOLONE SODIUM SUCCINATE 20 MG: 40 INJECTION, POWDER, FOR SOLUTION INTRAMUSCULAR; INTRAVENOUS at 20:23

## 2022-09-07 RX ADMIN — BUDESONIDE AND FORMOTEROL FUMARATE DIHYDRATE 2 PUFF: 160; 4.5 AEROSOL RESPIRATORY (INHALATION) at 06:50

## 2022-09-07 RX ADMIN — INSULIN LISPRO 12 UNITS: 100 INJECTION, SOLUTION INTRAVENOUS; SUBCUTANEOUS at 12:30

## 2022-09-07 RX ADMIN — Medication 6 MG: at 20:27

## 2022-09-07 RX ADMIN — DOXYCYCLINE 100 MG: 100 TABLET, FILM COATED ORAL at 20:23

## 2022-09-07 RX ADMIN — IPRATROPIUM BROMIDE 0.5 MG: 0.5 SOLUTION RESPIRATORY (INHALATION) at 06:50

## 2022-09-07 RX ADMIN — FUROSEMIDE 60 MG: 10 INJECTION, SOLUTION INTRAVENOUS at 09:46

## 2022-09-07 RX ADMIN — ACETAZOLAMIDE 500 MG: 500 INJECTION, POWDER, LYOPHILIZED, FOR SOLUTION INTRAVENOUS at 09:46

## 2022-09-07 RX ADMIN — INSULIN LISPRO 8 UNITS: 100 INJECTION, SOLUTION INTRAVENOUS; SUBCUTANEOUS at 08:04

## 2022-09-07 RX ADMIN — ANTACID TABLETS 2 TABLET: 500 TABLET, CHEWABLE ORAL at 20:27

## 2022-09-07 RX ADMIN — METOPROLOL TARTRATE 12.5 MG: 25 TABLET, FILM COATED ORAL at 20:23

## 2022-09-07 NOTE — PROGRESS NOTES
PULMONARY AND CRITICAL CARE PROGRESS NOTE - [facility]    Patient: Chito Govea  1955   MR# [id number]   Acct# [id number]  09/07/22   08:01 CDT  Referring Provider: Ellis Rey MD    Chief Complaint: Hypoxia    Interval history: He is seen awake and alert sitting up on the side of the bed.  He reports he feels a whole lot better today.  Oxygen saturation 90% on Vapotherm 40 L and FiO2 0.65.  He tells me he tried to utilize BiPAP device again overnight but it kept constantly alarming and he was unable to sleep due to the alarm.  He is utilizing incentive spirometry.  He continues on Solu-Medrol 3 times a day.  He has been able to sit up in the chair the last couple of days.  No documented fevers.  No other events reported overnight.    Meds:  atorvastatin, 20 mg, Oral, Daily  bisacodyl, 10 mg, Rectal, Daily  budesonide-formoterol, 2 puff, Inhalation, BID - RT  cefTRIAXone, 1 g, Intravenous, Q24H  doxycycline, 100 mg, Oral, Q12H  insulin detemir, 20 Units, Subcutaneous, Nightly  insulin lispro, 0-24 Units, Subcutaneous, TID AC  ipratropium, 0.5 mg, Nebulization, 4x Daily - RT  lidocaine, 10 mL, Infiltration, Once  methylPREDNISolone sodium succinate, 40 mg, Intravenous, Q8H  metoprolol tartrate, 12.5 mg, Oral, Q6H  pantoprazole, 40 mg, Oral, BID AC  rivaroxaban, 15 mg, Oral, Daily With Dinner  tamsulosin, 0.4 mg, Oral, Daily      hold, 1 each      Review of Systems:   Review of Systems   Constitutional: Negative for fatigue.   Respiratory: Positive for cough and shortness of breath.    Cardiovascular: Positive for leg swelling.   Gastrointestinal: Negative for diarrhea and nausea.   Neurological: Positive for weakness.         Physical Exam:  SpO2 Percentage    09/07/22 0400 09/07/22 0500 09/07/22 0600   SpO2: 95% 91% 100%     Body mass index is 21.77 kg/m².   Temp:  [97.2 °F (36.2 °C)-97.9 °F (36.6 °C)] 97.9 °F (36.6 °C)  Heart Rate:  [] 66  Resp:  [14-24] 21  BP:  ()/() 130/76    Intake/Output Summary (Last 24 hours) at 9/7/2022 0801  Last data filed at 9/7/2022 0530  Gross per 24 hour   Intake 320 ml   Output 1975 ml   Net -1655 ml     Physical Exam  Constitutional:       General: He is not in acute distress.     Appearance: He is ill-appearing.      Interventions: Nasal cannula in place.      Comments: Vapotherm   HENT:      Head: Normocephalic.      Nose: Nose normal.      Mouth/Throat:      Mouth: Mucous membranes are moist.   Eyes:      General: No scleral icterus.  Cardiovascular:      Rate and Rhythm: Normal rate.      Comments: Rate 62  Pulmonary:      Effort: No respiratory distress.      Breath sounds: Rales present.   Abdominal:      General: There is no distension.   Musculoskeletal:      Right lower leg: Edema present.      Left lower leg: Edema present.   Skin:     Nails: There is clubbing.      Comments: BLE wrapped in Kerlix   Neurological:      Mental Status: He is alert and oriented to person, place, and time.   Psychiatric:         Mood and Affect: Mood normal.         Behavior: Behavior is cooperative.             Electronically signed by MARKELL Mera, 9/7/2022, 08:01 CDT      Physician Substantive Portion: Medical Decision Making    Laboratory Data:  Results from last 7 days   Lab Units 09/06/22  0354 09/05/22  0513 09/03/22  1111   WBC 10*3/mm3 15.77* 13.41* 8.48   HEMOGLOBIN g/dL 10.3* 10.3* 10.5*   PLATELETS 10*3/mm3 401 397 344     Results from last 7 days   Lab Units 09/07/22  0348 09/06/22  1803 09/06/22  0354 09/01/22  1938 09/01/22  0048   SODIUM mmol/L 139 142 138   < > 141   POTASSIUM mmol/L 3.9 4.3 3.9   < > 4.6   BUN mg/dL 72* 68* 68*   < > 23   CREATININE mg/dL 1.33* 1.41* 1.58*   < > 1.15   INR   --   --   --   --  1.96*    < > = values in this interval not displayed.     Results from last 7 days   Lab Units 09/04/22  1610 09/03/22  1827 09/01/22  0510   PH, ARTERIAL pH units 7.487* 7.523* 7.494*   PCO2, ARTERIAL mm Hg  40.3 38.4 35.4   PO2 ART mm Hg 279.0* 89.0 65.8*   FIO2 % 100  --  40     Blood Culture   Date Value Ref Range Status   09/01/2022 No growth at 5 days  Final   09/01/2022 No growth at 5 days  Final     Recent films:  XR Chest 1 View    Result Date: 9/6/2022  EXAMINATION:  XR CHEST 1 VW-  9/6/2022 9:18 AM CDT  HISTORY: Repeat CXR to evaluate effusion; I50.9-Heart failure, unspecified; J96.01-Acute respiratory failure with hypoxia.  COMPARISON: 9/4/2022.  TECHNIQUE: Single view AP image.  FINDINGS:  There are diffuse bilateral lung infiltrates. There is minimal blunting of the costophrenic angles bilaterally. There is less blunting on the right side on the current study. There is borderline cardiomegaly. There are multiple calcified hilar and mediastinal lymph nodes. The thoracic aorta is atheromatous. There are degenerative changes of the spine and bilateral shoulders.      Impression: 1. Diffuse bilateral lung infiltrates may represent pulmonary edema or pneumonia. No significant change. 2. Minimal blunting of the costophrenic angles bilaterally suggesting small effusions. The blunting is decreased slightly on the right side. 3. Heart size upper limits of normal. Atheromatous thoracic aorta.   This report was finalized on 09/06/2022 09:32 by Dr. Wilmer Murphy MD.     My radiograph interpretation/independent review of imaging: Reviewed and agree with current interpretation.  Bilateral stable pulmonary infiltrate, possible interstitial lung disease and heart failure.  Small pleural effusion noted laterally.    Pulmonary Assessment:  1. Acute hypoxic respiratory failure  2. Bilateral pulmonary infiltrate, small pleural effusion and atelectasis  3. Likely inflammatory pneumonitis versus community-acquired pneumonia  4. Congestive diastolic heart failure and bilateral pleural effusion  5. Chronic obstructive pulmonary disease  6. History of tobacco abuse  7. Atrial fibrillation rapid ventricular response  8. Acute  kidney injury and metabolic acidosis recently  9. Chronic kidney disease stage III  10. Hypertension  11. Diabetes mellitus type 2  12. Aortic stenosis moderate  13. Mild pulmonary hypertension  14. Hyponatremia  15. On chronic anticoagulation       Recommend/plan:   · Patient was seen in follow-up visit in cardiac care unit.  · He is stable and sitting up in the chair.  He is on Vapotherm 35 L flow and 60% FiO2.  · Oxygen saturation 98%.  Continue bronchodilator treatment.  · He is getting diuresis.  On Lasix and Diamox.  Monitor renal function electrolytes   · Continue Solu-Medrol for interstitial lung disease likely related to amiodarone  · Continue empiric antibiotic treatment.  Steroid dose decreased by Dr. Rey.  · Titrate FiO2 and keep oxygen saturation more than 92%.  Home oxygen evaluation prior to discharge  · Outpatient pulmonary clinic follow-up with PFT when he is more stable.  · Continue bronchodilator treatment, routine respiratory care and incentive spirometry.  · PT/OT/nutritional support.  Increase physical activity started.  · Keep him in the ICU for high oxygen requirement.  Discussed care plan with Dr. Rey  · DVT and stress ulcer prophylaxis and pain and anxiety control.  He is on Xarelto.  · CODE STATUS: Full.  Overall prognosis: Guarded  · We will follow.    This visit was performed by both a physician and an [facility] Practice RN.  I personally evaluated and examined the patient.  I performed all aspects of the medical decision making as documented.    Electronically signed by     Stew Dawson MD,  Pulmonologist/Intensivist   9/7/2022, 10:42 CDT

## 2022-09-07 NOTE — CASE MANAGEMENT/SOCIAL WORK
Continued Stay Note   [place]     Patient Name: Chito Goeva  MRN: [id number]  Today's Date: 9/7/2022    Admit Date: 9/1/2022     Discharge Plan     Row Name 09/07/22 1017       Plan    Plan unclear    Plan Comments Patient continues to be on Vapotherm.  Patient is noted be on 40 L/min and 65% FiO2.  SW following for plan/needs.               Discharge Codes    No documentation.               Expected Discharge Date and Time     Expected Discharge Date Expected Discharge Time    Sep 9, 2022             FELICITY Chan

## 2022-09-07 NOTE — PLAN OF CARE
Goal Outcome Evaluation:  Plan of Care Reviewed With: patient        Progress: improving  Outcome Evaluation: Patient has denied any pain today. Remains alert and oriented x4. Has been able to wean down oxygen to 30L/60%. Worked with therapy today. PT placed unna boots to bilateral feet and ankles.

## 2022-09-07 NOTE — THERAPY EVALUATION
Patient Name: Chito Govea  : 1955    MRN: [id number]                              Today's Date: 2022       Admit Date: 2022    Visit Dx:     ICD-10-CM ICD-9-CM   1. Acute on chronic congestive heart failure, unspecified heart failure type (HCC)  I50.9 428.0   2. Acute respiratory failure with hypoxia (HCC)  J96.01 518.81   3. Impaired mobility and ADLs  Z74.09 V49.89    Z78.9    4. Impaired mobility  Z74.09 799.89     Patient Active Problem List   Diagnosis   • Paroxysmal atrial fibrillation (HCC)   • Acute kidney injury (HCC)   • Hypertension   • Type 2 diabetes mellitus with hyperglycemia, without long-term current use of insulin (HCC)   • Moderate malnutrition (HCC)   • Aortic stenosis, moderate   • Nausea and vomiting   • Type 2 MI (myocardial infarction) (HCC) due to hypoxia and congestive heart failure   • Hyponatremia due to dehydration with associated weakness   • Bilateral pleural effusion   • COPD (chronic obstructive pulmonary disease) (HCC)   • Former smoker, quit 2022   • Acute kidney injury (HCC) superimposed on CKD Stage 2   • Anemia, chronic disease   • Suspected Interstitial lung disease (HCC), likely due to amiodarone   • Acute systolic heart failure (HCC), EF 36-40% on    • Acute respiratory failure with hypoxia (HCC)   • Acute urinary retention requiring alonzo catheter     Past Medical History:   Diagnosis Date   • Arthritis    • CHF (congestive heart failure) (HCC)    • Diabetes mellitus (HCC)    • HLD (hyperlipidemia)    • Hypertension    • Renal disorder      History reviewed. No pertinent surgical history.   General Information     Row Name 22 1100          Physical Therapy Time and Intention    Document Type evaluation  PT presents to ED wtih SOA, LE edema, cough. Recent -8/15 for CONSUELO. Dx: acute on chronic diastolic CHF, acute hypoxic resp. failure, a-fib, B pleural effusions.  -JOSEPH     Mode of Treatment physical therapy  -JOSEPH     Row Name 22 1100           General Information    Patient Profile Reviewed yes  -JOSEPH     Prior Level of Function independent:;all household mobility  -JOSEPH     Existing Precautions/Restrictions fall;oxygen therapy device and L/min  vapotherm  -JOSEPH     Barriers to Rehab medically complex  -JOSEPH     Row Name 09/07/22 1100          Living Environment    People in Home child(juana), adult  -JOSEPH     Row Name 09/07/22 1100          Cognition    Orientation Status (Cognition) oriented x 4  -JOSEPH     Row Name 09/07/22 1100          Safety Issues, Functional Mobility    Impairments Affecting Function (Mobility) balance;endurance/activity tolerance;strength;range of motion (ROM);shortness of breath  -JOSEPH           User Key  (r) = Recorded By, (t) = Taken By, (c) = Cosigned By    Initials Name Provider Type    Alexis Clayton, PT DPT Physical Therapist               Mobility     Row Name 09/07/22 1100          Bed Mobility    Bed Mobility supine-sit  -JOSEPH     Supine-Sit Brule (Bed Mobility) contact guard  -JOSEPH     Assistive Device (Bed Mobility) head of bed elevated;bed rails  -JOSEPH     Row Name 09/07/22 1100          Sit-Stand Transfer    Sit-Stand Brule (Transfers) contact guard  -JOSEPH     Row Name 09/07/22 1100          Gait/Stairs (Locomotion)    [place] Level (Gait) contact guard  -JOSEPH     Distance in Feet (Gait) 200 ft  -JOSEPH     Deviations/Abnormal Patterns (Gait) gait speed decreased  -JOSEPH           User Key  (r) = Recorded By, (t) = Taken By, (c) = Cosigned By    Initials Name Provider Type    Alexis Clayton, PT DPT Physical Therapist               Obj/Interventions     Row Name 09/07/22 1100          Range of Motion Comprehensive    Comment, General Range of Motion B LE AROM WFL  -JOSEPH     Row Name 09/07/22 1100          Strength Comprehensive (MMT)    Comment, General Manual Muscle Testing (MMT) Assessment B LE grossly 4-/5  -JOSEPH     Row Name 09/07/22 1100          Balance    Balance Assessment sitting dynamic balance;standing dynamic  balance  -JOSEPH     Dynamic Sitting Balance supervision  -JOSEPH     Position, Sitting Balance unsupported;sitting edge of bed  -JOSEPH     Dynamic Standing Balance contact guard  -JOSEPH     Position/Device Used, Standing Balance supported  -JOSEPH           User Key  (r) = Recorded By, (t) = Taken By, (c) = Cosigned By    Initials Name Provider Type    Alexis Clayton PT DPT Physical Therapist               Goals/Plan     Row Name 09/07/22 1100          Transfer Goal 1 (PT)    Activity/Assistive Device (Transfer Goal 1, PT) sit-to-stand/stand-to-sit;bed-to-chair/chair-to-bed  -JOSEPH     [place] Level/Cues Needed (Transfer Goal 1, PT) supervision required  -JOSEPH     Time Frame (Transfer Goal 1, PT) long term goal (LTG);10 days  -JOSEPH     Progress/Outcome (Transfer Goal 1, PT) new goal  -JOSEPH     Row Name 09/07/22 1100          Gait Training Goal 1 (PT)    Activity/Assistive Device (Gait Training Goal 1, PT) gait (walking locomotion);assistive device use;decrease fall risk;improve balance and speed;increase endurance/gait distance  -JOSEPH     Beth[place] Level (Gait Training Goal 1, PT) supervision required  -JOSEPH     Distance (Gait Training Goal 1, PT) 250 ft  -JOSEPH     Time Frame (Gait Training Goal 1, PT) long term goal (LTG);10 days  -JOSEPH     Progress/Outcome (Gait Training Goal 1, PT) new goal  -JOSEPH     Row Name 09/07/22 1100          Therapy Assessment/Plan (PT)    Planned Therapy Interventions (PT) bed mobility training;transfer training;gait training;balance training;home exercise program;patient/family education;postural re-education;stair training;strengthening  -JOSEPH           User Key  (r) = Recorded By, (t) = Taken By, (c) = Cosigned By    Initials Name Provider Type    Alexis Clayton PT DPT Physical Therapist               Clinical Impression     Row Name 09/07/22 1100          Pain    Pretreatment Pain Rating 0/10 - no pain  -JOSEPH     Row Name 09/07/22 1100          Plan of Care Review    Plan of Care Reviewed With patient   -JOSEPH     Outcome Evaluation PT eval complete. He is alert and oriented, on the vapotherm. He reports functioning independently at home prior to admit. He needs CGA to stand and to ambulate. He was able to come off the vapotherm and transition to the non rebreather mask for gait. He ambulates 200 ft around the unit and desat to 82%. Once sitting he recovers O2 sat to 90% or greater. PT will cont to progress gait, strength and endurance. Anticipate he will recover and d.c home with assist and HH.  -JOSEPH     Row Name 09/07/22 1100          Therapy Assessment/Plan (PT)    Patient/Family Therapy Goals Statement (PT) go home  -JOSEPH     Rehab Potential (PT) good, to achieve stated therapy goals  -JOSEPH     Criteria for Skilled Interventions Met (PT) yes;meets criteria  -JOSEPH     Therapy Frequency (PT) 2 times/day  -JOSEPH     Predicted Duration of Therapy Intervention (PT) untili d/c  -JOSEPH     Row Name 09/07/22 1100          Vital Signs    O2 Delivery Pre Treatment --  vapotherm  -JOSEPH     Intra SpO2 (%) 82  -JOSEPH     O2 Delivery Intra Treatment non-rebreather  -JOSEPH     Post SpO2 (%) 92  -JOSEPH     O2 Delivery Post Treatment --  vapotherm  -JOSEPH     Pre Patient Position Supine  -JOSEPH     Intra Patient Position Standing  -JOSEPH     Post Patient Position Sitting  -JOSEPH     Row Name 09/07/22 1100          Positioning and Restraints    Pre-Treatment Position in bed  -JOSEPH     Post Treatment Position bed  -JOSEPH     In Bed notified nsg;sitting EOB;call light within reach;encouraged to call for assist  -JOSEPH           User Key  (r) = Recorded By, (t) = Taken By, (c) = Cosigned By    Initials Name Provider Type    Alexis Clayton, PT DPT Physical Therapist               Outcome Measures     Row Name 09/07/22 1100          How much help from another person do you currently need...    Turning from your back to your side while in flat bed without using bedrails? 3  -JOSEPH     Moving from lying on back to sitting on the side of a flat bed without bedrails? 3  -JOSEPH      Moving to and from a bed to a chair (including a wheelchair)? 3  -JOSEPH     Standing up from a chair using your arms (e.g., wheelchair, bedside chair)? 3  -JOSEPH     Climbing 3-5 steps with a railing? 2  -JOSEPH     To walk in hospital room? 3  -JOSEPH     AM-PAC 6 Clicks Score (PT) 17  -JOSEPH     Highest level of mobility 5 --> Static standing  -JOSEPH     Row Name 09/07/22 1100 09/07/22 1036       Functional Assessment    Outcome Measure Options AM-PAC 6 Clicks Basic Mobility (PT)  -JOSEPH AM-PAC 6 Clicks Daily Activity (OT)  -STEPHAN          User Key  (r) = Recorded By, (t) = Taken By, (c) = Cosigned By    Initials Name Provider Type    Alexis Clatyon, PT DPT Physical Therapist    Radha Saenz, OTR/L, CSRS Occupational Therapist                             Physical Therapy Education                 Title: PT OT SLP Therapies (In Progress)     Topic: Physical Therapy (In Progress)     Point: Mobility training (Done)     Learning Progress Summary           Patient Acceptance, E, VU,DU,NR by JOSEPH at 9/7/2022 1100    Comment: benefits of activity, progression of PT POC                   Point: Home exercise program (Not Started)     Learner Progress:  Not documented in this visit.          Point: Body mechanics (Not Started)     Learner Progress:  Not documented in this visit.          Point: Precautions (Not Started)     Learner Progress:  Not documented in this visit.                      User Key     Initials Effective Dates Name Provider Type Discipline    JOSEPH 08/02/16 -  Alexis Rios, PT DPT Physical Therapist PT              PT Recommendation and Plan  Planned Therapy Interventions (PT): bed mobility training, transfer training, gait training, balance training, home exercise program, patient/family education, postural re-education, stair training, strengthening  Plan of Care Reviewed With: patient  Outcome Evaluation: PT eval complete. He is alert and oriented, on the vapotherm. He reports functioning independently at home  prior to admit. He needs CGA to stand and to ambulate. He was able to come off the vapotherm and transition to the non rebreather mask for gait. He ambulates 200 ft around the unit and desat to 82%. Once sitting he recovers O2 sat to 90% or greater. PT will cont to progress gait, strength and endurance. Anticipate he will recover and d.c home with assist and HH.     Time Calculation:    PT Charges     Row Name 09/07/22 1305             Time Calculation    Start Time 1100  -JOSEPH      Stop Time 1130  + 15 minutes w chart review and 10 minutes needed for unna boot assessment. PT total time 55 minutes of eval.  -JOSEPH      Time Calculation (min) 30 min  -JOSEPH      PT Received On 09/07/22  -JOSEPH      PT Goal Re-Cert Due Date 09/17/22  -JOSEPH            User Key  (r) = Recorded By, (t) = Taken By, (c) = Cosigned By    Initials Name Provider Type    Alexis Clayton, PT DPT Physical Therapist              Therapy Charges for Today     Code Description Service Date Service Provider Modifiers Qty    43402844293 HC PT EVAL MOD COMPLEXITY 4 9/7/2022 Alexis Rios PT DPT GP 1          PT G-Codes  Outcome Measure Options: AM-PAC 6 Clicks Basic Mobility (PT)  AM-PAC 6 Clicks Score (PT): 17  AM-PAC 6 Clicks Score (OT): 19    Alexis Rios PT DPT  9/7/2022

## 2022-09-07 NOTE — ACP (ADVANCE CARE PLANNING)
Date of [facility] ACP interview: 9/7/2022  Location of interview: ICU 7  [facility] ACP Facilitator: Daiana Antonio RN  Referral Source: consult  Present for facilitation: Patient    SUMMARY OF ADVANCE CARE PLANNING DISCUSSION:  Chito visited consult for [facility] facilitation. We reviewed purpose and goals for advance care planning.     Chito  States he misunderstood and thought it was for his assets and property. We discussed the difference in a Last ill and Testament and the Living Will document .  He wanted to take the information packet and discuss with his daughter prior to completing.     Education materials were provided on: Advance Care Planning and Healthcare Agent Selection    Each section of the advance care/living will document was reviewed and discussed.    Advance care/living will document finished during this facilitation? no    Time spent on preparation, facilitation and documentation was 31-60 minutes.    RECOMMENDATIONS/FOLLOW-UP:  Will review information with his daughter and consider  Completing.    CONSULT/NOTE ROUTED  yes    Daiana Antonio, RN

## 2022-09-07 NOTE — THERAPY WOUND CARE TREATMENT
Acute Care - Wound/Debridement Treatment Note  [facility]     Patient Name: Chito Govea  : 1955  MRN: [id number]  Today's Date: 2022         Referring Physician: Dr Rey ([facility])      Admit Date: 2022    Visit Dx:    ICD-10-CM ICD-9-CM   1. Acute on chronic congestive heart failure, unspecified heart failure type ([facility])  I50.9 428.0   2. Acute respiratory failure with hypoxia ([facility])  J96.01 518.81   3. Impaired mobility and ADLs  Z74.09 V49.89    Z78.9    4. Impaired mobility  Z74.09 799.89   5. Bilateral leg edema  R60.0 782.3       Patient Active Problem List   Diagnosis   • Paroxysmal atrial fibrillation ([facility])   • Acute kidney injury ([facility])   • Hypertension   • Type 2 diabetes mellitus with hyperglycemia, without long-term current use of insulin ([facility])   • Moderate malnutrition ([facility])   • Aortic stenosis, moderate   • Nausea and vomiting   • Type 2 MI (myocardial infarction) ([facility]) due to hypoxia and congestive heart failure   • Hyponatremia due to dehydration with associated weakness   • Bilateral pleural effusion   • COPD (chronic obstructive pulmonary disease) ([facility])   • Former smoker, quit 2022   • Acute kidney injury ([facility]) superimposed on CKD Stage 2   • Anemia, chronic disease   • Suspected Interstitial lung disease ([facility]), likely due to amiodarone   • Acute systolic heart failure ([facility]), EF 36-40% on    • Acute respiratory failure with hypoxia ([facility])   • Acute urinary retention requiring alonzo catheter        Past Medical History:   Diagnosis Date   • Arthritis    • CHF (congestive heart failure) ([facility])    • Diabetes mellitus ([facility])    • HLD (hyperlipidemia)    • Hypertension    • Renal disorder         History reviewed. No pertinent surgical history.        Wound 22 0400 gluteal (Active)   Dressing Appearance open to air 22 0800   Closure Open to air 22 1200   Base maroon/purple 22   Periwound non-blanchable;intact;ecchymotic;redness 22    Periwound Skin Turgor soft 09/06/22 2003   Edges irregular 09/06/22 2003   Drainage Amount none 09/07/22 0400   Care, Wound cleansed with;soap and water 09/06/22 2003   Dressing Care open to air 09/06/22 2003   Periwound Care barrier ointment applied;dry periwound area maintained 09/06/22 2003       Wound 09/01/22 0403 Left lower leg (Active)   Wound Image   09/07/22 1310   Dressing Appearance intact 09/07/22 1310   Closure MELISA 09/07/22 1200   Base pink;red 09/07/22 1310   Periwound pink;redness;swelling;edematous 09/07/22 1310   Edges irregular 09/06/22 1600   Drainage Amount none 09/07/22 1310   Care, Wound loren boot 09/07/22 1310   Dressing Care dressing changed;gauze, dry 09/07/22 0530   Periwound Care dry periwound area maintained 09/07/22 0530       Wound 09/01/22 0405 Left lower leg (Active)   Dressing Appearance open to air 09/06/22 1600   Base scab;red 09/06/22 1600   Periwound redness 09/06/22 1600   Drainage Amount none 09/06/22 1600   Periwound Care dry periwound area maintained 09/06/22 1600       Wound 09/07/22 Right lower leg (Active)   Wound Image   09/07/22 1310   Periwound pink;redness;swelling;edematous 09/07/22 1310   Drainage Amount none 09/07/22 1310   Care, Wound loren boot 09/07/22 1310         WOUND DEBRIDEMENT                     PT Assessment (last 12 hours)     PT Evaluation and Treatment     Row Name 09/07/22 1310          Physical Therapy Time and Intention    Subjective Information complains of;swelling  -JOSEPH     Document Type wound care  -JOSEPH     Mode of Treatment physical therapy  -JOSEPH     Row Name 09/07/22 1310          General Information    Patient Profile Reviewed yes  -JOSEPH     Referring Physician Dr Krissy Vieira boots  -JOSEPH     Patient Observations alert;cooperative;agree to therapy  -JOSEPH     Patient/Family/Caregiver Comments/Observations no family present  -JOSEPH     General Observations of Patient fowlers in bed  -JOSEPH     Existing Precautions/Restrictions fall;oxygen therapy device and  L/min  B unna boots  -JOSEPH     Risks Reviewed patient:;increased discomfort;increased drainage  -JOESPH     Benefits Reviewed patient:;decrease pain;improve skin integrity;increase knowledge  -JOSEPH     Barriers to Rehab medically complex;previous functional deficit  -JOSEPH     Row Name 09/07/22 1310          Pain    Pretreatment Pain Rating 0/10 - no pain  -JOSEPH     Row Name 09/07/22 1310          Edema Assessment & Management    Additional Documentation --  B LE pitting edema, R LE worse  -JOSEPH     Row Name 09/07/22 1310          Health Promotion    Additional Documentation Wound (LDA)  -JOSEPH     Row Name             Wound 09/01/22 0400 gluteal    Wound - Properties Group Placement Date: 09/01/22  -VT Placement Time: 0400  -VT Location: gluteal  -VT     Retired Wound - Properties Group Placement Date: 09/01/22  -VT Placement Time: 0400  -VT Location: gluteal  -VT     Retired Wound - Properties Group Date first assessed: 09/01/22  -VT Time first assessed: 0400  -VT Location: gluteal  -VT     Row Name 09/07/22 1310          Wound 09/01/22 0403 Left lower leg    Wound - Properties Group Placement Date: 09/01/22  -VT Placement Time: 0403  -VT Present on Hospital Admission: Y  -VT Side: Left  -VT Orientation: lower  -VT Location: leg  -VT     Wound Image View All Images View Images  -JOSEPH     Dressing Appearance intact  has thera honey and adaptic to L calf post region wound  -JOSEPH     Base pink;red  L post calf region wound  -JOSEPH     Periwound pink;redness;swelling;edematous  multiple scabs, L post calf region wound  -JOSEPH     Drainage Amount none  -JOSEPH     Care, Wound loren boot  1 adaptic to L post calf region wound, unna layer, coban layer  -JOSEPH     Dressing Care --  capillary refill difficult to assess, ~ 3-4 seconds  -JOSEPH     Retired Wound - Properties Group Placement Date: 09/01/22  -VT Placement Time: 0403  -VT Present on Hospital Admission: Y  -VT Side: Left  -VT Orientation: lower  -VT Location: leg  -VT     Retired Wound - Properties  Group Date first assessed: 09/01/22  -VT Time first assessed: 0403  -VT Present on Hospital Admission: Y  -VT Side: Left  -VT Location: leg  -VT     Row Name             Wound 09/01/22 0405 Left lower leg    Wound - Properties Group Placement Date: 09/01/22  -VT Placement Time: 0405  -VT Present on Hospital Admission: Y  -VT Side: Left  -VT Orientation: lower  -VT Location: leg  -VT     Retired Wound - Properties Group Placement Date: 09/01/22  -VT Placement Time: 0405  -VT Present on Hospital Admission: Y  -VT Side: Left  -VT Orientation: lower  -VT Location: leg  -VT     Retired Wound - Properties Group Date first assessed: 09/01/22  -VT Time first assessed: 0405  -VT Present on Hospital Admission: Y  -VT Side: Left  -VT Location: leg  -VT     Row Name 09/07/22 1310          Wound 09/07/22 Right lower leg    Wound - Properties Group Placement Date: 09/07/22  -JOSEPH Present on Hospital Admission: Y  -JOSEPH Side: Right  -JOSEPH Orientation: lower  -JOSEPH Location: leg  -JOSEPH     Wound Image View All Images View Images  -JOSEPH     Periwound pink;redness;swelling;edematous  multiple scabs  -JOSEPH     Drainage Amount none  -JOSEPH     Care, Wound loren boot  unna layer, coban layer  -JOSEPH     Dressing Care --  capillary refill difficult to assess, ~ 3-4 seconds  -JOSEPH     Retired Wound - Properties Group Placement Date: 09/07/22  -JOSEPH Present on Hospital Admission: Y  -JOSEPH Side: Right  -JOSEPH Orientation: lower  -JOSEPH Location: leg  -JOSEPH     Retired Wound - Properties Group Date first assessed: 09/07/22  -JOSEPH Present on Hospital Admission: Y  -JOSEPH Side: Right  -JOSEPH Location: leg  -JOSEHP     Row Name 09/07/22 1310          Coping    Observed Emotional State calm;cooperative  -JOSEPH     Row Name 09/07/22 1310          Plan of Care Review    Plan of Care Reviewed With patient  -JOSEPH     Outcome Evaluation PT placed B unna boots per order. PT placed 1 adaptic to L post calf region wound and covered B LE with unna layer and coban layer. Plan per MD is to leave unna boots  on for 3 days and will then assess effectiveness.  -JOSEPH     Row Name 09/07/22 1310          Positioning and Restraints    Pre-Treatment Position in bed  -JOSEPH     Post Treatment Position bed  -JOSEPH     In Bed notified nsg;fowlers;call light within reach;encouraged to call for assist;legs elevated  -JOSEPH     Row Name 09/07/22 1310          Therapy Assessment/Plan (PT)    Patient/Family Therapy Goals Statement (PT) decrease LE edema  -JOSEPH     PT Diagnosis (PT) B LE edema  -JOSEPH     Rehab Potential (PT) good, to achieve stated therapy goals  -JOSEPH     Criteria for Skilled Interventions Met (PT) yes;meets criteria  -JOSEPH     Therapy Frequency (PT) --  check unna boots daily, change in 3 days per MD recs  -JOSEPH     Predicted Duration of Therapy Intervention (PT) unti ld.c  -JOSEPH     Row Name 09/07/22 1310          Therapy Plan Review/Discharge Plan (PT)    Therapy Plan Review (PT) patient;participants voiced agreement with care plan  -[facility] Name 09/07/22 1310          Physical Therapy Goals    Wound Care Goal Selection (PT) wound care, PT goal 1  -[facility] Name 09/07/22 1310          Wound Care Goal 1 (PT)    Wound Care Goal 1 (PT) B unna boots will stay intact for duration of treatment with no adverse reactions.  -JOSEPH     Time Frame (Wound Care Goal 1, PT) long term goal (LTG);10 days  -JOSEPH     Progress/Outcome (Wound Care Goal 1, PT) new goal  -JOSEPH           User Key  (r) = Recorded By, (t) = Taken By, (c) = Cosigned By    Initials Name Provider Type    Alexis Clayton, PT DPT Physical Therapist    Nikki Castro, RN Registered Nurse              Physical Therapy Education                 Title: PT OT SLP Therapies (In Progress)     Topic: Physical Therapy (In Progress)     Point: Mobility training (Done)     Learning Progress Summary           Patient Acceptance, E, VU,DU,NR by JOSEPH at 9/7/2022 1100    Comment: benefits of activity, progression of PT POC                   Point: Home exercise program (Not Started)      Learner Progress:  Not documented in this visit.          Point: Body mechanics (Not Started)     Learner Progress:  Not documented in this visit.          Point: Precautions (Done)     Learning Progress Summary           Patient Acceptance, E, VU,NR by JOSEPH at 9/7/2022 1310    Comment: unna boot purpose, goals, progression, change day                               User Key     Initials Effective Dates Name Provider Type Discipline    JOSEPH 08/02/16 -  Alexis Rios PT DPT Physical Therapist PT                Recommendation and Plan  Anticipated Discharge Disposition (PT): home with assist, home with 24/7 care, home with home health  Planned Therapy Interventions (PT): bed mobility training, transfer training, gait training, balance training, home exercise program, patient/family education, postural re-education, stair training, strengthening, wound care  Therapy Frequency (PT):  (check unna boots daily, change in 3 days per MD recs)  Plan of Care Reviewed With: patient           Outcome Evaluation: PT placed B unna boots per order. PT placed 1 adaptic to L post calf region wound and covered B LE with unna layer and coban layer. Plan per MD is to leave unna boots on for 3 days and will then assess effectiveness.  Plan of Care Reviewed With: patient            Time Calculation   PT Charges     Row Name 09/07/22 1524 09/07/22 1305          Time Calculation    Start Time 1310  -JOSEPH 1100  -JOSEPH     Stop Time 1340  -JOSEPH 1130  + 15 minutes w chart review and 10 minutes needed for unna boot assessment. PT total time 55 minutes of eval.  -JOSEPH     Time Calculation (min) 30 min  -JOSEPH 30 min  -JOSEPH     PT Received On 09/07/22  -JOSEPH 09/07/22  -JOSEPH     PT Goal Re-Cert Due Date -- 09/17/22  -JOSEPH           User Key  (r) = Recorded By, (t) = Taken By, (c) = Cosigned By    Initials Name Provider Type    Alexis Clayton, PT DPT Physical Therapist                  Therapy Charges for Today     Code Description Service Date Service Provider  Modifiers Qty    85316504608 HC PT EVAL MOD COMPLEXITY 4 9/7/2022 Alexis Rios, PT DPT GP 1    69874850985 HC PT STAPPING UNNA BOOT 9/7/2022 Alexis Rios, PT DPT GP 2            PT G-Codes  Outcome Measure Options: AM-PAC 6 Clicks Basic Mobility (PT)  AM-PAC 6 Clicks Score (PT): 17  AM-PAC 6 Clicks Score (OT): 19       Alexis Rios, PT DPT  9/7/2022

## 2022-09-07 NOTE — PLAN OF CARE
Goal Outcome Evaluation:  Plan of Care Reviewed With: patient           Outcome Evaluation: PT eval complete. He is alert and oriented, on the vapotherm. He reports functioning independently at home prior to admit. He needs CGA to stand and to ambulate. He was able to come off the vapotherm and transition to the non rebreather mask for gait. He ambulates 200 ft around the unit and desat to 82%. Once sitting he recovers O2 sat to 90% or greater. PT will cont to progress gait, strength and endurance. Anticipate he will recover and d.c home with assist and HH.

## 2022-09-07 NOTE — PLAN OF CARE
Goal Outcome Evaluation:  Plan of Care Reviewed With: patient           Outcome Evaluation: PT placed B unna boots per order. PT placed 1 adaptic to L post calf region wound and covered B LE with unna layer and coban layer. Plan per MD is to leave unna boots on for 3 days and will then assess effectiveness.

## 2022-09-07 NOTE — PROGRESS NOTES
[facility] Medicine Services  INPATIENT PROGRESS NOTE    Patient Name: Chito Govea  Date of Admission: 9/1/2022  Today's Date: 09/07/22  Length of Stay: 6  Primary Care Physician: Chito Rubio MD    Subjective   Chief Complaint: shortness of breath    HPI:    Patient was seen and examined at bedside.      Patient continues to be on Vapotherm.  Patient is noted be on 40 L/min and 65% FiO2.  Patient continues to have adequate diuresis.  The patient indicates that he feels much better.  Patient is breathing much more deeply.  Patient is tolerating p.o. intake.  Patient's legs still have significant edema, we will plan to get Unna boots, this will help with the patient's wound in addition to providing some extra squeeze for diuresis.    [place] catheter was removed on 9/6, patient's been able to void without any difficulty on his own.            Intake/Output Summary (Last 24 hours) at 9/7/2022 0901  Last data filed at 9/7/2022 0530  Gross per 24 hour   Intake 320 ml   Output 1975 ml   Net -1655 ml       Review of Systems   Constitutional: Positive for fatigue. Negative for activity change, appetite change, chills and fever.   Respiratory: Positive for shortness of breath. Negative for cough.    Cardiovascular: Positive for leg swelling. Negative for palpitations.   Gastrointestinal: Negative for abdominal distention, abdominal pain, diarrhea, nausea and vomiting.   Genitourinary: Negative for difficulty urinating.   Neurological: Positive for weakness.        All pertinent negatives and positives are as above. All other systems have been reviewed and are negative unless otherwise stated.     Objective    Temp:  [97.2 °F (36.2 °C)-97.9 °F (36.6 °C)] 97.6 °F (36.4 °C)  Heart Rate:  [] 64  Resp:  [14-24] 14  BP: ()/() 143/60  Physical Exam  Vitals and nursing note reviewed.   Constitutional:       Appearance: Normal appearance. He is ill-appearing.   HENT:       Head: Normocephalic and atraumatic.      Mouth/Throat:      Mouth: Mucous membranes are dry.      Pharynx: Oropharynx is clear.   Eyes:      General: No scleral icterus.     Conjunctiva/sclera: Conjunctivae normal.   Cardiovascular:      Rate and Rhythm: Tachycardia present. Rhythm irregular.   Pulmonary:      Effort: No respiratory distress (tachypnea).      Breath sounds: Rales (coarse, mild) present.      Comments: Diminished at bases   Abdominal:      General: Abdomen is flat. Bowel sounds are normal. There is no distension.      Palpations: Abdomen is soft. There is no mass.      Tenderness: There is no abdominal tenderness.   Musculoskeletal:      Right lower leg: Edema (2+) present.      Left lower leg: Edema (2+) present.   Skin:     General: Skin is warm and dry.      Findings: No rash.   Neurological:      General: No focal deficit present.      Mental Status: He is alert and oriented to person, place, and time.   Psychiatric:         Mood and Affect: Mood normal.         Behavior: Behavior normal.             Results Review:  I have reviewed the labs, radiology results, and diagnostic studies.    Laboratory Data:   Results from last 7 days   Lab Units 09/06/22  0354 09/05/22  0513 09/03/22  1111   WBC 10*3/mm3 15.77* 13.41* 8.48   HEMOGLOBIN g/dL 10.3* 10.3* 10.5*   HEMATOCRIT % 33.9* 33.2* 34.1*   PLATELETS 10*3/mm3 401 397 344        Results from last 7 days   Lab Units 09/07/22  0348 09/06/22  1803 09/06/22  0354 09/05/22  1027 09/05/22  0513 09/01/22  1938 09/01/22  0048   SODIUM mmol/L 139 142 138   < > 138   < > 141   POTASSIUM mmol/L 3.9 4.3 3.9   < > 4.7   < > 4.6   CHLORIDE mmol/L 102 99 98   < > 95*   < > 103   CO2 mmol/L 31.0* 31.0* 30.0*   < > 32.0*   < > 24.0   BUN mg/dL 72* 68* 68*   < > 54*   < > 23   CREATININE mg/dL 1.33* 1.41* 1.58*   < > 1.48*   < > 1.15   CALCIUM mg/dL 8.2* 9.0 8.7   < > 8.5*   < > 8.4*   BILIRUBIN mg/dL  --   --  0.2  --  0.3  --  0.5   ALK PHOS U/L  --   --  111  --   98  --  115   ALT (SGPT) U/L  --   --  19  --  14  --  16   AST (SGOT) U/L  --   --  26  --  20  --  15   GLUCOSE mg/dL 209* 191* 194*   < > 324*   < > 266*    < > = values in this interval not displayed.       Culture Data:   Blood Culture   Date Value Ref Range Status   09/01/2022 No growth at 4 days  Preliminary   09/01/2022 No growth at 4 days  Preliminary       Radiology Data:   Imaging Results (Last 24 Hours)     Procedure Component Value Units Date/Time    XR Chest 1 View [[id number]] Collected: 09/06/22 0930     Updated: 09/06/22 0935    Narrative:      EXAMINATION:  XR CHEST 1 VW-  9/6/2022 9:18 AM CDT     HISTORY: Repeat CXR to evaluate effusion; I50.9-Heart failure,  unspecified; J96.01-Acute respiratory failure with hypoxia.     COMPARISON: 9/4/2022.     TECHNIQUE: Single view AP image.     FINDINGS:  There are diffuse bilateral lung infiltrates. There is  minimal blunting of the costophrenic angles bilaterally. There is less  blunting on the right side on the current study. There is borderline  cardiomegaly. There are multiple calcified hilar and mediastinal lymph  nodes. The thoracic aorta is atheromatous. There are degenerative  changes of the spine and bilateral shoulders.       Impression:      1. Diffuse bilateral lung infiltrates may represent pulmonary edema or  pneumonia. No significant change.  2. Minimal blunting of the costophrenic angles bilaterally suggesting  small effusions. The blunting is decreased slightly on the right side.  3. Heart size upper limits of normal. Atheromatous thoracic aorta.        This report was finalized on 09/06/2022 09:32 by Dr. Wilmer Murphy MD.          I have reviewed the patient's current medications.     Assessment/Plan     Active Hospital Problems    Diagnosis    • **Acute systolic heart failure (HCC), EF 36-40% on 9/2    • Bilateral pleural effusion    • COPD (chronic obstructive pulmonary disease) (HCC)    • Former smoker, quit 7/2022    • Acute kidney  injury (HCC) superimposed on CKD Stage 2    • Anemia, chronic disease    • Suspected Interstitial lung disease (HCC), likely due to amiodarone    • Acute respiratory failure with hypoxia (HCC)    • Acute urinary retention requiring alonzo catheter    • Type 2 MI (myocardial infarction) (HCC) due to hypoxia and congestive heart failure    • Aortic stenosis, moderate    • Moderate malnutrition (HCC)    • Paroxysmal atrial fibrillation (HCC)    • Hypertension    • Type 2 diabetes mellitus with hyperglycemia, without long-term current use of insulin (HCC)        Plan:  Patient was admitted on 9/1 by Dr. Pena for shortness of breath.  Patient follows with Dr. Rubio for PCP and Dr. Devine for CHF.  Patient was recently admitted on 8/9-8/15 for CONSUELO, atrial fibrillation.  Patient was found during that admission to have a Cr of 6.96 and BUN >200 with hyperkalemia.  At that time, the patient was felt to have significant dehydration during that admission and was treated with IVF and patient had difficulty controlling HR and was started on amiodarone.    Upon admission, patient was noted to have a BNP of 33,372 and was noted to have tachypnea and hypoxia per Dr. Pena's note with O2 sat 78% upon presentation to ER.  Patient was admitted for suspected Acute on Chronic diastolic heart failure likely related to aortic stenosis and atrial fibrillation with RVR.  The patient's echo in 8/11/2022 showed normal EF and moderate AS.  Patient has been diuresed with IV diuretics since admission.  Patient is net negative 9.1 L for the hospitalization. Cardiology has been consulted, appreciate assistance.  Repeat echo 9/4/2022,Left ventricular ejection fraction appears to be 36 - 40%. Left ventricular systolic function is moderately decreased.  The following left ventricular wall segments are hypokinetic: mid anterior, apical anterior, apical lateral, apical inferior, mid inferior, apical septal, basal inferoseptal, mid inferoseptal, apex  hypokinetic, mid anteroseptal, basal inferior and basal inferoseptal. Moderate aortic valve stenosis is present.  Left atrial volume is moderately increased.  Estimated right ventricular systolic pressure from tricuspid regurgitation is mildly elevated (35-45 mmHg).  Patient given 60 mg IV lasix and acetazolamide 500 mg - Continue this regimen for now.    Patient has a bump in his Cr and meets criteria for CONSUELO.  Patients baseline Cr is ~1.  Patient Cr bumped to 1.6 on 9/4/2022.  This is likely fluctuation due to diuresis, patient also noted to be more alkalotic (likely contraction alkalosis).  Baseline GFR places patient in CKD Stage 2.  Monitor renal function closely.  Renal function improving with current diuresis strategy.    Pulmonary consulted for concern for lung disease.  Discussed the case with Tomas GUILLAUME.  We both agree with Dr. Uriostegui who is concerned about amiodarone toxicity.  Although amiodarone toxicity typically occurs with continue cumulative dose of medication over time, there is some literature, however, supporting acute toxicity.  Patient has only been on this medication since mid-August 2022.  The distribution of the imaging and the clinical examination do raise concern for ILD.  Often eosinophils will be elevated, patient has been on steroids which can interfere with this result.  The patient does have an elevated CRP and sedimentation rate.  Treatment essentially is to remove the offending agent and PO steroid treatment and taper.      There is significant change compared to CT scan performed just a month ago.  Do not think this is all related to pulmonary edema.  New CT scan (CT angiogram 9/1) compared with old CT scan (CT wo contrast 8/9):    Upper lobes:      At the level of the sondra:      Lower lobes:     The bilateral distribution is somewhat perivascular but has some honey-combing and appears almost fibrotic as opposed to just pulmonary edema.  The other finding is bilateral pleural  effusions.    Although low suspicion for bacterial pneumonia, continue antibiotics.  Previously on ceftriaxone and azithromycin.  D/C azithromycin due to QTc prolongation properties.  Continue doxycycline for atypical coverage and continue ceftriaxone - Treat for total of 7 days.        The patient was on solumedrol 40 mg q8h, titrate down to 20 mg q8h and atrovent nebs.  No albuterol due to HR.      Patient does have symptoms of orthopnea, but does not have significant JVD.  Suspect that patients bilateral pleural effusions layer out when he lays flat covering more area causing shortness of breath and causes increased work of breath.  Patient does not have hypoxia or significant JVD when laying flat.  Patient having worsening hypoxia, on 9/6 evaluated at bedside with US for possible thoracentesis.  Patient has bilateral pleural effusion R>L but has decreased in size.  Diuresis, pulmonary tolieting seems to be providing adequate improvement.    Metoprolol 12.5 mg TID for rate control for his atrial fibrillation.  Continue home xarelto for stroke prophylaxis in the setting of atrial fibrillation.  Discussed case with cardiology,  Dr. Uriostegui to start patient on losartan 25 mg.      For the patient's diabetes, it has been poorly controlled.  Patient had a hemoglobin A1c of 8.40 upon admission.  Patient's sugars were >200-300 when I took over on 9/5.  Increased levemir to 25 units qhs and continue high-intensity sliding scale insulin.     PT/OT - Increase activity.    Patient has some issue with urinary retention upon admission.  Patient had a alonzo placed.  Patient started on flomax and alonzo removed on 9/6.  Patient voiding spontaneously.        Discharge Planning: I expect the patient to be discharged to  in >2 days.    Electronically signed by Ellis Rey MD, 09/07/22, 08:44 CDT.

## 2022-09-07 NOTE — PROGRESS NOTES
"Pharmacy Renal Dose Conversion    Chito Govea is a 66 y.o. year old male  176.8 cm (69.61\") 68.2 kg (150 lb 6.4 oz)    Estimated Creatinine Clearance: 52 mL/min (A) (by C-G formula based on SCr of 1.33 mg/dL (H)).   Creatinine   Date Value Ref Range Status   09/07/2022 1.33 (H) 0.76 - 1.27 mg/dL Final   09/06/2022 1.41 (H) 0.76 - 1.27 mg/dL Final   09/06/2022 1.58 (H) 0.76 - 1.27 mg/dL Final   08/23/2022 0.9 0.5 - 1.2 mg/dL Final   03/16/2022 1 0.5 - 1.2 mg/dL Final   10/02/2020 1.1 0.5 - 1.2 mg/dL Final       PLAN  Based on prescribing information provided by the drug , Xarelto 15mg po daily with dinner,  has been changed to Xarelto 20mg po daily with dinner for atrial fibrillation.    Adjusted per the directives and guidelines established by [facility] and Therapeutics Committee and [facility] Medical Executive Committee.  Pharmacy will continue to monitor daily and make further adjustment(s) accordingly.    Kishor Dia, PharmD  09/07/2213:09 CDT    "

## 2022-09-07 NOTE — THERAPY EVALUATION
Patient Name: Chito Govea  : 1955    MRN: [id number]                              Today's Date: 2022       Admit Date: 2022    Visit Dx:     ICD-10-CM ICD-9-CM   1. Acute on chronic congestive heart failure, unspecified heart failure type (HCC)  I50.9 428.0   2. Acute respiratory failure with hypoxia (HCC)  J96.01 518.81   3. Impaired mobility and ADLs  Z74.09 V49.89    Z78.9    4. Impaired mobility  Z74.09 799.89     Patient Active Problem List   Diagnosis   • Paroxysmal atrial fibrillation (HCC)   • Acute kidney injury (HCC)   • Hypertension   • Type 2 diabetes mellitus with hyperglycemia, without long-term current use of insulin (HCC)   • Moderate malnutrition (HCC)   • Aortic stenosis, moderate   • Nausea and vomiting   • Type 2 MI (myocardial infarction) (HCC) due to hypoxia and congestive heart failure   • Hyponatremia due to dehydration with associated weakness   • Bilateral pleural effusion   • COPD (chronic obstructive pulmonary disease) (HCC)   • Former smoker, quit 2022   • Acute kidney injury (HCC) superimposed on CKD Stage 2   • Anemia, chronic disease   • Suspected Interstitial lung disease (HCC), likely due to amiodarone   • Acute systolic heart failure (HCC), EF 36-40% on    • Acute respiratory failure with hypoxia (HCC)   • Acute urinary retention requiring alonzo catheter     Past Medical History:   Diagnosis Date   • Arthritis    • CHF (congestive heart failure) (HCC)    • Diabetes mellitus (HCC)    • HLD (hyperlipidemia)    • Hypertension    • Renal disorder      History reviewed. No pertinent surgical history.   General Information     Row Name 22 1100          Physical Therapy Time and Intention    Document Type evaluation  PT presents to ED wtih SOA, LE edema, cough. Recent -8/15 for CONSUELO. Dx: acute on chronic diastolic CHF, acute hypoxic resp. failure, a-fib, B pleural effusions.  -JOSEPH     Mode of Treatment physical therapy  -JOSEPH     Row Name 22 1100           General Information    Patient Profile Reviewed yes  -JOSEPH     Prior Level of Function independent:;all household mobility  -JOSEPH     Existing Precautions/Restrictions fall;oxygen therapy device and L/min  vapotherm  -JOSEPH     Barriers to Rehab medically complex  -JOSEPH     Row Name 09/07/22 1100          Living Environment    People in Home child(juana), adult  -JOSEPH     Row Name 09/07/22 1100          Cognition    Orientation Status (Cognition) oriented x 4  -JOSEPH     Row Name 09/07/22 1100          Safety Issues, Functional Mobility    Impairments Affecting Function (Mobility) balance;endurance/activity tolerance;strength;range of motion (ROM);shortness of breath  -JOSEPH           User Key  (r) = Recorded By, (t) = Taken By, (c) = Cosigned By    Initials Name Provider Type    Alexis Clayton, PT DPT Physical Therapist               Mobility     Row Name 09/07/22 1100          Bed Mobility    Bed Mobility supine-sit  -JOSEPH     Supine-Sit [place] (Bed Mobility) contact guard  -JOSEPH     Assistive Device (Bed Mobility) head of bed elevated;bed rails  -JOSEPH     Row Name 09/07/22 1100          Sit-Stand Transfer    Sit-Stand [place] (Transfers) contact guard  -JOSEPH     Row Name 09/07/22 1100          Gait/Stairs (Locomotion)    [place] Level (Gait) contact guard  -JOSEPH     Distance in Feet (Gait) 200 ft  -JOSEPH     Deviations/Abnormal Patterns (Gait) gait speed decreased  -JOSEPH           User Key  (r) = Recorded By, (t) = Taken By, (c) = Cosigned By    Initials Name Provider Type    Alexis Clayton, PT DPT Physical Therapist               Obj/Interventions     Row Name 09/07/22 1100          Range of Motion Comprehensive    Comment, General Range of Motion B LE AROM WFL  -JOSEPH     Row Name 09/07/22 1100          Strength Comprehensive (MMT)    Comment, General Manual Muscle Testing (MMT) Assessment B LE grossly 4-/5  -JOSEPH     Row Name 09/07/22 1100          Balance    Balance Assessment sitting dynamic balance;standing dynamic  balance  -JOSEPH     Dynamic Sitting Balance supervision  -JOSEPH     Position, Sitting Balance unsupported;sitting edge of bed  -JOSEPH     Dynamic Standing Balance contact guard  -JOSEPH     Position/Device Used, Standing Balance supported  -JOSEPH           User Key  (r) = Recorded By, (t) = Taken By, (c) = Cosigned By    Initials Name Provider Type    Alexis Clayton PT DPT Physical Therapist               Goals/Plan     Row Name 09/07/22 1100          Transfer Goal 1 (PT)    Activity/Assistive Device (Transfer Goal 1, PT) sit-to-stand/stand-to-sit;bed-to-chair/chair-to-bed  -JOSEPH     [place] Level/Cues Needed (Transfer Goal 1, PT) supervision required  -JOSEPH     Time Frame (Transfer Goal 1, PT) long term goal (LTG);10 days  -JOSEPH     Progress/Outcome (Transfer Goal 1, PT) new goal  -JOSEPH     Row Name 09/07/22 1100          Gait Training Goal 1 (PT)    Activity/Assistive Device (Gait Training Goal 1, PT) gait (walking locomotion);assistive device use;decrease fall risk;improve balance and speed;increase endurance/gait distance  -JOSEPH     [place] Level (Gait Training Goal 1, PT) supervision required  -JOSEPH     Distance (Gait Training Goal 1, PT) 250 ft  -JOSEPH     Time Frame (Gait Training Goal 1, PT) long term goal (LTG);10 days  -JOSEPH     Progress/Outcome (Gait Training Goal 1, PT) new goal  -JOSEPH     Row Name 09/07/22 1100          Therapy Assessment/Plan (PT)    Planned Therapy Interventions (PT) bed mobility training;transfer training;gait training;balance training;home exercise program;patient/family education;postural re-education;stair training;strengthening  -JOSEPH           User Key  (r) = Recorded By, (t) = Taken By, (c) = Cosigned By    Initials Name Provider Type    Alexis Clayton PT DPT Physical Therapist               Clinical Impression     Row Name 09/07/22 1100          Pain    Pretreatment Pain Rating 0/10 - no pain  -JOSEPH     Row Name 09/07/22 1100          Plan of Care Review    Plan of Care Reviewed With patient   -JOSEPH     Outcome Evaluation PT eval complete. He is alert and oriented, on the vapotherm. He reports functioning independently at home prior to admit. He needs CGA to stand and to ambulate. He was able to come off the vapotherm and transition to the non rebreather mask for gait. He ambulates 200 ft around the unit and desat to 82%. Once sitting he recovers O2 sat to 90% or greater. PT will cont to progress gait, strength and endurance. Anticipate he will recover and d.c home with assist and HH.  -JOSEPH     Row Name 09/07/22 1100          Therapy Assessment/Plan (PT)    Patient/Family Therapy Goals Statement (PT) go home  -JOSEPH     Rehab Potential (PT) good, to achieve stated therapy goals  -JOSEPH     Criteria for Skilled Interventions Met (PT) yes;meets criteria  -JOSEPH     Therapy Frequency (PT) 2 times/day  -JOSEPH     Predicted Duration of Therapy Intervention (PT) untili d/c  -JOSEPH     Row Name 09/07/22 1100          Vital Signs    O2 Delivery Pre Treatment --  vapotherm  -JOSEPH     Intra SpO2 (%) 82  -JOSEPH     O2 Delivery Intra Treatment non-rebreather  -JOSEPH     Post SpO2 (%) 92  -JOSEPH     O2 Delivery Post Treatment --  vapotherm  -JOSEPH     Pre Patient Position Supine  -JOSEPH     Intra Patient Position Standing  -JOSEPH     Post Patient Position Sitting  -JOSEPH     Row Name 09/07/22 1100          Positioning and Restraints    Pre-Treatment Position in bed  -JOSEPH     Post Treatment Position bed  -JOSEPH     In Bed notified nsg;sitting EOB;call light within reach;encouraged to call for assist  -JOSEPH           User Key  (r) = Recorded By, (t) = Taken By, (c) = Cosigned By    Initials Name Provider Type    Alexis Clayotn, PT DPT Physical Therapist               Outcome Measures     Row Name 09/07/22 1100          How much help from another person do you currently need...    Turning from your back to your side while in flat bed without using bedrails? 3  -JOSEPH     Moving from lying on back to sitting on the side of a flat bed without bedrails? 3  -JOSEPH      Moving to and from a bed to a chair (including a wheelchair)? 3  -JOSEPH     Standing up from a chair using your arms (e.g., wheelchair, bedside chair)? 3  -JOSEPH     Climbing 3-5 steps with a railing? 2  -JOSEPH     To walk in hospital room? 3  -JOSEPH     AM-PAC 6 Clicks Score (PT) 17  -JOSEPH     Highest level of mobility 5 --> Static standing  -JOSEPH     Row Name 09/07/22 1100 09/07/22 1036       Functional Assessment    Outcome Measure Options AM-PAC 6 Clicks Basic Mobility (PT)  -JOSEPH AM-PAC 6 Clicks Daily Activity (OT)  -STEPHAN          User Key  (r) = Recorded By, (t) = Taken By, (c) = Cosigned By    Initials Name Provider Type    Alexis Clayton, PT DPT Physical Therapist    Radha Saenz, OTR/L, CSRS Occupational Therapist                             Physical Therapy Education                 Title: PT OT SLP Therapies (In Progress)     Topic: Physical Therapy (In Progress)     Point: Mobility training (Done)     Learning Progress Summary           Patient Acceptance, E, VU,DU,NR by JOSEPH at 9/7/2022 1100    Comment: benefits of activity, progression of PT POC                   Point: Home exercise program (Not Started)     Learner Progress:  Not documented in this visit.          Point: Body mechanics (Not Started)     Learner Progress:  Not documented in this visit.          Point: Precautions (Not Started)     Learner Progress:  Not documented in this visit.                      User Key     Initials Effective Dates Name Provider Type Discipline    JOSEPH 08/02/16 -  Alexis Rios, PT DPT Physical Therapist PT              PT Recommendation and Plan  Planned Therapy Interventions (PT): bed mobility training, transfer training, gait training, balance training, home exercise program, patient/family education, postural re-education, stair training, strengthening  Plan of Care Reviewed With: patient  Outcome Evaluation: PT eval complete. He is alert and oriented, on the vapotherm. He reports functioning independently at home  prior to admit. He needs CGA to stand and to ambulate. He was able to come off the vapotherm and transition to the non rebreather mask for gait. He ambulates 200 ft around the unit and desat to 82%. Once sitting he recovers O2 sat to 90% or greater. PT will cont to progress gait, strength and endurance. Anticipate he will recover and d.c home with assist and HH.     Time Calculation:    PT Charges     Row Name 09/07/22 1305             Time Calculation    Start Time 1100  -JOSEPH      Stop Time 1130  + 15 minutes w chart review. PT with 45 total minutes  -JOSEPH      Time Calculation (min) 30 min  -JOSEPH      PT Received On 09/07/22  -JOSEPH      PT Goal Re-Cert Due Date 09/17/22  -JOSEPH            User Key  (r) = Recorded By, (t) = Taken By, (c) = Cosigned By    Initials Name Provider Type    Alexis Clayton, PT DPT Physical Therapist              Therapy Charges for Today     Code Description Service Date Service Provider Modifiers Qty    75949687491 HC PT EVAL MOD COMPLEXITY 3 9/7/2022 Alexis Rios PT DPT GP 1          PT G-Codes  Outcome Measure Options: AM-PAC 6 Clicks Basic Mobility (PT)  AM-PAC 6 Clicks Score (PT): 17  AM-PAC 6 Clicks Score (OT): 19    Alexis Rios PT DPT  9/7/2022

## 2022-09-07 NOTE — THERAPY EVALUATION
Patient Name: Chito Govea  : 1955    MRN: [id number]                              Today's Date: 2022       Admit Date: 2022    Visit Dx:     ICD-10-CM ICD-9-CM   1. Acute on chronic congestive heart failure, unspecified heart failure type (HCC)  I50.9 428.0   2. Acute respiratory failure with hypoxia (HCC)  J96.01 518.81   3. Impaired mobility and ADLs  Z74.09 V49.89    Z78.9      Patient Active Problem List   Diagnosis   • Paroxysmal atrial fibrillation (HCC)   • Acute kidney injury (HCC)   • Hypertension   • Type 2 diabetes mellitus with hyperglycemia, without long-term current use of insulin (HCC)   • Moderate malnutrition (HCC)   • Aortic stenosis, moderate   • Nausea and vomiting   • Type 2 MI (myocardial infarction) (HCC) due to hypoxia and congestive heart failure   • Hyponatremia due to dehydration with associated weakness   • Bilateral pleural effusion   • COPD (chronic obstructive pulmonary disease) (HCC)   • Former smoker, quit 2022   • Acute kidney injury (HCC) superimposed on CKD Stage 2   • Anemia, chronic disease   • Suspected Interstitial lung disease (HCC), likely due to amiodarone   • Acute systolic heart failure (HCC), EF 36-40% on    • Acute respiratory failure with hypoxia (HCC)   • Acute urinary retention requiring alonzo catheter     Past Medical History:   Diagnosis Date   • Arthritis    • CHF (congestive heart failure) (HCC)    • Diabetes mellitus (HCC)    • HLD (hyperlipidemia)    • Hypertension    • Renal disorder      History reviewed. No pertinent surgical history.   General Information     Row Name 22 1036          OT Time and Intention    Document Type evaluation  PT presents to ED wtih SOA, LE edema, cough. Recent -8/15 for CONSUELO. Dx: acute on chronic diastolic CHF, acute hypoxic resp. failure, a-fib, B pleural effusions.  -JJ     Mode of Treatment occupational therapy  -JJ     Row Name 22 1038          General Information    Patient Profile  Reviewed yes  -     Prior Level of Function independent:;all household mobility;transfer;ADL's;bed mobility  -     Existing Precautions/Restrictions fall;oxygen therapy device and L/min  Vapotherm  -     Barriers to Rehab medically complex  -     Row Name 09/07/22 1036          Living Environment    People in Home child(juana), adult  -     Row Name 09/07/22 1036          Cognition    Orientation Status (Cognition) oriented x 4  -     Row Name 09/07/22 1036          Safety Issues, Functional Mobility    Impairments Affecting Function (Mobility) balance;endurance/activity tolerance;strength;range of motion (ROM);shortness of breath  -           User Key  (r) = Recorded By, (t) = Taken By, (c) = Cosigned By    Initials Name Provider Type    Radha Saenz, OTR/L, CSRS Occupational Therapist                 Mobility/ADL's     Row Name 09/07/22 1036          Bed Mobility    Bed Mobility supine-sit  -     Supine-Sit Ward (Bed Mobility) contact guard  -     Assistive Device (Bed Mobility) head of bed elevated;bed rails  -     Row Name 09/07/22 1036          Transfers    Transfers sit-stand transfer;stand-sit transfer  -     Sit-Stand Ward (Transfers) contact guard  -     Stand-Sit Ward (Transfers) contact guard  -     Row Name 09/07/22 1036          Functional Mobility    Functional Mobility- Ind. Level contact guard assist  -     Functional Mobility- Device walker, front-wheeled  -     Functional Mobility- Safety Issues step length decreased;supplemental O2  -     Functional Mobility- Comment amb around unit. Initially on 10 L non-rebreather but pt desated to 83%, increased to 15L during mobility and maintained in high 80s.  -     Row Name 09/07/22 1036          Activities of Daily Living    BADL Assessment/Intervention lower body dressing  -     Row Name 09/07/22 1036          Lower Body Dressing Assessment/Training    Ward Level (Lower Body  Dressing) don;socks;standby assist;set up  -JJ     Position (Lower Body Dressing) edge of bed sitting  -JJ           User Key  (r) = Recorded By, (t) = Taken By, (c) = Cosigned By    Initials Name Provider Type    Radha Saenz OTR/L, GIOVANNA Occupational Therapist               Obj/Interventions     Row Name 09/07/22 1036          Sensory Assessment (Somatosensory)    Sensory Assessment (Somatosensory) UE sensation intact  -[facility] Name 09/07/22 1036          Vision Assessment/Intervention    Visual Impairment/Limitations WFL  -[facility] Name 09/07/22 1036          Range of Motion Comprehensive    Comment, General Range of Motion B UE WFL, except for limited flexion of MP, DIPs and PIPs - limited approx 25%. Pt reports this has been going on approx 3-4 months.  -[facility] Name 09/07/22 1036          Strength Comprehensive (MMT)    Comment, General Manual Muscle Testing (MMT) Assessment B UE strength grossly 4/5, within avalible range.  -[facility] Name 09/07/22 1036          Balance    Balance Assessment sitting static balance;sitting dynamic balance;standing dynamic balance;standing static balance  -JJ     Static Sitting Balance independent  -JJ     Dynamic Sitting Balance independent  -JJ     Position, Sitting Balance unsupported  -JJ     Static Standing Balance supervision  -JJ     Dynamic Standing Balance contact guard  -JJ     Position/Device Used, Standing Balance supported;walker, front-wheeled  -JJ           User Key  (r) = Recorded By, (t) = Taken By, (c) = Cosigned By    Initials Name Provider Type    Radha Saenz OTR/L, GIOVANNA Occupational Therapist               Goals/Plan     Row Name 09/07/22 1036          Bathing Goal 1 (OT)    Activity/Device (Bathing Goal 1, OT) bathing skills, all  -JJ     [place] Level/Cues Needed (Bathing Goal 1, OT) modified independence  -JJ     Time Frame (Bathing Goal 1, OT) long term goal (LTG);by discharge  -JJ     Progress/Outcomes (Bathing Goal 1,  OT) new goal  -     Row Name 09/07/22 1036          Problem Specific Goal 1 (OT)    Problem Specific Goal 1 (OT) Pt will partcipate in adl task in standing for 10 minutes with O2 sat >/= 88% to increase his tolerance for activity and adls.  -     Time Frame (Problem Specific Goal 1, OT) long term goal (LTG);by discharge  -     Progress/Outcome (Problem Specific Goal 1, OT) new goal  -     Row Name 09/07/22 1036          Therapy Assessment/Plan (OT)    Planned Therapy Interventions (OT) activity tolerance training;BADL retraining;functional balance retraining;transfer/mobility retraining;strengthening exercise;occupation/activity based interventions;patient/caregiver education/training  -           User Key  (r) = Recorded By, (t) = Taken By, (c) = Cosigned By    Initials Name Provider Type    Radha Saenz, OTR/L, CSRS Occupational Therapist               Clinical Impression     Row Name 09/07/22 1138          Pain Assessment    Pretreatment Pain Rating 0/10 - no pain  -     Row Name 09/07/22 1138          Plan of Care Review    Outcome Evaluation OT eval completed. Pt presents alert and oriented x4, resting comfortably in bed. Pt presents on vapotherm, transitioned to non-rebreather 10L per RT for activity. He demonstrates generalized weakness, decreased balance, tolerance for activity and ROM. Pt reports since d/c on 8/15 he has been residing at home independently with spouse, pt has experienced a gradual decline in functional status over the last several weeks. Today, he was able to bring self to sititng at EOB, complete sit <> stand t/f and all functional mobility with CGA and use of rwx. Pt desated to 83% on 10L non-rebreather and was increased to 15L with sats returning to high 80s. He would benefit from skilled OT services to address these deficits. Recommend d/c home with assist and HH, pending pt O2 requirements.  -     Row Name 09/07/22 1138          Therapy Assessment/Plan (OT)     Rehab Potential (OT) good, to achieve stated therapy goals  -     Criteria for Skilled Therapeutic Interventions Met (OT) yes;skilled treatment is necessary  -     Therapy Frequency (OT) 5 times/wk  -     Predicted Duration of Therapy Intervention (OT) 10 days  -     Row Name 09/07/22 1138          Therapy Plan Review/Discharge Plan (OT)    Anticipated Discharge Disposition (OT) home with assist;home with home health  -     Row Name 09/07/22 1138          Vital Signs    Intra SpO2 (%) 82  -     O2 Delivery Intra Treatment non-rebreather  -     Post SpO2 (%) 92  -     O2 Delivery Post Treatment --  vapotherm  -     Row Name 09/07/22 1138          Positioning and Restraints    Pre-Treatment Position in bed  -     Post Treatment Position bed  -     In Bed notified nsg;call light within reach;encouraged to call for assist;sitting EOB;patient within staff view  -           User Key  (r) = Recorded By, (t) = Taken By, (c) = Cosigned By    Initials Name Provider Type    Radha Saenz OTR/L, GIOVANNA Occupational Therapist               Outcome Measures     Row Name 09/07/22 1036          How much help from another is currently needed...    Putting on and taking off regular lower body clothing? 3  -JJ     Bathing (including washing, rinsing, and drying) 3  -JJ     Toileting (which includes using toilet bed pan or urinal) 3  -JJ     Putting on and taking off regular upper body clothing 3  -JJ     Taking care of personal grooming (such as brushing teeth) 3  -JJ     Eating meals 4  -JJ     AM-PAC 6 Clicks Score (OT) 19  -     Row Name 09/07/22 1036          Functional Assessment    Outcome Measure Options AM-PAC 6 Clicks Daily Activity (OT)  -           User Key  (r) = Recorded By, (t) = Taken By, (c) = Cosigned By    Initials Name Provider Type    Radha Saenz OTR/L, CSRS Occupational Therapist                Occupational Therapy Education                 Title: PT OT SLP Therapies  (In Progress)     Topic: Occupational Therapy (In Progress)     Point: ADL training (Done)     Description:   Instruct learner(s) on proper safety adaptation and remediation techniques during self care or transfers.   Instruct in proper use of assistive devices.              Learning Progress Summary           Patient Acceptance, E, VU by  at 9/7/2022 1156                   Point: Home exercise program (Not Started)     Description:   Instruct learner(s) on appropriate technique for monitoring, assisting and/or progressing therapeutic exercises/activities.              Learner Progress:  Not documented in this visit.          Point: Precautions (Done)     Description:   Instruct learner(s) on prescribed precautions during self-care and functional transfers.              Learning Progress Summary           Patient Acceptance, E, VU by STEPHAN at 9/7/2022 1156                   Point: Body mechanics (Not Started)     Description:   Instruct learner(s) on proper positioning and spine alignment during self-care, functional mobility activities and/or exercises.              Learner Progress:  Not documented in this visit.                      User Key     Initials Effective Dates Name Provider Type Discipline     11/10/21 -  Radha Nevarez, OTR/L, CSRS Occupational Therapist OT              OT Recommendation and Plan  Planned Therapy Interventions (OT): activity tolerance training, BADL retraining, functional balance retraining, transfer/mobility retraining, strengthening exercise, occupation/activity based interventions, patient/caregiver education/training  Therapy Frequency (OT): 5 times/wk  Plan of Care Review  Outcome Evaluation: OT eval completed. Pt presents alert and oriented x4, resting comfortably in bed. Pt presents on vapotherm, transitioned to non-rebreather 10L per RT for activity. He demonstrates generalized weakness, decreased balance, tolerance for activity and ROM. Pt reports since d/c on 8/15 he has  been residing at home independently with spouse, pt has experienced a gradual decline in functional status over the last several weeks. Today, he was able to bring self to sititng at EOB, complete sit <> stand t/f and all functional mobility with CGA and use of rwx. Pt desated to 83% on 10L non-rebreather and was increased to 15L with sats returning to high 80s. He would benefit from skilled OT services to address these deficits. Recommend d/c home with assist and HH, pending pt O2 requirements.     Time Calculation:    Time Calculation- OT     Row Name 09/07/22 1036             Time Calculation- OT    OT Start Time 1036  -      OT Stop Time 1132  -      OT Time Calculation (min) 56 min  -      OT Received On 09/07/22  -      OT Goal Re-Cert Due Date 09/17/22  -            User Key  (r) = Recorded By, (t) = Taken By, (c) = Cosigned By    Initials Name Provider Type    Radha Saenz OTR/L, GIOVANNA Occupational Therapist              Therapy Charges for Today     Code Description Service Date Service Provider Modifiers Qty    05005478430 HC OT EVAL MOD COMPLEXITY 4 9/7/2022 Radha Nevarez OTR/L, RAMIROS GO 1               Radha Nevarez, OTR/L, GIOVANNA  9/7/2022

## 2022-09-07 NOTE — PLAN OF CARE
Problem: Adult Inpatient Plan of Care  Goal: Plan of Care Review  Recent Flowsheet Documentation  Taken 9/7/2022 1138 by Radha Nevarez, OTR/L, CSRS  Outcome Evaluation: OT eval completed. Pt presents alert and oriented x4, resting comfortably in bed. Pt presents on vapotherm, transitioned to non-rebreather 10L per RT for activity. He demonstrates generalized weakness, decreased balance, tolerance for activity and ROM. Pt reports since d/c on 8/15 he has been residing at home independently with spouse, pt has experienced a gradual decline in functional status over the last several weeks. Today, he was able to bring self to sititng at EOB, complete sit <> stand t/f and all functional mobility with CGA and use of rwx. Pt desated to 83% on 10L non-rebreather and was increased to 15L with sats returning to high 80s. He would benefit from skilled OT services to address these deficits. Recommend d/c home with assist and HH, pending pt O2 requirements.

## 2022-09-07 NOTE — PROGRESS NOTES
"Chief Complaint: Follow-up CHF    S: Today, Mr. Govea  endorses mild dyspnea that is continuing to improve.  He also continues to feel that his edema is improving every day.  He otherwise denies acute complaints to me including chest pain, palpitations, dizziness.  He remains symptomatically unaware of his atrial fibrillation.  Oxygen requirements are continuing to improve.    Medications: Reviewed    Review of Systems: All pertinent negative and positives as noted above.  Otherwise, all systems reviewed and found to be negative.    Telemetry: Paroxysmal atrial fibrillation    O:  /68   Pulse 100   Temp 97.6 °F (36.4 °C) (Oral)   Resp 14   Ht 176.8 cm (69.61\")   Wt 68 kg (150 lb)   SpO2 95%   BMI 21.77 kg/m²   Temp:  [97.2 °F (36.2 °C)-97.9 °F (36.6 °C)] 97.6 °F (36.4 °C)  Heart Rate:  [] 100  Resp:  [14-24] 14  BP: ()/() 120/68    Gen: male sitting in chair in NAD  HEENT: NC/AT, sclera anicteric  Neck: Supple, JVD 3 cm above the clavicle while seated at 60 degrees  CV: RRR, no m/r/g  Pulm: Bilateral scattered rales with basilar crackles, overall improved air movement compared to prior exams, no longer diminished lower lobe breath sounds, NWOB  Abd: Soft, ND/NT  Ext: WWP, 1+ bilateral pretibial edema it appears stable to me  Neuro: Aox3, grossly nonfocal  Psych: Appropriate mood and affect    Diagnostic Data:  Data reviewed for the past 24 hours notable for improving creatinine to 1.33 from 1.58.  Other pertinent labs and studies reviewed in the EMR.    ASSESSMENT/PLAN:    1.  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  - Appreciate pulmonary and hospitalist management  - CHF management as below    2.  Acute systolic heart failure: TTE with newly reduced EF this admission (36 to 40%) with some wall motion abnormalities.  Differential includes stress-induced " cardiomyopathy, CAD, among other etiologies.  Patient remains slightly volume up, and edema does not appear to be improving on exam.  Hopefully, diuresis will improve with afterload reduction and better HFrEF medical therapy optimization.  - Strict I's/O's and daily weights  - 2 L fluid restriction and at least 2 g Na restricted diet  - Diuretic: IV Lasix 60 mg this morning with second dose as needed to achieve I/O goal of net -500 cc to 1 L  HFrEF GDMT  - BB: Metoprolol as below  - RAAS inhibitor: Start losartan 25 mg daily, consider transition to Entresto in the coming days.  - SGLT2 inhibitor: Consider starting prior to discharge or as outpatient  - MRA: Consider starting prior to discharge or as outpatient  Defer potential ischemic evaluation to outpatient discussion    3.  Paroxysmal atrial fibrillation: In and out of atrial fibrillation with improved heart rate control on metoprolol, but may benefit from increase metoprolol dose.  - Continue Xarelto for CVA prophylaxis  - Continue metoprolol 12.5 mg every 6 hours    4.  Valvular heart disease (moderate aortic stenosis): Stable.  No further evaluation or treatment needed at this time.  Plan to follow-up as an outpatient.    Michael Uriostegui MD

## 2022-09-07 NOTE — PLAN OF CARE
"Goal Outcome Evaluation:  Plan of Care Reviewed With: patient           Outcome Evaluation: Patient intolerant of bipap while sleeping r/t constant alarming.  RT in room for extended period of time attempting to troubleshoot without success.  Patient remained on Vapotherm through night, down to 40/65.  Calm and cooperative with care, states he feels \"much better\" this shift.  Voiding per urinal, calm and cooperative with care.  "

## 2022-09-08 LAB
ALBUMIN SERPL-MCNC: 2.9 G/DL (ref 3.5–5.2)
ALBUMIN/GLOB SERPL: 1.4 G/DL
ALP SERPL-CCNC: 113 U/L (ref 39–117)
ALT SERPL W P-5'-P-CCNC: 18 U/L (ref 1–41)
ANION GAP SERPL CALCULATED.3IONS-SCNC: 10 MMOL/L (ref 5–15)
ANION GAP SERPL CALCULATED.3IONS-SCNC: 8 MMOL/L (ref 5–15)
AST SERPL-CCNC: 16 U/L (ref 1–40)
BILIRUB SERPL-MCNC: 0.2 MG/DL (ref 0–1.2)
BUN SERPL-MCNC: 70 MG/DL (ref 8–23)
BUN SERPL-MCNC: 70 MG/DL (ref 8–23)
BUN/CREAT SERPL: 59.8 (ref 7–25)
BUN/CREAT SERPL: 60.3 (ref 7–25)
CALCIUM SPEC-SCNC: 8.4 MG/DL (ref 8.6–10.5)
CALCIUM SPEC-SCNC: 8.5 MG/DL (ref 8.6–10.5)
CHLORIDE SERPL-SCNC: 102 MMOL/L (ref 98–107)
CHLORIDE SERPL-SCNC: 103 MMOL/L (ref 98–107)
CO2 SERPL-SCNC: 28 MMOL/L (ref 22–29)
CO2 SERPL-SCNC: 28 MMOL/L (ref 22–29)
CREAT SERPL-MCNC: 1.16 MG/DL (ref 0.76–1.27)
CREAT SERPL-MCNC: 1.17 MG/DL (ref 0.76–1.27)
DEPRECATED RDW RBC AUTO: 50.1 FL (ref 37–54)
EGFRCR SERPLBLD CKD-EPI 2021: 68.3 ML/MIN/1.73
EGFRCR SERPLBLD CKD-EPI 2021: 69 ML/MIN/1.73
ERYTHROCYTE [DISTWIDTH] IN BLOOD BY AUTOMATED COUNT: 14.9 % (ref 12.3–15.4)
GLOBULIN UR ELPH-MCNC: 2.1 GM/DL
GLUCOSE BLDC GLUCOMTR-MCNC: 193 MG/DL (ref 70–130)
GLUCOSE BLDC GLUCOMTR-MCNC: 221 MG/DL (ref 70–130)
GLUCOSE BLDC GLUCOMTR-MCNC: 250 MG/DL (ref 70–130)
GLUCOSE BLDC GLUCOMTR-MCNC: 297 MG/DL (ref 70–130)
GLUCOSE SERPL-MCNC: 200 MG/DL (ref 65–99)
GLUCOSE SERPL-MCNC: 240 MG/DL (ref 65–99)
HCT VFR BLD AUTO: 33.1 % (ref 37.5–51)
HGB BLD-MCNC: 10.4 G/DL (ref 13–17.7)
MCH RBC QN AUTO: 29.1 PG (ref 26.6–33)
MCHC RBC AUTO-ENTMCNC: 31.4 G/DL (ref 31.5–35.7)
MCV RBC AUTO: 92.7 FL (ref 79–97)
PLATELET # BLD AUTO: 334 10*3/MM3 (ref 140–450)
PMV BLD AUTO: 10.9 FL (ref 6–12)
POTASSIUM SERPL-SCNC: 3.9 MMOL/L (ref 3.5–5.2)
POTASSIUM SERPL-SCNC: 4.3 MMOL/L (ref 3.5–5.2)
PROT SERPL-MCNC: 5 G/DL (ref 6–8.5)
RBC # BLD AUTO: 3.57 10*6/MM3 (ref 4.14–5.8)
SODIUM SERPL-SCNC: 139 MMOL/L (ref 136–145)
SODIUM SERPL-SCNC: 140 MMOL/L (ref 136–145)
WBC NRBC COR # BLD: 11.45 10*3/MM3 (ref 3.4–10.8)

## 2022-09-08 PROCEDURE — 97530 THERAPEUTIC ACTIVITIES: CPT | Performed by: OCCUPATIONAL THERAPIST

## 2022-09-08 PROCEDURE — 25010000002 FUROSEMIDE PER 20 MG: Performed by: INTERNAL MEDICINE

## 2022-09-08 PROCEDURE — 82962 GLUCOSE BLOOD TEST: CPT

## 2022-09-08 PROCEDURE — 99232 SBSQ HOSP IP/OBS MODERATE 35: CPT | Performed by: HOSPITALIST

## 2022-09-08 PROCEDURE — 85027 COMPLETE CBC AUTOMATED: CPT | Performed by: INTERNAL MEDICINE

## 2022-09-08 PROCEDURE — 25010000002 METHYLPREDNISOLONE PER 40 MG: Performed by: INTERNAL MEDICINE

## 2022-09-08 PROCEDURE — 63710000001 PREDNISONE PER 1 MG: Performed by: INTERNAL MEDICINE

## 2022-09-08 PROCEDURE — 97110 THERAPEUTIC EXERCISES: CPT

## 2022-09-08 PROCEDURE — 80053 COMPREHEN METABOLIC PANEL: CPT | Performed by: INTERNAL MEDICINE

## 2022-09-08 PROCEDURE — 63710000001 INSULIN LISPRO (HUMAN) PER 5 UNITS: Performed by: FAMILY MEDICINE

## 2022-09-08 PROCEDURE — 25010000002 CEFTRIAXONE PER 250 MG: Performed by: FAMILY MEDICINE

## 2022-09-08 PROCEDURE — 97535 SELF CARE MNGMENT TRAINING: CPT | Performed by: OCCUPATIONAL THERAPIST

## 2022-09-08 PROCEDURE — 94799 UNLISTED PULMONARY SVC/PX: CPT

## 2022-09-08 PROCEDURE — 99232 SBSQ HOSP IP/OBS MODERATE 35: CPT | Performed by: INTERNAL MEDICINE

## 2022-09-08 PROCEDURE — 94660 CPAP INITIATION&MGMT: CPT

## 2022-09-08 PROCEDURE — 94761 N-INVAS EAR/PLS OXIMETRY MLT: CPT

## 2022-09-08 PROCEDURE — 63710000001 INSULIN LISPRO (HUMAN) PER 5 UNITS: Performed by: INTERNAL MEDICINE

## 2022-09-08 PROCEDURE — 97116 GAIT TRAINING THERAPY: CPT

## 2022-09-08 PROCEDURE — 63710000001 INSULIN DETEMIR PER 5 UNITS: Performed by: INTERNAL MEDICINE

## 2022-09-08 RX ORDER — FUROSEMIDE 10 MG/ML
60 INJECTION INTRAMUSCULAR; INTRAVENOUS ONCE
Status: COMPLETED | OUTPATIENT
Start: 2022-09-08 | End: 2022-09-08

## 2022-09-08 RX ORDER — METHYLPREDNISOLONE SODIUM SUCCINATE 40 MG/ML
20 INJECTION, POWDER, LYOPHILIZED, FOR SOLUTION INTRAMUSCULAR; INTRAVENOUS EVERY 12 HOURS
Status: DISCONTINUED | OUTPATIENT
Start: 2022-09-08 | End: 2022-09-08

## 2022-09-08 RX ORDER — SPIRONOLACTONE 25 MG/1
25 TABLET ORAL DAILY
Status: DISCONTINUED | OUTPATIENT
Start: 2022-09-08 | End: 2022-09-09

## 2022-09-08 RX ORDER — PREDNISONE 20 MG/1
20 TABLET ORAL 2 TIMES DAILY WITH MEALS
Status: DISCONTINUED | OUTPATIENT
Start: 2022-09-08 | End: 2022-09-10 | Stop reason: HOSPADM

## 2022-09-08 RX ADMIN — INSULIN LISPRO 12 UNITS: 100 INJECTION, SOLUTION INTRAVENOUS; SUBCUTANEOUS at 11:25

## 2022-09-08 RX ADMIN — DOXYCYCLINE 100 MG: 100 TABLET, FILM COATED ORAL at 08:16

## 2022-09-08 RX ADMIN — FUROSEMIDE 60 MG: 10 INJECTION, SOLUTION INTRAMUSCULAR; INTRAVENOUS at 11:18

## 2022-09-08 RX ADMIN — IPRATROPIUM BROMIDE 0.5 MG: 0.5 SOLUTION RESPIRATORY (INHALATION) at 19:28

## 2022-09-08 RX ADMIN — BUDESONIDE AND FORMOTEROL FUMARATE DIHYDRATE 2 PUFF: 160; 4.5 AEROSOL RESPIRATORY (INHALATION) at 06:49

## 2022-09-08 RX ADMIN — CEFTRIAXONE 1 G: 1 INJECTION, POWDER, FOR SOLUTION INTRAMUSCULAR; INTRAVENOUS at 11:14

## 2022-09-08 RX ADMIN — INSULIN DETEMIR 25 UNITS: 100 INJECTION, SOLUTION SUBCUTANEOUS at 22:08

## 2022-09-08 RX ADMIN — IPRATROPIUM BROMIDE 0.5 MG: 0.5 SOLUTION RESPIRATORY (INHALATION) at 14:31

## 2022-09-08 RX ADMIN — METHYLPREDNISOLONE SODIUM SUCCINATE 20 MG: 40 INJECTION, POWDER, FOR SOLUTION INTRAMUSCULAR; INTRAVENOUS at 03:59

## 2022-09-08 RX ADMIN — INSULIN LISPRO 8 UNITS: 100 INJECTION, SOLUTION INTRAVENOUS; SUBCUTANEOUS at 08:15

## 2022-09-08 RX ADMIN — IPRATROPIUM BROMIDE 0.5 MG: 0.5 SOLUTION RESPIRATORY (INHALATION) at 10:09

## 2022-09-08 RX ADMIN — SPIRONOLACTONE 25 MG: 25 TABLET ORAL at 11:17

## 2022-09-08 RX ADMIN — IPRATROPIUM BROMIDE 0.5 MG: 0.5 SOLUTION RESPIRATORY (INHALATION) at 06:36

## 2022-09-08 RX ADMIN — RIVAROXABAN 20 MG: 20 TABLET, FILM COATED ORAL at 17:58

## 2022-09-08 RX ADMIN — METOPROLOL TARTRATE 12.5 MG: 25 TABLET, FILM COATED ORAL at 02:31

## 2022-09-08 RX ADMIN — TAMSULOSIN HYDROCHLORIDE 0.4 MG: 0.4 CAPSULE ORAL at 08:16

## 2022-09-08 RX ADMIN — PREDNISONE 20 MG: 20 TABLET ORAL at 12:36

## 2022-09-08 RX ADMIN — PREDNISONE 20 MG: 20 TABLET ORAL at 17:58

## 2022-09-08 RX ADMIN — LOSARTAN POTASSIUM 25 MG: 50 TABLET, FILM COATED ORAL at 08:16

## 2022-09-08 RX ADMIN — Medication 6 MG: at 22:43

## 2022-09-08 RX ADMIN — DOXYCYCLINE 100 MG: 100 TABLET, FILM COATED ORAL at 22:08

## 2022-09-08 RX ADMIN — INSULIN LISPRO 4 UNITS: 100 INJECTION, SOLUTION INTRAVENOUS; SUBCUTANEOUS at 17:58

## 2022-09-08 RX ADMIN — PANTOPRAZOLE SODIUM 40 MG: 40 TABLET, DELAYED RELEASE ORAL at 08:15

## 2022-09-08 RX ADMIN — ATORVASTATIN CALCIUM 20 MG: 10 TABLET, FILM COATED ORAL at 08:15

## 2022-09-08 RX ADMIN — PANTOPRAZOLE SODIUM 40 MG: 40 TABLET, DELAYED RELEASE ORAL at 17:58

## 2022-09-08 RX ADMIN — METOPROLOL TARTRATE 37.5 MG: 25 TABLET, FILM COATED ORAL at 22:08

## 2022-09-08 RX ADMIN — ANTACID TABLETS 2 TABLET: 500 TABLET, CHEWABLE ORAL at 22:43

## 2022-09-08 RX ADMIN — BUDESONIDE AND FORMOTEROL FUMARATE DIHYDRATE 2 PUFF: 160; 4.5 AEROSOL RESPIRATORY (INHALATION) at 19:28

## 2022-09-08 RX ADMIN — METOPROLOL TARTRATE 12.5 MG: 25 TABLET, FILM COATED ORAL at 08:15

## 2022-09-08 RX ADMIN — METOPROLOL TARTRATE 25 MG: 25 TABLET, FILM COATED ORAL at 11:17

## 2022-09-08 NOTE — PROGRESS NOTES
PULMONARY AND CRITICAL CARE PROGRESS NOTE - [facility]    Patient: Chito Govea  1955   MR# [id number]   Acct# [id number]  09/08/22   07:20 CDT  Referring Provider: Ellis Rey MD    Chief Complaint: Shortness of breath    Interval history: He is afebrile.  Heart rate 60s.  Saturation 97 on 20 L and FiO2 0.4.  He reports he is breathing much better.  He was able to get some sleep last night to some degree.  He indicates he gets awakened frequently by staff and alarms. Weight back to admission weight, 150 pounds.  Labs stable.  No x-ray for review.  He has been ambulating with therapy.  Discussed with cardiology this morning.  Plans to add spironolactone.  No other issues.    Meds:  atorvastatin, 20 mg, Oral, Daily  bisacodyl, 10 mg, Rectal, Daily  budesonide-formoterol, 2 puff, Inhalation, BID - RT  cefTRIAXone, 1 g, Intravenous, Q24H  doxycycline, 100 mg, Oral, Q12H  insulin detemir, 25 Units, Subcutaneous, Nightly  insulin lispro, 0-24 Units, Subcutaneous, TID AC  ipratropium, 0.5 mg, Nebulization, 4x Daily - RT  lidocaine, 10 mL, Infiltration, Once  losartan, 25 mg, Oral, Q24H  methylPREDNISolone sodium succinate, 20 mg, Intravenous, Q8H  metoprolol tartrate, 12.5 mg, Oral, Q6H  pantoprazole, 40 mg, Oral, BID AC  rivaroxaban, 20 mg, Oral, Daily With Dinner  tamsulosin, 0.4 mg, Oral, Daily         Review of Systems:   Review of Systems     Constitutional: Negative for fatigue.   Respiratory: Positive for cough and shortness of breath.    Cardiovascular: Positive for leg swelling.   Gastrointestinal: Negative for diarrhea and nausea.   Neurological: Positive for weakness.     Physical Exam:  SpO2 Percentage    09/08/22 0636 09/08/22 0649 09/08/22 0655   SpO2: 93% 98% 97%     Body mass index is 21.83 kg/m².   Temp:  [97 °F (36.1 °C)-97.8 °F (36.6 °C)] 97 °F (36.1 °C)  Heart Rate:  [] 63  Resp:  [10-22] 22  BP: ()/(50-87) 117/58    Intake/Output Summary (Last 24 hours) at  9/8/2022 0720  Last data filed at 9/7/2022 2000  Gross per 24 hour   Intake 1200 ml   Output 1725 ml   Net -525 ml     Physical Exam     Constitutional:       General: He is not in acute distress.  Sitting up on the side of the bed     Appearance: He is ill-appearing.      Interventions: Nasal cannula in place.      Comments: Vapotherm  20 L and FiO2 0.4  HENT:      Head: Normocephalic.      Nose: Nose normal.      Mouth/Throat:      Mouth: Mucous membranes are moist.   Eyes:      General: No scleral icterus.  Cardiovascular:      Rate and Rhythm: Irregular rhythm       Comments: Rate 60's  Pulmonary:      Effort: No respiratory distress.      Breath sounds: Rales present.   Abdominal:      General: There is no distension.   Musculoskeletal:      Right lower leg: Edema present.      Left lower leg: Edema present.   Skin:     Nails: There is clubbing.      Comments: BLE wrapped in Kerlix   Neurological:      Mental Status: He is alert and oriented to person, place, and time.   Psychiatric:         Mood and Affect: Mood normal.         Behavior: Behavior is cooperative.      Electronically signed by MARKELL Lai, 9/8/2022, 07:20 CDT      Physician Substantive Portion: Medical Decision Making    Laboratory Data:  Results from last 7 days   Lab Units 09/08/22  0326 09/06/22  0354 09/05/22  0513   WBC 10*3/mm3 11.45* 15.77* 13.41*   HEMOGLOBIN g/dL 10.4* 10.3* 10.3*   PLATELETS 10*3/mm3 334 401 397     Results from last 7 days   Lab Units 09/08/22  0326 09/07/22  1848 09/07/22  0348   SODIUM mmol/L 140 139 139   POTASSIUM mmol/L 4.3 3.9 3.9   BUN mg/dL 70* 71* 72*   CREATININE mg/dL 1.16 1.18 1.33*     Results from last 7 days   Lab Units 09/04/22  1610 09/03/22  1827   PH, ARTERIAL pH units 7.487* 7.523*   PCO2, ARTERIAL mm Hg 40.3 38.4   PO2 ART mm Hg 279.0* 89.0   FIO2 % 100  --      No results found for: BLOODCX, URINECX, WOUNDCX, MRSACX, RESPCX, STOOLCX  Recent films:  XR Chest 1 View    Result Date:  9/6/2022  EXAMINATION:  XR CHEST 1 VW-  9/6/2022 9:18 AM CDT  HISTORY: Repeat CXR to evaluate effusion; I50.9-Heart failure, unspecified; J96.01-Acute respiratory failure with hypoxia.  COMPARISON: 9/4/2022.  TECHNIQUE: Single view AP image.  FINDINGS:  There are diffuse bilateral lung infiltrates. There is minimal blunting of the costophrenic angles bilaterally. There is less blunting on the right side on the current study. There is borderline cardiomegaly. There are multiple calcified hilar and mediastinal lymph nodes. The thoracic aorta is atheromatous. There are degenerative changes of the spine and bilateral shoulders.      Impression: 1. Diffuse bilateral lung infiltrates may represent pulmonary edema or pneumonia. No significant change. 2. Minimal blunting of the costophrenic angles bilaterally suggesting small effusions. The blunting is decreased slightly on the right side. 3. Heart size upper limits of normal. Atheromatous thoracic aorta.   This report was finalized on 09/06/2022 09:32 by Dr. Wilmer Murphy MD.     My radiograph interpretation/independent review of imaging: yesterday cxr diffuse infiltrates    Pulmonary Assessment:    1. Acute resp failure with hypoxia  2. CHF, apparently better  3. Copd stable  4. Aortic stenosis stable  5. Anemia stable    Recommend/plan:   · Continue high flow oxygen, try to taper down this am  · Continue bronchodilators  · Continue steroids, transition to po  · D/c bipap; not using and was counterproductive  · Ok to floor later today if no new issues develop      This visit was performed by both a physician and an Advanced Practice RN.  I personally evaluated and examined the patient.  I performed all aspects of the medical decision making as documented.  Electronically signed by aDnny Solares MD, 9/8/2022, 10:24 CDT

## 2022-09-08 NOTE — CASE MANAGEMENT/SOCIAL WORK
Continued Stay Note   Chris     Patient Name: Chito Govea  MRN: [id number]  Today's Date: 9/8/2022    Admit Date: 9/1/2022     Discharge Plan     Row Name 09/08/22 1020       Plan    Plan [facility] with HH?  [facility]    Patient/Family in Agreement with Plan yes    Plan Comments Patient's O2 demands have decreased.  Patient has worked with therapy and did well.  Patient continues to plan to return home upon discharge.  Patient resides with his daughter.  [facility]  SW following.               Discharge Codes    No documentation.               Expected Discharge Date and Time     Expected Discharge Date Expected Discharge Time    Sep 9, 2022             FELICITY Chan

## 2022-09-08 NOTE — THERAPY TREATMENT NOTE
Acute Care - Physical Therapy Treatment Note  [facility]     Patient Name: Chito Govea  : 1955  MRN: [id number]  Today's Date: 2022      Visit Dx:     ICD-10-CM ICD-9-CM   1. Acute on chronic congestive heart failure, unspecified heart failure type (HCC)  I50.9 428.0   2. Acute respiratory failure with hypoxia (HCC)  J96.01 518.81   3. Impaired mobility and ADLs  Z74.09 V49.89    Z78.9    4. Impaired mobility  Z74.09 799.89   5. Bilateral leg edema  R60.0 782.3     Patient Active Problem List   Diagnosis   • Paroxysmal atrial fibrillation (HCC)   • Acute kidney injury (HCC)   • Hypertension   • Type 2 diabetes mellitus with hyperglycemia, without long-term current use of insulin (HCC)   • Moderate malnutrition (HCC)   • Aortic stenosis, moderate   • Nausea and vomiting   • Type 2 MI (myocardial infarction) (HCC) due to hypoxia and congestive heart failure   • Hyponatremia due to dehydration with associated weakness   • Bilateral pleural effusion   • COPD (chronic obstructive pulmonary disease) ([facility])   • Former smoker, quit 2022   • Acute kidney injury (HCC) superimposed on CKD Stage 2   • Anemia, chronic disease   • Suspected Interstitial lung disease (HCC), likely due to amiodarone   • Acute systolic heart failure (HCC), EF 36-40% on    • Acute respiratory failure with hypoxia (HCC)   • Acute urinary retention requiring alonzo catheter     Past Medical History:   Diagnosis Date   • Arthritis    • CHF (congestive heart failure) (HCC)    • Diabetes mellitus (HCC)    • HLD (hyperlipidemia)    • Hypertension    • Renal disorder      History reviewed. No pertinent surgical history.  PT Assessment (last 12 hours)     PT Evaluation and Treatment     Row Name 22 1047          Physical Therapy Time and Intention    Subjective Information complains of;weakness  a dry mouth  -DELFIN     Document Type therapy note (daily note)  -DELFIN     Mode of Treatment physical therapy  -DELFIN     Row Name 22 1042           General Information    Existing Precautions/Restrictions fall;oxygen therapy device and L/min  -[facility] Name 09/08/22 1047          Pain    Pretreatment Pain Rating 0/10 - no pain  -     Posttreatment Pain Rating 0/10 - no pain  -[facility] Name 09/08/22 1047          Transfers    Comment, (Transfers) stod x4  -     Sit-Stand [place] (Transfers) standby assist  -     Stand-Sit [place] (Transfers) standby assist  -[facility] Name 09/08/22 1047          Gait/Stairs (Locomotion)    [place] Level (Gait) standby assist  -     Assistive Device (Gait) walker, front-wheeled  -     Distance in Feet (Gait) 250  -     Deviations/Abnormal Patterns (Gait) gait speed decreased  -     Comment, (Gait/Stairs) Energy conservation. Worked on purse lip breathing.  -[facility] Name 09/08/22 1047          Motor Skills    Therapeutic Exercise hip;knee;ankle  -DELFIN     Row Name 09/08/22 1047          Hip (Therapeutic Exercise)    Hip (Therapeutic Exercise) AROM (active range of motion)  -     Hip AROM (Therapeutic Exercise) bilateral;flexion;aBduction;aDduction;external rotation;internal rotation  -DELFIN     Row Name 09/08/22 1047          Knee (Therapeutic Exercise)    Knee (Therapeutic Exercise) AROM (active range of motion)  -     Knee AROM (Therapeutic Exercise) bilateral;LAQ (long arc quad)  -DELFIN     Row Name 09/08/22 1047          Ankle (Therapeutic Exercise)    Ankle (Therapeutic Exercise) AROM (active range of motion)  -     Ankle AROM (Therapeutic Exercise) bilateral;dorsiflexion;plantarflexion  -DELFIN     Row Name             Wound 09/01/22 0400 gluteal    Wound - Properties Group Placement Date: 09/01/22  -VT Placement Time: 0400 -VT Location: gluteal  -VT     Retired Wound - Properties Group Placement Date: 09/01/22  -VT Placement Time: 0400  -VT Location: gluteal  -VT     Retired Wound - Properties Group Date first assessed: 09/01/22  -VT Time first assessed: 0400  -VT Location: gluteal  -VT      Row Name             Wound 09/01/22 0403 Left lower leg    Wound - Properties Group Placement Date: 09/01/22  -VT Placement Time: 0403  -VT Present on Hospital Admission: Y  -VT Side: Left  -VT Orientation: lower  -VT Location: leg  -VT     Retired Wound - Properties Group Placement Date: 09/01/22  -VT Placement Time: 0403  -VT Present on Hospital Admission: Y  -VT Side: Left  -VT Orientation: lower  -VT Location: leg  -VT     Retired Wound - Properties Group Date first assessed: 09/01/22  -VT Time first assessed: 0403  -VT Present on Hospital Admission: Y  -VT Side: Left  -VT Location: leg  -VT     Row Name             Wound 09/01/22 0405 Left lower leg    Wound - Properties Group Placement Date: 09/01/22  -VT Placement Time: 0405  -VT Present on Hospital Admission: Y  -VT Side: Left  -VT Orientation: lower  -VT Location: leg  -VT     Retired Wound - Properties Group Placement Date: 09/01/22  -VT Placement Time: 0405  -VT Present on Hospital Admission: Y  -VT Side: Left  -VT Orientation: lower  -VT Location: leg  -VT     Retired Wound - Properties Group Date first assessed: 09/01/22  -VT Time first assessed: 0405  -VT Present on Hospital Admission: Y  -VT Side: Left  -VT Location: leg  -VT     Row Name             Wound 09/07/22 Right lower leg    Wound - Properties Group Placement Date: 09/07/22  -JOSEPH Present on Hospital Admission: Y  -JOSEPH Side: Right  -JOSEPH Orientation: lower  -JOSEPH Location: leg  -JOSEPH     Retired Wound - Properties Group Placement Date: 09/07/22  -JOSEPH Present on Hospital Admission: Y  -JOSEPH Side: Right  -JOSEPH Orientation: lower  -JOSEPH Location: leg  -JOSEPH     Retired Wound - Properties Group Date first assessed: 09/07/22  -JOSEPH Present on Hospital Admission: Y  -JOSEPH Side: Right  -JOSEPH Location: leg  -JOSEPH     Row Name 09/08/22 1047          Plan of Care Review    Plan of Care Reviewed With patient  -DELFIN     Progress improving  -DELFIN     Row Name 09/08/22 1047          Vital Signs    Pre SpO2 (%) 99  -DELFIN     O2  Delivery Pre Treatment --  5 lpm  -DELFIN     Intra SpO2 (%) 88  5 lpm  -DELFIN     Post SpO2 (%) 96  -DELFIN     O2 Delivery Post Treatment --  5 lpm  -DELFIN     Row Name 09/08/22 1047          Positioning and Restraints    Pre-Treatment Position sitting in chair/recliner  -DELFIN     Post Treatment Position chair  -DELFIN     In Chair sitting;call light within reach;encouraged to call for assist  -DELFIN           User Key  (r) = Recorded By, (t) = Taken By, (c) = Cosigned By    Initials Name Provider Type    Alexis Clayton, PT DPT Physical Therapist    Verenice Webb, SAUMYA Physical Therapist Assistant    Nikki Castro RN Registered Nurse                Physical Therapy Education                 Title: PT OT SLP Therapies (In Progress)     Topic: Physical Therapy (In Progress)     Point: Mobility training (Done)     Learning Progress Summary           Patient Acceptance, E,D, DU by DELFIN at 9/8/2022 1239    Comment: Safety with Rwx    Acceptance, E, VU,DU,NR by JOSEPH at 9/7/2022 1100    Comment: benefits of activity, progression of PT POC                   Point: Home exercise program (Not Started)     Learner Progress:  Not documented in this visit.          Point: Body mechanics (Not Started)     Learner Progress:  Not documented in this visit.          Point: Precautions (Done)     Learning Progress Summary           Patient Acceptance, E, VU,NR by JOSEPH at 9/7/2022 1310    Comment: unna boot purpose, goals, progression, change day                               User Key     Initials Effective Dates Name Provider Type Bambi RUIZ 08/02/16 -  Alexis Rios, PT DPT Physical Therapist PT    DELFIN 08/02/16 -  Verenice Wright, SAUMYA Physical Therapist Assistant PT              PT Recommendation and Plan     Plan of Care Reviewed With: patient  Progress: improving   Outcome Measures     Row Name 09/08/22 1238             How much help from another person do you currently need...    Turning from your back to your side while in  flat bed without using bedrails? 4  -DELFIN      Moving from lying on back to sitting on the side of a flat bed without bedrails? 3  -DELFIN      Moving to and from a bed to a chair (including a wheelchair)? 3  -DELFIN      Standing up from a chair using your arms (e.g., wheelchair, bedside chair)? 3  -DELFIN      Climbing 3-5 steps with a railing? 3  -DELFIN      To walk in hospital room? 3  -DELFIN      AM-PAC 6 Clicks Score (PT) 19  -DELFIN            User Key  (r) = Recorded By, (t) = Taken By, (c) = Cosigned By    Initials Name Provider Type    Verenice Webb PTA Physical Therapist Assistant                 Time Calculation:    PT Charges     Row Name 09/08/22 1243             Time Calculation    Start Time 1047  -DELFIN      Stop Time 1112  -DELFIN      Time Calculation (min) 25 min  -DELFIN              Timed Charges    25967 - PT Therapeutic Exercise Minutes 13  -DELFIN      67814 - Gait Training Minutes  12  -DELFIN              Total Minutes    Timed Charges Total Minutes 25  -DELFIN       Total Minutes 25  -DELFIN            User Key  (r) = Recorded By, (t) = Taken By, (c) = Cosigned By    Initials Name Provider Type    Verenice Webb PTA Physical Therapist Assistant              Therapy Charges for Today     Code Description Service Date Service Provider Modifiers Qty    63787669646 HC GAIT TRAINING EA 15 MIN 9/8/2022 Verencie Wright PTA GP 1    87985197212 HC PT THER PROC EA 15 MIN 9/8/2022 Verenice Wright PTA GP 1          PT G-Codes  Outcome Measure Options: AM-PAC 6 Clicks Daily Activity (OT)  AM-PAC 6 Clicks Score (PT): 19  AM-PAC 6 Clicks Score (OT): 19    Verenice Wright PTA  9/8/2022

## 2022-09-08 NOTE — PLAN OF CARE
Goal Outcome Evaluation:  Plan of Care Reviewed With: patient        Progress: improving   Patient actively worked thru multiple LE exercises. Stood and ambulated 250' with Rwx and CGA.  Worked on purse lip breathing and energy conservation. SATs on 5 lpm 99% to begin, 88% during ambulation and 94% seconds after sitting down when returned to room.

## 2022-09-08 NOTE — PLAN OF CARE
Goal Outcome Evaluation:  Plan of Care Reviewed With: patient        Progress: improving  Outcome Evaluation: Ntn follow up. Pt remains on a soft texture, ground, cardiac, C.CHO diet with a 2000mL fluid restriction. He is tolerating his diet well and his appetite is good. He has consumed 100% of the last 7 meals. He is receiving Boost gluc control BID to supplement intake to promote wound healing. He has a stage 2 PI to gluteal, as well as, wounds to his legs.  He is being treated with diuretics for edema. Will cont to follow for further nutrition needs.

## 2022-09-08 NOTE — NURSING NOTE
Report called to Demetria MONROY RN, pt transferred via w/c with 3L/NC to room 435. Pt tolerated well, sitting at side of bed making phone to family to make them aware of moving rooms

## 2022-09-08 NOTE — PROGRESS NOTES
[facility] Medicine Services  INPATIENT PROGRESS NOTE    Patient Name: Chito Govea  Date of Admission: 9/1/2022  Today's Date: 09/08/22  Length of Stay: 7  Primary Care Physician: Chito Rubio MD    Subjective   Chief Complaint: shortness of breath    HPI:    Patient was seen and examined at bedside.      Overall the patient has drastically improved.  The patient is on 20 L/min and 40% FiO2.  We will likely wean the patient down to nasal cannula today.  Discussed the case with Dr. Uriostegui, we will continue to adjust his cardiac medications for optimal rate control as well as volume control.  Patient's lungs have much improved auscultation.      Intake/Output Summary (Last 24 hours) at 9/8/2022 0955  Last data filed at 9/8/2022 0800  Gross per 24 hour   Intake 1200 ml   Output 2500 ml   Net -1300 ml       Review of Systems   Constitutional: Negative for activity change, appetite change, chills, fatigue and fever.   Respiratory: Negative for cough and shortness of breath.    Cardiovascular: Positive for leg swelling. Negative for palpitations.   Gastrointestinal: Negative for abdominal distention, abdominal pain, diarrhea, nausea and vomiting.   Genitourinary: Negative for difficulty urinating.   Neurological: Positive for weakness.        All pertinent negatives and positives are as above. All other systems have been reviewed and are negative unless otherwise stated.     Objective    Temp:  [97 °F (36.1 °C)-97.8 °F (36.6 °C)] 97 °F (36.1 °C)  Heart Rate:  [] 94  Resp:  [10-22] 15  BP: ()/(50-92) 125/60  Physical Exam  Vitals and nursing note reviewed.   Constitutional:       Appearance: Normal appearance. He is ill-appearing.   HENT:      Head: Normocephalic and atraumatic.      Mouth/Throat:      Mouth: Mucous membranes are dry.      Pharynx: Oropharynx is clear.   Eyes:      General: No scleral icterus.     Conjunctiva/sclera: Conjunctivae normal.    Cardiovascular:      Rate and Rhythm: Tachycardia present. Rhythm irregular.   Pulmonary:      Effort: No respiratory distress.      Breath sounds: Rales (diffuse bilateral; greatly improved) present.      Comments: Diminished at bases   Abdominal:      General: Abdomen is flat. Bowel sounds are normal. There is no distension.      Palpations: Abdomen is soft. There is no mass.      Tenderness: There is no abdominal tenderness.   Musculoskeletal:      Right lower leg: Edema (2+, unna boots in place) present.      Left lower leg: Edema (2+, unna boots in place) present.   Skin:     General: Skin is warm and dry.      Findings: No rash.   Neurological:      General: No focal deficit present.      Mental Status: He is alert and oriented to person, place, and time.   Psychiatric:         Mood and Affect: Mood normal.         Behavior: Behavior normal.             Results Review:  I have reviewed the labs, radiology results, and diagnostic studies.    Laboratory Data:   Results from last 7 days   Lab Units 09/08/22  0326 09/06/22  0354 09/05/22  0513   WBC 10*3/mm3 11.45* 15.77* 13.41*   HEMOGLOBIN g/dL 10.4* 10.3* 10.3*   HEMATOCRIT % 33.1* 33.9* 33.2*   PLATELETS 10*3/mm3 334 401 397        Results from last 7 days   Lab Units 09/08/22  0326 09/07/22  1848 09/07/22  0348 09/06/22  1803 09/06/22  0354 09/05/22  1027 09/05/22  0513   SODIUM mmol/L 140 139 139   < > 138   < > 138   POTASSIUM mmol/L 4.3 3.9 3.9   < > 3.9   < > 4.7   CHLORIDE mmol/L 102 100 102   < > 98   < > 95*   CO2 mmol/L 28.0 31.0* 31.0*   < > 30.0*   < > 32.0*   BUN mg/dL 70* 71* 72*   < > 68*   < > 54*   CREATININE mg/dL 1.16 1.18 1.33*   < > 1.58*   < > 1.48*   CALCIUM mg/dL 8.4* 8.6 8.2*   < > 8.7   < > 8.5*   BILIRUBIN mg/dL 0.2  --   --   --  0.2  --  0.3   ALK PHOS U/L 113  --   --   --  111  --  98   ALT (SGPT) U/L 18  --   --   --  19  --  14   AST (SGOT) U/L 16  --   --   --  26  --  20   GLUCOSE mg/dL 240* 210* 209*   < > 194*   < > 324*     < > = values in this interval not displayed.       Culture Data:   Blood Culture   Date Value Ref Range Status   09/01/2022 No growth at 4 days  Preliminary   09/01/2022 No growth at 4 days  Preliminary       Radiology Data:   Imaging Results (Last 24 Hours)     ** No results found for the last 24 hours. **          I have reviewed the patient's current medications.     Assessment/Plan     Active Hospital Problems    Diagnosis    • **Acute systolic heart failure (HCC), EF 36-40% on 9/2    • Bilateral pleural effusion    • COPD (chronic obstructive pulmonary disease) (HCC)    • Former smoker, quit 7/2022    • Acute kidney injury (HCC) superimposed on CKD Stage 2    • Anemia, chronic disease    • Suspected Interstitial lung disease (HCC), likely due to amiodarone    • Acute respiratory failure with hypoxia (HCC)    • Acute urinary retention requiring alonzo catheter    • Type 2 MI (myocardial infarction) (HCC) due to hypoxia and congestive heart failure    • Aortic stenosis, moderate    • Moderate malnutrition (HCC)    • Paroxysmal atrial fibrillation (HCC)    • Hypertension    • Type 2 diabetes mellitus with hyperglycemia, without long-term current use of insulin ([facility])        Plan:  Patient was admitted on 9/1 by Dr. Pena for shortness of breath.  Patient follows with Dr. Rubio for PCP and Dr. Devine for CHF.  Patient was recently admitted on 8/9-8/15 for CONSUELO, atrial fibrillation.  Patient was found during that admission to have a Cr of 6.96 and BUN >200 with hyperkalemia.  At that time, the patient was felt to have significant dehydration during that admission and was treated with IVF and patient had difficulty controlling HR and was started on amiodarone.    Upon admission, patient was noted to have a BNP of 33,372 and was noted to have tachypnea and hypoxia per Dr. Pena's note with O2 sat 78% upon presentation to ER.  Patient was admitted for suspected Acute on Chronic diastolic heart failure likely related  to aortic stenosis and atrial fibrillation with RVR.  The patient's echo in 8/11/2022 showed normal EF and moderate AS.  Patient has been diuresed with IV diuretics since admission.  Patient is net negative 9.1 L for the hospitalization. Cardiology has been consulted, appreciate assistance.  Repeat echo 9/4/2022,Left ventricular ejection fraction appears to be 36 - 40%. Left ventricular systolic function is moderately decreased.  The following left ventricular wall segments are hypokinetic: mid anterior, apical anterior, apical lateral, apical inferior, mid inferior, apical septal, basal inferoseptal, mid inferoseptal, apex hypokinetic, mid anteroseptal, basal inferior and basal inferoseptal. Moderate aortic valve stenosis is present.  Left atrial volume is moderately increased.  Estimated right ventricular systolic pressure from tricuspid regurgitation is mildly elevated (35-45 mmHg).  Patient given 60 mg IV lasix and acetazolamide 500 mg - Continue this regimen for now.    Patient has a bump in his Cr and meets criteria for CONSUELO.  Patients baseline Cr is ~1.  Patient Cr bumped to 1.6 on 9/4/2022.  This is likely fluctuation due to diuresis, patient also noted to be more alkalotic (likely contraction alkalosis).  Baseline GFR places patient in CKD Stage 2.  Monitor renal function closely.  Renal function improving with current diuresis strategy.    Pulmonary consulted for concern for lung disease.  Discussed the case with Tomas GUILLAUME.  We both agree with Dr. Uriostegui who is concerned about amiodarone toxicity.  Although amiodarone toxicity typically occurs with continue cumulative dose of medication over time, there is some literature, however, supporting acute toxicity.  Patient has only been on this medication since mid-August 2022.  The distribution of the imaging and the clinical examination do raise concern for ILD.  Often eosinophils will be elevated, patient has been on steroids which can interfere with this  result.  The patient does have an elevated CRP and sedimentation rate.  Treatment essentially is to remove the offending agent and PO steroid treatment and taper.      There is significant change compared to CT scan performed just a month ago.  Do not think this is all related to pulmonary edema.  New CT scan (CT angiogram 9/1) compared with old CT scan (CT wo contrast 8/9):    Upper lobes:      At the level of the sondra:      Lower lobes:     The bilateral distribution is somewhat perivascular but has some honey-combing and appears almost fibrotic as opposed to just pulmonary edema.  The other finding is bilateral pleural effusions.    Although low suspicion for bacterial pneumonia, continue antibiotics.  Previously on ceftriaxone and azithromycin.  D/C azithromycin due to QTc prolongation properties.  Continue doxycycline for atypical coverage and continue ceftriaxone - Treat for total of 7 days.        The patient was on solumedrol 40 mg q8h, titrate down to 20 mg q8h and atrovent nebs.  No albuterol due to HR.      Patient does have symptoms of orthopnea, but does not have significant JVD.  Suspect that patients bilateral pleural effusions layer out when he lays flat covering more area causing shortness of breath and causes increased work of breath.  Patient does not have hypoxia or significant JVD when laying flat.  Patient having worsening hypoxia, on 9/6 evaluated at bedside with US for possible thoracentesis.  Patient has bilateral pleural effusion R>L but has decreased in size.  Diuresis, pulmonary tolieting seems to be providing adequate improvement.    Metoprolol 37.5 mg BID for atrial fibrillation. Continue home xarelto for stroke prophylaxis in the setting of atrial fibrillation.  Discussed case with cardiology, Dr. Uriostegui, will start losartan 25 mg and aldactone 25 mg daily.    For the patient's diabetes, it has been poorly controlled.  Patient had a hemoglobin A1c of 8.40 upon admission.  Patient's  sugars were >200-300 when I took over on 9/5.  Increased levemir to 25 units qhs and continue high-intensity sliding scale insulin.     PT/OT - Increase activity.    Patient has some issue with urinary retention upon admission.  Patient had a alonzo placed.  Patient started on flomax and alonzo removed on 9/6.  Patient voiding spontaneously.        Discharge Planning: I expect the patient to be discharged to  in >2 days.    Electronically signed by Ellis Rey MD, 09/08/22, 09:55 CDT.

## 2022-09-08 NOTE — PLAN OF CARE
Goal Outcome Evaluation:  Plan of Care Reviewed With: patient           Outcome Evaluation: OT tx completed this am. He is alert and oriented, agreeable for therapy. He initially presented on 20/40 vapotherm, RT transitioned pt to 6L NC. He was able to bring self to sitting at EOB with SBA. Donned socks while seated at EOB with set-up and SBA. CGA for sit <> stand t/f and small steps from bed to chair. Pt completed sit <> stand t/f x10 reps to increase tolerance for activity/endurance training in preperation for higher level adls. He was able to complete sit <> stand x5 in 12 seconds, O2 sats dropped to 85% with t/fs but quickly returned to mid 90s with PLB. He was able to complete B UE strengthening exercises 20 reps x 1 set, seated upright in chair. Pt provided education on enegry conservation and home/environment modifications to increase his safety and independence upon returning home. Continue OT POC.

## 2022-09-08 NOTE — PROGRESS NOTES
"Chief Complaint: Follow-up CHF    S: Overall continue to improve.  Some shortness of breath, but was able to get up and walk yesterday for the first time in the hospital, which was a significant improvement.  He still feels edema is improving, and legs are wrapped today.  He denies chest pain, palpitations, dizziness.  Oxygen requirements further continuing to improve.  Tolerated losartan initiation yesterday well.    Medications: Reviewed    Review of Systems: All pertinent negative and positives as noted above.  Otherwise, all systems reviewed and found to be negative.    Telemetry: Paroxysmal atrial fibrillation    O:  /60   Pulse 101   Temp 97 °F (36.1 °C) (Axillary)   Resp 19   Ht 176.8 cm (69.61\")   Wt 68.2 kg (150 lb 6.4 oz)   SpO2 100%   BMI 21.83 kg/m²   Temp:  [97 °F (36.1 °C)-97.8 °F (36.6 °C)] 97 °F (36.1 °C)  Heart Rate:  [] 101  Resp:  [10-22] 19  BP: ()/(50-92) 125/60    Gen: male sitting in chair in NAD  HEENT: NC/AT, sclera anicteric  Neck: Supple, JVD 2 cm above the clavicle while seated at 90 degrees  CV: RRR, no m/r/g  Pulm: Overall CTAB with some subtle basilar crackles (much improved compared to prior exams), NWOB  Abd: Soft, ND/NT  Ext: WWP, legs wrapped today  Neuro: Aox3, grossly nonfocal  Psych: Appropriate mood and affect    Diagnostic Data:  CMP   CMP    CMP 9/6/22 9/6/22 9/7/22 9/7/22 9/8/22    0354 1803 0348 1848    Glucose 194 (A) 191 (A) 209 (A) 210 (A) 240 (A)   BUN 68 (A) 68 (A) 72 (A) 71 (A) 70 (A)   Creatinine 1.58 (A) 1.41 (A) 1.33 (A) 1.18 1.16   Sodium 138 142 139 139 140   Potassium 3.9 4.3 3.9 3.9 4.3   Chloride 98 99 102 100 102   Calcium 8.7 9.0 8.2 (A) 8.6 8.4 (A)   Albumin 3.10 (A)    2.90 (A)   Total Bilirubin 0.2    0.2   Alkaline Phosphatase 111    113   AST (SGOT) 26    16   ALT (SGPT) 19    18   (A) Abnormal value       Comments are available for some flowsheets but are not being displayed.           CBC   CBC    CBC 9/5/22 9/6/22 9/8/22 "   WBC 13.41 (A) 15.77 (A) 11.45 (A)   RBC 3.63 (A) 3.59 (A) 3.57 (A)   Hemoglobin 10.3 (A) 10.3 (A) 10.4 (A)   Hematocrit 33.2 (A) 33.9 (A) 33.1 (A)   MCV 91.5 94.4 92.7   MCH 28.4 28.7 29.1   MCHC 31.0 (A) 30.4 (A) 31.4 (A)   RDW 15.4 15.1 14.9   Platelets 397 401 334   (A) Abnormal value               ASSESSMENT/PLAN:    1.  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  Fortunately, oxygen requirements have been much improved; this is been without aggressive diuresis suggesting that hypoxia is primarily driven by alternative etiology (suspect pneumonitis improving with steroids and holding amiodarone).  - Appreciate pulmonary and hospitalist management  - CHF management as below    2.  Acute systolic heart failure: TTE with newly reduced EF this admission (36 to 40%) with some wall motion abnormalities.  Differential includes stress-induced cardiomyopathy, CAD, among other etiologies.    - Strict I's/O's and daily weights  - 2 L fluid restriction and at least 2 g Na restricted diet  - Diuretic: IV Lasix 60 mg this morning with second dose as needed to achieve I/O goal of net -500 cc to 1 L  HFrEF GDMT  - BB: Metoprolol as below  - RAAS inhibitor: Continue losartan 25 mg daily, consider transition to Entresto in the coming days.  - SGLT2 inhibitor: Consider starting prior to discharge or as outpatient  - MRA: Start spironolactone 25 mg daily today  Defer potential ischemic evaluation to outpatient discussion    3.  Paroxysmal atrial fibrillation: In and out of atrial fibrillation with improved heart rate control on metoprolol, but may benefit from increase metoprolol dose.  - Continue Xarelto for CVA prophylaxis  - Transition metoprolol to 37.5 mg twice daily    4.  Valvular heart disease (moderate aortic stenosis): Stable.  No further evaluation or treatment needed at this time.  Plan to follow-up  as an outpatient.    Michael Uriostegui MD

## 2022-09-08 NOTE — THERAPY TREATMENT NOTE
Patient Name: Chito Govea  : 1955    MRN: [id number]                              Today's Date: 2022       Admit Date: 2022    Visit Dx:     ICD-10-CM ICD-9-CM   1. Acute on chronic congestive heart failure, unspecified heart failure type (HCC)  I50.9 428.0   2. Acute respiratory failure with hypoxia (HCC)  J96.01 518.81   3. Impaired mobility and ADLs  Z74.09 V49.89    Z78.9    4. Impaired mobility  Z74.09 799.89   5. Bilateral leg edema  R60.0 782.3     Patient Active Problem List   Diagnosis   • Paroxysmal atrial fibrillation (HCC)   • Acute kidney injury (HCC)   • Hypertension   • Type 2 diabetes mellitus with hyperglycemia, without long-term current use of insulin (HCC)   • Moderate malnutrition (HCC)   • Aortic stenosis, moderate   • Nausea and vomiting   • Type 2 MI (myocardial infarction) ([facility]) due to hypoxia and congestive heart failure   • Hyponatremia due to dehydration with associated weakness   • Bilateral pleural effusion   • COPD (chronic obstructive pulmonary disease) ([facility])   • Former smoker, quit 2022   • Acute kidney injury (HCC) superimposed on CKD Stage 2   • Anemia, chronic disease   • Suspected Interstitial lung disease ([facility]), likely due to amiodarone   • Acute systolic heart failure ([facility]), EF 36-40% on    • Acute respiratory failure with hypoxia (HCC)   • Acute urinary retention requiring alonzo catheter     Past Medical History:   Diagnosis Date   • Arthritis    • CHF (congestive heart failure) (HCC)    • Diabetes mellitus (HCC)    • HLD (hyperlipidemia)    • Hypertension    • Renal disorder      History reviewed. No pertinent surgical history.   General Information     Row Name 22 0945          OT Time and Intention    Document Type therapy note (daily note)  -JJ     Mode of Treatment occupational therapy  -JJ     Row Name 22 0945          General Information    Patient Profile Reviewed yes  -JJ     Existing Precautions/Restrictions fall;oxygen therapy  device and L/min  vapotherm 20/40  -     Barriers to Rehab medically complex  -     Row Name 09/08/22 0945          Cognition    Orientation Status (Cognition) oriented x 4  -     Row Name 09/08/22 0945          Safety Issues, Functional Mobility    Impairments Affecting Function (Mobility) balance;endurance/activity tolerance;strength;range of motion (ROM);shortness of breath  -           User Key  (r) = Recorded By, (t) = Taken By, (c) = Cosigned By    Initials Name Provider Type    Radha Saenz OTR/L, RAMIROS Occupational Therapist                 Mobility/ADL's     Row Name 09/08/22 0945          Bed Mobility    Bed Mobility supine-sit  -     Supine-Sit [place] (Bed Mobility) supervision  -     Assistive Device (Bed Mobility) head of bed elevated;bed rails  -     Row Name 09/08/22 0945          Transfers    Transfers sit-stand transfer;stand-sit transfer  -     Comment, (Transfers) completed sit <> stand t/f x 10 reps to address increasing tolerance for activity/endurance training in preperation for high level adl training  -     Sit-Stand [place] (Transfers) contact guard  -     Stand-Sit [place] (Transfers) contact guard  -     Row Name 09/08/22 0945          Activities of Daily Living    BADL Assessment/Intervention lower body dressing  -     Row Name 09/08/22 0945          Lower Body Dressing Assessment/Training    [place] Level (Lower Body Dressing) don;socks;standby assist;set up  -           User Key  (r) = Recorded By, (t) = Taken By, (c) = Cosigned By    Initials Name Provider Type    Radha Prieto OTR/L, GIOVANNA Occupational Therapist               Obj/Interventions    No documentation.                Goals/Plan    No documentation.                Clinical Impression     Row Name 09/08/22 0945          Pain Assessment    Pretreatment Pain Rating 0/10 - no pain  -     Posttreatment Pain Rating 0/10 - no pain  -     Row Name 09/08/22 0945           Plan of Care Review    Plan of Care Reviewed With patient  -JJ     Outcome Evaluation OT tx completed this am. He is alert and oriented, agreeable for therapy. He initially presented on 20/40 vapotherm, RT transitioned pt to 6L NC. He was able to bring self to sitting at EOB with SBA. Donned socks while seated at EOB with set-up and SBA. CGA for sit <> stand t/f and small steps from bed to chair. Pt completed sit <> stand t/f x10 reps to increase tolerance for activity/endurance training in preperation for higher level adls. He was able to complete sit <> stand x5 in 12 seconds, O2 sats dropped to 85% with t/fs but quickly returned to mid 90s with PLB. He was able to complete B UE strengthening exercises 20 reps x 1 set, seated upright in chair. Pt provided education on enegry conservation and home/environment modifications to increase his safety and independence upon returning home. Continue OT POC.  -JJ     Row Name 09/08/22 1145          Positioning and Restraints    Post Treatment Position chair  -JJ     In Chair notified nsg;reclined;call light within reach;encouraged to call for assist;patient within staff view  -JJ           User Key  (r) = Recorded By, (t) = Taken By, (c) = Cosigned By    Initials Name Provider Type    Radha Saenz, OTR/L, CSRS Occupational Therapist               Outcome Measures     Row Name 09/08/22 0963          How much help from another is currently needed...    Putting on and taking off regular lower body clothing? 3  -JJ     Bathing (including washing, rinsing, and drying) 3  -JJ     Toileting (which includes using toilet bed pan or urinal) 3  -JJ     Putting on and taking off regular upper body clothing 3  -JJ     Taking care of personal grooming (such as brushing teeth) 3  -JJ     Eating meals 4  -JJ     AM-PAC 6 Clicks Score (OT) 19  -JJ     Row Name 09/08/22 0400          How much help from another person do you currently need...    Turning from your back to your  side while in flat bed without using bedrails? 3  -MM     Moving from lying on back to sitting on the side of a flat bed without bedrails? 3  -MM     Moving to and from a bed to a chair (including a wheelchair)? 3  -MM     Standing up from a chair using your arms (e.g., wheelchair, bedside chair)? 3  -MM     Climbing 3-5 steps with a railing? 2  -MM     To walk in hospital room? 3  -MM     AM-PAC 6 Clicks Score (PT) 17  -MM     Highest level of mobility 5 --> Static standing  -MM     Row Name 09/08/22 0945          Functional Assessment    Outcome Measure Options AM-PAC 6 Clicks Daily Activity (OT)  -JJ     Row Name 09/08/22 0945          5 Times Sit to Stand    5 Times Sit to Stand (seconds) 12 seconds  -     5 Times Sit to Stand Comments O2 dropped to 85% with x5 sit <> stand, required seated recovery period for O2 sats to return to mid 90s.  -STEPHAN           User Key  (r) = Recorded By, (t) = Taken By, (c) = Cosigned By    Initials Name Provider Type    Radha Saenz, OTR/L, CSRS Occupational Therapist    Lilia Multani, RN Registered Nurse                Occupational Therapy Education                 Title: PT OT SLP Therapies (In Progress)     Topic: Occupational Therapy (In Progress)     Point: ADL training (Done)     Description:   Instruct learner(s) on proper safety adaptation and remediation techniques during self care or transfers.   Instruct in proper use of assistive devices.              Learning Progress Summary           Patient Acceptance, E, VU by STEPHAN at 9/8/2022 1027    Comment: energy conservation and home/environmental modifications to increase safety and independence with adls.    Acceptance, E, VU by STEPHAN at 9/7/2022 1156                   Point: Home exercise program (Done)     Description:   Instruct learner(s) on appropriate technique for monitoring, assisting and/or progressing therapeutic exercises/activities.              Learning Progress Summary           Patient Acceptance,  E, VU by STEPHAN at 9/8/2022 1027    Comment: energy conservation and home/environmental modifications to increase safety and independence with adls.                   Point: Precautions (Done)     Description:   Instruct learner(s) on prescribed precautions during self-care and functional transfers.              Learning Progress Summary           Patient Acceptance, E, VU by STEPHAN at 9/8/2022 1027    Comment: energy conservation and home/environmental modifications to increase safety and independence with adls.    Acceptance, E, VU by STEPHAN at 9/7/2022 1156                   Point: Body mechanics (Not Started)     Description:   Instruct learner(s) on proper positioning and spine alignment during self-care, functional mobility activities and/or exercises.              Learner Progress:  Not documented in this visit.                      User Key     Initials Effective Dates Name Provider Type Discipline    STEPHAN 11/10/21 -  Radha Nevarez, MINAL/L, RAMIROS Occupational Therapist OT              OT Recommendation and Plan  Planned Therapy Interventions (OT): activity tolerance training, BADL retraining, functional balance retraining, transfer/mobility retraining, strengthening exercise, occupation/activity based interventions, patient/caregiver education/training  Therapy Frequency (OT): 5 times/wk  Plan of Care Review  Plan of Care Reviewed With: patient  Outcome Evaluation: OT tx completed this am. He is alert and oriented, agreeable for therapy. He initially presented on 20/40 vapotherm, RT transitioned pt to 6L NC. He was able to bring self to sitting at EOB with SBA. Donned socks while seated at EOB with set-up and SBA. CGA for sit <> stand t/f and small steps from bed to chair. Pt completed sit <> stand t/f x10 reps to increase tolerance for activity/endurance training in preperation for higher level adls. He was able to complete sit <> stand x5 in 12 seconds, O2 sats dropped to 85% with t/fs but quickly returned to mid 90s  with PLB. He was able to complete B UE strengthening exercises 20 reps x 1 set, seated upright in chair. Pt provided education on enegry conservation and home/environment modifications to increase his safety and independence upon returning home. Continue OT POC.     Time Calculation:    Time Calculation- OT     Row Name 09/08/22 0945             Time Calculation- OT    OT Start Time 0945  -J      OT Stop Time 1015  -      OT Time Calculation (min) 30 min  -      Total Timed Code Minutes- OT 30 minute(s)  -      OT Received On 09/08/22  -            User Key  (r) = Recorded By, (t) = Taken By, (c) = Cosigned By    Initials Name Provider Type    Radha Saenz OTR/L, CSRS Occupational Therapist              Therapy Charges for Today     Code Description Service Date Service Provider Modifiers Qty    74978309897  OT EVAL MOD COMPLEXITY 4 9/7/2022 Radha Nevarez OTR/L, CSRS GO 1    37445347607 HC OT SELF CARE/MGMT/TRAIN EA 15 MIN 9/8/2022 Radha Nevarez OTR/L, CSRS GO 1    33090104365 HC OT THERAPEUTIC ACT EA 15 MIN 9/8/2022 Radha Nevarez OTR/L, RAMIROS GO 1               Radha Nevarez, OTR/L, GIOVANNA  9/8/2022

## 2022-09-09 LAB
ANION GAP SERPL CALCULATED.3IONS-SCNC: 6 MMOL/L (ref 5–15)
ANION GAP SERPL CALCULATED.3IONS-SCNC: 9 MMOL/L (ref 5–15)
BUN SERPL-MCNC: 76 MG/DL (ref 8–23)
BUN SERPL-MCNC: 79 MG/DL (ref 8–23)
BUN/CREAT SERPL: 54.5 (ref 7–25)
BUN/CREAT SERPL: 61.8 (ref 7–25)
CALCIUM SPEC-SCNC: 8.1 MG/DL (ref 8.6–10.5)
CALCIUM SPEC-SCNC: 8.5 MG/DL (ref 8.6–10.5)
CHLORIDE SERPL-SCNC: 102 MMOL/L (ref 98–107)
CHLORIDE SERPL-SCNC: 102 MMOL/L (ref 98–107)
CO2 SERPL-SCNC: 27 MMOL/L (ref 22–29)
CO2 SERPL-SCNC: 28 MMOL/L (ref 22–29)
CREAT SERPL-MCNC: 1.23 MG/DL (ref 0.76–1.27)
CREAT SERPL-MCNC: 1.45 MG/DL (ref 0.76–1.27)
EGFRCR SERPLBLD CKD-EPI 2021: 52.8 ML/MIN/1.73
EGFRCR SERPLBLD CKD-EPI 2021: 64.3 ML/MIN/1.73
GLUCOSE BLDC GLUCOMTR-MCNC: 210 MG/DL (ref 70–130)
GLUCOSE BLDC GLUCOMTR-MCNC: 214 MG/DL (ref 70–130)
GLUCOSE BLDC GLUCOMTR-MCNC: 283 MG/DL (ref 70–130)
GLUCOSE BLDC GLUCOMTR-MCNC: 326 MG/DL (ref 70–130)
GLUCOSE SERPL-MCNC: 147 MG/DL (ref 65–99)
GLUCOSE SERPL-MCNC: 378 MG/DL (ref 65–99)
POTASSIUM SERPL-SCNC: 3.8 MMOL/L (ref 3.5–5.2)
POTASSIUM SERPL-SCNC: 4.3 MMOL/L (ref 3.5–5.2)
SODIUM SERPL-SCNC: 136 MMOL/L (ref 136–145)
SODIUM SERPL-SCNC: 138 MMOL/L (ref 136–145)

## 2022-09-09 PROCEDURE — 63710000001 INSULIN LISPRO (HUMAN) PER 5 UNITS: Performed by: INTERNAL MEDICINE

## 2022-09-09 PROCEDURE — 63710000001 INSULIN DETEMIR PER 5 UNITS: Performed by: INTERNAL MEDICINE

## 2022-09-09 PROCEDURE — 94799 UNLISTED PULMONARY SVC/PX: CPT

## 2022-09-09 PROCEDURE — 97530 THERAPEUTIC ACTIVITIES: CPT | Performed by: OCCUPATIONAL THERAPIST

## 2022-09-09 PROCEDURE — 94664 DEMO&/EVAL PT USE INHALER: CPT

## 2022-09-09 PROCEDURE — 94660 CPAP INITIATION&MGMT: CPT

## 2022-09-09 PROCEDURE — 82962 GLUCOSE BLOOD TEST: CPT

## 2022-09-09 PROCEDURE — 25010000002 CEFTRIAXONE PER 250 MG: Performed by: INTERNAL MEDICINE

## 2022-09-09 PROCEDURE — 99233 SBSQ HOSP IP/OBS HIGH 50: CPT | Performed by: PHYSICIAN ASSISTANT

## 2022-09-09 PROCEDURE — 97116 GAIT TRAINING THERAPY: CPT

## 2022-09-09 PROCEDURE — 63710000001 PREDNISONE PER 1 MG: Performed by: INTERNAL MEDICINE

## 2022-09-09 PROCEDURE — 99231 SBSQ HOSP IP/OBS SF/LOW 25: CPT | Performed by: INTERNAL MEDICINE

## 2022-09-09 PROCEDURE — 80048 BASIC METABOLIC PNL TOTAL CA: CPT | Performed by: INTERNAL MEDICINE

## 2022-09-09 RX ORDER — METOPROLOL TARTRATE 50 MG/1
50 TABLET, FILM COATED ORAL EVERY 12 HOURS SCHEDULED
Status: DISCONTINUED | OUTPATIENT
Start: 2022-09-09 | End: 2022-09-10 | Stop reason: HOSPADM

## 2022-09-09 RX ORDER — INSULIN LISPRO 100 [IU]/ML
0-24 INJECTION, SOLUTION INTRAVENOUS; SUBCUTANEOUS
Status: DISCONTINUED | OUTPATIENT
Start: 2022-09-09 | End: 2022-09-10 | Stop reason: HOSPADM

## 2022-09-09 RX ADMIN — SPIRONOLACTONE 25 MG: 25 TABLET ORAL at 08:49

## 2022-09-09 RX ADMIN — BUDESONIDE AND FORMOTEROL FUMARATE DIHYDRATE 2 PUFF: 160; 4.5 AEROSOL RESPIRATORY (INHALATION) at 07:31

## 2022-09-09 RX ADMIN — INSULIN LISPRO 8 UNITS: 100 INJECTION, SOLUTION INTRAVENOUS; SUBCUTANEOUS at 17:15

## 2022-09-09 RX ADMIN — PANTOPRAZOLE SODIUM 40 MG: 40 TABLET, DELAYED RELEASE ORAL at 17:15

## 2022-09-09 RX ADMIN — IPRATROPIUM BROMIDE 0.5 MG: 0.5 SOLUTION RESPIRATORY (INHALATION) at 14:56

## 2022-09-09 RX ADMIN — CEFTRIAXONE 1 G: 1 INJECTION, POWDER, FOR SOLUTION INTRAMUSCULAR; INTRAVENOUS at 12:53

## 2022-09-09 RX ADMIN — DOXYCYCLINE 100 MG: 100 TABLET, FILM COATED ORAL at 20:48

## 2022-09-09 RX ADMIN — ANTACID TABLETS 2 TABLET: 500 TABLET, CHEWABLE ORAL at 17:15

## 2022-09-09 RX ADMIN — PREDNISONE 20 MG: 20 TABLET ORAL at 08:49

## 2022-09-09 RX ADMIN — PANTOPRAZOLE SODIUM 40 MG: 40 TABLET, DELAYED RELEASE ORAL at 06:34

## 2022-09-09 RX ADMIN — IPRATROPIUM BROMIDE 0.5 MG: 0.5 SOLUTION RESPIRATORY (INHALATION) at 19:44

## 2022-09-09 RX ADMIN — LOSARTAN POTASSIUM 25 MG: 50 TABLET, FILM COATED ORAL at 08:49

## 2022-09-09 RX ADMIN — INSULIN LISPRO 8 UNITS: 100 INJECTION, SOLUTION INTRAVENOUS; SUBCUTANEOUS at 20:44

## 2022-09-09 RX ADMIN — METOPROLOL TARTRATE 50 MG: 50 TABLET, FILM COATED ORAL at 20:48

## 2022-09-09 RX ADMIN — DOXYCYCLINE 100 MG: 100 TABLET, FILM COATED ORAL at 08:49

## 2022-09-09 RX ADMIN — INSULIN LISPRO 12 UNITS: 100 INJECTION, SOLUTION INTRAVENOUS; SUBCUTANEOUS at 12:53

## 2022-09-09 RX ADMIN — ANTACID TABLETS 2 TABLET: 500 TABLET, CHEWABLE ORAL at 23:12

## 2022-09-09 RX ADMIN — RIVAROXABAN 20 MG: 20 TABLET, FILM COATED ORAL at 17:15

## 2022-09-09 RX ADMIN — PREDNISONE 20 MG: 20 TABLET ORAL at 17:15

## 2022-09-09 RX ADMIN — ATORVASTATIN CALCIUM 20 MG: 10 TABLET, FILM COATED ORAL at 08:49

## 2022-09-09 RX ADMIN — IPRATROPIUM BROMIDE 0.5 MG: 0.5 SOLUTION RESPIRATORY (INHALATION) at 07:31

## 2022-09-09 RX ADMIN — TAMSULOSIN HYDROCHLORIDE 0.4 MG: 0.4 CAPSULE ORAL at 08:49

## 2022-09-09 RX ADMIN — Medication 6 MG: at 20:48

## 2022-09-09 RX ADMIN — INSULIN DETEMIR 35 UNITS: 100 INJECTION, SOLUTION SUBCUTANEOUS at 20:44

## 2022-09-09 RX ADMIN — BUDESONIDE AND FORMOTEROL FUMARATE DIHYDRATE 2 PUFF: 160; 4.5 AEROSOL RESPIRATORY (INHALATION) at 19:44

## 2022-09-09 RX ADMIN — METOPROLOL TARTRATE 37.5 MG: 25 TABLET, FILM COATED ORAL at 08:49

## 2022-09-09 RX ADMIN — IPRATROPIUM BROMIDE 0.5 MG: 0.5 SOLUTION RESPIRATORY (INHALATION) at 10:47

## 2022-09-09 RX ADMIN — INSULIN LISPRO 16 UNITS: 100 INJECTION, SOLUTION INTRAVENOUS; SUBCUTANEOUS at 08:49

## 2022-09-09 NOTE — PROGRESS NOTES
PULMONARY AND CRITICAL CARE PROGRESS NOTE - [facility]    Patient: Chito Govea  1955   MR# [id number]   Acct# [id number]  09/09/22   07:38 CDT  Referring Provider: Ellis Rey MD    Chief Complaint: Shortness of breath    Interval history: Patient is resting flat on his back in the bed. He is on room air now. He states he is feeling much improved. He is afebrile. He has had a Net (-) of ~52097 out since admission. No x-ray for review.  He has been ambulating with therapy. He is hoping to go home soon. He is awaiting for his legs to be assessed. He has bilateral unaboots in place.  No other issues.    Meds:  atorvastatin, 20 mg, Oral, Daily  bisacodyl, 10 mg, Rectal, Daily  budesonide-formoterol, 2 puff, Inhalation, BID - RT  cefTRIAXone, 1 g, Intravenous, Q24H  doxycycline, 100 mg, Oral, Q12H  insulin detemir, 25 Units, Subcutaneous, Nightly  insulin lispro, 0-24 Units, Subcutaneous, TID AC  ipratropium, 0.5 mg, Nebulization, 4x Daily - RT  losartan, 25 mg, Oral, Q24H  metoprolol tartrate, 37.5 mg, Oral, Q12H  pantoprazole, 40 mg, Oral, BID AC  predniSONE, 20 mg, Oral, BID With Meals  rivaroxaban, 20 mg, Oral, Daily With Dinner  spironolactone, 25 mg, Oral, Daily  tamsulosin, 0.4 mg, Oral, Daily         Review of Systems:   Review of Systems     Constitutional: Negative for fatigue.   Respiratory: Positive for cough and shortness of breath.    Cardiovascular: Positive for leg swelling.   Gastrointestinal: Negative for diarrhea and nausea.   Neurological: Positive for weakness.     Physical Exam:  SpO2 Percentage    09/08/22 2317 09/08/22 2335 09/09/22 0319   SpO2: 93% 93% 97%     Body mass index is 21.48 kg/m².   Temp:  [97 °F (36.1 °C)-98.4 °F (36.9 °C)] 98 °F (36.7 °C)  Heart Rate:  [] 58  Resp:  [15-19] 16  BP: ()/(45-92) 106/57    Intake/Output Summary (Last 24 hours) at 9/9/2022 0761  Last data filed at 9/9/2022 0319  Gross per 24 hour   Intake 1080 ml   Output 7361  ml   Net -1145 ml     Physical Exam     Constitutional:       General: He is not in acute distress.  Sitting up on the side of the bed     Appearance: He is ill-appearing.   HENT:      Head: Normocephalic.   Eyes:      General: No scleral icterus.  Cardiovascular:      Rate and Rhythm: Irregular rhythm       Comments: Rate 60's  Pulmonary:      Effort: No respiratory distress.      Breath sounds: Rales present.   Abdominal:      General: There is no distension.   Musculoskeletal:      Right lower leg: Edema present.      Left lower leg: Edema present.   Skin:     Nails: There is clubbing.      Comments: BLE wrapped in Kerlix   Neurological:      Mental Status: He is alert and oriented to person, place, and time.   Psychiatric:         Mood and Affect: Mood normal.         Behavior: Behavior is cooperative.      Electronically signed by MARKELL Alan, 9/9/2022, 07:38 CDT      Physician Substantive Portion: Medical Decision Making    Laboratory Data:  Results from last 7 days   Lab Units 09/08/22  0326 09/06/22  0354 09/05/22  0513   WBC 10*3/mm3 11.45* 15.77* 13.41*   HEMOGLOBIN g/dL 10.4* 10.3* 10.3*   PLATELETS 10*3/mm3 334 401 397     Results from last 7 days   Lab Units 09/09/22  0532 09/08/22  1812 09/08/22  0326   SODIUM mmol/L 136 139 140   POTASSIUM mmol/L 4.3 3.9 4.3   BUN mg/dL 79* 70* 70*   CREATININE mg/dL 1.45* 1.17 1.16     Results from last 7 days   Lab Units 09/04/22  1610 09/03/22  1827   PH, ARTERIAL pH units 7.487* 7.523*   PCO2, ARTERIAL mm Hg 40.3 38.4   PO2 ART mm Hg 279.0* 89.0   FIO2 % 100  --      No results found for: BLOODCX, URINECX, WOUNDCX, MRSACX, RESPCX, STOOLCX  Recent films:  No radiology results from the last 24 hrs     Pulmonary Assessment:    1. Acute resp failure with hypoxia improved, markedly  2. CHF better  3. Copd stable    Recommend/plan:   · Continue rt  · Mobilize  · From resp standpoint, ok to home  · Signing off, available    This visit was performed by both  a physician and an [facility] Practice RN.  I personally evaluated and examined the patient.  I performed all aspects of the medical decision making as documented.  Electronically signed by Danny Solares MD, 9/9/2022, 11:34 CDT

## 2022-09-09 NOTE — THERAPY TREATMENT NOTE
Acute Care - Physical Therapy Treatment Note  [facility]     Patient Name: Chito Govea  : 1955  MRN: [id number]  Today's Date: 2022      Visit Dx:     ICD-10-CM ICD-9-CM   1. Acute on chronic congestive heart failure, unspecified heart failure type (HCC)  I50.9 428.0   2. Acute respiratory failure with hypoxia (HCC)  J96.01 518.81   3. Impaired mobility and ADLs  Z74.09 V49.89    Z78.9    4. Impaired mobility  Z74.09 799.89   5. Bilateral leg edema  R60.0 782.3     Patient Active Problem List   Diagnosis   • Paroxysmal atrial fibrillation (HCC)   • Acute kidney injury (HCC)   • Hypertension   • Type 2 diabetes mellitus with hyperglycemia, without long-term current use of insulin (HCC)   • Moderate malnutrition (HCC)   • Aortic stenosis, moderate   • Nausea and vomiting   • Type 2 MI (myocardial infarction) (HCC) due to hypoxia and congestive heart failure   • Hyponatremia due to dehydration with associated weakness   • Bilateral pleural effusion   • COPD (chronic obstructive pulmonary disease) ([facility])   • Former smoker, quit 2022   • Acute kidney injury (HCC) superimposed on CKD Stage 2   • Anemia, chronic disease   • Suspected Interstitial lung disease (HCC), likely due to amiodarone   • Acute systolic heart failure (HCC), EF 36-40% on    • Acute respiratory failure with hypoxia (HCC)   • Acute urinary retention requiring alonzo catheter     Past Medical History:   Diagnosis Date   • Arthritis    • CHF (congestive heart failure) (HCC)    • Diabetes mellitus (HCC)    • HLD (hyperlipidemia)    • Hypertension    • Renal disorder      History reviewed. No pertinent surgical history.  PT Assessment (last 12 hours)     PT Evaluation and Treatment     Row Name 22 1036          Physical Therapy Time and Intention    Subjective Information complains of;fatigue  -LY     Document Type therapy note (daily note)  -LY     Mode of Treatment physical therapy  -LY     Comment unna boots intact, plan to change  9/10 per MD orders  -LY     Row Name 09/09/22 1036          General Information    Existing Precautions/Restrictions fall;oxygen therapy device and L/min  -LY     Row Name 09/09/22 1036          Pain    Pretreatment Pain Rating 0/10 - no pain  -LY     Posttreatment Pain Rating 0/10 - no pain  -LY     Row Name 09/09/22 1036          Bed Mobility    Supine-Sit Kern (Bed Mobility) supervision  -LY     Assistive Device (Bed Mobility) head of bed elevated  -LY     Comment, (Bed Mobility) left sitting EOB  -LY     Row Name 09/09/22 1036          Transfers    Sit-Stand Kern (Transfers) standby assist  -LY     Stand-Sit Kern (Transfers) standby assist  -LY     Row Name 09/09/22 1036          Gait/Stairs (Locomotion)    Kern Level (Gait) standby assist  -LY     Assistive Device (Gait) walker, front-wheeled  -LY     Distance in Feet (Gait) 250'  -LY     Pattern (Gait) step-through  -LY     Deviations/Abnormal Patterns (Gait) gait speed decreased;stride length decreased  -LY     Comment, (Gait/Stairs) O2 sats maintained low 90s on RA  -LY     Row Name 09/09/22 1036          Motor Skills    Comments, Therapeutic Exercise BLE AROM x15 reps seated  -LY     Additional Documentation Comments, Therapeutic Exercise (Row)  -LY     Row Name             Wound 09/01/22 0400 gluteal    Wound - Properties Group Placement Date: 09/01/22  -VT Placement Time: 0400  -VT Location: gluteal  -VT     Retired Wound - Properties Group Placement Date: 09/01/22  -VT Placement Time: 0400  -VT Location: gluteal  -VT     Retired Wound - Properties Group Date first assessed: 09/01/22  -VT Time first assessed: 0400  -VT Location: gluteal  -VT     Row Name             Wound 09/01/22 0403 Left lower leg    Wound - Properties Group Placement Date: 09/01/22  -VT Placement Time: 0403  -VT Present on Hospital Admission: Y  -VT Side: Left  -VT Orientation: lower  -VT Location: leg  -VT     Retired Wound - Properties Group Placement  Date: 09/01/22  -VT Placement Time: 0403  -VT Present on Hospital Admission: Y  -VT Side: Left  -VT Orientation: lower  -VT Location: leg  -VT     Retired Wound - Properties Group Date first assessed: 09/01/22  -VT Time first assessed: 0403  -VT Present on Hospital Admission: Y  -VT Side: Left  -VT Location: leg  -VT     Row Name             Wound 09/01/22 0405 Left lower leg    Wound - Properties Group Placement Date: 09/01/22  -VT Placement Time: 0405  -VT Present on Hospital Admission: Y  -VT Side: Left  -VT Orientation: lower  -VT Location: leg  -VT     Retired Wound - Properties Group Placement Date: 09/01/22  -VT Placement Time: 0405  -VT Present on Hospital Admission: Y  -VT Side: Left  -VT Orientation: lower  -VT Location: leg  -VT     Retired Wound - Properties Group Date first assessed: 09/01/22  -VT Time first assessed: 0405  -VT Present on Hospital Admission: Y  -VT Side: Left  -VT Location: leg  -VT     Row Name             Wound 09/07/22 Right lower leg    Wound - Properties Group Placement Date: 09/07/22  -JOSEPH Present on Hospital Admission: Y  -JOSEPH Side: Right  -JOSEPH Orientation: lower  -JOSEPH Location: leg  -JOSEPH     Retired Wound - Properties Group Placement Date: 09/07/22  -JOSEPH Present on Hospital Admission: Y  -JOSEPH Side: Right  -JOSEPH Orientation: lower  -JOSEPH Location: leg  -JOSEPH     Retired Wound - Properties Group Date first assessed: 09/07/22  -JOSEPH Present on Hospital Admission: Y  -JOSEPH Side: Right  -JOSEPH Location: leg  -JOSEPH     Row Name 09/09/22 1036          Plan of Care Review    Plan of Care Reviewed With patient  -LY     Progress improving  -LY     Outcome Evaluation PT tx completed. Pt S for bed mobility with HOB elevated. O2 sats at rest on RA post breathing tx 95%. Amb 250' with RWX and SBA, cues for pursed lip breathing. O2 sats maintained low 90s with moblity on RA. Unna boots intact, plan to cooper 9/10 per MD orders. Will cont to follow for improved strength and endurance.  -LY     Row Name 09/09/22 1036           Positioning and Restraints    Pre-Treatment Position in bed  -LY     Post Treatment Position bed  -LY     In Bed sitting EOB;call light within reach;encouraged to call for assist  -LY           User Key  (r) = Recorded By, (t) = Taken By, (c) = Cosigned By    Initials Name Provider Type    Alexis Clayton, PT DPT Physical Therapist    VT Nikki Irvin, RN Registered Nurse    Shanell Ledezma, PTA Physical Therapist Assistant                Physical Therapy Education                 Title: PT OT SLP Therapies (In Progress)     Topic: Physical Therapy (In Progress)     Point: Mobility training (Done)     Learning Progress Summary           Patient Acceptance, E,D, DU by DELFIN at 9/8/2022 1239    Comment: Safety with Rwx    Acceptance, E, VU,DU,NR by JOSEPH at 9/7/2022 1100    Comment: benefits of activity, progression of PT POC                   Point: Home exercise program (Not Started)     Learner Progress:  Not documented in this visit.          Point: Body mechanics (Not Started)     Learner Progress:  Not documented in this visit.          Point: Precautions (Done)     Learning Progress Summary           Patient Acceptance, E, VU,NR by JOSEPH at 9/7/2022 1310    Comment: unna boot purpose, goals, progression, change day                               User Key     Initials Effective Dates Name Provider Type Discipline    JOSEPH 08/02/16 -  Alexis Rios, PT DPT Physical Therapist PT    DELFIN 08/02/16 -  Verenice Wright PTA Physical Therapist Assistant PT              PT Recommendation and Plan     Plan of Care Reviewed With: patient  Progress: improving  Outcome Evaluation: PT tx completed. Pt S for bed mobility with HOB elevated. O2 sats at rest on RA post breathing tx 95%. Amb 250' with RWX and SBA, cues for pursed lip breathing. O2 sats maintained low 90s with moblity on RA. Unna boots intact, plan to cooper 9/10 per MD orders. Will cont to follow for improved strength and endurance.   Outcome Measures      Row Name 09/09/22 1036 09/08/22 1238          How much help from another person do you currently need...    Turning from your back to your side while in flat bed without using bedrails? 4  -LY 4  -DELFIN     Moving from lying on back to sitting on the side of a flat bed without bedrails? 4  -LY 3  -DELFIN     Moving to and from a bed to a chair (including a wheelchair)? 3  -LY 3  -DELFIN     Standing up from a chair using your arms (e.g., wheelchair, bedside chair)? 3  -LY 3  -DELFIN     Climbing 3-5 steps with a railing? 3  -LY 3  -DELFIN     To walk in hospital room? 3  -LY 3  -DELFIN     AM-PAC 6 Clicks Score (PT) 20  -LY 19  -DELFIN            Functional Assessment    Outcome Measure Options AM-PAC 6 Clicks Basic Mobility (PT)  -LY --           User Key  (r) = Recorded By, (t) = Taken By, (c) = Cosigned By    Initials Name Provider Type    Verenice Webb PTA Physical Therapist Assistant    Shanell Ledezma PTA Physical Therapist Assistant                 Time Calculation:    PT Charges     Row Name 09/09/22 1140             Time Calculation    Start Time 1036  -LY      Stop Time 1100  -LY      Time Calculation (min) 24 min  -LY      PT Received On 09/09/22  -LY              Time Calculation- PT    Total Timed Code Minutes- PT 24 minute(s)  -LY              Timed Charges    95653 - Gait Training Minutes  24  -LY              Total Minutes    Timed Charges Total Minutes 24  -LY       Total Minutes 24  -LY            User Key  (r) = Recorded By, (t) = Taken By, (c) = Cosigned By    Initials Name Provider Type    Shanell Ledezma PTA Physical Therapist Assistant              Therapy Charges for Today     Code Description Service Date Service Provider Modifiers Qty    35974496240 HC GAIT TRAINING EA 15 MIN 9/9/2022 Shanell Irwin PTA GP 2          PT G-Codes  Outcome Measure Options: AM-PAC 6 Clicks Basic Mobility (PT)  AM-PAC 6 Clicks Score (PT): 20  AM-PAC 6 Clicks Score (OT): 19    Shanell Irwin PTA  9/9/2022

## 2022-09-09 NOTE — PLAN OF CARE
Problem: Adult Inpatient Plan of Care  Goal: Plan of Care Review  Outcome: Ongoing, Progressing  Flowsheets (Taken 9/9/2022 6993)  Progress: no change  Outcome Evaluation: Patient on 2l.  He is being treated with Diuretics for the edema in his legs.  He is to get BMP BID.  His BP has been 90's/50's.  Pressure injury on coccyx, purple but not broken.  1500 Sodium Restriction  @L Fluid Restriction.  Soft Texture Diet.  Cont. to monitor.  AF .  Cont. to montior.  call for concerns.   Goal Outcome Evaluation:           Progress: no change  Outcome Evaluation: Patient on 2l.  He is being treated with Diuretics for the edema in his legs.  He is to get BMP BID.  His BP has been 90's/50's.  Pressure injury on coccyx, purple but not broken.  1500 Sodium Restriction  @L Fluid Restriction.  Soft Texture Diet.  Cont. to monitor.  AF .  Cont. to montior.  call for concerns.

## 2022-09-09 NOTE — THERAPY TREATMENT NOTE
Patient Name: Chito Govea  : 1955    MRN: [id number]                              Today's Date: 2022       Admit Date: 2022    Visit Dx:     ICD-10-CM ICD-9-CM   1. Acute on chronic congestive heart failure, unspecified heart failure type (HCC)  I50.9 428.0   2. Acute respiratory failure with hypoxia (HCC)  J96.01 518.81   3. Impaired mobility and ADLs  Z74.09 V49.89    Z78.9    4. Impaired mobility  Z74.09 799.89   5. Bilateral leg edema  R60.0 782.3     Patient Active Problem List   Diagnosis   • Paroxysmal atrial fibrillation (HCC)   • Acute kidney injury (HCC)   • Hypertension   • Type 2 diabetes mellitus with hyperglycemia, without long-term current use of insulin (HCC)   • Moderate malnutrition (HCC)   • Aortic stenosis, moderate   • Nausea and vomiting   • Type 2 MI (myocardial infarction) ([facility]) due to hypoxia and congestive heart failure   • Hyponatremia due to dehydration with associated weakness   • Bilateral pleural effusion   • COPD (chronic obstructive pulmonary disease) ([facility])   • Former smoker, quit 2022   • Acute kidney injury (HCC) superimposed on CKD Stage 2   • Anemia, chronic disease   • Suspected Interstitial lung disease ([facility]), likely due to amiodarone   • Acute systolic heart failure ([facility]), EF 36-40% on    • Acute respiratory failure with hypoxia (HCC)   • Acute urinary retention requiring alonzo catheter     Past Medical History:   Diagnosis Date   • Arthritis    • CHF (congestive heart failure) (HCC)    • Diabetes mellitus (HCC)    • HLD (hyperlipidemia)    • Hypertension    • Renal disorder      History reviewed. No pertinent surgical history.   General Information     Row Name 22 1342          OT Time and Intention    Document Type therapy note (daily note)  -MM     Mode of Treatment occupational therapy  -MM     Row Name 22 134          General Information    Patient Profile Reviewed yes  -MM     Existing Precautions/Restrictions fall  -MM     Barriers  to Rehab medically complex  -MM     Row Name 09/09/22 1342          Cognition    Orientation Status (Cognition) oriented x 4  -MM     Row Name 09/09/22 1342          Safety Issues, Functional Mobility    Impairments Affecting Function (Mobility) balance;endurance/activity tolerance;strength;range of motion (ROM);shortness of breath  -MM           User Key  (r) = Recorded By, (t) = Taken By, (c) = Cosigned By    Initials Name Provider Type    Marco A Alvarez, OTR/L Occupational Therapist                 Mobility/ADL's     Row Name 09/09/22 1342          Bed Mobility    Comment, (Bed Mobility) up in chair  -MM     Row Name 09/09/22 1342          Transfers    Comment, (Transfers) refused OOB activity  -MM           User Key  (r) = Recorded By, (t) = Taken By, (c) = Cosigned By    Initials Name Provider Type    Marco A Alvarez, OTR/L Occupational Therapist               Obj/Interventions     Row Name 09/09/22 1342          Shoulder (Therapeutic Exercise)    Shoulder (Therapeutic Exercise) AROM (active range of motion)  -MM     Shoulder AROM (Therapeutic Exercise) bilateral;flexion;extension;aBduction;aDduction;scapular protraction;scapular retraction;10 repetitions;20 repititions  -MM     Row Name 09/09/22 1342          Elbow/Forearm (Therapeutic Exercise)    Elbow/Forearm (Therapeutic Exercise) AROM (active range of motion)  -MM     Elbow/Forearm AROM (Therapeutic Exercise) bilateral;flexion;extension;20 repititions  with 3lb dowel bar  -MM     Row Name 09/09/22 1342          Wrist (Therapeutic Exercise)    Wrist (Therapeutic Exercise) AROM (active range of motion)  -MM     Wrist AROM (Therapeutic Exercise) bilateral;flexion;extension;20 repititions  -MM     Row Name 09/09/22 1342          Hand (Therapeutic Exercise)    Hand (Therapeutic Exercise) AROM (active range of motion)  -MM     Hand AROM/AAROM (Therapeutic Exercise) bilateral;AROM (active range of motion);finger flexion;finger extension;20 repititions  " -MM     Row Name 09/09/22 1342          Motor Skills    Therapeutic Exercise shoulder;elbow/forearm;wrist;hand;aerobic  -MM           User Key  (r) = Recorded By, (t) = Taken By, (c) = Cosigned By    Initials Name Provider Type    MM Marco A Doan, OTR/L Occupational Therapist               Goals/Plan    No documentation.                Clinical Impression     Row Name 09/09/22 1342          Pain Assessment    Pretreatment Pain Rating 0/10 - no pain  pain \"3-4\"/10 in LUE and R flank with activity  -MM     Posttreatment Pain Rating 0/10 - no pain  -MM     Pain Intervention(s) Medication (See MAR);Repositioned;Ambulation/increased activity  -MM     Row Name 09/09/22 1342          Plan of Care Review    Plan of Care Reviewed With patient  -MM     Progress no change  -MM     Outcome Evaluation OT tx completed. Pt reports no pain pre and post tx, but pain \"3-4\"/10 in LUE and R flank with activity. Pt is up in chair and refuses any activity out of chair. Pt completed BUE AROM exercise in all planes for improved activity tolerance. Pt completes 10-20 reps in all planes x1 set. Pt utilized 3lb dowel bar with elbow exercise but is unable to use weight for further activity d/t LUE weakness. Pt reports LUE is his \"weak arm\". Pt completed 10-20 reps of pulmonary exercises. Pt educated on HEP, endurance techniques, home safety and diaphramatic breathing. Handouts provided. Continue OT POC.  -MM     Row Name 09/09/22 1342          Therapy Plan Review/Discharge Plan (OT)    Anticipated Discharge Disposition (OT) home with assist;home with home health  -MM     Row Name 09/09/22 1342          Vital Signs    O2 Delivery Pre Treatment room air  -MM     Intra SpO2 (%) 93  -MM     O2 Delivery Intra Treatment room air  -MM     O2 Delivery Post Treatment room air  -MM     Pre Patient Position Sitting  -MM     Intra Patient Position Sitting  -MM     Post Patient Position Sitting  -MM     Row Name 09/09/22 1342          Positioning and " Restraints    Pre-Treatment Position sitting in chair/recliner  -MM     Post Treatment Position chair  -MM     In Chair sitting;call light within reach;encouraged to call for assist  -MM           User Key  (r) = Recorded By, (t) = Taken By, (c) = Cosigned By    Initials Name Provider Type    Marco A Alvarez, OTR/L Occupational Therapist               Outcome Measures     Row Name 09/09/22 1342          How much help from another is currently needed...    Putting on and taking off regular lower body clothing? 3  -MM     Bathing (including washing, rinsing, and drying) 3  -MM     Toileting (which includes using toilet bed pan or urinal) 3  -MM     Putting on and taking off regular upper body clothing 3  -MM     Taking care of personal grooming (such as brushing teeth) 3  -MM     Eating meals 4  -MM     AM-PAC 6 Clicks Score (OT) 19  -MM     Row Name 09/09/22 1036          How much help from another person do you currently need...    Turning from your back to your side while in flat bed without using bedrails? 4  -LY     Moving from lying on back to sitting on the side of a flat bed without bedrails? 4  -LY     Moving to and from a bed to a chair (including a wheelchair)? 3  -LY     Standing up from a chair using your arms (e.g., wheelchair, bedside chair)? 3  -LY     Climbing 3-5 steps with a railing? 3  -LY     To walk in hospital room? 3  -LY     AM-PAC 6 Clicks Score (PT) 20  -LY     Highest level of mobility 6 --> Walked 10 steps or more  -LY     Row Name 09/09/22 1342 09/09/22 1036       Functional Assessment    Outcome Measure Options AM-PAC 6 Clicks Daily Activity (OT)  -MM AM-PAC 6 Clicks Basic Mobility (PT)  -LY          User Key  (r) = Recorded By, (t) = Taken By, (c) = Cosigned By    Initials Name Provider Type    Marco A Alvarez, OTR/L Occupational Therapist    Shanell Ledezma PTA Physical Therapist Assistant                Occupational Therapy Education                 Title: PT OT SLP  Therapies (In Progress)     Topic: Occupational Therapy (Done)     Point: ADL training (Done)     Description:   Instruct learner(s) on proper safety adaptation and remediation techniques during self care or transfers.   Instruct in proper use of assistive devices.              Learning Progress Summary           Patient Acceptance, E,H, VU by MM at 9/9/2022 1502    Comment: Pt educated on HEP, pulmonary exercises, endurance techniques, home safety and diaphramatic breathing. Handouts provided    Acceptance, E, VU by STEPHAN at 9/8/2022 1027    Comment: energy conservation and home/environmental modifications to increase safety and independence with adls.    Acceptance, E, VU by STEPHAN at 9/7/2022 1156                   Point: Home exercise program (Done)     Description:   Instruct learner(s) on appropriate technique for monitoring, assisting and/or progressing therapeutic exercises/activities.              Learning Progress Summary           Patient Acceptance, E,H, VU by MM at 9/9/2022 1502    Comment: Pt educated on HEP, pulmonary exercises, endurance techniques, home safety and diaphramatic breathing. Handouts provided    Acceptance, E, VU by STEPHAN at 9/8/2022 1027    Comment: energy conservation and home/environmental modifications to increase safety and independence with adls.                   Point: Precautions (Done)     Description:   Instruct learner(s) on prescribed precautions during self-care and functional transfers.              Learning Progress Summary           Patient Acceptance, E,H, VU by MM at 9/9/2022 1502    Comment: Pt educated on HEP, pulmonary exercises, endurance techniques, home safety and diaphramatic breathing. Handouts provided    Acceptance, E, VU by STEPAHN at 9/8/2022 1027    Comment: energy conservation and home/environmental modifications to increase safety and independence with adls.    Acceptance, E, VU by STEPHAN at 9/7/2022 1156                   Point: Body mechanics (Done)     Description:  "  Instruct learner(s) on proper positioning and spine alignment during self-care, functional mobility activities and/or exercises.              Learning Progress Summary           Patient Acceptance, E,H, VU by  at 9/9/2022 1502    Comment: Pt educated on HEP, pulmonary exercises, endurance techniques, home safety and diaphramatic breathing. Handouts provided                               User Key     Initials Effective Dates Name Provider Type Discipline     06/16/21 -  Marco A Doan, OTR/L Occupational Therapist OT    STEPHAN 11/10/21 -  Radha Nevarez OTR/L, CSRS Occupational Therapist OT              OT Recommendation and Plan     Plan of Care Review  Plan of Care Reviewed With: patient  Progress: no change  Outcome Evaluation: OT tx completed. Pt reports no pain pre and post tx, but pain \"3-4\"/10 in LUE and R flank with activity. Pt is up in chair and refuses any activity out of chair. Pt completed BUE AROM exercise in all planes for improved activity tolerance. Pt completes 10-20 reps in all planes x1 set. Pt utilized 3lb dowel bar with elbow exercise but is unable to use weight for further activity d/t LUE weakness. Pt reports LUE is his \"weak arm\". Pt completed 10-20 reps of pulmonary exercises. Pt educated on HEP, endurance techniques, home safety and diaphramatic breathing. Handouts provided. Continue OT POC.     Time Calculation:    Time Calculation- OT     Row Name 09/09/22 1342 09/09/22 1140          Time Calculation- OT    OT Start Time 1342  -MM --     OT Stop Time 1415  -MM --     OT Time Calculation (min) 33 min  -MM --     Total Timed Code Minutes- OT 33 minute(s)  -MM --     OT Received On 09/09/22  -MM --            Timed Charges    38486 - Gait Training Minutes  -- 24  -LY     04282 - OT Therapeutic Activity Minutes 33  -MM --            Total Minutes    Timed Charges Total Minutes 33  -MM 24  -LY      Total Minutes 33  -MM 24  -LY           User Key  (r) = Recorded By, (t) = Taken By, " (c) = Cosigned By    Initials Name Provider Type    MM Marco A Doan, OTR/L Occupational Therapist    Shanell Ledezma, PTA Physical Therapist Assistant              Therapy Charges for Today     Code Description Service Date Service Provider Modifiers Qty    92459455549 HC OT THERAPEUTIC ACT EA 15 MIN 9/9/2022 Marco A Doan, OTR/L GO 2               Marco A Doan OTR/L  9/9/2022

## 2022-09-09 NOTE — PROGRESS NOTES
[facility] -  Progress Note     LOS: 8 days   Patient Care Team:  Chito Rubio MD as PCP - General (Family Medicine)    Chief Complaint:     Subjective     Interval History: Admitted Sept 1st with acute congestive heart failure with acute hypoxic respiratory failure. PAF recently diagnosed during hospitalization in August at OSH and started on Amiodarone, which may have precipitated pneumonitis. Echo performed here showed new reduction in EF with new wall motion abnormalities with LVEF 36-40%. Renal function began to fluctuate and diuresis was scaled back. Medication for heart failure was optimized.     Patient Complaints: Patient states he feels his breathing is doing much better. Legs are still wrapped, but states that he is hopeful they will be reevaluated tomorrow. He is anxious for discharge. Telemetry appears to be in afib in the 90s. Anticoagulated.         Review of Systems:     Review of Systems   Constitutional: Negative.    HENT: Negative.    Eyes: Negative.    Respiratory: Negative.    Cardiovascular: Negative.    Gastrointestinal: Negative.    Endocrine: Negative.    Genitourinary: Negative.    Musculoskeletal: Negative.    Skin: Negative.    Allergic/Immunologic: Negative.    Neurological: Negative.    Hematological: Negative.    Psychiatric/Behavioral: Negative.      Objective     Vital Sign Min/Max for last 24 hours  Temp  Min: 97.5 °F (36.4 °C)  Max: 98.4 °F (36.9 °C)   BP  Min: 90/58  Max: 111/61   Pulse  Min: 54  Max: 103   Resp  Min: 16  Max: 18   SpO2  Min: 91 %  Max: 98 %   Flow (L/min)  Min: 2  Max: 2   Weight  Min: 67.1 kg (148 lb)  Max: 67.1 kg (148 lb)         09/08/22 1959   Weight: 67.1 kg (148 lb)       Physical Exam:    Vitals reviewed.   Constitutional:       Appearance: Healthy appearance. Not in distress.   Eyes:      Extraocular Movements: Extraocular movements intact.      Conjunctiva/sclera: Conjunctivae normal.      Pupils: Pupils are equal, round,  and reactive to light.   HENT:      Head: Normocephalic and atraumatic.      Nose: Nose normal.    Mouth/Throat:      Lips: Pink.      Mouth: Mucous membranes are moist.      Pharynx: Oropharynx is clear.   Neck:      Vascular: No carotid bruit or JVD. JVD normal.   Pulmonary:      Effort: Pulmonary effort is normal.      Breath sounds: Normal breath sounds.   Chest:      Chest wall: Not tender to palpatation.   Cardiovascular:      PMI at left midclavicular line. Normal rate. Irregular rhythm. Normal S1. Normal S2.      Murmurs: There is a grade 2/6 crescendo-decrescendo systolic murmur. Radiation to carotids      No gallop. No rub.   Pulses:     Radial: 2+ bilaterally.     Posterior tibial: 2+ bilaterally.  Abdominal:      General: Bowel sounds are normal.      Palpations: Abdomen is soft.   Musculoskeletal: Normal range of motion.      Extremities: No clubbing present.     Cervical back: Normal range of motion. Skin:     General: Skin is warm and dry.   Neurological:      General: No focal deficit present.      Mental Status: Alert and oriented to person, place, and time.      Cranial Nerves: Cranial nerves are intact.   Psychiatric:         Attention and Perception: Attention normal.         Mood and Affect: Affect normal.         Speech: Speech normal.         Behavior: Behavior normal.         Cognition and Memory: Cognition normal.       Results Review:   Lab Results (last 72 hours)     Procedure Component Value Units Date/Time    POC Glucose Once [[id number]]  (Abnormal) Collected: 09/09/22 1032    Specimen: Blood Updated: 09/09/22 1055     Glucose 283 mg/dL      Comment: : [id number] Duenas LadonnaMeter ID: [id number]       POC Glucose Once [[id number]]  (Abnormal) Collected: 09/09/22 0743    Specimen: Blood Updated: 09/09/22 0800     Glucose 326 mg/dL      Comment: : [id number] Duenas LadonnaMeter ID: [id number]       Basic Metabolic Panel [[id number]]  (Abnormal) Collected: 09/09/22 0532     Specimen: Blood Updated: 09/09/22 0643     Glucose 378 mg/dL      BUN 79 mg/dL      Creatinine 1.45 mg/dL      Sodium 136 mmol/L      Potassium 4.3 mmol/L      Chloride 102 mmol/L      CO2 28.0 mmol/L      Calcium 8.1 mg/dL      BUN/Creatinine Ratio 54.5     Anion Gap 6.0 mmol/L      eGFR 52.8 mL/min/1.73      Comment: National Kidney Foundation and American Society of Nephrology (ASN) Task Force recommended calculation based on the Chronic Kidney Disease Epidemiology Collaboration (CKD-EPI) equation refit without adjustment for race.       Narrative:      GFR Normal >60  Chronic Kidney Disease <60  Kidney Failure <15      POC Glucose Once [[id number]]  (Abnormal) Collected: 09/08/22 2006    Specimen: Blood Updated: 09/08/22 2016     Glucose 250 mg/dL      Comment: : [id number] Charley MccormickraMemily ID: [id number]       Basic Metabolic Panel [[id number]]  (Abnormal) Collected: 09/08/22 1812    Specimen: Blood Updated: 09/08/22 1846     Glucose 200 mg/dL      BUN 70 mg/dL      Creatinine 1.17 mg/dL      Sodium 139 mmol/L      Potassium 3.9 mmol/L      Chloride 103 mmol/L      CO2 28.0 mmol/L      Calcium 8.5 mg/dL      BUN/Creatinine Ratio 59.8     Anion Gap 8.0 mmol/L      eGFR 68.3 mL/min/1.73      Comment: National Kidney Foundation and American Society of Nephrology (ASN) Task Force recommended calculation based on the Chronic Kidney Disease Epidemiology Collaboration (CKD-EPI) equation refit without adjustment for race.       Narrative:      GFR Normal >60  Chronic Kidney Disease <60  Kidney Failure <15      POC Glucose Once [[id number]]  (Abnormal) Collected: 09/08/22 1623    Specimen: Blood Updated: 09/08/22 1641     Glucose 193 mg/dL      Comment: : [id number] Pilar JoeMeter ID: [id number]       POC Glucose Once [[id number]]  (Abnormal) Collected: 09/08/22 1122    Specimen: Blood Updated: 09/08/22 1132     Glucose 297 mg/dL      Comment: : [id number] Pennie RamirezMeter ID: [id number]       POC  Glucose Once [[id number]]  (Abnormal) Collected: 09/08/22 0753    Specimen: Blood Updated: 09/08/22 0803     Glucose 221 mg/dL      Comment: : [id number] Raissa Giron ID: [id number]       Comprehensive Metabolic Panel [[id number]]  (Abnormal) Collected: 09/08/22 0326    Specimen: Blood Updated: 09/08/22 0428     Glucose 240 mg/dL      BUN 70 mg/dL      Creatinine 1.16 mg/dL      Sodium 140 mmol/L      Potassium 4.3 mmol/L      Chloride 102 mmol/L      CO2 28.0 mmol/L      Calcium 8.4 mg/dL      Total Protein 5.0 g/dL      Albumin 2.90 g/dL      ALT (SGPT) 18 U/L      AST (SGOT) 16 U/L      Alkaline Phosphatase 113 U/L      Total Bilirubin 0.2 mg/dL      Globulin 2.1 gm/dL      A/G Ratio 1.4 g/dL      BUN/Creatinine Ratio 60.3     Anion Gap 10.0 mmol/L      eGFR 69.0 mL/min/1.73      Comment: National Kidney Foundation and American Society of Nephrology (ASN) Task Force recommended calculation based on the Chronic Kidney Disease Epidemiology Collaboration (CKD-EPI) equation refit without adjustment for race.       Narrative:      GFR Normal >60  Chronic Kidney Disease <60  Kidney Failure <15      CBC (No Diff) [[id number]]  (Abnormal) Collected: 09/08/22 0326    Specimen: Blood Updated: 09/08/22 0402     WBC 11.45 10*3/mm3      RBC 3.57 10*6/mm3      Hemoglobin 10.4 g/dL      Hematocrit 33.1 %      MCV 92.7 fL      MCH 29.1 pg      MCHC 31.4 g/dL      RDW 14.9 %      RDW-SD 50.1 fl      MPV 10.9 fL      Platelets 334 10*3/mm3     POC Glucose Once [[id number]]  (Abnormal) Collected: 09/07/22 2034    Specimen: Blood Updated: 09/07/22 2046     Glucose 214 mg/dL      Comment: : [id number] Lilia Adam ID: [id number]       Basic Metabolic Panel [[id number]]  (Abnormal) Collected: 09/07/22 1848    Specimen: Blood Updated: 09/07/22 1938     Glucose 210 mg/dL      BUN 71 mg/dL      Creatinine 1.18 mg/dL      Sodium 139 mmol/L      Potassium 3.9 mmol/L      Chloride 100 mmol/L      CO2 31.0 mmol/L       Calcium 8.6 mg/dL      BUN/Creatinine Ratio 60.2     Anion Gap 8.0 mmol/L      eGFR 67.6 mL/min/1.73      Comment: National Kidney Foundation and American Society of Nephrology (ASN) Task Force recommended calculation based on the Chronic Kidney Disease Epidemiology Collaboration (CKD-EPI) equation refit without adjustment for race.       Narrative:      GFR Normal >60  Chronic Kidney Disease <60  Kidney Failure <15      POC Glucose Once [[id number]]  (Abnormal) Collected: 09/07/22 1652    Specimen: Blood Updated: 09/07/22 1703     Glucose 228 mg/dL      Comment: : [id number] Raissa MythosnathonMeter ID: [id number]       POC Glucose Once [[id number]]  (Abnormal) Collected: 09/07/22 1207    Specimen: Blood Updated: 09/07/22 1318     Glucose 266 mg/dL      Comment: : [id number] Raissa MythosnathonMeter ID: [id number]       POC Glucose Once [[id number]]  (Abnormal) Collected: 09/07/22 0729    Specimen: Blood Updated: 09/07/22 0757     Glucose 211 mg/dL      Comment: : [id number] Saravananpiter Nath  FILEMONeter ID: [id number]       Basic Metabolic Panel [[id number]]  (Abnormal) Collected: 09/07/22 0348    Specimen: Blood Updated: 09/07/22 0425     Glucose 209 mg/dL      BUN 72 mg/dL      Creatinine 1.33 mg/dL      Sodium 139 mmol/L      Potassium 3.9 mmol/L      Chloride 102 mmol/L      CO2 31.0 mmol/L      Calcium 8.2 mg/dL      BUN/Creatinine Ratio 54.1     Anion Gap 6.0 mmol/L      eGFR 58.6 mL/min/1.73      Comment: National Kidney Foundation and American Society of Nephrology (ASN) Task Force recommended calculation based on the Chronic Kidney Disease Epidemiology Collaboration (CKD-EPI) equation refit without adjustment for race.       Narrative:      GFR Normal >60  Chronic Kidney Disease <60  Kidney Failure <15      POC Glucose Once [[id number]]  (Abnormal) Collected: 09/06/22 2059    Specimen: Blood Updated: 09/06/22 2109     Glucose 165 mg/dL      Comment: : [id number] Radha Miller ID: [id number]       POC  Glucose Once [[id number]]  (Abnormal) Collected: 09/06/22 1952    Specimen: Blood Updated: 09/06/22 2003     Glucose 155 mg/dL      Comment: : [id number] Iraida Pereyraeter ID: [id number]       Basic Metabolic Panel [[id number]]  (Abnormal) Collected: 09/06/22 1803    Specimen: Blood Updated: 09/06/22 1845     Glucose 191 mg/dL      BUN 68 mg/dL      Creatinine 1.41 mg/dL      Sodium 142 mmol/L      Potassium 4.3 mmol/L      Chloride 99 mmol/L      CO2 31.0 mmol/L      Calcium 9.0 mg/dL      BUN/Creatinine Ratio 48.2     Anion Gap 12.0 mmol/L      eGFR 54.6 mL/min/1.73      Comment: National Kidney Foundation and American Society of Nephrology (ASN) Task Force recommended calculation based on the Chronic Kidney Disease Epidemiology Collaboration (CKD-EPI) equation refit without adjustment for race.       Narrative:      GFR Normal >60  Chronic Kidney Disease <60  Kidney Failure <15      POC Glucose Once [[id number]]  (Abnormal) Collected: 09/06/22 1606    Specimen: Blood Updated: 09/06/22 1630     Glucose 304 mg/dL      Comment: : [id number] Boogie Tejadaeter ID: [id number]       POC Glucose Once [[id number]]  (Abnormal) Collected: 09/06/22 1200    Specimen: Blood Updated: 09/06/22 1627     Glucose 234 mg/dL      Comment: : [id number] Raissa Fishereter ID: [id number]                 Echo EF Estimated  Lab Results   Component Value Date    ECHOEFEST 50 08/11/2022         Medication Review: yes  Current Facility-Administered Medications   Medication Dose Route Frequency Provider Last Rate Last Admin   • atorvastatin (LIPITOR) tablet 20 mg  20 mg Oral Daily Ellis Rey MD   20 mg at 09/09/22 0849   • bisacodyl (DULCOLAX) suppository 10 mg  10 mg Rectal Daily Ellis Rey MD   10 mg at 09/03/22 1741   • budesonide-formoterol (SYMBICORT) 160-4.5 MCG/ACT inhaler 2 puff  2 puff Inhalation BID - RT Ellis Rey MD   2 puff at 09/09/22 0731   • calcium carbonate (TUMS) chewable tablet 500 mg  (200 mg elemental)  2 tablet Oral Q2H PRN Ellis Rey MD   2 tablet at 09/08/22 2243   • dextrose (D50W) (25 g/50 mL) IV injection 25 g  25 g Intravenous Q15 Min PRN Ellis Rey MD       • dextrose (GLUTOSE) oral gel 15 g  15 g Oral Q15 Min PRN Ellis Rey MD       • doxycycline (ADOXA) tablet 100 mg  100 mg Oral Q12H Ellis Rey MD   100 mg at 09/09/22 0849   • glucagon (human recombinant) (GLUCAGEN DIAGNOSTIC) injection 1 mg  1 mg Intramuscular Q15 Min PRN Ellis Rey MD       • insulin detemir (LEVEMIR) injection 35 Units  35 Units Subcutaneous Nightly Ellis Rey MD       • Insulin Lispro (humaLOG) injection 0-24 Units  0-24 Units Subcutaneous TID AC Ellis Rey MD   12 Units at 09/09/22 1253   • ipratropium (ATROVENT) nebulizer solution 0.5 mg  0.5 mg Nebulization 4x Daily - RT Ellis Rey MD   0.5 mg at 09/09/22 1456   • losartan (COZAAR) tablet 25 mg  25 mg Oral Q24H Ellis Rey MD   25 mg at 09/09/22 0849   • melatonin tablet 6 mg  6 mg Oral Nightly PRN Ellis Rey MD   6 mg at 09/08/22 2243   • metoprolol tartrate (LOPRESSOR) tablet 50 mg  50 mg Oral Q12H Ellis Rey MD       • pantoprazole (PROTONIX) EC tablet 40 mg  40 mg Oral BID AC Ellis Rey MD   40 mg at 09/09/22 0634   • predniSONE (DELTASONE) tablet 20 mg  20 mg Oral BID With Meals Danny Solares MD   20 mg at 09/09/22 0849   • rivaroxaban (XARELTO) tablet 20 mg  20 mg Oral Daily With Dinner Elils Rey MD   20 mg at 09/08/22 1758   • sennosides-docusate (PERICOLACE) 8.6-50 MG per tablet 2 tablet  2 tablet Oral BID PRN Ellis Rey MD       • sodium chloride 0.9 % flush 10 mL  10 mL Intravenous PRN Ellis Rey MD   10 mL at 09/06/22 2056   • tamsulosin (FLOMAX) 24 hr capsule 0.4 mg  0.4 mg Oral Daily Ellis Rey MD   0.4 mg at 09/09/22 0825         Assessment & Plan   1. .  Acute systolic heart failure: TTE with newly  reduced EF this admission (36 to 40%) with some wall motion abnormalities.  Differential includes stress-induced cardiomyopathy, CAD, among other etiologies.    - Strict I's/O's and daily weights  - 2 L fluid restriction and at least 2 g Na restricted diet    HFrEF GDMT  - BB: Metoprolol 50mg BID beginning tonight   - RAAS inhibitor: Continue losartan 25 mg daily, consider transition to Entresto at follow up  - SGLT2 inhibitor: Consider starting as outpatient with follow up with Dr. Uriostegui.   - MRA: spironolactone 25 mg  Defer potential ischemic evaluation to outpatient discussion    2. PAF: In and out of atrial fibrillation with improved heart rate control on metoprolol, but may benefit from increase metoprolol dose. Will increase to 50mg BID tonight. Telemetry looks to be afib in 90s now.   - Continue Xarelto for CVA prophylaxis  -Acute pneumonitis related to Amiodarone. He had just been initiated on it at OSH and although rare, that short duration could have precipitated pneumonitis, given the patient's history as described above (developed shortness of breath immediately after starting amiodarone, resolved when he did not take it, then started back again and when he resumed the medication).      -Would favor rate control strategy until respiratory status controlled. Consider cardioversion outpatient if he remains in it.     3. Valvular HD with moderate AS: asymptomatic, no further work up or testing at this time.           Further recs to follow with Dr. Devine.   Follow up as scheduled with Dr. Devine.       Acute systolic heart failure (HCC), EF 36-40% on 9/2    Paroxysmal atrial fibrillation (HCC)    Hypertension    Type 2 diabetes mellitus with hyperglycemia, without long-term current use of insulin (HCC)    Moderate malnutrition (HCC)    Aortic stenosis, moderate    Type 2 MI (myocardial infarction) (HCC) due to hypoxia and congestive heart failure    Bilateral pleural effusion    COPD (chronic  obstructive pulmonary disease) (HCC)    Former smoker, quit 7/2022    Acute kidney injury (HCC) superimposed on CKD Stage 2    Anemia, chronic disease    Suspected Interstitial lung disease (HCC), likely due to amiodarone    Acute respiratory failure with hypoxia (HCC)    Acute urinary retention requiring alonzo catheter          Electronically signed by YOLIE Buitrago, 09/09/22, 3:01 PM CDT.

## 2022-09-09 NOTE — PLAN OF CARE
Goal Outcome Evaluation:  Plan of Care Reviewed With: patient        Progress: improving  Outcome Evaluation: PT tx completed. Pt S for bed mobility with HOB elevated. O2 sats at rest on RA post breathing tx 95%. Amb 250' with RWX and SBA, cues for pursed lip breathing. O2 sats maintained low 90s with moblity on RA. Unna boots intact, plan to cooper 9/10 per MD orders. Will cont to follow for improved strength and endurance.

## 2022-09-09 NOTE — PLAN OF CARE
Goal Outcome Evaluation:  Plan of Care Reviewed With: patient        Progress: improving  Outcome Evaluation: VSS. No c/o pain. Sats WNL on room air. IV abx completed this afternoon. NSR/Aflutter on tele. Rates controlled. Pt to ambulated in [place] three times a day. Possible d/c in AM. Cont to monitor overnight.

## 2022-09-09 NOTE — CASE MANAGEMENT/SOCIAL WORK
Continued Stay Note   [place]     Patient Name: Chito Govea  MRN: [id number]  Today's Date: 9/9/2022    Admit Date: 9/1/2022     Discharge Plan     Row Name 09/09/22 1343       Plan    Plan Home    Plan Comments Plan discharge home tomorrow with no needs. Patient was able to walk 250 ft today with PTA. Referral has been made to [facility] for health dept follow up.                       Expected Discharge Date and Time     Expected Discharge Date Expected Discharge Time    Sep 9, 2022             SIRI Chowdhury

## 2022-09-09 NOTE — PLAN OF CARE
"  Problem: Adult Inpatient Plan of Care  Goal: Plan of Care Review  Recent Flowsheet Documentation  Taken 9/9/2022 1342 by Marco A Doan, OTR/L  Progress: no change  Plan of Care Reviewed With: patient  Outcome Evaluation: OT tx completed. Pt reports no pain pre and post tx, but pain \"3-4\"/10 in LUE and R flank with activity. Pt is up in chair and refuses any activity out of chair. Pt completed BUE AROM exercise in all planes for improved activity tolerance. Pt completes 10-20 reps in all planes x1 set. Pt utilized 3lb dowel bar with elbow exercise but is unable to use weight for further activity d/t LUE weakness. Pt reports LUE is his \"weak arm\". Pt completed 10-20 reps of pulmonary exercises. Pt educated on HEP, endurance techniques, home safety and diaphramatic breathing. Handouts provided. Continue OT POC.     "

## 2022-09-10 ENCOUNTER — READMISSION MANAGEMENT (OUTPATIENT)
Dept: CALL CENTER | Facility: HOSPITAL | Age: 67
End: 2022-09-10

## 2022-09-10 VITALS
TEMPERATURE: 98 F | HEIGHT: 70 IN | RESPIRATION RATE: 16 BRPM | HEART RATE: 98 BPM | OXYGEN SATURATION: 98 % | WEIGHT: 147.8 LBS | BODY MASS INDEX: 21.16 KG/M2 | DIASTOLIC BLOOD PRESSURE: 55 MMHG | SYSTOLIC BLOOD PRESSURE: 106 MMHG

## 2022-09-10 LAB
ANION GAP SERPL CALCULATED.3IONS-SCNC: 8 MMOL/L (ref 5–15)
BUN SERPL-MCNC: 79 MG/DL (ref 8–23)
BUN/CREAT SERPL: 62.2 (ref 7–25)
CALCIUM SPEC-SCNC: 8.3 MG/DL (ref 8.6–10.5)
CHLORIDE SERPL-SCNC: 105 MMOL/L (ref 98–107)
CO2 SERPL-SCNC: 26 MMOL/L (ref 22–29)
CREAT SERPL-MCNC: 1.27 MG/DL (ref 0.76–1.27)
EGFRCR SERPLBLD CKD-EPI 2021: 61.9 ML/MIN/1.73
GLUCOSE BLDC GLUCOMTR-MCNC: 51 MG/DL (ref 70–130)
GLUCOSE SERPL-MCNC: 67 MG/DL (ref 65–99)
POTASSIUM SERPL-SCNC: 3.7 MMOL/L (ref 3.5–5.2)
SODIUM SERPL-SCNC: 139 MMOL/L (ref 136–145)

## 2022-09-10 PROCEDURE — 82962 GLUCOSE BLOOD TEST: CPT

## 2022-09-10 PROCEDURE — 94664 DEMO&/EVAL PT USE INHALER: CPT

## 2022-09-10 PROCEDURE — 94799 UNLISTED PULMONARY SVC/PX: CPT

## 2022-09-10 PROCEDURE — 80048 BASIC METABOLIC PNL TOTAL CA: CPT | Performed by: INTERNAL MEDICINE

## 2022-09-10 PROCEDURE — 63710000001 PREDNISONE PER 1 MG: Performed by: INTERNAL MEDICINE

## 2022-09-10 RX ORDER — BUDESONIDE AND FORMOTEROL FUMARATE DIHYDRATE 160; 4.5 UG/1; UG/1
2 AEROSOL RESPIRATORY (INHALATION)
Qty: 1 EACH | Refills: 0 | Status: SHIPPED | OUTPATIENT
Start: 2022-09-10 | End: 2022-11-09

## 2022-09-10 RX ORDER — PREDNISONE 10 MG/1
TABLET ORAL
Qty: 18 TABLET | Refills: 0 | Status: SHIPPED | OUTPATIENT
Start: 2022-09-10 | End: 2022-09-16

## 2022-09-10 RX ORDER — FUROSEMIDE 20 MG/1
20 TABLET ORAL DAILY
Status: DISCONTINUED | OUTPATIENT
Start: 2022-09-10 | End: 2022-09-10 | Stop reason: HOSPADM

## 2022-09-10 RX ORDER — FUROSEMIDE 20 MG/1
20 TABLET ORAL DAILY
Qty: 30 TABLET | Refills: 0 | Status: SHIPPED | OUTPATIENT
Start: 2022-09-11 | End: 2022-10-04 | Stop reason: HOSPADM

## 2022-09-10 RX ORDER — TAMSULOSIN HYDROCHLORIDE 0.4 MG/1
0.4 CAPSULE ORAL DAILY
Qty: 30 CAPSULE | Refills: 0 | Status: SHIPPED | OUTPATIENT
Start: 2022-09-11

## 2022-09-10 RX ORDER — METOPROLOL SUCCINATE 100 MG/1
100 TABLET, EXTENDED RELEASE ORAL DAILY
Qty: 30 TABLET | Refills: 0 | Status: SHIPPED | OUTPATIENT
Start: 2022-09-10 | End: 2022-11-09 | Stop reason: SDUPTHER

## 2022-09-10 RX ORDER — LOSARTAN POTASSIUM 25 MG/1
25 TABLET ORAL
Qty: 30 TABLET | Refills: 0 | Status: SHIPPED | OUTPATIENT
Start: 2022-09-11 | End: 2022-10-04 | Stop reason: HOSPADM

## 2022-09-10 RX ADMIN — BUDESONIDE AND FORMOTEROL FUMARATE DIHYDRATE 2 PUFF: 160; 4.5 AEROSOL RESPIRATORY (INHALATION) at 07:26

## 2022-09-10 RX ADMIN — FUROSEMIDE 20 MG: 20 TABLET ORAL at 08:53

## 2022-09-10 RX ADMIN — TAMSULOSIN HYDROCHLORIDE 0.4 MG: 0.4 CAPSULE ORAL at 08:50

## 2022-09-10 RX ADMIN — IPRATROPIUM BROMIDE 0.5 MG: 0.5 SOLUTION RESPIRATORY (INHALATION) at 10:36

## 2022-09-10 RX ADMIN — ATORVASTATIN CALCIUM 20 MG: 10 TABLET, FILM COATED ORAL at 08:51

## 2022-09-10 RX ADMIN — PREDNISONE 20 MG: 20 TABLET ORAL at 08:49

## 2022-09-10 RX ADMIN — LOSARTAN POTASSIUM 25 MG: 50 TABLET, FILM COATED ORAL at 08:50

## 2022-09-10 RX ADMIN — METOPROLOL TARTRATE 50 MG: 50 TABLET, FILM COATED ORAL at 08:51

## 2022-09-10 RX ADMIN — IPRATROPIUM BROMIDE 0.5 MG: 0.5 SOLUTION RESPIRATORY (INHALATION) at 07:26

## 2022-09-10 RX ADMIN — PANTOPRAZOLE SODIUM 40 MG: 40 TABLET, DELAYED RELEASE ORAL at 06:08

## 2022-09-10 NOTE — PLAN OF CARE
Goal Outcome Evaluation:  Plan of Care Reviewed With: patient        Progress: improving  Outcome Evaluation: Patient c/o heart burn again tonight.  Tums given with relief.  Melatonin given for sleep.  Levemir and Humalog given at hs.  Patient has on EFRA boots.  He is supposed to have those removed today or tomorrow then possible home if legs look good.  He has three wounds under the efra boots to inspect.    He is on at 1500 Sodium Restriction.  2000ml Fluid Restriction.  Accurate I/O.  Cont. to monitor.  Call for concerns.

## 2022-09-10 NOTE — PROGRESS NOTES
[facility] Medicine Services  INPATIENT PROGRESS NOTE    Patient Name: Chito Govea  Date of Admission: 9/1/2022  Today's Date: 09/09/22  Length of Stay: 8  Primary Care Physician: Chito Rubio MD    Subjective   Chief Complaint: shortness of breath    HPI:    Patient was seen and examined at bedside.      Patient on room air.  Patient appears evolemiccurrently.  Bicarbonate stable.  Patient has no issues or complaints.  Increase acitivity.  Ambulate TID.        Intake/Output Summary (Last 24 hours) at 9/9/2022 2037  Last data filed at 9/9/2022 1449  Gross per 24 hour   Intake 960 ml   Output 1050 ml   Net -90 ml       Review of Systems   Constitutional: Negative for activity change, appetite change, chills, fatigue and fever.   Respiratory: Negative for cough and shortness of breath.    Cardiovascular: Positive for leg swelling. Negative for palpitations.   Gastrointestinal: Negative for abdominal distention, abdominal pain, diarrhea, nausea and vomiting.   Genitourinary: Negative for difficulty urinating.   Neurological: Positive for weakness.        All pertinent negatives and positives are as above. All other systems have been reviewed and are negative unless otherwise stated.     Objective    Temp:  [97.5 °F (36.4 °C)-98.4 °F (36.9 °C)] 97.9 °F (36.6 °C)  Heart Rate:  [54-96] 67  Resp:  [16-18] 16  BP: ()/(52-61) 114/52  Physical Exam  Vitals and nursing note reviewed.   Constitutional:       Appearance: Normal appearance. He is ill-appearing.   HENT:      Head: Normocephalic and atraumatic.      Mouth/Throat:      Mouth: Mucous membranes are dry.      Pharynx: Oropharynx is clear.   Eyes:      General: No scleral icterus.     Conjunctiva/sclera: Conjunctivae normal.   Cardiovascular:      Rate and Rhythm: Tachycardia present. Rhythm irregular.   Pulmonary:      Effort: No respiratory distress.      Breath sounds: Rales (diffuse bilateral; greatly improved)  present.      Comments: Diminished at bases   Abdominal:      General: Abdomen is flat. Bowel sounds are normal. There is no distension.      Palpations: Abdomen is soft. There is no mass.      Tenderness: There is no abdominal tenderness.   Musculoskeletal:      Right lower leg: Edema (2+, unna boots in place) present.      Left lower leg: Edema (2+, unna boots in place) present.   Skin:     General: Skin is warm and dry.      Findings: No rash.   Neurological:      General: No focal deficit present.      Mental Status: He is alert and oriented to person, place, and time.   Psychiatric:         Mood and Affect: Mood normal.         Behavior: Behavior normal.             Results Review:  I have reviewed the labs, radiology results, and diagnostic studies.    Laboratory Data:   Results from last 7 days   Lab Units 09/08/22  0326 09/06/22  0354 09/05/22  0513   WBC 10*3/mm3 11.45* 15.77* 13.41*   HEMOGLOBIN g/dL 10.4* 10.3* 10.3*   HEMATOCRIT % 33.1* 33.9* 33.2*   PLATELETS 10*3/mm3 334 401 397        Results from last 7 days   Lab Units 09/09/22  1809 09/09/22  0532 09/08/22  1812 09/08/22  0326 09/06/22  1803 09/06/22  0354 09/05/22  1027 09/05/22  0513   SODIUM mmol/L 138 136 139 140   < > 138   < > 138   POTASSIUM mmol/L 3.8 4.3 3.9 4.3   < > 3.9   < > 4.7   CHLORIDE mmol/L 102 102 103 102   < > 98   < > 95*   CO2 mmol/L 27.0 28.0 28.0 28.0   < > 30.0*   < > 32.0*   BUN mg/dL 76* 79* 70* 70*   < > 68*   < > 54*   CREATININE mg/dL 1.23 1.45* 1.17 1.16   < > 1.58*   < > 1.48*   CALCIUM mg/dL 8.5* 8.1* 8.5* 8.4*   < > 8.7   < > 8.5*   BILIRUBIN mg/dL  --   --   --  0.2  --  0.2  --  0.3   ALK PHOS U/L  --   --   --  113  --  111  --  98   ALT (SGPT) U/L  --   --   --  18  --  19  --  14   AST (SGOT) U/L  --   --   --  16  --  26  --  20   GLUCOSE mg/dL 147* 378* 200* 240*   < > 194*   < > 324*    < > = values in this interval not displayed.       Culture Data:   Blood Culture   Date Value Ref Range Status    09/01/2022 No growth at 4 days  Preliminary   09/01/2022 No growth at 4 days  Preliminary       Radiology Data:   Imaging Results (Last 24 Hours)     ** No results found for the last 24 hours. **          I have reviewed the patient's current medications.     Assessment/Plan     Active Hospital Problems    Diagnosis    • **Acute systolic heart failure (HCC), EF 36-40% on 9/2    • Bilateral pleural effusion    • COPD (chronic obstructive pulmonary disease) (HCC)    • Former smoker, quit 7/2022    • Acute kidney injury (HCC) superimposed on CKD Stage 2    • Anemia, chronic disease    • Suspected Interstitial lung disease (HCC), likely due to amiodarone    • Acute respiratory failure with hypoxia (HCC)    • Acute urinary retention requiring alonzo catheter    • Type 2 MI (myocardial infarction) (HCC) due to hypoxia and congestive heart failure    • Aortic stenosis, moderate    • Moderate malnutrition (HCC)    • Paroxysmal atrial fibrillation (HCC)    • Hypertension    • Type 2 diabetes mellitus with hyperglycemia, without long-term current use of insulin (HCC)        Plan:  Patient was admitted on 9/1 by Dr. Pena for shortness of breath.  Patient follows with Dr. Rubio for PCP and Dr. Devine for CHF.  Patient was recently admitted on 8/9-8/15 for CONSUELO, atrial fibrillation.  Patient was found during that admission to have a Cr of 6.96 and BUN >200 with hyperkalemia.  At that time, the patient was felt to have significant dehydration during that admission and was treated with IVF and patient had difficulty controlling HR and was started on amiodarone.    Upon admission, patient was noted to have a BNP of 33,372 and was noted to have tachypnea and hypoxia per Dr. Pena's note with O2 sat 78% upon presentation to ER.  Patient was admitted for suspected Acute on Chronic diastolic heart failure likely related to aortic stenosis and atrial fibrillation with RVR.  The patient's echo in 8/11/2022 showed normal EF and moderate  AS.  Patient has been diuresed with IV diuretics since admission.  Patient is net negative 9.1 L for the hospitalization. Cardiology has been consulted, appreciate assistance.  Repeat echo 9/4/2022,Left ventricular ejection fraction appears to be 36 - 40%. Left ventricular systolic function is moderately decreased.  The following left ventricular wall segments are hypokinetic: mid anterior, apical anterior, apical lateral, apical inferior, mid inferior, apical septal, basal inferoseptal, mid inferoseptal, apex hypokinetic, mid anteroseptal, basal inferior and basal inferoseptal. Moderate aortic valve stenosis is present.  Left atrial volume is moderately increased.  Estimated right ventricular systolic pressure from tricuspid regurgitation is mildly elevated (35-45 mmHg).  Patient given 60 mg IV lasix and acetazolamide 500 mg - Continue this regimen for now.    Patient has a bump in his Cr and meets criteria for CONSUELO.  Patients baseline Cr is ~1.  Patient Cr bumped to 1.6 on 9/4/2022.  This is likely fluctuation due to diuresis, patient also noted to be more alkalotic (likely contraction alkalosis).  Baseline GFR places patient in CKD Stage 2.  Monitor renal function closely.  Renal function improving with current diuresis strategy.    Pulmonary consulted for concern for lung disease.  Discussed the case with Tomas GUILLAUME.  We both agree with Dr. Uriostegui who is concerned about amiodarone toxicity.  Although amiodarone toxicity typically occurs with continue cumulative dose of medication over time, there is some literature, however, supporting acute toxicity.  Patient has only been on this medication since mid-August 2022.  The distribution of the imaging and the clinical examination do raise concern for ILD.  Often eosinophils will be elevated, patient has been on steroids which can interfere with this result.  The patient does have an elevated CRP and sedimentation rate.  Treatment essentially is to remove the  offending agent and PO steroid treatment and taper.      There is significant change compared to CT scan performed just a month ago.  Do not think this is all related to pulmonary edema.  New CT scan (CT angiogram 9/1) compared with old CT scan (CT wo contrast 8/9):    Upper lobes:      At the level of the sondra:      Lower lobes:     The bilateral distribution is somewhat perivascular but has some honey-combing and appears almost fibrotic as opposed to just pulmonary edema.  The other finding is bilateral pleural effusions.    Although low suspicion for bacterial pneumonia, continue antibiotics.  Previously on ceftriaxone and azithromycin.  D/C azithromycin due to QTc prolongation properties.  Continue doxycycline for atypical coverage and continue ceftriaxone - Treat for total of 7 days.        The patient was on solumedrol 40 mg q8h, titrate down to 20 mg q8h and atrovent nebs.  No albuterol due to HR.      Patient does have symptoms of orthopnea, but does not have significant JVD.  Suspect that patients bilateral pleural effusions layer out when he lays flat covering more area causing shortness of breath and causes increased work of breath.  Patient does not have hypoxia or significant JVD when laying flat.  Patient having worsening hypoxia, on 9/6 evaluated at bedside with US for possible thoracentesis.  Patient has bilateral pleural effusion R>L but has decreased in size.  Diuresis, pulmonary tolieting seems to be providing adequate improvement.    Metoprolol 50 mg BID for atrial fibrillation. Continue home xarelto for stroke prophylaxis in the setting of atrial fibrillation.  Patient was started on losartan and spironolactone as well for HFpEF.  Will d/c spironolactone due to bump in Cr and less evidence of benefit then in HFrEF.    For the patient's diabetes, it has been poorly controlled.  Patient had a hemoglobin A1c of 8.40 upon admission.  Patient's sugars were >200-300 when I took over on 9/5.   Increased levemir to 35 units qhs and continue high-intensity sliding scale insulin.     PT/OT - Increase activity.    Patient has some issue with urinary retention upon admission.  Patient had a alonzo placed.  Patient started on flomax and alonzo removed on 9/6.  Patient voiding spontaneously.        Discharge Planning: I expect the patient to be discharged to  in >2 days.    Electronically signed by Ellis Rey MD, 09/09/22, 20:37 CDT.

## 2022-09-10 NOTE — THERAPY DISCHARGE NOTE
Acute Care - Physical Therapy Discharge Summary  [facility]       Patient Name: Chito Govea  : 1955  MRN: [id number]    Today's Date: 9/10/2022          Referring Physician: Dr Rey ([facility])      Admit Date: 2022      PT Recommendation and Plan    Visit Dx:    ICD-10-CM ICD-9-CM   1. Acute on chronic congestive heart failure, unspecified heart failure type (HCC)  I50.9 428.0   2. Acute respiratory failure with hypoxia (HCC)  J96.01 518.81   3. Impaired mobility and ADLs  Z74.09 V49.89    Z78.9    4. Impaired mobility  Z74.09 799.89   5. Bilateral leg edema  R60.0 782.3   6. Acute systolic heart failure (HCC), EF 36-40% on   I50.21 428.21   7. Chronic obstructive pulmonary disease, unspecified COPD type (HCC)  J44.9 496        Outcome Measures     Row Name 22 1036 22 1238          How much help from another person do you currently need...    Turning from your back to your side while in flat bed without using bedrails? 4  -LY 4  -DELFIN     Moving from lying on back to sitting on the side of a flat bed without bedrails? 4  -LY 3  -DELFIN     Moving to and from a bed to a chair (including a wheelchair)? 3  -LY 3  -DELFIN     Standing up from a chair using your arms (e.g., wheelchair, bedside chair)? 3  -LY 3  -DELFIN     Climbing 3-5 steps with a railing? 3  -LY 3  -DELFIN     To walk in hospital room? 3  -LY 3  -DELFIN     AM-PAC 6 Clicks Score (PT) 20  -LY 19  -DELFIN            Functional Assessment    Outcome Measure Options AM-PAC 6 Clicks Basic Mobility (PT)  -LY --           User Key  (r) = Recorded By, (t) = Taken By, (c) = Cosigned By    Initials Name Provider Type    Verenice Webb, PTA Physical Therapist Assistant    Shanell Ledezma, PTA Physical Therapist Assistant                     PT Rehab Goals     Row Name 09/10/22 155             Transfer Goal 1 (PT)    Activity/Assistive Device (Transfer Goal 1, PT) sit-to-stand/stand-to-sit;bed-to-chair/chair-to-bed  -WK      [place]  Level/Cues Needed (Transfer Goal 1, PT) supervision required  -WK      Time Frame (Transfer Goal 1, PT) long term goal (LTG);10 days  -WK      Progress/Outcome (Transfer Goal 1, PT) goal not met  -WK              Gait Training Goal 1 (PT)    Activity/Assistive Device (Gait Training Goal 1, PT) gait (walking locomotion);assistive device use;decrease fall risk;improve balance and speed;increase endurance/gait distance  -WK      [place] Level (Gait Training Goal 1, PT) supervision required  -WK      Distance (Gait Training Goal 1, PT) 250 ft  -WK      Time Frame (Gait Training Goal 1, PT) long term goal (LTG);10 days  -WK      Progress/Outcome (Gait Training Goal 1, PT) goal partially met  -WK              Wound Care Goal 1 (PT)    Wound Care Goal 1 (PT) B unna boots will stay intact for duration of treatment with no adverse reactions.  -WK      Time Frame (Wound Care Goal 1, PT) long term goal (LTG);10 days  -WK      Progress/Outcome (Wound Care Goal 1, PT) goal met  -WK            User Key  (r) = Recorded By, (t) = Taken By, (c) = Cosigned By    Initials Name Provider Type Discipline    Nicole Wu PTA Physical Therapist Assistant PT                    PT Discharge Summary  Anticipated Discharge Disposition (PT): home  Reason for Discharge: Discharge from facility  Outcomes Achieved: Refer to plan of care for updates on goals achieved  Discharge Destination: Home      Nicole Solares PTA   9/10/2022

## 2022-09-10 NOTE — PLAN OF CARE
Problem: Adult Inpatient Plan of Care  Goal: Plan of Care Review  9/10/2022 0516 by Wendy Melara, RN  Flowsheets  Taken 9/10/2022 0516  Plan of Care Reviewed With: patient  Taken 9/10/2022 0513  Outcome Evaluation: S 60-70.  Converted AF . 3R PVC  9/10/2022 0513 by Wendy Melara, RN  Flowsheets (Taken 9/10/2022 0513)  Progress: declining  Plan of Care Reviewed With: patient  Outcome Evaluation: S 60-70.  Converted AF . 3R PVC   Goal Outcome Evaluation:  Plan of Care Reviewed With: patient        Progress: declining  Outcome Evaluation: S 60-70.  Converted AF . 3R PVC

## 2022-09-11 ENCOUNTER — HOME HEALTH ADMISSION (OUTPATIENT)
Dept: HOME HEALTH SERVICES | Facility: HOME HEALTHCARE | Age: 67
End: 2022-09-11

## 2022-09-11 NOTE — OUTREACH NOTE
Prep Survey    Flowsheet Row Responses   [facility]? [facility]   Is LACE score < 7 ? No   Emergency Room discharge w/ pulse ox? No   Eligibility Readm Mgmt   Discharge diagnosis Acute systolic heart failure, CONSUELO, bilateral pleural effusions, Paroxysmal atrial fibrillation, Acute respiratory failure with hypoxia    Does the patient have one of the following disease processes/diagnoses(primary or secondary)? CHF   Does the patient have Home health ordered? No   Is there a DME ordered? No   Prep survey completed? Yes          ALEXANDREA URIAS - Registered Nurse

## 2022-09-11 NOTE — CASE MANAGEMENT/SOCIAL WORK
Continued Stay Note   Chris     Patient Name: Chito Govea  MRN: [id number]  Today's Date: 9/11/2022    Admit Date: 9/1/2022     Discharge Plan     Row Name 09/11/22 1529       Plan    Plan Home with     Patient/Family in Agreement with Plan yes    Provided Post Acute Provider List? Yes    Post Acute Provider List Home Health    Provided Post Acute Provider Quality & Resource List? Yes    Post Acute Provider Quality and Resource List Home Health    Delivered To Support Person    Support Person Daughter - Shae    Method of Delivery Telephone    Plan Comments Contacted patient's daughter re:  orders for patient.  They had no preference as to  provider.   referral sent to [facility] centralized intake.    Final Discharge Disposition Code 06 - home with home health care    Final Note Home with [facility].               Discharge Codes    No documentation.               Expected Discharge Date and Time     Expected Discharge Date Expected Discharge Time    Sep 10, 2022             FELICITY Chan

## 2022-09-12 ENCOUNTER — HOME CARE VISIT (OUTPATIENT)
Dept: HOME HEALTH SERVICES | Facility: CLINIC | Age: 67
End: 2022-09-12

## 2022-09-12 VITALS
RESPIRATION RATE: 18 BRPM | DIASTOLIC BLOOD PRESSURE: 78 MMHG | HEART RATE: 64 BPM | OXYGEN SATURATION: 94 % | TEMPERATURE: 98.6 F | SYSTOLIC BLOOD PRESSURE: 110 MMHG

## 2022-09-12 PROCEDURE — G0299 HHS/HOSPICE OF RN EA 15 MIN: HCPCS

## 2022-09-12 NOTE — DISCHARGE SUMMARY
[facility]  DISCHARGE SUMMARY       Date of Admission: 9/1/2022  Date of Discharge:  9/10/2022  Primary Care Physician: Chito Rubio MD    Presenting Problem/History of Present Illness:  Shortness of breath    Final Discharge Diagnoses:  Active Hospital Problems    Diagnosis    • **Acute systolic heart failure (HCC), EF 36-40% on 9/2    • Bilateral pleural effusion    • COPD (chronic obstructive pulmonary disease) (HCC)    • Former smoker, quit 7/2022    • Acute kidney injury (HCC) superimposed on CKD Stage 2    • Anemia, chronic disease    • Suspected Interstitial lung disease (HCC), likely due to amiodarone    • Acute respiratory failure with hypoxia (HCC)    • Acute urinary retention requiring alonzo catheter    • Type 2 MI (myocardial infarction) (HCC) due to hypoxia and congestive heart failure    • Aortic stenosis, moderate    • Moderate malnutrition (HCC)    • Paroxysmal atrial fibrillation (HCC)    • Hypertension    • Type 2 diabetes mellitus with hyperglycemia, without long-term current use of insulin (HCC)        Consults:   Cardiology  Pulmonary  Wound care    Procedures Performed: None    Pertinent Test Results:   Results for orders placed during the hospital encounter of 09/01/22    Adult Transthoracic Echo Complete W/ Cont if Necessary Per Protocol    Interpretation Summary  · Estimated left ventricular EF was in agreement with the calculated left ventricular EF. Left ventricular ejection fraction appears to be 36 - 40%. Left ventricular systolic function is moderately decreased.  · The following left ventricular wall segments are hypokinetic: mid anterior, apical anterior, apical lateral, apical inferior, mid inferior, apical septal, basal inferoseptal, mid inferoseptal, apex hypokinetic, mid anteroseptal, basal inferior and basal inferoseptal.  · Moderate aortic valve stenosis is present.  · Left ventricular wall thickness is consistent  with mild concentric hypertrophy.  · Left atrial volume is moderately increased.  · Estimated right ventricular systolic pressure from tricuspid regurgitation is mildly elevated (35-45 mmHg).  · Normal size and function of the right ventricle.  · There is a small (<1cm) pericardial effusion.  · Compared to recent exam from 8/11/22, LVEF has declined and new wall motion abnormalities are present.      Imaging Results (All)     Procedure Component Value Units Date/Time    XR Chest 1 View [[id number]] Collected: 09/06/22 0930     Updated: 09/06/22 0935    Narrative:      EXAMINATION:  XR CHEST 1 VW-  9/6/2022 9:18 AM CDT     HISTORY: Repeat CXR to evaluate effusion; I50.9-Heart failure,  unspecified; J96.01-Acute respiratory failure with hypoxia.     COMPARISON: 9/4/2022.     TECHNIQUE: Single view AP image.     FINDINGS:  There are diffuse bilateral lung infiltrates. There is  minimal blunting of the costophrenic angles bilaterally. There is less  blunting on the right side on the current study. There is borderline  cardiomegaly. There are multiple calcified hilar and mediastinal lymph  nodes. The thoracic aorta is atheromatous. There are degenerative  changes of the spine and bilateral shoulders.       Impression:      1. Diffuse bilateral lung infiltrates may represent pulmonary edema or  pneumonia. No significant change.  2. Minimal blunting of the costophrenic angles bilaterally suggesting  small effusions. The blunting is decreased slightly on the right side.  3. Heart size upper limits of normal. Atheromatous thoracic aorta.        This report was finalized on 09/06/2022 09:32 by Dr. Wilmer Murphy MD.    XR Chest 1 View [[id number]] Collected: 09/04/22 0754     Updated: 09/04/22 0758    Narrative:      EXAMINATION: Chest 1 view 09/04/2022     HISTORY: Pneumonia and CHF     FINDINGS: Today's exam is compared to previous study one day earlier.  There is persistent diffuse mixed interstitial and alveolar  disease  within both lungs with a small right-sided effusion with blunting of the  costophrenic angle. No change from the previous exam. There is no free  air beneath the hemidiaphragms.       Impression:      1.. Stable 1 day appearance of the chest.  This report was finalized on 09/04/2022 07:55 by Dr. Yonis Polo MD.    XR Chest 1 View [[id number]] Collected: 09/03/22 1301     Updated: 09/03/22 1305    Narrative:      EXAM/TECHNIQUE: XR CHEST 1 VW-     INDICATION: Dyspnea, pulm edema, pleural eff; I50.9-Heart failure,  unspecified; J96.01-Acute respiratory failure with hypoxia     COMPARISON: 09/01/2022     FINDINGS:     Cardiac silhouette is within normal limits and stable. No significant  change in diffuse bilateral interstitial and airspace opacity. Small  bilateral pleural effusions appear unchanged. Granulomatous  calcifications in both hilar regions are noted. No pneumothorax. No  acute osseous finding.       Impression:         No significant interval change in moderate pulmonary edema and small  bilateral pleural effusions.  This report was finalized on 09/03/2022 13:02 by Dr. Kishor Escobedo MD.    CT Angiogram Chest [[id number]] Collected: 09/01/22 0719     Updated: 09/01/22 0731    Narrative:      EXAMINATION: CT ANGIOGRAM CHEST-      9/1/2022 1:23 AM CDT     HISTORY: Pulmonary embolism (PE) suspected, unknown D-dimer     In order to have a CT radiation dose as low as reasonably achievable  Automated Exposure Control was utilized for adjustment of the mA and/or  KV according to patient size.     DLP in mGycm= 204     The CT Angiography of the chest is performed after intravenous contrast  enhancement.     The images are acquired in axial plane with subsequent 2-D  reconstructions coronal and sagittal planes and 3-D maximum intensity  projection reconstruction.     Comparison is made with the previous study dated 8/9/2022.     There is normal opacification of the pulmonary arteries and  branches  bilaterally. There are no filling defects in the visualized/opacified  pulmonary arterial bed.     The RV/LV ratio is 30/51 which is normal. No finding to suggest right  heart strain.     Atheromatous changes thoracic aorta is seen. No aneurysmal dilatation.  Atheromatous changes in the coronary arteries are seen.     There is no evidence of mediastinal or hilar mass or lymphadenopathy.  There are large calcified granulomas in the mediastinum and tyesha  bilaterally. This is similar to the previous study.     The limited visualized soft tissues of the neck are unremarkable. The  thyroid gland appear unremarkable.     There is extensive bilateral interstitial infiltrate which was not seen  in the previous study. There is a mild intralobular septal thickening,  particularly in the lower lung which may suggest an element of pulmonary  congestion/edema.     There is a large bibasal pleural effusion, right more than the left.     The tracheobronchial structures are patent.     Moderately dilated proximal esophagus seen with air-fluid level.     The limited included/visualized abdomen show no significant abnormality.     Images reviewed in bone window show no acute bony abnormality. Chronic  degenerative changes of the thoracolumbar spine is seen.       Impression:      1. No evidence of pulmonary embolism. No aortic aneurysm or dissection.  Moderate atheromatous changes of coronary arteries.  2. Extensive interstitial infiltrate bilaterally representing  inflammatory/infectious process. A mild pulmonary vascular  congestion/pulmonary edema.  3. Large bilateral pleural effusion, right more than the left.     The above study was initially reviewed and reported by [employer]. I do not  find any discrepancies.                                   This report was finalized on 09/01/2022 07:28 by Dr. Ashlyn Cordon MD.    XR Chest 1 View [[id number]] Collected: 09/01/22 0652     Updated: 09/01/22 0657    Narrative:       EXAMINATION: XR CHEST 1 VW-     9/1/2022 1:00 AM CDT     HISTORY: soa     A frontal projection of the chest is compared with the previous study  dated 8/9/2022.     The lungs are poorly expanded.     There is extensive coarse interstitial infiltrate in the lungs  bilaterally.     There is a right lower lung consolidation.     There is bibasal small pleural effusion.     No pneumothorax.     Heart size is not evaluated due to obliteration of the cardiac border by  the adjacent infiltrate.     No acute bony abnormality.       Impression:      1. Extensive bilateral coarse interstitial infiltrate may represent an  inflammatory/infectious process.  2. Right lower lung consolidation.  3. Small bibasal pleural effusion.  This report was finalized on 09/01/2022 06:54 by Dr. Ashlyn Cordon MD.        LAB RESULTS:      Lab 09/08/22  0326 09/06/22  0354   WBC 11.45* 15.77*   HEMOGLOBIN 10.4* 10.3*   HEMATOCRIT 33.1* 33.9*   PLATELETS 334 401   NEUTROS ABS  --  14.68*   IMMATURE GRANS (ABS)  --  0.11*   LYMPHS ABS  --  0.32*   MONOS ABS  --  0.65   EOS ABS  --  0.00   MCV 92.7 94.4   PROCALCITONIN  --  0.07         Lab 09/10/22  0610 09/09/22  1809 09/09/22  0532 09/08/22  1812 09/08/22  0326   SODIUM 139 138 136 139 140   POTASSIUM 3.7 3.8 4.3 3.9 4.3   CHLORIDE 105 102 102 103 102   CO2 26.0 27.0 28.0 28.0 28.0   ANION GAP 8.0 9.0 6.0 8.0 10.0   BUN 79* 76* 79* 70* 70*   CREATININE 1.27 1.23 1.45* 1.17 1.16   EGFR 61.9 64.3 52.8* 68.3 69.0   GLUCOSE 67 147* 378* 200* 240*   CALCIUM 8.3* 8.5* 8.1* 8.5* 8.4*         Lab 09/08/22  0326 09/06/22  0354   TOTAL PROTEIN 5.0* 6.1   ALBUMIN 2.90* 3.10*   GLOBULIN 2.1 3.0   ALT (SGPT) 18 19   AST (SGOT) 16 26   BILIRUBIN 0.2 0.2   ALK PHOS 113 111                     Brief Urine Lab Results  (Last result in the past 365 days)      Color   Clarity   Blood   Leuk Est   Nitrite   Protein   CREAT   Urine HCG        09/04/22 1622 Dark Yellow   Clear   Negative   Negative   Negative   30  mg/dL (1+)               Microbiology Results (last 10 days)     ** No results found for the last 240 hours. **          Hospital Course:   Patient was admitted on 9/1 by Dr. Pena for shortness of breath.  Patient follows with Dr. Rubio for PCP and Dr. Devine for CHF.  Patient was recently admitted on 8/9-8/15 for CONSUELO, atrial fibrillation.  Patient was found during that admission to have a Cr of 6.96 and BUN >200 with hyperkalemia.  At that time, the patient was felt to have significant dehydration during that admission and was treated with IVF and patient had difficulty controlling HR and was started on amiodarone.     Upon admission, patient was noted to have a BNP of 33,372 and was noted to have tachypnea and hypoxia per Dr. Pena's note with O2 sat 78% upon presentation to ER.  Patient was admitted for suspected Acute on Chronic diastolic heart failure likely related to aortic stenosis and atrial fibrillation with RVR.  The patient's echo in 8/11/2022 showed normal EF and moderate AS.  Patient has been diuresed with IV diuretics since admission.  Patient is net negative 9.1 L for the hospitalization. Cardiology has been consulted, appreciate assistance.  Repeat echo 9/4/2022,Left ventricular ejection fraction appears to be 36 - 40%. Left ventricular systolic function is moderately decreased.  The following left ventricular wall segments are hypokinetic: mid anterior, apical anterior, apical lateral, apical inferior, mid inferior, apical septal, basal inferoseptal, mid inferoseptal, apex hypokinetic, mid anteroseptal, basal inferior and basal inferoseptal. Moderate aortic valve stenosis is present.  Left atrial volume is moderately increased.  Estimated right ventricular systolic pressure from tricuspid regurgitation is mildly elevated (35-45 mmHg).  Patient given 60 mg IV lasix and acetazolamide 500 mg - Continue this regimen for now.     Patient has a bump in his Cr and meets criteria for CONSUELO.  Patients  baseline Cr is ~1.  Patient Cr bumped to 1.6 on 9/4/2022.  This is likely fluctuation due to diuresis, patient also noted to be more alkalotic (likely contraction alkalosis).  Baseline GFR places patient in CKD Stage 2.  Monitor renal function closely.  Renal function improving with current diuresis strategy.     Pulmonary consulted for concern for lung disease.  Discussed the case with Tomas GUILLAUME.  We both agree with Dr. Uriostegui who is concerned about amiodarone toxicity.  Although amiodarone toxicity typically occurs with continue cumulative dose of medication over time, there is some literature, however, supporting acute toxicity.  Patient has only been on this medication since mid-August 2022.  The distribution of the imaging and the clinical examination do raise concern for ILD.  Often eosinophils will be elevated, patient has been on steroids which can interfere with this result.  The patient does have an elevated CRP and sedimentation rate.  Treatment essentially is to remove the offending agent and PO steroid treatment and taper.       There is significant change compared to CT scan performed just a month ago.  Do not think this is all related to pulmonary edema.  New CT scan (CT angiogram 9/1) compared with old CT scan (CT wo contrast 8/9):     Upper lobes:       At the level of the sondra:       Lower lobes:      The bilateral distribution is somewhat perivascular but has some honey-combing and appears almost fibrotic as opposed to just pulmonary edema.  The other finding is bilateral pleural effusions.     Although low suspicion for bacterial pneumonia, continue antibiotics.  Previously on ceftriaxone and azithromycin.  D/C azithromycin due to QTc prolongation properties.  Continue doxycycline for atypical coverage and continue ceftriaxone - Treat for total of 7 days.         The patient was on solumedrol 40 mg q8h, titrate down to 20 mg q8h and atrovent nebs.  No albuterol due to HR.       Patient does  "have symptoms of orthopnea, but does not have significant JVD.  Suspect that patients bilateral pleural effusions layer out when he lays flat covering more area causing shortness of breath and causes increased work of breath.  Patient does not have hypoxia or significant JVD when laying flat.  Patient having worsening hypoxia, on 9/6 evaluated at bedside with US for possible thoracentesis.  Patient has bilateral pleural effusion R>L but has decreased in size.  Diuresis, pulmonary tolieting seems to be providing adequate improvement.     Metoprolol 50 mg BID for atrial fibrillation. Continue home xarelto for stroke prophylaxis in the setting of atrial fibrillation.  Patient was started on losartan and spironolactone as well for HFpEF.  Will d/c spironolactone due to bump in Cr and less evidence of benefit then in HFrEF.     For the patient's diabetes, it has been poorly controlled.  Patient had a hemoglobin A1c of 8.40 upon admission.  Patient's sugars were >200-300 when I took over on 9/5.  Increased levemir to 35 units qhs and continue high-intensity sliding scale insulin.      PT/OT - Increase activity.     Patient has some issue with urinary retention upon admission.  Patient had a alonzo placed.  Patient started on flomax and alonzo removed on 9/6.  Patient voiding spontaneously.           Physical Exam on Discharge:  /55 (BP Location: Right arm, Patient Position: Sitting)   Pulse 98   Temp 98 °F (36.7 °C) (Oral)   Resp 16   Ht 176.8 cm (69.61\")   Wt 67 kg (147 lb 12.8 oz)   SpO2 98%   BMI 21.45 kg/m²   Physical Exam  Vitals and nursing note reviewed.   Constitutional:       Appearance: Normal appearance. He is ill-appearing.   HENT:      Head: Normocephalic and atraumatic.      Mouth/Throat:      Mouth: Mucous membranes are dry.      Pharynx: Oropharynx is clear.   Eyes:      General: No scleral icterus.     Conjunctiva/sclera: Conjunctivae normal.   Cardiovascular:      Rate and Rhythm: Tachycardia " present. Rhythm irregular.   Pulmonary:      Effort: No respiratory distress.      Breath sounds: Rales (diffuse bilateral; greatly improved) present.      Comments: Diminished at bases   Abdominal:      General: Abdomen is flat. Bowel sounds are normal. There is no distension.      Palpations: Abdomen is soft. There is no mass.      Tenderness: There is no abdominal tenderness.   Musculoskeletal:      Right lower leg: Edema (2+, unna boots in place) present.      Left lower leg: Edema (2+, unna boots in place) present.   Skin:     General: Skin is warm and dry.      Findings: No rash.   Neurological:      General: No focal deficit present.      Mental Status: He is alert and oriented to person, place, and time.   Psychiatric:         Mood and Affect: Mood normal.         Behavior: Behavior normal.     Condition on Discharge: None    Discharge Disposition:  Home or Self Care    Discharge Medications:     Discharge Medications      New Medications      Instructions Start Date   budesonide-formoterol 160-4.5 MCG/ACT inhaler  Commonly known as: SYMBICORT   2 puffs, Inhalation, 2 Times Daily - RT      furosemide 20 MG tablet  Commonly known as: LASIX   20 mg, Oral, Daily      losartan 25 MG tablet  Commonly known as: COZAAR   25 mg, Oral, Every 24 Hours Scheduled      metoprolol succinate  MG 24 hr tablet  Commonly known as: Toprol XL   100 mg, Oral, Daily      predniSONE 10 MG tablet  Commonly known as: DELTASONE   Take 2 tablets by mouth 2 (Two) Times a Day With Meals for 3 days, THEN 1 tablet 2 (Two) Times a Day With Meals for 3 days.   Start Date: September 10, 2022     tamsulosin 0.4 MG capsule 24 hr capsule  Commonly known as: FLOMAX   0.4 mg, Oral, Daily         Continue These Medications      Instructions Start Date   atorvastatin 20 MG tablet  Commonly known as: LIPITOR   20 mg, Oral, Daily      glimepiride 2 MG tablet  Commonly known as: AMARYL   2 mg, Oral, Every Morning Before Breakfast       pantoprazole 40 MG EC tablet  Commonly known as: PROTONIX   40 mg, Oral, 2 Times Daily Before Meals      rivaroxaban 20 MG tablet  Commonly known as: XARELTO   20 mg, Oral, Daily         Stop These Medications    amiodarone 200 MG tablet  Commonly known as: PACERONE     apixaban 5 MG tablet tablet  Commonly known as: ELIQUIS     sucralfate 1 GM/10ML suspension  Commonly known as: CARAFATE            Discharge Diet:   Diet Instructions     Diet: Soft Texture; Thin Liquids, No Restrictions; Ground      Discharge Diet: Soft Texture    Fluid Consistency: Thin Liquids, No Restrictions    Soft Options: Ground    Fluid Restriction per day: 1500 mL Fluid    Diet Soft Texture; Ground; Thin; Cardiac, Consistent Carbohydrate, Low Sodium, Daily Fluid Restriction; Other; 2,000; 240 mL Fluid Per Tray; 1,500 mg Na  Diet          Activity at Discharge:   Activity Instructions     Activity as Tolerated            Follow-up Appointments:   Future Appointments   Date Time Provider Department Center   9/13/2022 To Be Determined Elías Jasmine, PT [facility] PAD [facility] PAD   9/27/2022 10:30 AM Kun Devine DO [facility] P[facility] PAD       Test Results Pending at Discharge: None    Electronically signed by Ellis Rey MD, 09/12/22, 12:08 CDT.    Time: 35 minutes.

## 2022-09-12 NOTE — THERAPY DISCHARGE NOTE
Acute Care - Occupational Therapy Discharge Summary  [facility]     Patient Name: Chito Govea  : 1955  MRN: [id number]    Today's Date: 2022          Referring Physician: Dr Rey ([facility])      Admit Date: 2022        OT Recommendation and Plan    Visit Dx:    ICD-10-CM ICD-9-CM   1. Acute on chronic congestive heart failure, unspecified heart failure type (HCC)  I50.9 428.0   2. Acute respiratory failure with hypoxia (HCC)  J96.01 518.81   3. Impaired mobility and ADLs  Z74.09 V49.89    Z78.9    4. Impaired mobility  Z74.09 799.89   5. Bilateral leg edema  R60.0 782.3   6. Acute systolic heart failure (HCC), EF 36-40% on   I50.21 428.21   7. Chronic obstructive pulmonary disease, unspecified COPD type ([facility])  J44.9 496                OT Rehab Goals     Row Name 22 1600             Bathing Goal 1 (OT)    Activity/Device (Bathing Goal 1, OT) bathing skills, all  -MW      [place] Level/Cues Needed (Bathing Goal 1, OT) modified independence  -MW      Time Frame (Bathing Goal 1, OT) long term goal (LTG);by discharge  -MW      Progress/Outcomes (Bathing Goal 1, OT) goal not met  -MW              Problem Specific Goal 1 (OT)    Problem Specific Goal 1 (OT) Pt will partcipate in adl task in standing for 10 minutes with O2 sat >/= 88% to increase his tolerance for activity and adls.  -MW      Time Frame (Problem Specific Goal 1, OT) long term goal (LTG);by discharge  -MW      Progress/Outcome (Problem Specific Goal 1, OT) goal not met  -MW            User Key  (r) = Recorded By, (t) = Taken By, (c) = Cosigned By    Initials Name Provider Type Discipline    Elza Guevara, OTR/L Occupational Therapist OT                    Timed Therapy Charges  Total Units: 2    Charges  Total Units: 2    Procedure Name Documented Minutes Units Code    HC OT THERAPEUTIC ACT EA 15 MIN 33  2    56330 (CPT®)               Documented Minutes  Total Minutes: 33    Therapy Provided Minutes    71722 - OT  Therapeutic Activity Minutes 33                    OT Discharge Summary  Anticipated Discharge Disposition (OT): home with assist, home with home health  Reason for Discharge: Discharge from facility  Outcomes Achieved: Refer to plan of care for updates on goals achieved  Discharge Destination: Home with assist      Elza Stone, OTR/L  9/12/2022

## 2022-09-12 NOTE — HOME HEALTH
"SOC Note: PT ADMITTED AND AGREE TO [facility].  PT JUST RELEASED FROM HOSPITAL FOR CHF/AF/DM/CONSUELO. CONSUELO, UNCONTROLLED BLOOD SUGARS AND AF WAS NEW DIAGNOSIS. Pt will understand COPD disease process and how to control symptoms of COPD by the end of this cert period.   Pt will have no exacerbations of COPD this cert period.  Pt to check blood sugars daily and report high readings to Dr. Sean Rubio on next visit.  Weight to remain stable without signs and symptoms of CHF x 3-4 weeks Pt to keep log with daily weights.  Patient will be able to verbalize symptoms of CHF, management, and contributing factors within 3 weeks. Pt educated on fall prevention such as proper fitting shoes, nonskid socks, well lit clutter free pathways. Pt/caregiver demonstrated understanding by teach back method.  Pt verbalized attempt to increase protein in diet and maintain fluid restriction to decrease pressure injury and decrease edema in lower extremities.    Home Health ordered for: disciplines: SN, PT, OT    Reason for Hosp/Primary Dx/Co-morbidities: COPD, CHF, AFIB, DM, AND CONSUELO.    Focus of Care: WEAKNESS W/ MANAGEMENT OF COPD, CHF, AFIB, DM    Current Functional status/mobility/DME: ASSIST OF 1 PERSON SBA WITH AMBULATION/TRANSFER, DME = WALKER,     HB status/Living Arrangements: PT UNABLE TO LEAVE HOME EXCEPT FOR MEDICAL APPOINT. DAUGHTER LIVES WITH PATIENT. 2 GRANDCHILDREN ALSO PRESENT.    Skin Integrity/wound status: NO WOUNDS UPON ASSESSMENT, PT STATES\" BRUISED, WEEPING SORES TO BOTH LEGS'    Code Status: Pt requests DNR    Fall Risk: 7    I, Dahlia Lopez RN, hereby state that I was present with Lili Silveira RN for the SOC on September 12, 2022 and agree with their documentation for that visit."

## 2022-09-13 ENCOUNTER — HOME CARE VISIT (OUTPATIENT)
Dept: HOME HEALTH SERVICES | Facility: CLINIC | Age: 67
End: 2022-09-13

## 2022-09-13 PROCEDURE — G0151 HHCP-SERV OF PT,EA 15 MIN: HCPCS

## 2022-09-13 NOTE — HOME HEALTH
Reason for Hosp/Primary Dx/Co-morbidities: 65 yo male Primary Dx: Hypertensive heart and chronic kidney disease with heart failure . Pt reports hospitalized 8-29-22 thru 9-10-22 per medical dx ( previously hospitalized 7-31 thru 8-8-22 ~ )       Focus of Care: deconditioning, gait, transfers, safety Primary Dx: Hypertensive heart and chronic kidney disease with heart failure      Current Functional status/mobility/DME:   See DME    HB status/Living Arrangements: lives with family - 4 step entry     Skin Integrity/wound status: BLE drsg CDI     Code Status: FULL CODE    Fall Risk: high risk      PmHx:  DM BLE Neuropathy -   PLOF :  community amb -      Infectious disease screen : Pt denies s/s or a exposure to anyone with  s/s of Covid -19      Skills :Education in ther ex to faciltate functional strength for transfers and mobility - reduce fall risk   Education in transfers with hand and AD placement for safety   Education in AD use to promote reduced fall risk - safe functional gait   Education on proper hand/foot placement, transitional movements, and safety awareness to decrease risk of falls

## 2022-09-14 ENCOUNTER — READMISSION MANAGEMENT (OUTPATIENT)
Dept: CALL CENTER | Facility: HOSPITAL | Age: 67
End: 2022-09-14

## 2022-09-14 VITALS
TEMPERATURE: 97.1 F | HEART RATE: 72 BPM | SYSTOLIC BLOOD PRESSURE: 112 MMHG | DIASTOLIC BLOOD PRESSURE: 64 MMHG | RESPIRATION RATE: 16 BRPM | OXYGEN SATURATION: 97 %

## 2022-09-14 NOTE — OUTREACH NOTE
CHF Week 1 Survey    Flowsheet Row Responses   [facility] patient discharged from? [place]   Does the patient have one of the following disease processes/diagnoses(primary or secondary)? CHF  [COPD]   CHF Week 1 attempt successful? Yes   Call start time 1102   Call end time 1116   Discharge diagnosis Acute systolic heart failure, CONSUELO, bilateral pleural effusions, Paroxysmal atrial fibrillation, Acute respiratory failure with hypoxia    Person spoke with today (if not patient) and relationship Patient   Meds reviewed with patient/caregiver? Yes   Is the patient having any side effects they believe may be caused by any medication additions or changes? No   Does the patient have all medications ordered at discharge? Yes   Is the patient taking all medications as directed (includes completed medication regime)? Yes   Does the patient have a primary care provider?  Yes   Does the patient have an appointment with their PCP within 7 days of discharge? Yes   Comments regarding PCP 9/19/22   Nursing Interventions Educated patient on importance of making appointment, Advised patient to make appointment   Has the patient kept scheduled appointments due by today? N/A   Pulse Ox monitoring None   Psychosocial issues? No   Did the patient receive a copy of their discharge instructions? Yes   Nursing interventions Reviewed instructions with patient   What is the patient's perception of their health status since discharge? Improving   Nursing interventions Nurse provided patient education   Is the patient able to teach back signs and symptoms of worsening condition? (i.e. weight gain, shortness of air, etc.) Yes   Is the patient/caregiver able to teach back the hierarchy of who to call/visit for symptoms/problems? PCP, Specialist, Home health nurse, Urgent Care, ED, 911 Yes   Is the patient able to teach back Heart Failure Zones? Yes   CHF Nursing Interventions Education provided on various zones   CHF Zone this Call Jesse  Zone   Green Zone Patient reports doing well, No new or worsening shortness of breath, Physical activity level is normal for you, Weight check stable, No new swelling -  feet, ankles and legs look normal for you   Green Zone Interventions Daily weight check    CHF Week 1 call completed? Yes   Wrap up additional comments Pt states he is doing ok,  pt reports swelling in both legs, and leg are wrapped r/t swelling. Educated pt on cardiac warning s/s, and when to call cardiiologist/911. Pt verified cardiologist fu appt on 9/27/22, and advised to make a PCP fu appt. No questions/concerns.   Call end time 1116          MENA DELEON - Registered Nurse

## 2022-09-15 ENCOUNTER — HOME CARE VISIT (OUTPATIENT)
Dept: HOME HEALTH SERVICES | Facility: CLINIC | Age: 67
End: 2022-09-15

## 2022-09-15 VITALS
DIASTOLIC BLOOD PRESSURE: 64 MMHG | HEART RATE: 58 BPM | OXYGEN SATURATION: 95 % | RESPIRATION RATE: 16 BRPM | SYSTOLIC BLOOD PRESSURE: 116 MMHG | TEMPERATURE: 97.9 F

## 2022-09-15 PROCEDURE — G0157 HHC PT ASSISTANT EA 15: HCPCS

## 2022-09-15 PROCEDURE — G0300 HHS/HOSPICE OF LPN EA 15 MIN: HCPCS

## 2022-09-15 NOTE — HOME HEALTH
FOCUS OF CARE/SKILLED NEED: HOME SAFETY/FALL PREVENTION, MEDICATION EDUCATION, PAIN MANAGEMENT, PREVENTATIVE SKIN CARE    TEACHING/INTERVENTIONS: FALL PREVENTION, MEDICATION EDUCATION, PAIN MANAGEMENT, PREVENTATIVE SKIN CARE    PROGRESS TOWARDS GOALS:     RECENT FALLS:    RECENT MEDICATION CHANGES:    D/C PLAN:  DISCHARGE WITH GOALS MET    PHYSICIAN CONTACT:      INSURANCE CHANGES:     PRE-SCREEN FOR COVID 19:

## 2022-09-16 ENCOUNTER — HOME CARE VISIT (OUTPATIENT)
Dept: HOME HEALTH SERVICES | Facility: HOME HEALTHCARE | Age: 67
End: 2022-09-16

## 2022-09-19 NOTE — HOME HEALTH
Per phone call from Rebeka Delgado 9/18/22 at 237pm:  The progress note created by Rebeka Delgado on 9/19/22 at 218pm was created in error. Please disregard.

## 2022-09-19 NOTE — PROGRESS NOTES
I have reviewed the notes, assessments, and/or procedures performed by ***, I {ACTIONS; CONCUR/ DO NOT CONCUR:18877} with her/his documentation of Chito Govea.

## 2022-09-20 ENCOUNTER — HOME CARE VISIT (OUTPATIENT)
Dept: HOME HEALTH SERVICES | Facility: CLINIC | Age: 67
End: 2022-09-20

## 2022-09-21 ENCOUNTER — HOME CARE VISIT (OUTPATIENT)
Dept: HOME HEALTH SERVICES | Facility: CLINIC | Age: 67
End: 2022-09-21

## 2022-09-21 VITALS
RESPIRATION RATE: 20 BRPM | HEART RATE: 78 BPM | DIASTOLIC BLOOD PRESSURE: 77 MMHG | SYSTOLIC BLOOD PRESSURE: 130 MMHG | TEMPERATURE: 97.8 F | OXYGEN SATURATION: 98 %

## 2022-09-21 VITALS
DIASTOLIC BLOOD PRESSURE: 80 MMHG | SYSTOLIC BLOOD PRESSURE: 130 MMHG | HEART RATE: 82 BPM | OXYGEN SATURATION: 98 % | RESPIRATION RATE: 16 BRPM | TEMPERATURE: 98.7 F

## 2022-09-21 PROCEDURE — G0300 HHS/HOSPICE OF LPN EA 15 MIN: HCPCS

## 2022-09-21 PROCEDURE — G0157 HHC PT ASSISTANT EA 15: HCPCS

## 2022-09-21 NOTE — CASE COMMUNICATION
Patient missed a PT HH visit from [facility] on 9/20/22.     Reason:  Several attempts made to schedule PT visit, however nobody answered calls.  No way to leave message for pt to call back      For your records only.   As per home health protocol, MD must be notified of missed/cancelled visits; therefore the prescribed frequency was not met.

## 2022-09-22 ENCOUNTER — HOME CARE VISIT (OUTPATIENT)
Dept: HOME HEALTH SERVICES | Facility: HOME HEALTHCARE | Age: 67
End: 2022-09-22

## 2022-09-26 ENCOUNTER — HOME CARE VISIT (OUTPATIENT)
Dept: HOME HEALTH SERVICES | Facility: CLINIC | Age: 67
End: 2022-09-26

## 2022-09-26 NOTE — CASE COMMUNICATION
Pt has requested PT discharge without a visit.  Pt states he's starting to get his bills in the mail from [facility] and does not want to accrue any more bills.  Refused to allow HHPT to make any further visits.  Pt states he will be telling SN the same thing on their next visit.

## 2022-09-27 ENCOUNTER — OFFICE VISIT (OUTPATIENT)
Dept: CARDIOLOGY | Facility: CLINIC | Age: 67
End: 2022-09-27

## 2022-09-27 VITALS
SYSTOLIC BLOOD PRESSURE: 130 MMHG | OXYGEN SATURATION: 99 % | DIASTOLIC BLOOD PRESSURE: 54 MMHG | HEART RATE: 62 BPM | BODY MASS INDEX: 21.62 KG/M2 | WEIGHT: 151 LBS | HEIGHT: 70 IN

## 2022-09-27 DIAGNOSIS — Z87.891 FORMER SMOKER: ICD-10-CM

## 2022-09-27 DIAGNOSIS — I21.A1 TYPE 2 MI (MYOCARDIAL INFARCTION): ICD-10-CM

## 2022-09-27 DIAGNOSIS — I35.0 AORTIC STENOSIS, MODERATE: ICD-10-CM

## 2022-09-27 DIAGNOSIS — I48.0 PAROXYSMAL ATRIAL FIBRILLATION: ICD-10-CM

## 2022-09-27 DIAGNOSIS — I42.9 CARDIOMYOPATHY, UNSPECIFIED TYPE: Primary | ICD-10-CM

## 2022-09-27 DIAGNOSIS — J44.9 CHRONIC OBSTRUCTIVE PULMONARY DISEASE, UNSPECIFIED COPD TYPE: ICD-10-CM

## 2022-09-27 DIAGNOSIS — E11.65 TYPE 2 DIABETES MELLITUS WITH HYPERGLYCEMIA, WITHOUT LONG-TERM CURRENT USE OF INSULIN: ICD-10-CM

## 2022-09-27 DIAGNOSIS — J90 BILATERAL PLEURAL EFFUSION: ICD-10-CM

## 2022-09-27 DIAGNOSIS — I10 PRIMARY HYPERTENSION: ICD-10-CM

## 2022-09-27 DIAGNOSIS — I27.20 PULMONARY HYPERTENSION: ICD-10-CM

## 2022-09-27 PROCEDURE — 99214 OFFICE O/P EST MOD 30 MIN: CPT | Performed by: EMERGENCY MEDICINE

## 2022-09-28 ENCOUNTER — HOME CARE VISIT (OUTPATIENT)
Dept: HOME HEALTH SERVICES | Facility: CLINIC | Age: 67
End: 2022-09-28

## 2022-10-03 ENCOUNTER — HOSPITAL ENCOUNTER (OUTPATIENT)
Facility: HOSPITAL | Age: 67
Discharge: HOME | End: 2022-10-04
Attending: EMERGENCY MEDICINE | Admitting: EMERGENCY MEDICINE

## 2022-10-03 DIAGNOSIS — I25.700 CORONARY ARTERY DISEASE INVOLVING CORONARY BYPASS GRAFT OF NATIVE HEART WITH UNSTABLE ANGINA PECTORIS: Primary | ICD-10-CM

## 2022-10-03 DIAGNOSIS — I25.5 ISCHEMIC CARDIOMYOPATHY: ICD-10-CM

## 2022-10-03 DIAGNOSIS — I27.20 PULMONARY HYPERTENSION: ICD-10-CM

## 2022-10-03 DIAGNOSIS — I42.9 CARDIOMYOPATHY, UNSPECIFIED TYPE: ICD-10-CM

## 2022-10-03 LAB
A-A DO2: ABNORMAL
ANION GAP SERPL CALCULATED.3IONS-SCNC: 8 MMOL/L (ref 5–15)
ARTERIAL PATENCY WRIST A: ABNORMAL
ATMOSPHERIC PRESS: 756 MMHG
ATMOSPHERIC PRESS: 756 MMHG
BASE EXCESS BLDA CALC-SCNC: 5.5 MMOL/L (ref 0–2)
BASE EXCESS BLDV CALC-SCNC: 6.6 MMOL/L (ref 0–2)
BDY SITE: ABNORMAL
BDY SITE: ABNORMAL
BODY TEMPERATURE: 37 C
BODY TEMPERATURE: 37 C
BUN SERPL-MCNC: 11 MG/DL (ref 8–23)
BUN/CREAT SERPL: 14.3 (ref 7–25)
CALCIUM SPEC-SCNC: 8.1 MG/DL (ref 8.6–10.5)
CHLORIDE SERPL-SCNC: 99 MMOL/L (ref 98–107)
CO2 SERPL-SCNC: 30 MMOL/L (ref 22–29)
COHGB MFR BLD: 2.3 % (ref 0–5)
COHGB MFR BLD: 2.5 % (ref 0–5)
CREAT SERPL-MCNC: 0.77 MG/DL (ref 0.76–1.27)
DEPRECATED RDW RBC AUTO: 54.3 FL (ref 37–54)
EGFRCR SERPLBLD CKD-EPI 2021: 98.1 ML/MIN/1.73
ERYTHROCYTE [DISTWIDTH] IN BLOOD BY AUTOMATED COUNT: 15.9 % (ref 12.3–15.4)
GAS FLOW AIRWAY: 3 LPM
GAS FLOW AIRWAY: 3 LPM
GLUCOSE SERPL-MCNC: 223 MG/DL (ref 65–99)
HCO3 BLDA-SCNC: 29.8 MMOL/L (ref 20–26)
HCO3 BLDV-SCNC: 31.4 MMOL/L (ref 22–28)
HCT VFR BLD AUTO: 36.2 % (ref 37.5–51)
HCT VFR BLD CALC: 32.1 % (ref 38–51)
HGB BLD-MCNC: 11.4 G/DL (ref 13–17.7)
HGB BLDA-MCNC: 10.5 G/DL (ref 14–18)
HGB BLDA-MCNC: 10.5 G/DL (ref 14–18)
INR PPP: 1.25 (ref 0.91–1.09)
Lab: ABNORMAL
Lab: ABNORMAL
MCH RBC QN AUTO: 29.1 PG (ref 26.6–33)
MCHC RBC AUTO-ENTMCNC: 31.5 G/DL (ref 31.5–35.7)
MCV RBC AUTO: 92.3 FL (ref 79–97)
METHGB BLD QL: 0.7 % (ref 0–3)
METHGB BLD QL: 0.8 % (ref 0–3)
MODALITY: ABNORMAL
MODALITY: ABNORMAL
NOTE: ABNORMAL
NOTE: ABNORMAL
OXYHGB MFR BLDV: 72.4 % (ref 60–85)
OXYHGB MFR BLDV: 97 % (ref 94–99)
PCO2 BLDA: 41.7 MM HG (ref 35–45)
PCO2 BLDV: 45.5 MM HG (ref 41–51)
PCO2 TEMP ADJ BLD: 41.7 MM HG (ref 35–45)
PH BLDA: 7.46 PH UNITS (ref 7.35–7.45)
PH BLDV: 7.45 PH UNITS (ref 7.32–7.42)
PH, TEMP CORRECTED: 7.46 PH UNITS (ref 7.35–7.45)
PLATELET # BLD AUTO: 203 10*3/MM3 (ref 140–450)
PMV BLD AUTO: 9.7 FL (ref 6–12)
PO2 BLDA: 120 MM HG (ref 83–108)
PO2 BLDV: 38.8 MM HG (ref 27–53)
PO2 TEMP ADJ BLD: 120 MM HG (ref 83–108)
POTASSIUM BLDA-SCNC: 3 MMOL/L (ref 3.5–5.2)
POTASSIUM BLDV-SCNC: 3 MMOL/L (ref 3.5–5.2)
POTASSIUM SERPL-SCNC: 3.3 MMOL/L (ref 3.5–5.2)
PROTHROMBIN TIME: 15.2 SECONDS (ref 11.9–14.6)
RBC # BLD AUTO: 3.92 10*6/MM3 (ref 4.14–5.8)
SAO2 % BLDCOA: 100 % (ref 94–99)
SAO2 % BLDCOV: 74.9 % (ref 45–75)
SODIUM BLDA-SCNC: 137 MMOL/L (ref 136–145)
SODIUM BLDV-SCNC: 137 MMOL/L (ref 136–145)
SODIUM SERPL-SCNC: 137 MMOL/L (ref 136–145)
VENTILATOR MODE: ABNORMAL
VENTILATOR MODE: ABNORMAL
WBC NRBC COR # BLD: 3.88 10*3/MM3 (ref 3.4–10.8)

## 2022-10-03 PROCEDURE — 25010000002 MIDAZOLAM PER 1 MG: Performed by: EMERGENCY MEDICINE

## 2022-10-03 PROCEDURE — 83050 HGB METHEMOGLOBIN QUAN: CPT

## 2022-10-03 PROCEDURE — 36600 WITHDRAWAL OF ARTERIAL BLOOD: CPT

## 2022-10-03 PROCEDURE — G0378 HOSPITAL OBSERVATION PER HR: HCPCS

## 2022-10-03 PROCEDURE — C1769 GUIDE WIRE: HCPCS | Performed by: EMERGENCY MEDICINE

## 2022-10-03 PROCEDURE — 93460 R&L HRT ART/VENTRICLE ANGIO: CPT | Performed by: EMERGENCY MEDICINE

## 2022-10-03 PROCEDURE — 82375 ASSAY CARBOXYHB QUANT: CPT

## 2022-10-03 PROCEDURE — 25010000002 FUROSEMIDE PER 20 MG: Performed by: EMERGENCY MEDICINE

## 2022-10-03 PROCEDURE — C1894 INTRO/SHEATH, NON-LASER: HCPCS | Performed by: EMERGENCY MEDICINE

## 2022-10-03 PROCEDURE — 80048 BASIC METABOLIC PNL TOTAL CA: CPT | Performed by: EMERGENCY MEDICINE

## 2022-10-03 PROCEDURE — 0 IOPAMIDOL PER 1 ML: Performed by: EMERGENCY MEDICINE

## 2022-10-03 PROCEDURE — C1887 CATHETER, GUIDING: HCPCS | Performed by: EMERGENCY MEDICINE

## 2022-10-03 PROCEDURE — 93572 IV DOP VEL&/PRESS C FLO EA: CPT | Performed by: EMERGENCY MEDICINE

## 2022-10-03 PROCEDURE — 85610 PROTHROMBIN TIME: CPT | Performed by: EMERGENCY MEDICINE

## 2022-10-03 PROCEDURE — 92928 PRQ TCAT PLMT NTRAC ST 1 LES: CPT | Performed by: EMERGENCY MEDICINE

## 2022-10-03 PROCEDURE — 99152 MOD SED SAME PHYS/QHP 5/>YRS: CPT | Performed by: EMERGENCY MEDICINE

## 2022-10-03 PROCEDURE — C1751 CATH, INF, PER/CENT/MIDLINE: HCPCS | Performed by: EMERGENCY MEDICINE

## 2022-10-03 PROCEDURE — 99153 MOD SED SAME PHYS/QHP EA: CPT | Performed by: EMERGENCY MEDICINE

## 2022-10-03 PROCEDURE — 25010000002 HEPARIN (PORCINE) 2000-0.9 UNIT/L-% SOLUTION: Performed by: EMERGENCY MEDICINE

## 2022-10-03 PROCEDURE — 25010000002 HEPARIN (PORCINE) 1000-0.9 UT/500ML-% SOLUTION: Performed by: EMERGENCY MEDICINE

## 2022-10-03 PROCEDURE — S0260 H&P FOR SURGERY: HCPCS | Performed by: EMERGENCY MEDICINE

## 2022-10-03 PROCEDURE — 25010000002 FENTANYL CITRATE (PF) 50 MCG/ML SOLUTION: Performed by: EMERGENCY MEDICINE

## 2022-10-03 PROCEDURE — 93571 IV DOP VEL&/PRESS C FLO 1ST: CPT | Performed by: EMERGENCY MEDICINE

## 2022-10-03 PROCEDURE — C9600 PERC DRUG-EL COR STENT SING: HCPCS | Performed by: EMERGENCY MEDICINE

## 2022-10-03 PROCEDURE — 85027 COMPLETE CBC AUTOMATED: CPT | Performed by: EMERGENCY MEDICINE

## 2022-10-03 PROCEDURE — 82820 HEMOGLOBIN-OXYGEN AFFINITY: CPT

## 2022-10-03 PROCEDURE — 25010000002 HEPARIN (PORCINE) PER 1000 UNITS: Performed by: EMERGENCY MEDICINE

## 2022-10-03 PROCEDURE — C1725 CATH, TRANSLUMIN NON-LASER: HCPCS | Performed by: EMERGENCY MEDICINE

## 2022-10-03 PROCEDURE — C1874 STENT, COATED/COV W/DEL SYS: HCPCS | Performed by: EMERGENCY MEDICINE

## 2022-10-03 PROCEDURE — 25010000002 DIPHENHYDRAMINE PER 50 MG: Performed by: EMERGENCY MEDICINE

## 2022-10-03 PROCEDURE — 82805 BLOOD GASES W/O2 SATURATION: CPT

## 2022-10-03 DEVICE — XIENCE SKYPOINT™ EVEROLIMUS ELUTING CORONARY STENT SYSTEM 2.25 MM X 18 MM / RAPID-EXCHANGE
Type: IMPLANTABLE DEVICE | Site: CORONARY | Status: FUNCTIONAL
Brand: XIENCE SKYPOINT™

## 2022-10-03 RX ORDER — ACETAMINOPHEN 325 MG/1
650 TABLET ORAL EVERY 4 HOURS PRN
Status: DISCONTINUED | OUTPATIENT
Start: 2022-10-03 | End: 2022-10-04 | Stop reason: HOSPADM

## 2022-10-03 RX ORDER — HEPARIN SODIUM 1000 [USP'U]/ML
INJECTION, SOLUTION INTRAVENOUS; SUBCUTANEOUS AS NEEDED
Status: DISCONTINUED | OUTPATIENT
Start: 2022-10-03 | End: 2022-10-03 | Stop reason: HOSPADM

## 2022-10-03 RX ORDER — CLOPIDOGREL BISULFATE 75 MG/1
75 TABLET ORAL DAILY
Qty: 90 TABLET | Refills: 3 | Status: SHIPPED | OUTPATIENT
Start: 2022-10-03 | End: 2022-11-09 | Stop reason: SDUPTHER

## 2022-10-03 RX ORDER — BUMETANIDE 2 MG/1
2 TABLET ORAL DAILY
Qty: 90 TABLET | Refills: 1 | Status: SHIPPED | OUTPATIENT
Start: 2022-10-03 | End: 2022-11-09 | Stop reason: SDUPTHER

## 2022-10-03 RX ORDER — ASPIRIN 81 MG/1
81 TABLET ORAL DAILY
Qty: 30 TABLET | Refills: 0 | Status: SHIPPED | OUTPATIENT
Start: 2022-10-03 | End: 2022-11-09

## 2022-10-03 RX ORDER — ONDANSETRON 4 MG/1
4 TABLET, FILM COATED ORAL EVERY 6 HOURS PRN
Status: DISCONTINUED | OUTPATIENT
Start: 2022-10-03 | End: 2022-10-04 | Stop reason: HOSPADM

## 2022-10-03 RX ORDER — BUMETANIDE 0.25 MG/ML
0.5 INJECTION INTRAMUSCULAR; INTRAVENOUS ONCE
Status: DISCONTINUED | OUTPATIENT
Start: 2022-10-04 | End: 2022-10-04

## 2022-10-03 RX ORDER — ONDANSETRON 2 MG/ML
4 INJECTION INTRAMUSCULAR; INTRAVENOUS EVERY 6 HOURS PRN
Status: DISCONTINUED | OUTPATIENT
Start: 2022-10-03 | End: 2022-10-04 | Stop reason: HOSPADM

## 2022-10-03 RX ORDER — MIDAZOLAM HYDROCHLORIDE 1 MG/ML
INJECTION INTRAMUSCULAR; INTRAVENOUS AS NEEDED
Status: DISCONTINUED | OUTPATIENT
Start: 2022-10-03 | End: 2022-10-03 | Stop reason: HOSPADM

## 2022-10-03 RX ORDER — SACUBITRIL AND VALSARTAN 24; 26 MG/1; MG/1
1 TABLET, FILM COATED ORAL 2 TIMES DAILY
Qty: 60 TABLET | Refills: 5 | Status: SHIPPED | OUTPATIENT
Start: 2022-10-03 | End: 2022-10-04 | Stop reason: HOSPADM

## 2022-10-03 RX ORDER — SODIUM CHLORIDE 9 MG/ML
1-3 INJECTION, SOLUTION INTRAVENOUS CONTINUOUS
Status: DISCONTINUED | OUTPATIENT
Start: 2022-10-03 | End: 2022-10-04 | Stop reason: HOSPADM

## 2022-10-03 RX ORDER — HEPARIN SODIUM 200 [USP'U]/100ML
INJECTION, SOLUTION INTRAVENOUS AS NEEDED
Status: DISCONTINUED | OUTPATIENT
Start: 2022-10-03 | End: 2022-10-03 | Stop reason: HOSPADM

## 2022-10-03 RX ORDER — SODIUM CHLORIDE 9 MG/ML
100 INJECTION, SOLUTION INTRAVENOUS CONTINUOUS
Status: DISCONTINUED | OUTPATIENT
Start: 2022-10-04 | End: 2022-10-04 | Stop reason: HOSPADM

## 2022-10-03 RX ORDER — LIDOCAINE HYDROCHLORIDE 20 MG/ML
INJECTION, SOLUTION INFILTRATION; PERINEURAL AS NEEDED
Status: DISCONTINUED | OUTPATIENT
Start: 2022-10-03 | End: 2022-10-03 | Stop reason: HOSPADM

## 2022-10-03 RX ORDER — POTASSIUM CHLORIDE 20MEQ/15ML
40 LIQUID (ML) ORAL ONCE
Status: DISCONTINUED | OUTPATIENT
Start: 2022-10-04 | End: 2022-10-04

## 2022-10-03 RX ORDER — SODIUM CHLORIDE 0.9 % (FLUSH) 0.9 %
10 SYRINGE (ML) INJECTION EVERY 12 HOURS SCHEDULED
Status: DISCONTINUED | OUTPATIENT
Start: 2022-10-03 | End: 2022-10-03 | Stop reason: HOSPADM

## 2022-10-03 RX ORDER — SODIUM CHLORIDE 0.9 % (FLUSH) 0.9 %
10 SYRINGE (ML) INJECTION AS NEEDED
Status: DISCONTINUED | OUTPATIENT
Start: 2022-10-03 | End: 2022-10-03 | Stop reason: HOSPADM

## 2022-10-03 RX ORDER — DIPHENHYDRAMINE HYDROCHLORIDE 50 MG/ML
INJECTION INTRAMUSCULAR; INTRAVENOUS AS NEEDED
Status: DISCONTINUED | OUTPATIENT
Start: 2022-10-03 | End: 2022-10-03 | Stop reason: HOSPADM

## 2022-10-03 RX ORDER — ASPIRIN 325 MG
TABLET ORAL AS NEEDED
Status: DISCONTINUED | OUTPATIENT
Start: 2022-10-03 | End: 2022-10-03 | Stop reason: HOSPADM

## 2022-10-03 RX ORDER — POTASSIUM CHLORIDE 20 MEQ/1
20 TABLET, EXTENDED RELEASE ORAL DAILY
Qty: 30 TABLET | Refills: 11 | Status: SHIPPED | OUTPATIENT
Start: 2022-10-03

## 2022-10-03 RX ORDER — FUROSEMIDE 10 MG/ML
40 INJECTION INTRAMUSCULAR; INTRAVENOUS 2 TIMES DAILY
Status: DISCONTINUED | OUTPATIENT
Start: 2022-10-03 | End: 2022-10-04 | Stop reason: HOSPADM

## 2022-10-03 RX ORDER — FENTANYL CITRATE 50 UG/ML
INJECTION, SOLUTION INTRAMUSCULAR; INTRAVENOUS AS NEEDED
Status: DISCONTINUED | OUTPATIENT
Start: 2022-10-03 | End: 2022-10-03 | Stop reason: HOSPADM

## 2022-10-03 RX ADMIN — FUROSEMIDE 40 MG: 10 INJECTION, SOLUTION INTRAMUSCULAR; INTRAVENOUS at 22:09

## 2022-10-03 NOTE — OP NOTE
[facility]  Date of procedure: 10/3/2022     Procedures performed:     1.  Access to the right radial artery via modified Seldinger technique and ultrasound guidance  2.  Access to the right internal jugular vein via modified Seldinger technique and ultrasound guidance  3.  Left heart catheterization with retrograde crosscut aortic valve to the left ventricle pressures were recorded  4.  LV ventriculography  5.  Right heart catheterization with oxygen saturations and pressures obtained in a pullback fashion  6.  RFR analysis of the left circumflex with a ratio recorded at greater than 0.90 indicating the lesion was not hemodynamically significant  7.  RFR analysis of the intermedius ramus branch with a ratio recorded at less than 0.90 indicating the lesion was hemodynamically significant  8.  RFR analysis of the LAD with a ratio recorded at greater than 0.90 indicating the lesion was not hemodynamically significant  9.  Successful PTCA to the ramus branch with a 2 oh by 12 balloon  10.  Successful PCI to the ramus branch with a 2 to 5 x 18 mm drug-eluting stent  11.  Conscious sedation with continuous hemodynamic monitoring for 1 hour and 10 minutes  12.  Patent hemostasis achieved in the right radial artery access site using a TR band applied to the right wrist once sheath was pulled  13.  Patent hemostasis achieved in the right internal jugular vein access site using manual pressure held to the right neck once the sheath was pulled         Risk, Benefits, and Alternatives discussed with the patient and/or family.  Plan is for moderate sedation, and the patient agrees to proceed with the procedure.  An immediate assessment was done prior to the administration of moderate sedation    Indication: New onset cardiomyopathy, multiple recent hospitalizations, progressively worsening dyspnea on exertion  Premedication: Versed, Fentanyl  Contrast: Isovue 252 cc  Radiation: Flouro time= 10 minutes.  Air Kerma= 915  Catheters: 5F TIG, pigtail, XB 3.5, XB 3.5 with sideholes, [place]-Ted    Procedural details  The patient was brought to the cath lab and prepped and draped in the usual fashion.  Access was obtained in the right radial artery via modified Seldinger technique and ultrasound guidance.  A 6 [place] citizen sheath was placed into the artery and flushed.  Next, access was obtained in the right internal jugular vein access site using a modified Seldinger technique and ultrasound guidance.  A 7 [place] citizen sheath was placed into the vein and flushed.  Next, a [place]-Ted catheter was inserted and advanced into the right pulmonary artery.  Oxygen saturations were obtained from the right radial sheath as well as the catheter in the right pulmonary artery.  Next, pressures were recorded with the balloon inflated and deflated.  The catheter was then withdrawn back to the right ventricle and again pressures were recorded followed by pulling back into the right atrium where pressures were recorded.  This catheter was then withdrawn through the sheath and the sheath was flushed.  Next, the TIG catheter was inserted and advanced into the ascending aorta where it was used to engage the left main.  Selective left coronary angiography was performed in multiple views.  This catheter was then used to engage the right and selective right coronary angiography was performed in multiple views.  This catheter was then exchanged for a pigtail which was used to cross the aortic valve to the left ventricle where pressures were recorded.  LV ventriculography was then performed.  This catheter was then withdrawn back cross aortic valve and again pressures were recorded.  Next, an XB was inserted and used to engage the left main.  We had significant dampening so this catheter was exchanged for an XB 3.5 with sideholes which was used to engage the left main.  A pressure wire was inserted and advanced into the distal left circumflex after pressures  were equalized and nitroglycerin was administered.  RFR analysis was performed on the left circumflex with a ratio recorded at 0.91 indicating the lesion was borderline but not hemodynamically significant.  The wire was then withdrawn and advanced into the ramus branch.  RFR analysis was performed on this vessel with a ratio recorded at less than 0.90 indicating that the lesion was hemodynamically significant.  Next, a 2 oh by 12 mm balloon was inserted and advanced across the lesion where it was inflated to 12 luli for 20 seconds.  This balloon was then exchanged for a Xience nate point 2.2 518 mm drug-eluting stent which was inserted into the proximal ramus branch where it was deployed at 12 luli for 45 seconds.  Post intervention angiography was performed.  RFR analysis was repeated with a ratio improving to greater than 0.90 indicating we had addressed the hemodynamically significant lesion.  Next, the wire was withdrawn and advanced into the distal LAD.  RFR analysis was performed on the LAD with a ratio recorded at 0.92 indicating that the lesion was not hemodynamically significant.  Post analysis angiography was performed with no complications noted.  Everything was then withdrawn to the sheath and sheath was flushed.  Patent hemostasis was achieved in the right radial artery access site using a TR band applied to the right wrist once sheath was pulled.  Pain hemostasis was achieved in the right internal jugular venous access site using manual pressure held to the right neck once the sheath was pulled.  Patient tolerated procedure well without any complications.  He left the Cath Lab in stable condition.      Lastly, moderate sedation was administered throughout the case by nursing staff under my supervision.  Intravenous fentanyl and Versed were administered with first dose given at 1301.  Continuous pulse oximetry, blood pressure, and heart rate monitoring was performed throughout the case, along with patient  assessments.  The end of my face-to-face encounter with the patient was at 1412, accounting for a total of 1 hour and 10 minutes of supervised administration a moderate sedation.      Findings:    Ultrasound examination of the right neck: Right internal jugular vein was identified in typical anterolateral location to the right internal internal carotid artery.  Vein was fully compressible and there was no evidence of thrombus.  Pulsatile flow was confirmed in the artery using color Doppler.  Needle seen directly entering the anterior surface of the vein.    Hemodynamics:      Aortic: 164/66 mmHg  Left ventricle: 182/11 mmHg  Left ventricular end-diastolic pressure: 43 mmHg (significantly elevated)    Right atrium: 2 mmHg (normal)  Right ventricle: 38/1 mmHg with a mean of 5 mmHg (normal)  Pulmonary artery: 38/12 mmHg with a mean of 24 mmHg (borderline elevated)  Pulmonary capillary wedge pressure: 14 mmHg (normal)    Oximetry:  Pulmonary artery: 75%  Aortic: 100%    Cardiac output and index via Diego equation:  Cardiac output: 6.7 L/min  Cardiac index: 3.7 L/min/m²    Left ventriculography:  1. The aortic and mitral valves appear normal. The aortic outflow tract is normal.  2. The contractility pattern of the LV moderately reduced, ejection fraction 36 to 40% with basal inferior hypokinesis noted.  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  3.  Moderate aortic stenosis    Selective coronary angiography:     Left main: The left main is a very short caliber vessel that immediately trifurcates into the LAD, intermedius ramus and left circumflex coronary arteries, MARIS-3 flow    LAD: The LAD is a large-caliber vessel that wraps around to the apex, there is a proximately 40 to 50% stenosis in the proximal segment and underwent RFR analysis with a ratio recorded at greater than 0.90 indicating the lesion was not hemodynamically significant, MARIS-3 flow    Diagonals: First diagonal is small caliber, the second diagonal is  moderate caliber with mild disease    Intermedius ramus: The ramus is a large-caliber vessel that has a 70% stenosis in its proximal segment and underwent RFR analysis with a ratio recorded at less than 0.90 indicating a hemodynamically significant lesion.  Status post successful PTCA and PCI we had continuation of MARIS-3 flow and no residual stenosis remaining.  RFR analysis improved to greater than 0.90.    Left circumflex: Left circumflex is a large-caliber vessel, there is a 40 to 50% stenotic lesion in the midsegment, MARIS-3 flow.  Collaterals supplied distal RCA    Obtuse marginals: Small caliber vessels    RCA: Right coronary artery is a moderate caliber vessel that has 20 to 30% stenosis in proximal segment, there appears to be a subtotal occlusion in the mid segment with collaterals extending into the distal vessel, the distal vessel also fills retrograde via left to right collaterals from the left circumflex system.    PDA/RENE: Poorly visualized    Estimated Blood Loss: 5 cc    Specimens: None    Complications: None     Impression:  1.  Coronary artery disease as described above including a hemodynamically significant lesion in the intermedius ramus branch that underwent successful PTCA and PCI with continuation of MARIS-3 flow and no residual stenosis remaining  2.  Mild pulmonary hypertension  3.  Normal cardiac outputs, significantly elevated left ventricular end-diastolic pressure  4.  Heart failure with reduced ejection fraction, ischemic cardiomyopathy with an ejection fraction of 36 to 40%  5.  Paroxysmal atrial fibrillation  6.  Hypertension  7.  Moderate aortic stenosis  8.  COPD  9.  Type 2 diabetes    Plan:   1.  Continue dual antiplatelet for a minimum of 1 year with aspirin and Plavix.  This will be on top of Xarelto for his A. fib.  We will do triple therapy for 1 month and then get rid of the aspirin.  2.  High intensity statin with Lipitor 40 (double the dose of home Lipitor 20)  3.   Continue on home dose losartan and Toprol  4.  Change Lasix to Bumex due to the significantly elevated end-diastolic pressures  5.  Repeat echocardiogram in 3 months  6.  Follow-up with cardiology in 3 months      Kun Devine DO  Interventional cardiology  [facility]  October 3, 2022

## 2022-10-03 NOTE — H&P
"Patient seen and examined at bedside.  History and physical have not changed as below.    We will performing a diagnostic left heart catheterization with possible intervention based on findings.  We will also be performing a right heart catheterization today.    The risk, benefits and alternatives were explained to the patient and he wished to proceed.    Chief Complaint   Patient presents with   • Shortness of Breath       NEW PT    • Edema       LEGS          History of Present Illness  Chito Govea is a 66-year-old male who presents to the clinic today to establish care.      The patient's current medications were reviewed. He is currently utilizing Lipitor 20 mg, Lasix 20 mg, losartan (Cozaar) 25 mg, Toprol 100 mg, and Xarelto 20 mg. He also takes glimepiride 2 mg (Amaryl) for diabetes, Protonix 40 mg, Flomax, and Symbicort.     Mr. Govea is recovering from his recent hospital stay at [facility] from 09/01/2022 to 09/10/2022 where he was diagnosed with systolic heart failure, paroxysmal atrial fibrillation, and moderate aortic stenosis. The patient reports that he has been doing well since leaving the hospital. He states that he feels good as far as his breathing goes. However, he is experiencing extreme bilateral lower extremity edema even on the Lasix 20 mg. He reports that overnight last night his bilateral legs became extremely bruised for no reason. He notes that he tries to walk for exercise and when he is at home he elevates his legs. He also has a cough that on occasion produces a \"stringy-like\" mucus. He states that the mucus is usually clear in color, but occasionally it will be a yellowish colored phlegm. He notes that when he coughs, it feels like the mucus gets caught in his throat and \"it don't want to come out.\" The patient reports that he has never had a heart catheterization nor has he ever experienced anything heart related, not even chest pains, until a couple months ago when he was " taken to the emergency room in 2022.     He reports that for the first 3 days of being home after the hospital he was not given any medications and was feeling good. He was then put on amiodarone by Dr. Du and within 2 days he could not breathe. So for 2 days he stopped taking the amiodarone and started feeling good, but then his doctor told him to resume taking. He resumed taking the amiodarone and by that night he could not breathe again and it felt like he had an elephant sitting on his chest, so he went to the hospital. The amiodarone was discontinued and he was started on metoprolol and ever since he has not had any problems with breathing.     The patient's blood pressure is 130/54 mmHg today, and his heart rate is 62 beats per minute.      The patient has a family history of heart disease. His father suffered a stroke at some point and  at age 55 or 56 due to heart related issues.      The patient reports that he recently quit smoking while he was in the hospital.         Review of Systems   Constitutional: Negative.   HENT: Negative.    Cardiovascular: Positive for leg swelling.   Respiratory: Positive for cough, shortness of breath and sputum production.    Endocrine: Negative.    Skin:        Bruising of bilateral lower extremities along with edema.   Musculoskeletal: Negative.    Gastrointestinal: Negative.    Genitourinary: Negative.    Neurological: Negative.          Otherwise complete ROS reviewed and negative except as mentioned in the HPI.        Past Medical History:   Medical History        Past Medical History:   Diagnosis Date   • Arthritis     • CHF (congestive heart failure) (HCC)     • Diabetes mellitus (HCC)     • HLD (hyperlipidemia)     • Hypertension     • Renal disorder              Past Surgical History:  Surgical History   No past surgical history on file.        Family History: family history includes COPD in his brother; Cancer in his mother and sister; Diabetes in his  "sister; Heart disease in his father.     Social History:  reports that he quit smoking about 3 months ago. His smoking use included cigarettes. He has a 75.00 pack-year smoking history. He has never used smokeless tobacco. He reports current drug use. Drug: Marijuana. He reports that he does not drink alcohol.     Medications:          Prior to Admission medications    Medication Sig Start Date End Date Taking? Authorizing Provider   atorvastatin (LIPITOR) 20 MG tablet Take 20 mg by mouth Daily.     Yes Michael Montanez MD   budesonide-formoterol (SYMBICORT) 160-4.5 MCG/ACT inhaler Inhale 2 puffs 2 (Two) Times a Day. 9/10/22   Yes Ellis Rey MD   furosemide (LASIX) 20 MG tablet Take 1 tablet by mouth Daily for 30 days. 9/11/22 10/11/22 Yes Ellis Rey MD   glimepiride (AMARYL) 2 MG tablet Take 2 mg by mouth Every Morning Before Breakfast.     Yes Michael Montanez MD   losartan (COZAAR) 25 MG tablet Take 1 tablet by mouth Daily for 30 days. 9/11/22 10/11/22 Yes Ellis Rey MD   metoprolol succinate XL (Toprol XL) 100 MG 24 hr tablet Take 1 tablet by mouth Daily for 30 days. 9/10/22 10/10/22 Yes Ellis Rey MD   pantoprazole (PROTONIX) 40 MG EC tablet Take 1 tablet by mouth 2 (Two) Times a Day Before Meals. 8/15/22   Yes Edwin Du DO   rivaroxaban (XARELTO) 20 MG tablet Take 20 mg by mouth Daily.     Yes Michael Montanez MD   tamsulosin (FLOMAX) 0.4 MG capsule 24 hr capsule Take 1 capsule by mouth Daily. 9/11/22   Yes Ellis Rey MD      Allergies:  No Known Allergies     Objective      Vital Signs: /54   Pulse 62   Ht 176.8 cm (69.61\")   Wt 68.5 kg (151 lb)   SpO2 99%   BMI 21.91 kg/m²      Vitals and nursing note reviewed.   Constitutional:       Appearance: Normal and healthy appearance. Well-developed and not in distress.   Eyes:      Extraocular Movements: Extraocular movements intact.      Pupils: Pupils are equal, round, and reactive " to light.   HENT:      Head: Normocephalic and atraumatic.    Mouth/Throat:      Pharynx: Oropharynx is clear.   Neck:      Vascular: JVD normal.      Trachea: Trachea normal.   Pulmonary:      Effort: Pulmonary effort is normal.      Breath sounds: Normal breath sounds. No wheezing. No rhonchi. No rales.   Cardiovascular:      PMI at left midclavicular line. Normal rate. Regular rhythm. Normal S1. Normal S2.      Murmurs: There is a grade 2/6 systolic murmur.      No gallop. No rub.   Pulses:     Dorsalis pedis: 2+ bilaterally.     Posterior tibial: 2+ bilaterally.  Edema:     Peripheral edema present.     Comments: Bilateral lower extremity edema.  Abdominal:      General: Bowel sounds are normal.      Palpations: Abdomen is soft.      Tenderness: There is no abdominal tenderness.   Musculoskeletal: Normal range of motion.      Cervical back: Normal range of motion and neck supple. Skin:     General: Skin is warm and dry.      Capillary Refill: Capillary refill takes less than 2 seconds.   Feet:      Right foot:      Skin integrity: Skin integrity normal.      Left foot:      Skin integrity: Skin integrity normal.   Neurological:      Mental Status: Alert and oriented to person, place and time.      Cranial Nerves: Cranial nerves are intact.      Sensory: Sensation is intact.      Motor: Motor function is intact.      Coordination: Coordination is intact.   Psychiatric:         Speech: Speech normal.         Behavior: Behavior is cooperative.            Results Reviewed:  Transthoracic echocardiogram dated 08/11/2022 was reviewed today. Estimated left ventricular ejection fraction equals 50 percent. Left ventricular systolic function is normal.      Transthoracic echocardiogram dated 09/04/2022 was reviewed today. Left ventricular ejection fraction appears to be 36 - 40 percent.        Procedures              Lab Results   Component Value Date     TRIG 100 08/23/2022     HDL 46 (L) 08/23/2022            Lab  Results   Component Value Date     HGBA1C 8.40 (H) 09/01/2022   Lab work during his hospital stay dated 09/01/2022 to 09/10/2022 was reviewed today. His creatinine on 09/05/2022 was 1.6 mg/dL and upon discharge on 09/10/2022 his creatinine was 1.27 mg/dL.        Assessment / Plan              Problem List Items Addressed This Visit                 Cardiac and Vasculature      Paroxysmal atrial fibrillation (HCC)      Hypertension      Aortic stenosis, moderate      Type 2 MI (myocardial infarction) (HCC) due to hypoxia and congestive heart failure      Cardiomyopathy (HCC) - Primary      Overview        Added automatically from request for surgery [id number]            Relevant Orders      Case Request Cath Lab: Left Heart Cath/ RIGHT HEART CATH, RIGHT RADIAL, RIGHT IJ ACCESS, US GUIDED (Completed)            Endocrine and Metabolic      Type 2 diabetes mellitus with hyperglycemia, without long-term current use of insulin (HCC)            Pulmonary and Pneumonias      Bilateral pleural effusion      COPD (chronic obstructive pulmonary disease) (HCC)      Pulmonary hypertension (HCC)      Overview        Added automatically from request for surgery [id number]            Relevant Orders      Case Request Cath Lab: Left Heart Cath/ RIGHT HEART CATH, RIGHT RADIAL, RIGHT IJ ACCESS, US GUIDED (Completed)            Tobacco      Former smoker, quit 7/2022           1. Cardiomyopathy, unspecified type (HCC)  2. Pulmonary hypertension (HCC)     The patient is experiencing severe lower extremity edema even though he is on Lasix 20 mg daily. We will increase his Lasix to 40 mg  taking 2 pills daily. He will continue Xarelto 20 mg for the atrial fibrillation. He will continue Lipitor 20 mg at current dose. He will continue Toprol (metoprolol) as prescribed. He is to continue the Cozaar 25 mg (losartan). The patient has been in and out of the hospital numerous times since his first admission to the emergency room on 08/09/2022. He is  experiencing shortness of breath and his heart failure is worsening. He has agreed to undergo a heart catheterization and the procedure was explained to him in detail. He is scheduled Monday,10/03/2022, for a left heart catheterization and a right heart catheterization.

## 2022-10-03 NOTE — NURSING NOTE
Patient O2 sat in the low 80's on arrival from the cath lab placed on 2 l O2 per nasal cannula, patient started desating again about 30 minutes later increased to 4 L O2 per nasal cannula.  Dr. Devine notified, will continue to monitor.

## 2022-10-04 ENCOUNTER — HOME CARE VISIT (OUTPATIENT)
Dept: HOME HEALTH SERVICES | Facility: CLINIC | Age: 67
End: 2022-10-04

## 2022-10-04 VITALS
SYSTOLIC BLOOD PRESSURE: 134 MMHG | HEIGHT: 68 IN | BODY MASS INDEX: 22.73 KG/M2 | OXYGEN SATURATION: 95 % | TEMPERATURE: 98.1 F | WEIGHT: 150 LBS | RESPIRATION RATE: 18 BRPM | HEART RATE: 64 BPM | DIASTOLIC BLOOD PRESSURE: 83 MMHG

## 2022-10-04 LAB
ANION GAP SERPL CALCULATED.3IONS-SCNC: 6 MMOL/L (ref 5–15)
BUN SERPL-MCNC: 13 MG/DL (ref 8–23)
BUN/CREAT SERPL: 14.3 (ref 7–25)
CALCIUM SPEC-SCNC: 7.8 MG/DL (ref 8.6–10.5)
CHLORIDE SERPL-SCNC: 99 MMOL/L (ref 98–107)
CO2 SERPL-SCNC: 31 MMOL/L (ref 22–29)
CREAT SERPL-MCNC: 0.91 MG/DL (ref 0.76–1.27)
DEPRECATED RDW RBC AUTO: 53.5 FL (ref 37–54)
EGFRCR SERPLBLD CKD-EPI 2021: 92.4 ML/MIN/1.73
ERYTHROCYTE [DISTWIDTH] IN BLOOD BY AUTOMATED COUNT: 15.9 % (ref 12.3–15.4)
GLUCOSE SERPL-MCNC: 213 MG/DL (ref 65–99)
HCT VFR BLD AUTO: 36.2 % (ref 37.5–51)
HGB BLD-MCNC: 11.3 G/DL (ref 13–17.7)
MCH RBC QN AUTO: 28.5 PG (ref 26.6–33)
MCHC RBC AUTO-ENTMCNC: 31.2 G/DL (ref 31.5–35.7)
MCV RBC AUTO: 91.2 FL (ref 79–97)
PLATELET # BLD AUTO: 157 10*3/MM3 (ref 140–450)
PMV BLD AUTO: 9.8 FL (ref 6–12)
POTASSIUM SERPL-SCNC: 4.1 MMOL/L (ref 3.5–5.2)
RBC # BLD AUTO: 3.97 10*6/MM3 (ref 4.14–5.8)
SODIUM SERPL-SCNC: 136 MMOL/L (ref 136–145)
WBC NRBC COR # BLD: 4.59 10*3/MM3 (ref 3.4–10.8)

## 2022-10-04 PROCEDURE — 63710000001 HYDROCHLOROTHIAZIDE 25 MG TABLET 100 EACH BOTTLE: Performed by: EMERGENCY MEDICINE

## 2022-10-04 PROCEDURE — 25010000002 FUROSEMIDE PER 20 MG: Performed by: EMERGENCY MEDICINE

## 2022-10-04 PROCEDURE — 63710000001 LISINOPRIL 10 MG TABLET 1,000 EACH BOTTLE: Performed by: EMERGENCY MEDICINE

## 2022-10-04 PROCEDURE — A9270 NON-COVERED ITEM OR SERVICE: HCPCS | Performed by: EMERGENCY MEDICINE

## 2022-10-04 PROCEDURE — 80048 BASIC METABOLIC PNL TOTAL CA: CPT | Performed by: EMERGENCY MEDICINE

## 2022-10-04 PROCEDURE — 63710000001 AMLODIPINE 5 MG TABLET: Performed by: EMERGENCY MEDICINE

## 2022-10-04 PROCEDURE — 63710000001: Performed by: EMERGENCY MEDICINE

## 2022-10-04 PROCEDURE — 99217 PR OBSERVATION CARE DISCHARGE MANAGEMENT: CPT | Performed by: EMERGENCY MEDICINE

## 2022-10-04 PROCEDURE — G0378 HOSPITAL OBSERVATION PER HR: HCPCS

## 2022-10-04 PROCEDURE — 94618 PULMONARY STRESS TESTING: CPT

## 2022-10-04 PROCEDURE — 85027 COMPLETE CBC AUTOMATED: CPT | Performed by: EMERGENCY MEDICINE

## 2022-10-04 RX ORDER — METOPROLOL SUCCINATE 100 MG/1
100 TABLET, EXTENDED RELEASE ORAL
Status: DISCONTINUED | OUTPATIENT
Start: 2022-10-04 | End: 2022-10-04 | Stop reason: HOSPADM

## 2022-10-04 RX ORDER — ATORVASTATIN CALCIUM 10 MG/1
20 TABLET, FILM COATED ORAL NIGHTLY
Status: DISCONTINUED | OUTPATIENT
Start: 2022-10-04 | End: 2022-10-04 | Stop reason: HOSPADM

## 2022-10-04 RX ORDER — PANTOPRAZOLE SODIUM 20 MG/1
20 TABLET, DELAYED RELEASE ORAL 2 TIMES DAILY
Status: DISCONTINUED | OUTPATIENT
Start: 2022-10-04 | End: 2022-10-04 | Stop reason: HOSPADM

## 2022-10-04 RX ORDER — POTASSIUM CHLORIDE 20MEQ/15ML
40 LIQUID (ML) ORAL ONCE
Status: COMPLETED | OUTPATIENT
Start: 2022-10-04 | End: 2022-10-04

## 2022-10-04 RX ORDER — LISINOPRIL AND HYDROCHLOROTHIAZIDE 20; 12.5 MG/1; MG/1
1 TABLET ORAL DAILY
COMMUNITY
End: 2022-10-04 | Stop reason: HOSPADM

## 2022-10-04 RX ORDER — AMLODIPINE BESYLATE 5 MG/1
5 TABLET ORAL
Status: DISCONTINUED | OUTPATIENT
Start: 2022-10-04 | End: 2022-10-04 | Stop reason: HOSPADM

## 2022-10-04 RX ORDER — BUMETANIDE 0.25 MG/ML
0.5 INJECTION INTRAMUSCULAR; INTRAVENOUS ONCE
Status: COMPLETED | OUTPATIENT
Start: 2022-10-04 | End: 2022-10-04

## 2022-10-04 RX ORDER — TAMSULOSIN HYDROCHLORIDE 0.4 MG/1
0.4 CAPSULE ORAL DAILY
Status: DISCONTINUED | OUTPATIENT
Start: 2022-10-04 | End: 2022-10-04 | Stop reason: HOSPADM

## 2022-10-04 RX ORDER — LOSARTAN POTASSIUM 25 MG/1
25 TABLET ORAL DAILY
Qty: 90 TABLET | Refills: 1 | Status: SHIPPED | OUTPATIENT
Start: 2022-10-04 | End: 2022-11-09 | Stop reason: SDUPTHER

## 2022-10-04 RX ORDER — AMLODIPINE BESYLATE 10 MG/1
10 TABLET ORAL DAILY
COMMUNITY
End: 2022-10-12 | Stop reason: ALTCHOICE

## 2022-10-04 RX ORDER — AMIODARONE HYDROCHLORIDE 200 MG/1
200 TABLET ORAL 2 TIMES DAILY WITH MEALS
Status: DISCONTINUED | OUTPATIENT
Start: 2022-10-04 | End: 2022-10-04 | Stop reason: HOSPADM

## 2022-10-04 RX ORDER — ATORVASTATIN CALCIUM 40 MG/1
40 TABLET, FILM COATED ORAL DAILY
Qty: 90 TABLET | Refills: 3 | Status: SHIPPED | OUTPATIENT
Start: 2022-10-04 | End: 2022-11-09 | Stop reason: SDUPTHER

## 2022-10-04 RX ORDER — AMIODARONE HYDROCHLORIDE 200 MG/1
200 TABLET ORAL 2 TIMES DAILY
COMMUNITY
End: 2022-10-12 | Stop reason: ALTCHOICE

## 2022-10-04 RX ADMIN — FUROSEMIDE 40 MG: 10 INJECTION, SOLUTION INTRAMUSCULAR; INTRAVENOUS at 09:02

## 2022-10-04 RX ADMIN — LISINOPRIL: 10 TABLET ORAL at 13:09

## 2022-10-04 RX ADMIN — BUMETANIDE 0.5 MG: 0.25 INJECTION, SOLUTION INTRAMUSCULAR; INTRAVENOUS at 03:20

## 2022-10-04 RX ADMIN — POTASSIUM CHLORIDE 40 MEQ: 20 SOLUTION ORAL at 03:20

## 2022-10-04 RX ADMIN — AMLODIPINE BESYLATE 5 MG: 5 TABLET ORAL at 13:10

## 2022-10-04 NOTE — CASE MANAGEMENT/SOCIAL WORK
Continued Stay Note   [place]     Patient Name: Chito Govea  MRN: [id number]  Today's Date: 10/4/2022    Admit Date: 10/3/2022    Plan: Home Oxygen   Discharge Plan     Row Name 10/04/22 1244       Plan    Plan Home Oxygen    Final Discharge Disposition Code 01 - home or self-care    Final Note Received order for home o2. Patient would like to use [facility]. SW provided referral to Estelita Brewster with [employer]. Portable tank will be delivered to patient's room.                Expected Discharge Date and Time     Expected Discharge Date Expected Discharge Time    Oct 4, 2022             SIRI Chowdhury

## 2022-10-04 NOTE — PROGRESS NOTES
Exercise Oximetry    Patient Name:Chito Govea   MRN: [id number]   Date: 10/04/22             ROOM AIR BASELINE   SpO2% 87   Heart Rate 116   Blood Pressure      EXERCISE ON ROOM AIR SpO2% EXERCISE ON O2 @ 2 LPM SpO2%   1 MINUTE  1 MINUTE 97   2 MINUTES  2 MINUTES 96   3 MINUTES  3 MINUTES 95   4 MINUTES  4 MINUTES 96   5 MINUTES  5 MINUTES    6 MINUTES  6 MINUTES               Distance Walked   Distance Walked 260 ft   Dyspnea (Marjan Scale)   Dyspnea (Marjan Scale) 0   Fatigue (Marjan Scale)   Fatigue (Marjan Scale) 0   SpO2% Post Exercise  SpO2% Post Exercise 97%   HR Post Exercise   HR Post Exercise 115   Time to Recovery   Time to Recovery 3 min     Comments:  RT recommendation of 2lpm of oxygen continuous.

## 2022-10-04 NOTE — CASE MANAGEMENT/SOCIAL WORK
"Continued Stay Note  [facility]     Patient Name: Chito Govea  MRN: [id number]  Today's Date: 10/4/2022    Admit Date: 10/3/2022    Plan: Home Oxygen / Medication Assistance   Discharge Plan     Row Name 10/04/22 0857       Plan    Plan Home Oxygen / Medication Assistance    Plan Comments Received consults for home oxygen and also medication assistance. Patient does not have Rx coverage so SS will be able to assist patient with certain discharge medications and will follow to arrange assistance at discharge.  notified Dr. Devine that patient will need walking oximetry and home oxygen orders at discharge if patient qualifies \"home oxygen at __lpm\".    Again, will follow for these to assist at ID.                       Expected Discharge Date and Time     Expected Discharge Date Expected Discharge Time    Oct 3, 2022             SIRI Chowdhury    "

## 2022-10-04 NOTE — DISCHARGE SUMMARY
[facility] HEART GROUP DISCHARGE    Date of Discharge:  10/4/2022    Discharge Diagnosis: CAD, hypoxia    Presenting Problem/History of Present Illness  Cardiomyopathy, unspecified type (HCC) [I42.9]  Pulmonary hypertension (HCC) [I27.20]  Coronary artery disease involving coronary bypass graft of native heart with unstable angina pectoris (HCC) [I25.700]        Hospital Course  Patient is a 66 y.o. male presented as an outpatient to undergo a diagnostic left heart catheterization.  Please see the dedicated procedure note for details.  He underwent stent placement to his intermedius ramus branch.  He was noted to be hypoxic on room air and postcardiac cath recovery.  He was therefore admitted overnight with oxygen supplementation.  His shortness of breath improved with diuresis and the oxygen.  He was discharged home in stable condition.    He will be going home with home oxygen continuously.  He will be doing triple therapy with aspirin, Plavix and Xarelto for 1 month.  On day 31, he will stop the aspirin and just continue on Xarelto and Plavix.  He is unable to afford Entresto, therefore we will continue on his ARB.  He will stop Lasix and start Bumex in place with potassium supplementation daily.  He will continue on his current dose of beta-blocker, amiodarone and amlodipine.  He will double the dose of his Lipitor from 20 up to 40 for high intensity.    He will follow-up with me in the clinic in 1 month    Procedures Performed  Procedure(s):  Left Heart Cath/ RIGHT HEART CATH, RIGHT RADIAL, RIGHT IJ ACCESS, US GUIDED       Consults:   Consults     Date and Time Order Name Status Description    9/3/2022  5:25 PM Inpatient Pulmonology Consult Completed     9/1/2022  9:14 AM Inpatient Cardiology Consult Completed     9/1/2022  4:22 AM Wound / Ostomy  Care Provider Consult Completed           PCondition on Discharge: Stable    Physical Exam at Discharge    Vital Signs  Temp:  [98.1 °F (36.7 °C)-100.5  °F (38.1 °C)] 98.1 °F (36.7 °C)  Heart Rate:  [] 64  Resp:  [17-24] 18  BP: (121-174)/() 134/83    Physical Exam:  Vitals and nursing note reviewed.   Constitutional:       Appearance: Normal and healthy appearance. Well-developed and not in distress.   Eyes:      Extraocular Movements: Extraocular movements intact.      Pupils: Pupils are equal, round, and reactive to light.   HENT:      Head: Normocephalic and atraumatic.    Mouth/Throat:      Pharynx: Oropharynx is clear.   Neck:      Vascular: JVD normal.      Trachea: Trachea normal.   Pulmonary:      Effort: Pulmonary effort is normal.      Breath sounds: Decreased breath sounds present. No wheezing. No rhonchi. No rales.   Cardiovascular:      PMI at left midclavicular line. Normal rate. Regular rhythm. Normal S1. Normal S2.      Murmurs: There is a grade 2/6 systolic murmur.      No gallop. No rub.   Pulses:     Dorsalis pedis: 2+ bilaterally.     Posterior tibial: 2+ bilaterally.  Abdominal:      General: Bowel sounds are normal.      Palpations: Abdomen is soft.      Tenderness: There is no abdominal tenderness.   Musculoskeletal: Normal range of motion.      Cervical back: Normal range of motion and neck supple. Skin:     General: Skin is warm and dry.      Capillary Refill: Capillary refill takes less than 2 seconds.   Feet:      Right foot:      Skin integrity: Skin integrity normal.      Left foot:      Skin integrity: Skin integrity normal.   Neurological:      Mental Status: Alert and oriented to person, place and time.      Cranial Nerves: Cranial nerves are intact.      Sensory: Sensation is intact.      Motor: Motor function is intact.      Coordination: Coordination is intact.   Psychiatric:         Speech: Speech normal.         Behavior: Behavior is cooperative.         Discharge Disposition  Home or Self Care    Discharge Medications     Discharge Medications      New Medications      Instructions Start Date   aspirin 81 MG EC  tablet   81 mg, Oral, Daily      bumetanide 2 MG tablet  Commonly known as: BUMEX   2 mg, Oral, Daily      clopidogrel 75 MG tablet  Commonly known as: PLAVIX   75 mg, Oral, Daily      potassium chloride 20 MEQ CR tablet  Commonly known as: K-DURKLOR-CON   20 mEq, Oral, Daily         Changes to Medications      Instructions Start Date   losartan 25 MG tablet  Commonly known as: Cozaar  What changed: when to take this   25 mg, Oral, Daily         Continue These Medications      Instructions Start Date   budesonide-formoterol 160-4.5 MCG/ACT inhaler  Commonly known as: SYMBICORT   2 puffs, Inhalation, 2 Times Daily - RT      glimepiride 2 MG tablet  Commonly known as: AMARYL   2 mg, Oral, Every Morning Before Breakfast      metoprolol succinate  MG 24 hr tablet  Commonly known as: Toprol XL   100 mg, Oral, Daily      pantoprazole 40 MG EC tablet  Commonly known as: PROTONIX   40 mg, Oral, 2 Times Daily Before Meals      rivaroxaban 20 MG tablet  Commonly known as: XARELTO   20 mg, Oral, Daily      tamsulosin 0.4 MG capsule 24 hr capsule  Commonly known as: FLOMAX   0.4 mg, Oral, Daily         Stop These Medications    atorvastatin 20 MG tablet  Commonly known as: LIPITOR     furosemide 20 MG tablet  Commonly known as: LASIX     lisinopril-hydrochlorothiazide 20-12.5 MG per tablet  Commonly known as: MOSHE TEJEDA        ASK your doctor about these medications      Instructions Start Date   amiodarone 200 MG tablet  Commonly known as: PACERONE   200 mg, Oral, 2 Times Daily      amLODIPine 10 MG tablet  Commonly known as: NORVASC   10 mg, Oral, Daily             Discharge Diet: Heart healthy    Activity at Discharge: As tolerated    Follow-up Appointments  Future Appointments   Date Time Provider Department Center   10/11/2022 To Be Determined Haase, Mallory L, RN HH PAD HC PAD   10/12/2022 10:30 AM Teresa Maciel APRN [facility] PAD PAD   10/27/2022  9:30 AM Kun Devine DO [facility] PAD PAD    1/3/2023 10:00 AM PAD ECHO ROOM 2  PAD CARDI PAD     Additional Instructions for the Follow-ups that You Need to Schedule     Ambulatory Referral to Cardiac Rehab   As directed            Test Results Pending at Discharge       Kun Devine DO  Interventional cardiology  [facility]  10/04/22  12:54 CDT

## 2022-10-06 NOTE — PROGRESS NOTES
MARKELL Aaln  [facility]   Pulmonary and Critical Care  [address]  [place] [address]  Phone: [phone]  Fax: [phone]           Chief Complaint  Hospital Follow Up Visit    Subjective    History of Present Illness     Chito Govea presents to [facility] PULMONARY & CRITICAL CARE MEDICINE   History of Present Illness   Mr. Govea is here for a hospital follow up. He was admitted to  last month when he present with complaints of shortness of air. He has known CHF. His SOA had been present for a week and he had associated leg edema as well as orthopnea. He was found to be hypoxic in the ER with sat of 78%. His BNP was elvated to 33.372. His CXR and CTA showed pulmonary edema. He had a prior admit in August where he presented with nausea, vomiting and abdominal discomfort. He had chest discomfort and generalized weakness. He was found to be atrial fib with RVR. He was treated for his acute kidney injury. He was placed on Amiodarone for his atrial fib. His Echo showed an EF of 50%, RVSP of 35-45 mmHg. He was placed on OAC. His repeat Echo in Sept showed an EF of 36-40% and moderate aortic valve stenosis. RVSP remained same. His CTA on 9/1/2022 showed no evidence of PE. Extensive interstitial infiltrate bilaterally, mild pulmonary vascular congestion/ pulmonary edema and large bilateral pleural effusion R>L. After discharge he underwent heart cath on 10/3/22 with noted CAD with PTCA and PCI performed. Today he reports he feels 100% better. He is no long on oxygen. He is almost finished with his Symbicort and has not really found much benefit. He was seen by his PCP yesterday who advised to finish this inhaler and stop. He states the Bumex has been working well. It was felt that his findings on his CT were related to possible amiodarone toxicity. He has not had a pulmonary function test. He quit smoking end of June, prior smoked 2.5 PPD x 56 years.  "             Objective   Vital Signs:   /68   Pulse 70   Ht 172.7 cm (68\")   Wt 61.5 kg (135 lb 9.6 oz)   SpO2 98%   BMI 20.62 kg/m²     Physical Exam  Vitals reviewed.   Constitutional:       Appearance: Normal appearance.   Cardiovascular:      Rate and Rhythm: Normal rate and regular rhythm.   Pulmonary:      Effort: Pulmonary effort is normal.      Breath sounds: Decreased breath sounds present.   Musculoskeletal:      Right lower leg: Edema present.      Left lower leg: Edema present.   Skin:     Nails: There is clubbing.   Neurological:      General: No focal deficit present.      Mental Status: He is alert and oriented to person, place, and time.   Psychiatric:         Mood and Affect: Mood normal.         Behavior: Behavior normal.          Result Review :  The following data was reviewed by: MARKELL Alan on 10/12/2022:  Common labs    Common Labs 9/10/22 10/3/22 10/3/22 10/3/22 10/3/22 10/4/22 10/4/22     1207 1207 1329 1331 0506 0506   Glucose 67  223 (A)    213 (A)   BUN 79 (A)  11    13   Creatinine 1.27  0.77    0.91   Sodium 139  137 137 137  136   Potassium 3.7  3.3 (A) 3.0 (A)   4.1   Chloride 105  99    99   Calcium 8.3 (A)  8.1 (A)    7.8 (A)   WBC  3.88    4.59    Hemoglobin  11.4 (A)    11.3 (A)    Hematocrit  36.2 (A)    36.2 (A)    Platelets  203    157    (A) Abnormal value       Comments are available for some flowsheets but are not being displayed.           My interpretation of imaging:  No new   My interpretation of labs: No new      CT Angiogram Chest (09/01/2022 01:53)    My interpretation of the PFT: none    No results found for this or any previous visit.        Assessment and Plan   Diagnoses and all orders for this visit:    1. Suspected Interstitial lung disease (HCC), likely due to amiodarone (Primary)    2. Pulmonary hypertension (HCC)    3. Former smoker, quit 7/2022    4. Chronic obstructive pulmonary disease, unspecified COPD type (HCC)    5. Chronic " systolic congestive heart failure (HCC)    6. Moderate aortic stenosis        Overall he is feeling much improved. He will complete his Symbicort as he does not find much benefit. He is well pleased with the effects of the Bumex and pleased with the significant improvement in his lower extremity edema. We discussed his CTA, Echo and cath results. I have asked him to return in 8 weeks with a CT of the chest and pulmonary function test.     Follow Up   Return in about 8 weeks (around 12/7/2022) for CT, PFT.  Patient was given instructions and counseling regarding his condition or for health maintenance advice. Please see specific information pulled into the AVS if appropriate.     Teresa Maciel, MARKELL  10/12/2022  16:00 CDT

## 2022-10-12 ENCOUNTER — HOME CARE VISIT (OUTPATIENT)
Dept: HOME HEALTH SERVICES | Facility: CLINIC | Age: 67
End: 2022-10-12

## 2022-10-12 ENCOUNTER — OFFICE VISIT (OUTPATIENT)
Dept: PULMONOLOGY | Facility: CLINIC | Age: 67
End: 2022-10-12

## 2022-10-12 VITALS
HEIGHT: 68 IN | BODY MASS INDEX: 20.55 KG/M2 | DIASTOLIC BLOOD PRESSURE: 68 MMHG | SYSTOLIC BLOOD PRESSURE: 118 MMHG | HEART RATE: 70 BPM | WEIGHT: 135.6 LBS | OXYGEN SATURATION: 98 %

## 2022-10-12 DIAGNOSIS — Z87.891 FORMER SMOKER: ICD-10-CM

## 2022-10-12 DIAGNOSIS — I27.20 PULMONARY HYPERTENSION: ICD-10-CM

## 2022-10-12 DIAGNOSIS — I50.22 CHRONIC SYSTOLIC CONGESTIVE HEART FAILURE: ICD-10-CM

## 2022-10-12 DIAGNOSIS — I35.0 MODERATE AORTIC STENOSIS: ICD-10-CM

## 2022-10-12 DIAGNOSIS — J84.9 INTERSTITIAL LUNG DISEASE: Primary | ICD-10-CM

## 2022-10-12 DIAGNOSIS — J44.9 CHRONIC OBSTRUCTIVE PULMONARY DISEASE, UNSPECIFIED COPD TYPE: ICD-10-CM

## 2022-10-12 PROCEDURE — 99214 OFFICE O/P EST MOD 30 MIN: CPT | Performed by: NURSE PRACTITIONER

## 2022-10-12 NOTE — HOME HEALTH
pt daughter contacted and states pt has returned to community activity with A as needed and declines further HH tx     Reason for Hosp/Primary Dx/Co-morbidities: 67 yo male Primary Dx: Hypertensive heart and chronic kidney disease with heart failure . Pt reports hospitalized 8-29-22 thru 9-10-22 per medical dx ( previously hospitalized 7-31 thru 8-8-22 ~ )   Focus of Care: deconditioning, gait, transfers, safety Primary Dx: Hypertensive heart and chronic kidney disease with heart failure   Current Functional status/mobility/DME:   See DME   HB status/Living Arrangements: lives with family - 4 step entry   Skin Integrity/wound status: BLE drsg CDI   Code Status: FULL CODE   Fall Risk: high risk   PmHx: DM BLE Neuropathy -   PLOF : community amb -    Infectious disease screen : Pt denies s/s or a exposure to anyone with s/s of Covid -19   Skills :Education in ther ex to faciltate functional strength for transfers and mobility - reduce fall risk   Education in transfers with hand and AD placement for safety   Education in AD use to promote reduced fall risk - safe functional gait   Education on proper hand/foot placement, transitional movements, and safety awareness to decrease risk of falls

## 2022-11-09 ENCOUNTER — OFFICE VISIT (OUTPATIENT)
Dept: CARDIOLOGY | Facility: CLINIC | Age: 67
End: 2022-11-09

## 2022-11-09 VITALS
WEIGHT: 135 LBS | SYSTOLIC BLOOD PRESSURE: 122 MMHG | HEART RATE: 67 BPM | OXYGEN SATURATION: 98 % | DIASTOLIC BLOOD PRESSURE: 60 MMHG | HEIGHT: 68 IN | BODY MASS INDEX: 20.46 KG/M2

## 2022-11-09 DIAGNOSIS — E11.65 TYPE 2 DIABETES MELLITUS WITH HYPERGLYCEMIA, WITHOUT LONG-TERM CURRENT USE OF INSULIN: ICD-10-CM

## 2022-11-09 DIAGNOSIS — I42.9 CARDIOMYOPATHY, UNSPECIFIED TYPE: ICD-10-CM

## 2022-11-09 DIAGNOSIS — I25.700 CORONARY ARTERY DISEASE INVOLVING CORONARY BYPASS GRAFT OF NATIVE HEART WITH UNSTABLE ANGINA PECTORIS: ICD-10-CM

## 2022-11-09 DIAGNOSIS — Z87.891 FORMER SMOKER: ICD-10-CM

## 2022-11-09 DIAGNOSIS — I48.0 PAROXYSMAL ATRIAL FIBRILLATION: Primary | ICD-10-CM

## 2022-11-09 DIAGNOSIS — I50.21 ACUTE SYSTOLIC HEART FAILURE: ICD-10-CM

## 2022-11-09 DIAGNOSIS — I10 PRIMARY HYPERTENSION: ICD-10-CM

## 2022-11-09 DIAGNOSIS — I27.20 PULMONARY HYPERTENSION: ICD-10-CM

## 2022-11-09 DIAGNOSIS — J44.9 CHRONIC OBSTRUCTIVE PULMONARY DISEASE, UNSPECIFIED COPD TYPE: ICD-10-CM

## 2022-11-09 DIAGNOSIS — I35.0 AORTIC STENOSIS, MODERATE: ICD-10-CM

## 2022-11-09 PROCEDURE — 99214 OFFICE O/P EST MOD 30 MIN: CPT | Performed by: EMERGENCY MEDICINE

## 2022-11-09 RX ORDER — CLOPIDOGREL BISULFATE 75 MG/1
75 TABLET ORAL DAILY
Qty: 90 TABLET | Refills: 3 | Status: SHIPPED | OUTPATIENT
Start: 2022-11-09

## 2022-11-09 RX ORDER — BUMETANIDE 2 MG/1
2 TABLET ORAL DAILY
Qty: 90 TABLET | Refills: 3 | Status: SHIPPED | OUTPATIENT
Start: 2022-11-09 | End: 2023-03-27 | Stop reason: SDUPTHER

## 2022-11-09 RX ORDER — METOPROLOL SUCCINATE 100 MG/1
100 TABLET, EXTENDED RELEASE ORAL DAILY
Qty: 90 TABLET | Refills: 3 | Status: SHIPPED | OUTPATIENT
Start: 2022-11-09 | End: 2022-12-14

## 2022-11-09 RX ORDER — ATORVASTATIN CALCIUM 40 MG/1
40 TABLET, FILM COATED ORAL DAILY
Qty: 90 TABLET | Refills: 3 | Status: SHIPPED | OUTPATIENT
Start: 2022-11-09

## 2022-11-09 RX ORDER — LOSARTAN POTASSIUM 25 MG/1
25 TABLET ORAL DAILY
Qty: 90 TABLET | Refills: 3 | Status: SHIPPED | OUTPATIENT
Start: 2022-11-09 | End: 2023-02-09 | Stop reason: ALTCHOICE

## 2022-11-09 NOTE — PROGRESS NOTES
Subjective:     Encounter Date:11/09/2022      Patient ID: Chito Govea is a 67 y.o. male.    Chief Complaint:  History of Present Illness    The patient is a 67-year-old male that presents today for a follow-up.    When asked the patient reports having a general feeling of wellness. He reports having some neuropathic issues with his feet. He has a stinging and tingly sensation that radiates from the sole of his foot to his toes. associated with his neuropathy. The patient utilizes a walker for mobility and reports pain in his ankles as well. When asked he is unsure if he is prescribed medication for nerve pain.     He denies any chest pain. He reports having better rest at night while on oxygen supplementation and has completed his inhaler prescription provided to him by Bere Maciel. He maintains that he uses his oxygen during the day occasionally. He has discontinued his prescription for Xarelto.     He reports after a hospitalization where he had finished his prescription, a new prescription was provided and he reports difficulty breathing when he began taking the Xarelto again. He reports 3 days after stopping he began to feel better. He reports having a second episode after he was advised by one of his providers to begin taking it again. He reported to the emergency department and discontinued taking Xarelto permanently.    After further discussion the patient confirmed he stopped taking any prescription with in X in its spelling as advised by the emergency department staff.          Review of Systems   Constitutional: Negative for diaphoresis, fever and malaise/fatigue.   HENT: Negative for congestion.    Eyes: Negative for vision loss in left eye and vision loss in right eye.   Cardiovascular: Negative for chest pain, claudication, dyspnea on exertion, irregular heartbeat, leg swelling, orthopnea, palpitations and syncope.   Respiratory: Negative for cough, shortness of breath and wheezing.     Hematologic/Lymphatic: Negative for adenopathy.   Skin: Negative for rash.   Musculoskeletal: Negative for joint pain and joint swelling.   Gastrointestinal: Negative for abdominal pain, diarrhea, nausea and vomiting.   Neurological: Negative for excessive daytime sleepiness, dizziness, focal weakness, light-headedness, numbness and weakness.   Psychiatric/Behavioral: Negative for depression. The patient does not have insomnia.            Current Outpatient Medications:   •  atorvastatin (LIPITOR) 40 MG tablet, Take 1 tablet by mouth Daily., Disp: 90 tablet, Rfl: 3  •  bumetanide (BUMEX) 2 MG tablet, Take 1 tablet by mouth Daily., Disp: 90 tablet, Rfl: 3  •  clopidogrel (PLAVIX) 75 MG tablet, Take 1 tablet by mouth Daily., Disp: 90 tablet, Rfl: 3  •  glimepiride (AMARYL) 2 MG tablet, Take 2 mg by mouth Every Morning Before Breakfast., Disp: , Rfl:   •  losartan (Cozaar) 25 MG tablet, Take 1 tablet by mouth Daily., Disp: 90 tablet, Rfl: 3  •  metoprolol succinate XL (Toprol XL) 100 MG 24 hr tablet, Take 1 tablet by mouth Daily for 30 days., Disp: 90 tablet, Rfl: 3  •  pantoprazole (PROTONIX) 40 MG EC tablet, Take 1 tablet by mouth 2 (Two) Times a Day Before Meals., Disp: 60 tablet, Rfl: 2  •  potassium chloride (K-DUR,KLOR-CON) 20 MEQ CR tablet, Take 1 tablet by mouth Daily., Disp: 30 tablet, Rfl: 11  •  tamsulosin (FLOMAX) 0.4 MG capsule 24 hr capsule, Take 1 capsule by mouth Daily., Disp: 30 capsule, Rfl: 0  •  rivaroxaban (Xarelto) 20 MG tablet, Take 1 tablet by mouth Daily., Disp: 90 tablet, Rfl: 3       Objective:      Vitals:    11/09/22 1017   BP: 122/60   Pulse: 67   SpO2: 98%     Vitals and nursing note reviewed.   Constitutional:       Appearance: Normal and healthy appearance. Well-developed and not in distress.   Eyes:      Extraocular Movements: Extraocular movements intact.      Pupils: Pupils are equal, round, and reactive to light.   HENT:      Head: Normocephalic and atraumatic.    Mouth/Throat:       Pharynx: Oropharynx is clear.   Neck:      Vascular: JVD normal.      Trachea: Trachea normal.   Pulmonary:      Effort: Pulmonary effort is normal.      Breath sounds: Normal breath sounds. No wheezing. No rhonchi. No rales.   Cardiovascular:      PMI at left midclavicular line. Normal rate. Regular rhythm. Normal S1. Normal S2.      Murmurs: There is no murmur.      No gallop. No click. No rub.   Edema:     Peripheral edema absent.   Abdominal:      General: Bowel sounds are normal.      Palpations: Abdomen is soft.      Tenderness: There is no abdominal tenderness.   Musculoskeletal: Normal range of motion.      Cervical back: Normal range of motion and neck supple. Skin:     General: Skin is warm and dry.      Capillary Refill: Capillary refill takes less than 2 seconds.   Feet:      Right foot:      Skin integrity: Skin integrity normal.      Left foot:      Skin integrity: Skin integrity normal.   Neurological:      Mental Status: Alert and oriented to person, place and time.      Cranial Nerves: Cranial nerves are intact.      Sensory: Sensation is intact.      Motor: Motor function is intact.      Coordination: Coordination is intact.   Psychiatric:         Speech: Speech normal.         Behavior: Behavior is cooperative.         Lab Review:       Procedures      Assessment/Plan:     Problem List Items Addressed This Visit        Cardiac and Vasculature    Paroxysmal atrial fibrillation (HCC) - Primary    Relevant Medications    clopidogrel (PLAVIX) 75 MG tablet    metoprolol succinate XL (Toprol XL) 100 MG 24 hr tablet    Hypertension    Relevant Medications    bumetanide (BUMEX) 2 MG tablet    losartan (Cozaar) 25 MG tablet    metoprolol succinate XL (Toprol XL) 100 MG 24 hr tablet    Aortic stenosis, moderate    Relevant Medications    clopidogrel (PLAVIX) 75 MG tablet    metoprolol succinate XL (Toprol XL) 100 MG 24 hr tablet    Acute systolic heart failure (HCC), EF 36-40% on 9/2    Overview      Previous echo 8/2022, EF was normal         Relevant Medications    clopidogrel (PLAVIX) 75 MG tablet    metoprolol succinate XL (Toprol XL) 100 MG 24 hr tablet    Cardiomyopathy (HCC)    Overview     Added automatically from request for surgery [id number]         Relevant Medications    clopidogrel (PLAVIX) 75 MG tablet    metoprolol succinate XL (Toprol XL) 100 MG 24 hr tablet    Coronary artery disease involving coronary bypass graft of native heart with unstable angina pectoris (HCC)    Relevant Medications    clopidogrel (PLAVIX) 75 MG tablet    metoprolol succinate XL (Toprol XL) 100 MG 24 hr tablet       Endocrine and Metabolic    Type 2 diabetes mellitus with hyperglycemia, without long-term current use of insulin (HCC)       Pulmonary and Pneumonias    COPD (chronic obstructive pulmonary disease) (HCC)    Pulmonary hypertension (HCC)    Overview     Added automatically from request for surgery [id number]            Tobacco    Former smoker, quit 7/2022     Cardiac health maintenance  - Discontinued the patient's prescription for aspirin 81 mg. The patient will continue taking 2 tablets of Lipitor 20 mg until he is done and then begin taking is 40 mg tablet once a day. The patient had a stent placed in his ramus branch. Advised the patient on the placement of his stent.     Advised the patient with what prescriptions he must continue taking. The patient will continue taking his Xarelto 20 mg daily,  Refilled the patient's prescription for Plavix 75 mg daily. He will continue to take Bumex 2 mg daily, Cozaar 25 mg daily, glimepiride and metoprolol daily. he will discontinue his prescriptions for amiodarone and amlodipine. The patient will contact the office if any cardiac issues arise.    The patient will follow-up in 6 months.    Recommendations/plans:    Transcribed from ambient dictation for Kun Devine DO by Alverto Giron.  11/09/22   12:48 CST    Patient or patient representative verbalized consent to the  visit recording.  I have personally performed the services described in this document as transcribed by the above individual, and it is both accurate and complete.  Kun Devine,   11/13/2022  19:51 CST

## 2022-11-11 LAB
ANION GAP SERPL CALCULATED.3IONS-SCNC: 14 MMOL/L (ref 7–19)
BUN BLDV-MCNC: 64 MG/DL (ref 8–23)
CALCIUM SERPL-MCNC: 9.4 MG/DL (ref 8.8–10.2)
CHLORIDE BLD-SCNC: 103 MMOL/L (ref 98–111)
CHOLESTEROL, TOTAL: 145 MG/DL (ref 160–199)
CO2: 21 MMOL/L (ref 22–29)
CREAT SERPL-MCNC: 1.9 MG/DL (ref 0.5–1.2)
GFR SERPL CREATININE-BSD FRML MDRD: 38 ML/MIN/{1.73_M2}
GLUCOSE BLD-MCNC: 253 MG/DL (ref 74–109)
HBA1C MFR BLD: 12.5 % (ref 4–6)
HDLC SERPL-MCNC: 31 MG/DL (ref 55–121)
LDL CHOLESTEROL CALCULATED: 69 MG/DL
POTASSIUM SERPL-SCNC: 5.3 MMOL/L (ref 3.5–5)
SODIUM BLD-SCNC: 138 MMOL/L (ref 136–145)
TRIGL SERPL-MCNC: 224 MG/DL (ref 0–149)

## 2022-12-12 ENCOUNTER — TELEPHONE (OUTPATIENT)
Dept: CARDIOLOGY | Facility: CLINIC | Age: 67
End: 2022-12-12

## 2022-12-12 NOTE — TELEPHONE ENCOUNTER
Caller: Chito Govea    Relationship to patient: Self    Best call back number: [phone]    Patient is needing: PT REQUESTING XARELTO 20 MG SAMPLES, ENOUGH TO LAST HIM UNTIL HIS NEW INSURANCE KICKS IN 1.1.23.

## 2022-12-12 NOTE — PROGRESS NOTES
" MARKELL Alan  [facility]   Pulmonary and Critical Care  [address]  [place] [address]  Phone: [phone]  Fax: [phone]           Chief Complaint  Suspected Interstitial lung disease (HCC), likely due to am    Subjective    History of Present Illness     Chito Govea presents to [facility] PULMONARY & CRITICAL CARE MEDICINE   History of Present Illness  Mr. Govea is a pleasant 67 year old male with known CAD with PTCA/ PCI on 10/3/2022, CHF, pulmonary hypertension, moderate aortic stenosis, chronic obstructive lung disease, atrial fibrillation on chronic OAC. Former Smoker.  He was inpatient in Oct with volume overload/ pulmonary edema and improved following diuresis. In August he had been placed on Amiodarone when he was admitted for Afib with RVR. He was on oxygen through these admissions and at last visit with me noted he was no longer on oxygen. I did however review Dr. Devine's note from Nov where patient indicated he was still using the oxygen at night and PRN during the day. Today he reports he will use it if he has been out working in the yard. His on Plavix and  Xarelto and no bleeding issues. Dr. Devine has listed out for him the medications he must continue to take. He has a planned follow up Echo in January.  He has done well on Bumex. He completed Symbicort around his last visit with me and did not find any benefit. His findings on his prior CT thought could be related to Amiodarone toxicity. His repeat CT showed resolution of pulmonary edema and pleura fluid with a left lower lobe 5mm nodule. His PFT today was canceled due to Rt being out sick. He states his breathing is doing well unless he over exerts himself. His cough is rare but productive when it occurs. He denies fever, chills or night sweats.           Objective   Vital Signs:   /72   Pulse 70   Ht 172.7 cm (67.99\")   Wt 68 kg (150 lb)   SpO2 97%   BMI 22.81 kg/m²   "   Physical Exam  Vitals reviewed.   Constitutional:       Appearance: Normal appearance.   Cardiovascular:      Rate and Rhythm: Normal rate and regular rhythm.   Pulmonary:      Effort: Pulmonary effort is normal.      Breath sounds: Normal breath sounds.   Neurological:      General: No focal deficit present.      Mental Status: He is alert and oriented to person, place, and time.   Psychiatric:         Mood and Affect: Mood normal.         Behavior: Behavior normal.          Result Review :  The following data was reviewed by: MARKELL Alan on 12/14/2022:    Data reviewed: Radiologic studies CT chest    My interpretation of imaging:  As in hpi  My interpretation of labs: none       My interpretation of the PFT: none    No results found for this or any previous visit.        Assessment and Plan   Diagnoses and all orders for this visit:    1. Suspected Interstitial lung disease (HCC), likely due to amiodarone (Primary)    2. Pulmonary hypertension (HCC)    3. Chronic obstructive pulmonary disease, unspecified COPD type (HCC)    4. Former smoker, quit 7/2022    5. Aortic stenosis, moderate    6. Chronic systolic CHF (congestive heart failure) (HCC)    7. Lung nodule    8. Pulmonary emphysema, unspecified emphysema type (HCC)        Repeat echo showed resolution of the pleural effusion and pulmonary edema. He has noted emphysema and a 5 mm left lower lobe lung nodule. No honeycombing noted. Chronic calcified granulomas. He will keep his Echo appointment in January. He is agreeable for a yearly low dose CT scan. He declines any inhalers at this time as he does not feel he needs them. We had to cancel his PFT today as our RT is out sick. We will see him back in 2 months with complete PFT without post bronchodilator in the office. Patient declines influenza and pneumonia vaccinations.         Follow Up   Return in about 8 weeks (around 2/8/2023) for PFT-in office complete .  Patient was given instructions  and counseling regarding his condition or for health maintenance advice. Please see specific information pulled into the AVS if appropriate.     Teresa Maciel, MARKELL  12/14/2022  09:44 CST

## 2022-12-13 ENCOUNTER — HOSPITAL ENCOUNTER (OUTPATIENT)
Dept: CT IMAGING | Facility: HOSPITAL | Age: 67
Discharge: HOME | End: 2022-12-13
Admitting: NURSE PRACTITIONER

## 2022-12-13 DIAGNOSIS — Z87.891 FORMER SMOKER: ICD-10-CM

## 2022-12-13 DIAGNOSIS — I27.20 PULMONARY HYPERTENSION: ICD-10-CM

## 2022-12-13 DIAGNOSIS — J44.9 CHRONIC OBSTRUCTIVE PULMONARY DISEASE, UNSPECIFIED COPD TYPE: ICD-10-CM

## 2022-12-13 DIAGNOSIS — J84.9 INTERSTITIAL LUNG DISEASE: ICD-10-CM

## 2022-12-13 PROCEDURE — 71250 CT THORAX DX C-: CPT

## 2022-12-14 ENCOUNTER — OFFICE VISIT (OUTPATIENT)
Dept: PULMONOLOGY | Facility: CLINIC | Age: 67
End: 2022-12-14

## 2022-12-14 VITALS
WEIGHT: 150 LBS | HEIGHT: 68 IN | OXYGEN SATURATION: 97 % | DIASTOLIC BLOOD PRESSURE: 72 MMHG | SYSTOLIC BLOOD PRESSURE: 124 MMHG | BODY MASS INDEX: 22.73 KG/M2 | HEART RATE: 70 BPM

## 2022-12-14 DIAGNOSIS — J84.9 INTERSTITIAL LUNG DISEASE: Primary | ICD-10-CM

## 2022-12-14 DIAGNOSIS — Z87.891 PERSONAL HISTORY OF NICOTINE DEPENDENCE: ICD-10-CM

## 2022-12-14 DIAGNOSIS — I27.20 PULMONARY HYPERTENSION: ICD-10-CM

## 2022-12-14 DIAGNOSIS — J44.9 CHRONIC OBSTRUCTIVE PULMONARY DISEASE, UNSPECIFIED COPD TYPE: ICD-10-CM

## 2022-12-14 DIAGNOSIS — J43.9 PULMONARY EMPHYSEMA, UNSPECIFIED EMPHYSEMA TYPE: Chronic | ICD-10-CM

## 2022-12-14 DIAGNOSIS — I35.0 AORTIC STENOSIS, MODERATE: ICD-10-CM

## 2022-12-14 DIAGNOSIS — R91.1 LUNG NODULE: ICD-10-CM

## 2022-12-14 DIAGNOSIS — Z87.891 FORMER SMOKER: ICD-10-CM

## 2022-12-14 DIAGNOSIS — I50.22 CHRONIC SYSTOLIC CHF (CONGESTIVE HEART FAILURE): ICD-10-CM

## 2022-12-14 PROCEDURE — 99214 OFFICE O/P EST MOD 30 MIN: CPT | Performed by: NURSE PRACTITIONER

## 2022-12-14 NOTE — PATIENT INSTRUCTIONS
Keep January 3rd appointment at 10:00 am for the Echo (ultrasound of heart)  We will reschedule your pulmonary function study  We will plan a yearly follow up low dose cat scan.

## 2023-01-03 ENCOUNTER — HOSPITAL ENCOUNTER (OUTPATIENT)
Dept: CARDIOLOGY | Facility: HOSPITAL | Age: 68
Discharge: HOME | End: 2023-01-03
Admitting: EMERGENCY MEDICINE
Payer: MEDICARE

## 2023-01-03 VITALS
DIASTOLIC BLOOD PRESSURE: 72 MMHG | BODY MASS INDEX: 22.73 KG/M2 | SYSTOLIC BLOOD PRESSURE: 124 MMHG | WEIGHT: 150 LBS | HEIGHT: 68 IN

## 2023-01-03 DIAGNOSIS — I25.5 ISCHEMIC CARDIOMYOPATHY: ICD-10-CM

## 2023-01-03 LAB
BH CV ECHO MEAS - AO MAX PG: 23.8 MMHG
BH CV ECHO MEAS - AO MEAN PG: 13 MMHG
BH CV ECHO MEAS - AO ROOT DIAM: 2.8 CM
BH CV ECHO MEAS - AO V2 MAX: 244 CM/SEC
BH CV ECHO MEAS - AO V2 VTI: 57.7 CM
BH CV ECHO MEAS - AVA(I,D): 1.2 CM2
BH CV ECHO MEAS - EDV(CUBED): 139.8 ML
BH CV ECHO MEAS - EDV(MOD-SP2): 62.6 ML
BH CV ECHO MEAS - EDV(MOD-SP4): 90.2 ML
BH CV ECHO MEAS - EF(MOD-BP): 47 %
BH CV ECHO MEAS - EF(MOD-SP2): 26.7 %
BH CV ECHO MEAS - EF(MOD-SP4): 57.6 %
BH CV ECHO MEAS - ESV(CUBED): 37.9 ML
BH CV ECHO MEAS - ESV(MOD-SP2): 45.9 ML
BH CV ECHO MEAS - ESV(MOD-SP4): 38.2 ML
BH CV ECHO MEAS - FS: 35.3 %
BH CV ECHO MEAS - IVS/LVPW: 0.65 CM
BH CV ECHO MEAS - IVSD: 0.7 CM
BH CV ECHO MEAS - LA DIMENSION: 4.5 CM
BH CV ECHO MEAS - LAT PEAK E' VEL: 4.7 CM/SEC
BH CV ECHO MEAS - LV DIASTOLIC VOL/BSA (35-75): 49.9 CM2
BH CV ECHO MEAS - LV MASS(C)D: 164.6 GRAMS
BH CV ECHO MEAS - LV MAX PG: 3.4 MMHG
BH CV ECHO MEAS - LV MEAN PG: 2 MMHG
BH CV ECHO MEAS - LV SYSTOLIC VOL/BSA (12-30): 21.1 CM2
BH CV ECHO MEAS - LV V1 MAX: 92.4 CM/SEC
BH CV ECHO MEAS - LV V1 VTI: 22 CM
BH CV ECHO MEAS - LVIDD: 5.2 CM
BH CV ECHO MEAS - LVIDS: 3.4 CM
BH CV ECHO MEAS - LVOT AREA: 3.1 CM2
BH CV ECHO MEAS - LVOT DIAM: 2 CM
BH CV ECHO MEAS - LVPWD: 1.07 CM
BH CV ECHO MEAS - MED PEAK E' VEL: 4.1 CM/SEC
BH CV ECHO MEAS - MV A MAX VEL: 103 CM/SEC
BH CV ECHO MEAS - MV DEC SLOPE: 269 CM/SEC2
BH CV ECHO MEAS - MV DEC TIME: 0.28 MSEC
BH CV ECHO MEAS - MV E MAX VEL: 74.7 CM/SEC
BH CV ECHO MEAS - MV E/A: 0.73
BH CV ECHO MEAS - MV MAX PG: 4.2 MMHG
BH CV ECHO MEAS - MV MEAN PG: 2 MMHG
BH CV ECHO MEAS - MV V2 VTI: 30.9 CM
BH CV ECHO MEAS - MVA(VTI): 2.24 CM2
BH CV ECHO MEAS - PA V2 MAX: 96.1 CM/SEC
BH CV ECHO MEAS - PI END-D VEL: 92.3 CM/SEC
BH CV ECHO MEAS - RAP SYSTOLE: 5 MMHG
BH CV ECHO MEAS - RV MAX PG: 1.58 MMHG
BH CV ECHO MEAS - RV V1 MAX: 62.8 CM/SEC
BH CV ECHO MEAS - RV V1 VTI: 12.5 CM
BH CV ECHO MEAS - RVDD: 3.4 CM
BH CV ECHO MEAS - RVSP: 23.3 MMHG
BH CV ECHO MEAS - SI(MOD-SP2): 9.2 ML/M2
BH CV ECHO MEAS - SI(MOD-SP4): 28.8 ML/M2
BH CV ECHO MEAS - SV(LVOT): 69.1 ML
BH CV ECHO MEAS - SV(MOD-SP2): 16.7 ML
BH CV ECHO MEAS - SV(MOD-SP4): 52 ML
BH CV ECHO MEAS - TAPSE (>1.6): 2.4 CM
BH CV ECHO MEAS - TR MAX PG: 18.3 MMHG
BH CV ECHO MEAS - TR MAX VEL: 214 CM/SEC
BH CV ECHO MEASUREMENTS AVERAGE E/E' RATIO: 16.98
BH CV XLRA - TDI S': 8.1 CM/SEC
LEFT ATRIUM VOLUME INDEX: 32.3 ML/M2
LEFT ATRIUM VOLUME: 58.4 ML
MAXIMAL PREDICTED HEART RATE: 152 BPM
STRESS TARGET HR: 129 BPM

## 2023-01-03 PROCEDURE — 93306 TTE W/DOPPLER COMPLETE: CPT | Performed by: EMERGENCY MEDICINE

## 2023-01-03 PROCEDURE — 93306 TTE W/DOPPLER COMPLETE: CPT

## 2023-02-06 NOTE — PROGRESS NOTES
" MARKELL Alan  [facility]   Pulmonary and Critical Care  [address] [place] [address]  Phone: [phone]  Fax: [phone]           Chief Complaint  Suspected Interstitial lung disease (HCC), likely due to am    Subjective    History of Present Illness     Chito Govea presents to [facility] PULMONARY & CRITICAL CARE MEDICINE   History of Present Illness  Mr. Govea is a pleasant 67 year old male with known CAD with PTCA/ PCI on 10/3/2022, CHF, emphysema, moderate aortic stenosis, chronic obstructive lung disease, atrial fibrillation on chronic OAC. Former Smoker. He uses oxygen as needed with exertion. He denies bleeding issues. His nose will bleed with oxygen overnight. His most recent Echo showed an EF of 46-50%, mild aortic stenosis, no evidence of pulmonary hypertension. CT 12/23/22 showed resolution of pulmonary edema and pleura fluid with a left lower lobe 5mm nodule. His PFT today shows mild restriction by spirometry with normal lung volumes and moderately reduced diffusion capacity. His short of breath if he overexerts himself. His cough is rare but productive when it occurs.  He denies fever, chills or night sweats.  He declined inhalers at last visit and noted Symbicort was of no benefit.             Objective   Vital Signs:   /72   Pulse 75   Ht 165.1 cm (65\") Comment: measured  Wt 71.6 kg (157 lb 12.8 oz)   SpO2 100%   BMI 26.26 kg/m²     Physical Exam  Vitals reviewed.   Constitutional:       Appearance: Normal appearance.   Cardiovascular:      Rate and Rhythm: Normal rate and regular rhythm.   Pulmonary:      Effort: Pulmonary effort is normal.      Breath sounds: Normal breath sounds.   Neurological:      General: No focal deficit present.      Mental Status: He is alert and oriented to person, place, and time.   Psychiatric:         Mood and Affect: Mood normal.         Behavior: Behavior normal.          Result Review :  The " following data was reviewed by: MARKELL Alan on 2023:    My interpretation of imaging:  No new   My interpretation of labs: no new     PFT Values        Some values may be hidden. Unless noted otherwise, only the newest values recorded on each date are displayed.         Old Values PFT Results 23   No data to display.      Pre Drug PFT Results 23   FVC 89   FEV1 87   FEF 25-75% 82   FEV1/FVC 75      Post Drug PFT Results 23   No data to display.      Other Tests PFT Results 23   TLC 93      DLCO 55   D/VAsb 57           My interpretation of the PFT: as below    Results for orders placed in visit on 23    Pulmonary Function Test    Narrative  Pulmonary Function Test  Performed by: Isela Arthur, RRT  Authorized by: Teresa Maciel APRN    Pre Drug % Predicted  FVC: 89%  FEV1: 87%  FEF 25-75%: 82%  FEV1/FVC: 75%  T%  RV: 106%  DLCO: 55%  D/VAsb: 57%    Interpretation  Spirometry  Spirometry shows mild restriction. There is reduced midflow suggesting small airway/airflow obstruction.  Review of FVL curve  Patient's effort is normal.  Lung Volume Measurements  Measurements show normal results.  Diffusion Capacity  The patient's diffusion capacity is moderately reduced.  Diffusion capacity is moderately reduced when corrected for alveolar volume.          Assessment and Plan   Diagnoses and all orders for this visit:    1. Pulmonary emphysema, unspecified emphysema type (HCC) (Primary)    2. Former smoker, quit 2022    3. Lung nodule    4. Diffusion capacity of lung (dl), decreased       Patient declines influenza and pneumonia vaccinations. He declines inhalers again today. He feels he is stable and at his baseline. I have asked him to return in Dec with his LDCT. He is agreeable to the plan.       Follow Up   Return in about 44 weeks (around 2023) for CT low dose .  Patient was given instructions and counseling regarding his condition or for  health maintenance advice. Please see specific information pulled into the AVS if appropriate.     Teresa Maciel, APRN  2/9/2023  13:59 CST

## 2023-02-08 PROBLEM — J43.9 PULMONARY EMPHYSEMA: Chronic | Status: ACTIVE | Noted: 2023-02-08

## 2023-02-08 PROBLEM — R91.1 LUNG NODULE: Status: ACTIVE | Noted: 2023-02-08

## 2023-02-09 ENCOUNTER — OFFICE VISIT (OUTPATIENT)
Dept: PULMONOLOGY | Facility: CLINIC | Age: 68
End: 2023-02-09
Payer: MEDICARE

## 2023-02-09 VITALS
HEIGHT: 65 IN | DIASTOLIC BLOOD PRESSURE: 72 MMHG | OXYGEN SATURATION: 100 % | SYSTOLIC BLOOD PRESSURE: 124 MMHG | WEIGHT: 157.8 LBS | HEART RATE: 75 BPM | BODY MASS INDEX: 26.29 KG/M2

## 2023-02-09 DIAGNOSIS — I27.20 PULMONARY HYPERTENSION: ICD-10-CM

## 2023-02-09 DIAGNOSIS — J44.9 CHRONIC OBSTRUCTIVE PULMONARY DISEASE, UNSPECIFIED COPD TYPE: ICD-10-CM

## 2023-02-09 DIAGNOSIS — Z87.891 FORMER SMOKER: ICD-10-CM

## 2023-02-09 DIAGNOSIS — J43.9 PULMONARY EMPHYSEMA, UNSPECIFIED EMPHYSEMA TYPE: Primary | Chronic | ICD-10-CM

## 2023-02-09 DIAGNOSIS — R94.2 DIFFUSION CAPACITY OF LUNG (DL), DECREASED: ICD-10-CM

## 2023-02-09 DIAGNOSIS — J84.9 INTERSTITIAL LUNG DISEASE: ICD-10-CM

## 2023-02-09 DIAGNOSIS — R91.1 LUNG NODULE: ICD-10-CM

## 2023-02-09 PROCEDURE — 94729 DIFFUSING CAPACITY: CPT | Performed by: NURSE PRACTITIONER

## 2023-02-09 PROCEDURE — 99214 OFFICE O/P EST MOD 30 MIN: CPT | Performed by: NURSE PRACTITIONER

## 2023-02-09 PROCEDURE — 94727 GAS DIL/WSHOT DETER LNG VOL: CPT | Performed by: NURSE PRACTITIONER

## 2023-02-09 PROCEDURE — 94010 BREATHING CAPACITY TEST: CPT | Performed by: NURSE PRACTITIONER

## 2023-02-09 RX ORDER — LOSARTAN POTASSIUM 50 MG/1
1 TABLET ORAL DAILY
COMMUNITY
Start: 2023-01-17

## 2023-02-09 NOTE — PROCEDURES
Pulmonary Function Test  Performed by: Isela Arthur, RRT  Authorized by: Teresa Maciel APRN      Pre Drug % Predicted    FVC: 89%   FEV1: 87%   FEF 25-75%: 82%   FEV1/FVC: 75%   T%   RV: 106%   DLCO: 55%   D/VAsb: 57%    Interpretation   Spirometry   Spirometry shows mild restriction. There is reduced midflow suggesting small airway/airflow obstruction.   Review of FVL curve   Patient's effort is normal.   Lung Volume Measurements  Measurements show normal results.   Diffusion Capacity  The patient's diffusion capacity is moderately reduced.  Diffusion capacity is moderately reduced when corrected for alveolar volume.

## 2023-03-09 NOTE — SIGNIFICANT NOTE
BEE RN STATED EVERYTIME BIPAP ALARMED IT MADE PT VERY ANXIOUS AND PT COULDN'T SLEEP. PLACED ON NRB AND CANNULA TO GIVE PT A BREAK AND ALLOW HIM TO GET SOME REST. WILL ATTEMPT BIPAP LATER ON.   Admission

## 2023-03-27 RX ORDER — BUMETANIDE 2 MG/1
2 TABLET ORAL DAILY
Qty: 90 TABLET | Refills: 3 | Status: SHIPPED | OUTPATIENT
Start: 2023-03-27

## 2023-05-09 ENCOUNTER — OFFICE VISIT (OUTPATIENT)
Dept: CARDIOLOGY | Facility: CLINIC | Age: 68
End: 2023-05-09
Payer: MEDICARE

## 2023-05-09 VITALS
WEIGHT: 157 LBS | SYSTOLIC BLOOD PRESSURE: 140 MMHG | DIASTOLIC BLOOD PRESSURE: 62 MMHG | BODY MASS INDEX: 26.16 KG/M2 | OXYGEN SATURATION: 97 % | HEART RATE: 77 BPM | HEIGHT: 65 IN

## 2023-05-09 DIAGNOSIS — I42.9 CARDIOMYOPATHY, UNSPECIFIED TYPE: ICD-10-CM

## 2023-05-09 DIAGNOSIS — I48.0 PAROXYSMAL ATRIAL FIBRILLATION: ICD-10-CM

## 2023-05-09 DIAGNOSIS — Z87.891 FORMER SMOKER: ICD-10-CM

## 2023-05-09 DIAGNOSIS — I35.0 AORTIC STENOSIS, MODERATE: ICD-10-CM

## 2023-05-09 DIAGNOSIS — I10 PRIMARY HYPERTENSION: ICD-10-CM

## 2023-05-09 DIAGNOSIS — I50.21 ACUTE SYSTOLIC HEART FAILURE: Primary | ICD-10-CM

## 2023-05-09 DIAGNOSIS — I25.700 CORONARY ARTERY DISEASE INVOLVING CORONARY BYPASS GRAFT OF NATIVE HEART WITH UNSTABLE ANGINA PECTORIS: ICD-10-CM

## 2023-05-09 DIAGNOSIS — J44.9 CHRONIC OBSTRUCTIVE PULMONARY DISEASE, UNSPECIFIED COPD TYPE: ICD-10-CM

## 2023-05-09 DIAGNOSIS — E11.65 TYPE 2 DIABETES MELLITUS WITH HYPERGLYCEMIA, WITHOUT LONG-TERM CURRENT USE OF INSULIN: ICD-10-CM

## 2023-05-09 RX ORDER — LOSARTAN POTASSIUM 50 MG/1
50 TABLET ORAL DAILY
Qty: 90 TABLET | Refills: 3 | Status: SHIPPED | OUTPATIENT
Start: 2023-05-09

## 2023-05-09 RX ORDER — BUMETANIDE 2 MG/1
2 TABLET ORAL DAILY
Qty: 90 TABLET | Refills: 3 | Status: SHIPPED | OUTPATIENT
Start: 2023-05-09

## 2023-05-09 RX ORDER — CLOPIDOGREL BISULFATE 75 MG/1
75 TABLET ORAL DAILY
Qty: 90 TABLET | Refills: 3 | Status: SHIPPED | OUTPATIENT
Start: 2023-05-09

## 2023-05-09 RX ORDER — ATORVASTATIN CALCIUM 40 MG/1
40 TABLET, FILM COATED ORAL DAILY
Qty: 90 TABLET | Refills: 3 | Status: SHIPPED | OUTPATIENT
Start: 2023-05-09

## 2023-05-09 RX ORDER — METOPROLOL SUCCINATE 100 MG/1
100 TABLET, EXTENDED RELEASE ORAL DAILY
Qty: 90 TABLET | Refills: 3 | Status: SHIPPED | OUTPATIENT
Start: 2023-05-09 | End: 2023-06-08

## 2023-05-09 RX ORDER — POTASSIUM CHLORIDE 20 MEQ/1
20 TABLET, EXTENDED RELEASE ORAL DAILY
Qty: 30 TABLET | Refills: 11 | Status: SHIPPED | OUTPATIENT
Start: 2023-05-09

## 2023-05-09 NOTE — PROGRESS NOTES
Subjective:     Encounter Date:05/09/2023      Patient ID: Chito Govea is a 67 y.o. male.    Chief Complaint:  History of Present Illness          Mr. Govea is a 67-year-old male who presents for a follow-up.    The patient denies any current problems. He has A fib, normal sinus rhythm. He is no longer talking amiodarone. He is taking 5 or 6 medications. He is taking Lipitor and Bumex. He is taking Plavix because of his stents. is last ultrasound echocardiogram was in 01/2023.    Review of Systems   Constitutional: Negative for diaphoresis, fever and malaise/fatigue.   HENT: Negative for congestion.    Eyes: Negative for vision loss in left eye and vision loss in right eye.   Cardiovascular: Negative for chest pain, claudication, dyspnea on exertion, irregular heartbeat, leg swelling, orthopnea, palpitations and syncope.   Respiratory: Negative for cough, shortness of breath and wheezing.    Hematologic/Lymphatic: Negative for adenopathy.   Skin: Negative for rash.   Musculoskeletal: Negative for joint pain and joint swelling.   Gastrointestinal: Negative for abdominal pain, diarrhea, nausea and vomiting.   Neurological: Negative for excessive daytime sleepiness, dizziness, focal weakness, light-headedness, numbness and weakness.   Psychiatric/Behavioral: Negative for depression. The patient does not have insomnia.            Current Outpatient Medications:   •  atorvastatin (LIPITOR) 40 MG tablet, Take 1 tablet by mouth Daily., Disp: 90 tablet, Rfl: 3  •  bumetanide (BUMEX) 2 MG tablet, Take 1 tablet by mouth Daily., Disp: 90 tablet, Rfl: 3  •  clopidogrel (PLAVIX) 75 MG tablet, Take 1 tablet by mouth Daily., Disp: 90 tablet, Rfl: 3  •  glimepiride (AMARYL) 2 MG tablet, Take 1 tablet by mouth Every Morning Before Breakfast., Disp: , Rfl:   •  losartan (COZAAR) 50 MG tablet, Take 1 tablet by mouth Daily., Disp: 90 tablet, Rfl: 3  •  metoprolol succinate XL (Toprol XL) 100 MG 24 hr tablet, Take 1 tablet by  mouth Daily for 30 days., Disp: 90 tablet, Rfl: 3  •  potassium chloride (K-DUR,KLOR-CON) 20 MEQ CR tablet, Take 1 tablet by mouth Daily., Disp: 30 tablet, Rfl: 11  •  rivaroxaban (Xarelto) 20 MG tablet, Take 1 tablet by mouth Daily., Disp: 90 tablet, Rfl: 3  •  tamsulosin (FLOMAX) 0.4 MG capsule 24 hr capsule, Take 1 capsule by mouth Daily., Disp: 30 capsule, Rfl: 0       Objective:      Vitals:    05/09/23 1035   BP: 140/62   Pulse: 77   SpO2: 97%     Vitals and nursing note reviewed.   Constitutional:       Appearance: Normal and healthy appearance. Well-developed and not in distress.   Eyes:      Extraocular Movements: Extraocular movements intact.      Pupils: Pupils are equal, round, and reactive to light.   HENT:      Head: Normocephalic and atraumatic.    Mouth/Throat:      Pharynx: Oropharynx is clear.   Neck:      Vascular: JVD normal.      Trachea: Trachea normal.   Pulmonary:      Effort: Pulmonary effort is normal.      Breath sounds: Normal breath sounds. No wheezing. No rhonchi. No rales.   Cardiovascular:      PMI at left midclavicular line. Normal rate. Regular rhythm. Normal S1. Normal S2.      Murmurs: There is a grade 2/6 systolic murmur.      No gallop. No rub.   Pulses:     Dorsalis pedis: 2+ bilaterally.     Posterior tibial: 2+ bilaterally.  Abdominal:      General: Bowel sounds are normal.      Palpations: Abdomen is soft.      Tenderness: There is no abdominal tenderness.   Musculoskeletal: Normal range of motion.      Cervical back: Normal range of motion and neck supple. Skin:     General: Skin is warm and dry.      Capillary Refill: Capillary refill takes less than 2 seconds.   Feet:      Right foot:      Skin integrity: Skin integrity normal.      Left foot:      Skin integrity: Skin integrity normal.   Neurological:      Mental Status: Alert and oriented to person, place and time.      Cranial Nerves: Cranial nerves are intact.      Sensory: Sensation is intact.      Motor: Motor  function is intact.      Coordination: Coordination is intact.   Psychiatric:         Speech: Speech normal.         Behavior: Behavior is cooperative.         Lab Review:       09/04/2022  The ejection fraction was 36 to 40.    Heart catheterization 10/03/2022-Stent placed in the ramus and then repeated the ultrasound 01/2023 and the ejection fraction has come up to 10 percent. It is now 46 to 50. His heart is getting stronger. He had neuropathy in his feet, PAD in his legs, and arthritis in both of his hips. He does get worn out easily because of this.      ECG 12 Lead    Date/Time: 5/14/2023 7:32 PM  Performed by: Kun Devine DO  Authorized by: Kun Devine DO   Comparison: compared with previous ECG   Similar to previous ECG  Rhythm: sinus rhythm  Rate: normal  Conduction: conduction normal  QRS axis: normal    Clinical impression: normal ECG              Assessment/Plan:     Problem List Items Addressed This Visit        Cardiac and Vasculature    Paroxysmal atrial fibrillation    Relevant Medications    metoprolol succinate XL (Toprol XL) 100 MG 24 hr tablet    clopidogrel (PLAVIX) 75 MG tablet    Hypertension    Relevant Medications    metoprolol succinate XL (Toprol XL) 100 MG 24 hr tablet    losartan (COZAAR) 50 MG tablet    bumetanide (BUMEX) 2 MG tablet    Aortic stenosis, moderate    Relevant Medications    metoprolol succinate XL (Toprol XL) 100 MG 24 hr tablet    clopidogrel (PLAVIX) 75 MG tablet    Acute systolic heart failure (HCC), EF 36-40% on 9/2 - Primary    Overview     Previous echo 8/2022, EF was normal         Relevant Medications    metoprolol succinate XL (Toprol XL) 100 MG 24 hr tablet    clopidogrel (PLAVIX) 75 MG tablet    Other Relevant Orders    Adult Transthoracic Echo Complete W/ Cont if Necessary Per Protocol    Cardiomyopathy    Overview     Added automatically from request for surgery [id number]         Relevant Medications    metoprolol succinate XL  (Toprol XL) 100 MG 24 hr tablet    clopidogrel (PLAVIX) 75 MG tablet    Coronary artery disease involving coronary bypass graft of native heart with unstable angina pectoris    Relevant Medications    metoprolol succinate XL (Toprol XL) 100 MG 24 hr tablet    clopidogrel (PLAVIX) 75 MG tablet       Endocrine and Metabolic    Type 2 diabetes mellitus with hyperglycemia, without long-term current use of insulin       Pulmonary and Pneumonias    COPD (chronic obstructive pulmonary disease)       Tobacco    Former smoker, quit 7/2022         The patient will have a repeat ultrasound in 6 months. He will continue all his medications.      Recommendations/plans:    Transcribed from ambient dictation for Kun Devine DO by Geraldine Plasencia.  05/09/23   13:52 CDT    Patient or patient representative verbalized consent to the visit recording.  I have personally performed the services described in this document as transcribed by the above individual, and it is both accurate and complete.  Kun Devine DO  5/14/2023  19:33 CDT

## 2023-06-07 ENCOUNTER — HOSPITAL ENCOUNTER (OUTPATIENT)
Dept: CARDIOLOGY | Facility: HOSPITAL | Age: 68
Discharge: HOME | End: 2023-06-07
Payer: MEDICARE

## 2023-06-07 VITALS
HEIGHT: 65 IN | DIASTOLIC BLOOD PRESSURE: 62 MMHG | BODY MASS INDEX: 26.16 KG/M2 | SYSTOLIC BLOOD PRESSURE: 140 MMHG | WEIGHT: 157 LBS

## 2023-06-07 DIAGNOSIS — I50.21 ACUTE SYSTOLIC HEART FAILURE: ICD-10-CM

## 2023-06-07 PROCEDURE — 93306 TTE W/DOPPLER COMPLETE: CPT

## 2023-06-07 PROCEDURE — 25510000001 PERFLUTREN 6.52 MG/ML SUSPENSION: Performed by: EMERGENCY MEDICINE

## 2023-06-07 PROCEDURE — 93306 TTE W/DOPPLER COMPLETE: CPT | Performed by: EMERGENCY MEDICINE

## 2023-06-07 RX ADMIN — PERFLUTREN 13.04 MG: 6.52 INJECTION, SUSPENSION INTRAVENOUS at 15:02

## 2023-06-12 LAB
BH CV ECHO MEAS - AO MAX PG: 19.4 MMHG
BH CV ECHO MEAS - AO MEAN PG: 11 MMHG
BH CV ECHO MEAS - AO ROOT DIAM: 3.3 CM
BH CV ECHO MEAS - AO V2 MAX: 220 CM/SEC
BH CV ECHO MEAS - AO V2 VTI: 47.5 CM
BH CV ECHO MEAS - AVA(I,D): 1.1 CM2
BH CV ECHO MEAS - EDV(CUBED): 67.9 ML
BH CV ECHO MEAS - EDV(MOD-SP2): 85 ML
BH CV ECHO MEAS - EDV(MOD-SP4): 99.4 ML
BH CV ECHO MEAS - EF(MOD-BP): 52 %
BH CV ECHO MEAS - EF(MOD-SP2): 50.5 %
BH CV ECHO MEAS - EF(MOD-SP4): 54.1 %
BH CV ECHO MEAS - ESV(CUBED): 21.7 ML
BH CV ECHO MEAS - ESV(MOD-SP2): 42.1 ML
BH CV ECHO MEAS - ESV(MOD-SP4): 45.6 ML
BH CV ECHO MEAS - FS: 31.6 %
BH CV ECHO MEAS - IVS/LVPW: 0.96 CM
BH CV ECHO MEAS - IVSD: 1.32 CM
BH CV ECHO MEAS - LA DIMENSION: 4.1 CM
BH CV ECHO MEAS - LAT PEAK E' VEL: 5.8 CM/SEC
BH CV ECHO MEAS - LV DIASTOLIC VOL/BSA (35-75): 55.7 CM2
BH CV ECHO MEAS - LV MASS(C)D: 202.3 GRAMS
BH CV ECHO MEAS - LV MAX PG: 1.74 MMHG
BH CV ECHO MEAS - LV MEAN PG: 1 MMHG
BH CV ECHO MEAS - LV SYSTOLIC VOL/BSA (12-30): 25.5 CM2
BH CV ECHO MEAS - LV V1 MAX: 66 CM/SEC
BH CV ECHO MEAS - LV V1 VTI: 13.8 CM
BH CV ECHO MEAS - LVIDD: 4.1 CM
BH CV ECHO MEAS - LVIDS: 2.8 CM
BH CV ECHO MEAS - LVOT AREA: 3.8 CM2
BH CV ECHO MEAS - LVOT DIAM: 2.2 CM
BH CV ECHO MEAS - LVPWD: 1.37 CM
BH CV ECHO MEAS - MED PEAK E' VEL: 3.8 CM/SEC
BH CV ECHO MEAS - MV A MAX VEL: 75.8 CM/SEC
BH CV ECHO MEAS - MV DEC TIME: 0.3 MSEC
BH CV ECHO MEAS - MV E MAX VEL: 53.6 CM/SEC
BH CV ECHO MEAS - MV E/A: 0.71
BH CV ECHO MEAS - MV MAX PG: 2.44 MMHG
BH CV ECHO MEAS - MV MEAN PG: 1 MMHG
BH CV ECHO MEAS - MV V2 VTI: 23.7 CM
BH CV ECHO MEAS - MVA(VTI): 2.21 CM2
BH CV ECHO MEAS - PA V2 MAX: 83.6 CM/SEC
BH CV ECHO MEAS - PI END-D VEL: 83.6 CM/SEC
BH CV ECHO MEAS - RAP SYSTOLE: 5 MMHG
BH CV ECHO MEAS - RV MAX PG: 1.51 MMHG
BH CV ECHO MEAS - RV V1 MAX: 61.4 CM/SEC
BH CV ECHO MEAS - RV V1 VTI: 11.9 CM
BH CV ECHO MEAS - RVDD: 2.9 CM
BH CV ECHO MEAS - RVSP: 24.9 MMHG
BH CV ECHO MEAS - SI(MOD-SP2): 24 ML/M2
BH CV ECHO MEAS - SI(MOD-SP4): 30.1 ML/M2
BH CV ECHO MEAS - SV(LVOT): 52.5 ML
BH CV ECHO MEAS - SV(MOD-SP2): 42.9 ML
BH CV ECHO MEAS - SV(MOD-SP4): 53.8 ML
BH CV ECHO MEAS - TAPSE (>1.6): 1.38 CM
BH CV ECHO MEAS - TR MAX PG: 19.9 MMHG
BH CV ECHO MEAS - TR MAX VEL: 223 CM/SEC
BH CV ECHO MEASUREMENTS AVERAGE E/E' RATIO: 11.17
BH CV XLRA - TDI S': 10.1 CM/SEC
LEFT ATRIUM VOLUME INDEX: 22.5 ML/M2
LEFT ATRIUM VOLUME: 44.4 ML

## 2023-09-27 ENCOUNTER — TELEPHONE (OUTPATIENT)
Dept: CARDIOLOGY | Facility: CLINIC | Age: 68
End: 2023-09-27

## 2023-09-27 RX ORDER — CLOPIDOGREL BISULFATE 75 MG/1
75 TABLET ORAL DAILY
Qty: 90 TABLET | Refills: 3 | OUTPATIENT
Start: 2023-09-27

## 2023-09-27 RX ORDER — POTASSIUM CHLORIDE 20 MEQ/1
20 TABLET, EXTENDED RELEASE ORAL DAILY
Qty: 30 TABLET | Refills: 11 | OUTPATIENT
Start: 2023-09-27

## 2023-09-27 NOTE — TELEPHONE ENCOUNTER
Caller: Chito Govea    Relationship: Self    Best call back number: [phone]     What medications are you currently taking:   Current Outpatient Medications on File Prior to Visit   Medication Sig Dispense Refill    atorvastatin (LIPITOR) 40 MG tablet Take 1 tablet by mouth Daily. 90 tablet 3    bumetanide (BUMEX) 2 MG tablet Take 1 tablet by mouth Daily. 90 tablet 3    clopidogrel (PLAVIX) 75 MG tablet Take 1 tablet by mouth Daily. 90 tablet 3    glimepiride (AMARYL) 2 MG tablet Take 1 tablet by mouth Every Morning Before Breakfast.      losartan (COZAAR) 50 MG tablet Take 1 tablet by mouth Daily. 90 tablet 3    metoprolol succinate XL (Toprol XL) 100 MG 24 hr tablet Take 1 tablet by mouth Daily for 30 days. 90 tablet 3    potassium chloride (K-DUR,KLOR-CON) 20 MEQ CR tablet Take 1 tablet by mouth Daily. 30 tablet 11    rivaroxaban (Xarelto) 20 MG tablet Take 1 tablet by mouth Daily. 90 tablet 3    tamsulosin (FLOMAX) 0.4 MG capsule 24 hr capsule Take 1 capsule by mouth Daily. 30 capsule 0     No current facility-administered medications on file prior to visit.          When did you start taking these medications: A LITTLE OVER A YEAR PER PATIENT    Which medication are you concerned about: PATRICK    Who prescribed you this medication: DR. GODOY    What are your concerns: PATIENT IS IN MEDICARE [place] AND THE COST OF THIS MEDICATION IS JUMPING FROM $47 TO $150. PATIENT IS WONDERING IF HE THERE WAS ANOTHER MEDICATION OR IF HE COULD GET SAMPLES.    How long have you had these concerns: LAST MONTH

## 2023-09-27 NOTE — TELEPHONE ENCOUNTER
Caller: Chito Govea    Relationship: Self    Best call back number: [phone]    Requested Prescriptions:   Requested Prescriptions     Pending Prescriptions Disp Refills    clopidogrel (PLAVIX) 75 MG tablet 90 tablet 3     Sig: Take 1 tablet by mouth Daily.    potassium chloride (K-DUR,KLOR-CON) 20 MEQ CR tablet 30 tablet 11     Sig: Take 1 tablet by mouth Daily.        Pharmacy where request should be sent: [address] [phone] [facility] [phone]      Last office visit with prescribing clinician: 5/9/2023   Last telemedicine visit with prescribing clinician: Visit date not found   Next office visit with prescribing clinician: 11/9/2023     Additional details provided by patient: ABOUT 5 DAYS LEFT    Does the patient have less than a 3 day supply:  [] Yes  [x] No    Would you like a call back once the refill request has been completed: [] Yes [] No    If the office needs to give you a call back, can they leave a voicemail: [] Yes [] No    Nellie Rivers Rep   09/27/23 08:20 CDT

## 2023-10-13 LAB
CHOLEST SERPL-MCNC: 158 MG/DL (ref 160–199)
HBA1C MFR BLD: 11.3 % (ref 4–6)
HDLC SERPL-MCNC: 30 MG/DL (ref 55–121)
LDLC SERPL CALC-MCNC: 62 MG/DL
TRIGL SERPL-MCNC: 329 MG/DL (ref 0–149)

## 2023-12-06 ENCOUNTER — TELEPHONE (OUTPATIENT)
Dept: CARDIOLOGY | Facility: CLINIC | Age: 68
End: 2023-12-06

## 2023-12-06 NOTE — TELEPHONE ENCOUNTER
Caller Name: Chito Govea      Relationship: Self      Best Contact Number: [phone]      Patient is requesting samples of: XARELTO 20MG       How many days of medication do you have left? 3        Additional Information:

## 2023-12-14 ENCOUNTER — HOSPITAL ENCOUNTER (OUTPATIENT)
Dept: CT IMAGING | Facility: HOSPITAL | Age: 68
Discharge: HOME | End: 2023-12-14
Payer: MEDICARE

## 2023-12-29 NOTE — TELEPHONE ENCOUNTER
Rx Refill Note  Requested Prescriptions      No prescriptions requested or ordered in this encounter      Last office visit with prescribing clinician: 5/9/2023   Last telemedicine visit with prescribing clinician: Visit date not found   Next office visit with prescribing clinician: 2/6/2024                         Would you like a call back once the refill request has been completed: [] Yes [] No    If the office needs to give you a call back, can they leave a voicemail: [] Yes [] No    Lyubov Nelson  12/29/23, 15:07 CST

## 2024-01-02 RX ORDER — BUMETANIDE 2 MG/1
2 TABLET ORAL DAILY
Qty: 90 TABLET | Refills: 3 | Status: SHIPPED | OUTPATIENT
Start: 2024-01-02

## 2024-01-15 ENCOUNTER — TELEPHONE (OUTPATIENT)
Dept: PULMONOLOGY | Facility: CLINIC | Age: 69
End: 2024-01-15

## 2024-01-15 DIAGNOSIS — Z87.891 PERSONAL HISTORY OF NICOTINE DEPENDENCE: Primary | ICD-10-CM

## 2024-01-15 NOTE — TELEPHONE ENCOUNTER
Hub staff attempted to follow warm transfer process and was unsuccessful     Caller: Chito Govea    Relationship to patient: Self    Best call back number: [phone]    Patient is needing: RETURNING CALL

## 2024-01-15 NOTE — TELEPHONE ENCOUNTER
Patient is willing to reschedule his Chest Ct. Advised patient if Scheduling is unable to get the CT prior to the appointment with Patience GUILLAUME on the 24th he should call back and move the appointment in the office.

## 2024-01-15 NOTE — TELEPHONE ENCOUNTER
See if he is willing to have it done before he comes in and then I will  place the order. Thank you.

## 2024-01-15 NOTE — TELEPHONE ENCOUNTER
Patient no showed for his CT Chest in December and the order has .   He is scheduled to come in on 24. Do you want to put in a new order and have the CT before he comes back? Or just cancel the order?

## 2024-01-19 NOTE — PROGRESS NOTES
" MARKELL Alan  [facility]   Pulmonary and Critical Care  [address] [place] [address]  Phone: [phone]  Fax: [phone]           Chief Complaint  Emphysema    Subjective    History of Present Illness     Chito Govea presents to [facility] PULMONARY & CRITICAL CARE MEDICINE   History of Present Illness  Mr. Govea is a pleasant 68 year old male with known CAD with PTCA/ PCI on 10/3/2022, CHF, emphysema, moderate aortic stenosis, chronic obstructive lung disease, atrial fibrillation on chronic OAC. Former Smoker. He uses oxygen as needed with exertion and nightly. He denies bleeding issues. He does have a nose bleed every now and then. CT 12/23/22 showed resolution of pulmonary edema and pleura fluid with a left lower lobe 5mm nodule. He denies shortness of breath. His cough is rare but productive when it occurs.  He denies fever, chills or night sweats.  He states he had sinus infection for 2 months and busted his ear drum. His hearing and sinuses have improved.          Objective   Vital Signs:   /80   Pulse 63   Ht 165.1 cm (65\")   Wt 70.9 kg (156 lb 3.2 oz)   SpO2 99% Comment: RA  BMI 25.99 kg/m²     Physical Exam  Vitals reviewed.   Constitutional:       Appearance: Normal appearance.   Cardiovascular:      Rate and Rhythm: Normal rate and regular rhythm.   Pulmonary:      Effort: Pulmonary effort is normal.      Breath sounds: Normal breath sounds.   Neurological:      General: No focal deficit present.      Mental Status: He is alert and oriented to person, place, and time.   Psychiatric:         Mood and Affect: Mood normal.         Behavior: Behavior normal.          Result Review :  The following data was reviewed by: MARKELL Alan on 01/31/2024:    Data reviewed : Radiologic studies LDCT Jan 2024     My interpretation of imaging:  as in HPI  My interpretation of labs: none  CT Chest Low Dose Cancer Screening WO " (2024 14:55)   PFT Values          2023    13:30   Pre Drug PFT Results   FVC 89   FEV1 87   FEF 25-75% 82   FEV1/FVC 75   Other Tests PFT Results   TLC 93      DLCO 55   D/VAsb 57     My interpretation of the PFT : no new     Results for orders placed in visit on 23    Pulmonary Function Test    Narrative  Pulmonary Function Test  Performed by: Isela Arthur, RRT  Authorized by: Teresa Maciel APRN    Pre Drug % Predicted  FVC: 89%  FEV1: 87%  FEF 25-75%: 82%  FEV1/FVC: 75%  T%  RV: 106%  DLCO: 55%  D/VAsb: 57%    Interpretation  Spirometry  Spirometry shows mild restriction. There is reduced midflow suggesting small airway/airflow obstruction.  Review of FVL curve  Patient's effort is normal.  Lung Volume Measurements  Measurements show normal results.  Diffusion Capacity  The patient's diffusion capacity is moderately reduced.  Diffusion capacity is moderately reduced when corrected for alveolar volume.            Assessment and Plan   Diagnoses and all orders for this visit:    1. Pulmonary emphysema, unspecified emphysema type (Primary)  Comments:  repeat FVL and DLCO in one year.   Decleines inhalers  Orders:  -     Alpha - 1 - Antitrypsin; Future  -     Spirometry with Diffusion Capacity; Future    2. Decreased diffusion capacity  Assessment & Plan:  Reassess in 12 months with FVL and DLCO     Orders:  -     Spirometry with Diffusion Capacity; Future    3. Lung nodule  Assessment & Plan:  Stable 5 mm left lung nodule.   Plan for annual LDCT   Multiple calcified granulomas  Reviewed current LDCT with patient       4. Personal history of nicotine dependence  Overview:  Quit 2022. 140 pack years     Assessment & Plan:  Annual LDCT   Reviewed current imaging with patient     Orders:  -      CT Chest Low Dose Cancer Screening WO; Future    5. Nocturnal hypoxia  Assessment & Plan:  Continue supplemental oxygen  Use Ayr gel as needed for nasal dryness         Patient  declines influenza and pneumonia vaccinations.   Alpha 1: tested today       Follow Up   Return in about 1 year (around 1/31/2025) for FVL w DIF, CTlow dose.  Patient was given instructions and counseling regarding his condition or for health maintenance advice. Please see specific information pulled into the AVS if appropriate.     Teresa Maciel, APRN  1/31/2024  09:36 CST

## 2024-01-25 ENCOUNTER — HOSPITAL ENCOUNTER (OUTPATIENT)
Dept: CT IMAGING | Facility: HOSPITAL | Age: 69
Discharge: HOME | End: 2024-01-25
Admitting: NURSE PRACTITIONER
Payer: MEDICARE

## 2024-01-25 DIAGNOSIS — Z87.891 PERSONAL HISTORY OF NICOTINE DEPENDENCE: ICD-10-CM

## 2024-01-25 PROCEDURE — 71271 CT THORAX LUNG CANCER SCR C-: CPT

## 2024-01-31 ENCOUNTER — OFFICE VISIT (OUTPATIENT)
Dept: PULMONOLOGY | Facility: CLINIC | Age: 69
End: 2024-01-31
Payer: MEDICARE

## 2024-01-31 VITALS
DIASTOLIC BLOOD PRESSURE: 80 MMHG | HEIGHT: 65 IN | WEIGHT: 156.2 LBS | SYSTOLIC BLOOD PRESSURE: 140 MMHG | OXYGEN SATURATION: 99 % | BODY MASS INDEX: 26.02 KG/M2 | HEART RATE: 63 BPM

## 2024-01-31 DIAGNOSIS — G47.34 NOCTURNAL HYPOXIA: ICD-10-CM

## 2024-01-31 DIAGNOSIS — J43.9 PULMONARY EMPHYSEMA, UNSPECIFIED EMPHYSEMA TYPE: Primary | Chronic | ICD-10-CM

## 2024-01-31 DIAGNOSIS — Z87.891 PERSONAL HISTORY OF NICOTINE DEPENDENCE: Chronic | ICD-10-CM

## 2024-01-31 DIAGNOSIS — R94.2 DECREASED DIFFUSION CAPACITY: Chronic | ICD-10-CM

## 2024-01-31 DIAGNOSIS — R91.1 LUNG NODULE: ICD-10-CM

## 2024-01-31 PROCEDURE — 3079F DIAST BP 80-89 MM HG: CPT | Performed by: NURSE PRACTITIONER

## 2024-01-31 PROCEDURE — 3077F SYST BP >= 140 MM HG: CPT | Performed by: NURSE PRACTITIONER

## 2024-01-31 PROCEDURE — 99214 OFFICE O/P EST MOD 30 MIN: CPT | Performed by: NURSE PRACTITIONER

## 2024-01-31 NOTE — ASSESSMENT & PLAN NOTE
Stable 5 mm left lung nodule.   Plan for annual LDCT   Multiple calcified granulomas  Reviewed current LDCT with patient

## 2024-02-06 ENCOUNTER — OFFICE VISIT (OUTPATIENT)
Dept: CARDIOLOGY | Facility: CLINIC | Age: 69
End: 2024-02-06
Payer: MEDICARE

## 2024-02-06 VITALS
WEIGHT: 156 LBS | SYSTOLIC BLOOD PRESSURE: 102 MMHG | OXYGEN SATURATION: 99 % | HEART RATE: 75 BPM | HEIGHT: 65 IN | BODY MASS INDEX: 25.99 KG/M2 | DIASTOLIC BLOOD PRESSURE: 50 MMHG

## 2024-02-06 DIAGNOSIS — I50.22 CHRONIC SYSTOLIC CONGESTIVE HEART FAILURE: ICD-10-CM

## 2024-02-06 DIAGNOSIS — I48.0 PAROXYSMAL ATRIAL FIBRILLATION: ICD-10-CM

## 2024-02-06 DIAGNOSIS — E78.49 OTHER HYPERLIPIDEMIA: ICD-10-CM

## 2024-02-06 DIAGNOSIS — Z87.891 PERSONAL HISTORY OF NICOTINE DEPENDENCE: Chronic | ICD-10-CM

## 2024-02-06 DIAGNOSIS — J43.2 CENTRILOBULAR EMPHYSEMA: ICD-10-CM

## 2024-02-06 DIAGNOSIS — E11.65 TYPE 2 DIABETES MELLITUS WITH HYPERGLYCEMIA, WITHOUT LONG-TERM CURRENT USE OF INSULIN: ICD-10-CM

## 2024-02-06 DIAGNOSIS — I35.0 AORTIC STENOSIS, MODERATE: Primary | ICD-10-CM

## 2024-02-06 DIAGNOSIS — I25.5 ISCHEMIC CARDIOMYOPATHY: ICD-10-CM

## 2024-02-06 DIAGNOSIS — I25.700 CORONARY ARTERY DISEASE INVOLVING CORONARY BYPASS GRAFT OF NATIVE HEART WITH UNSTABLE ANGINA PECTORIS: ICD-10-CM

## 2024-02-06 DIAGNOSIS — I10 PRIMARY HYPERTENSION: ICD-10-CM

## 2024-02-06 RX ORDER — BUMETANIDE 2 MG/1
2 TABLET ORAL DAILY
Qty: 90 TABLET | Refills: 3 | Status: SHIPPED | OUTPATIENT
Start: 2024-02-06

## 2024-02-06 RX ORDER — METOPROLOL SUCCINATE 100 MG/1
100 TABLET, EXTENDED RELEASE ORAL DAILY
Qty: 90 TABLET | Refills: 3 | Status: SHIPPED | OUTPATIENT
Start: 2024-02-06 | End: 2024-03-07

## 2024-02-06 RX ORDER — CLOPIDOGREL BISULFATE 75 MG/1
75 TABLET ORAL DAILY
Qty: 90 TABLET | Refills: 3 | Status: SHIPPED | OUTPATIENT
Start: 2024-02-06

## 2024-02-06 RX ORDER — ATORVASTATIN CALCIUM 40 MG/1
40 TABLET, FILM COATED ORAL DAILY
Qty: 90 TABLET | Refills: 3 | Status: SHIPPED | OUTPATIENT
Start: 2024-02-06

## 2024-02-06 RX ORDER — LOSARTAN POTASSIUM 50 MG/1
50 TABLET ORAL DAILY
Qty: 90 TABLET | Refills: 3 | Status: SHIPPED | OUTPATIENT
Start: 2024-02-06

## 2024-02-06 NOTE — PROGRESS NOTES
Subjective:     Encounter Date:02/06/2024      Patient ID: Chito Govea is a 68 y.o. male.    Chief Complaint:  History of Present Illness    Chito Govea is a 68-year-old male who presents today for a follow-up visit.    The patient reports he has been feeling fine. He denies chest pain, shortness of breath, dizzy spells, or passing out spells. His legs were swollen when he was in the hospital, but not currently. He denies feeling his heart skipping or fluttering or flipping. He is able to get up and work in his yard for 3 to 4 hours. He notes he is having pain in his thumb.    He reports he used to smoke but quit a couple of years ago.    The patient is currently prescribed Lipitor, Bumex, Plavix, Farxiga, losartan, metoprolol, and Xarelto.        Review of Systems   Constitutional: Negative for diaphoresis, fever and malaise/fatigue.   HENT:  Negative for congestion.    Eyes:  Negative for vision loss in left eye and vision loss in right eye.   Cardiovascular:  Negative for chest pain, claudication, dyspnea on exertion, irregular heartbeat, leg swelling, orthopnea, palpitations and syncope.   Respiratory:  Negative for cough, shortness of breath and wheezing.    Hematologic/Lymphatic: Negative for adenopathy.   Skin:  Negative for rash.   Musculoskeletal:  Negative for joint pain and joint swelling.   Gastrointestinal:  Negative for abdominal pain, diarrhea, nausea and vomiting.   Neurological:  Negative for excessive daytime sleepiness, dizziness, focal weakness, light-headedness, numbness and weakness.   Psychiatric/Behavioral:  Negative for depression. The patient does not have insomnia.        Current Outpatient Medications:     atorvastatin (LIPITOR) 40 MG tablet, Take 1 tablet by mouth Daily., Disp: 90 tablet, Rfl: 3    bumetanide (BUMEX) 2 MG tablet, Take 1 tablet by mouth Daily., Disp: 90 tablet, Rfl: 3    clopidogrel (PLAVIX) 75 MG tablet, Take 1 tablet by mouth Daily., Disp: 90 tablet, Rfl: 3     dapagliflozin Propanediol (Farxiga) 10 MG tablet, Take 10 mg by mouth Daily., Disp: 90 tablet, Rfl: 3    glimepiride (AMARYL) 2 MG tablet, Take 1 tablet by mouth Every Morning Before Breakfast., Disp: , Rfl:     losartan (COZAAR) 50 MG tablet, Take 1 tablet by mouth Daily., Disp: 90 tablet, Rfl: 3    metFORMIN (GLUCOPHAGE) 500 MG tablet, Take 1 tablet by mouth Every 12 (Twelve) Hours., Disp: , Rfl:     metoprolol succinate XL (Toprol XL) 100 MG 24 hr tablet, Take 1 tablet by mouth Daily for 30 days., Disp: 90 tablet, Rfl: 3    Omega-3 Fatty Acids (OMEGA-3 FISH OIL PO), Take  by mouth., Disp: , Rfl:     potassium chloride (K-DUR,KLOR-CON) 20 MEQ CR tablet, Take 1 tablet by mouth Daily., Disp: 30 tablet, Rfl: 11    rivaroxaban (Xarelto) 20 MG tablet, Take 1 tablet by mouth Daily., Disp: 90 tablet, Rfl: 3    tamsulosin (FLOMAX) 0.4 MG capsule 24 hr capsule, Take 1 capsule by mouth Daily., Disp: 30 capsule, Rfl: 0       Objective:      Vitals:    02/06/24 1353   BP: 102/50   Pulse: 75   SpO2: 99%     Vitals and nursing note reviewed.   Constitutional:       Appearance: Normal and healthy appearance. Well-developed and not in distress.   Eyes:      Extraocular Movements: Extraocular movements intact.      Pupils: Pupils are equal, round, and reactive to light.   HENT:      Head: Normocephalic and atraumatic.    Mouth/Throat:      Pharynx: Oropharynx is clear.   Neck:      Vascular: JVD normal.      Trachea: Trachea normal.   Pulmonary:      Effort: Pulmonary effort is normal.      Breath sounds: Normal breath sounds. No wheezing. No rhonchi. No rales.   Cardiovascular:      PMI at left midclavicular line. Normal rate. Regular rhythm. Normal S1. Normal S2.       Murmurs: There is no murmur. There is a grade 2/6 murmur.      No gallop.  No click. No rub.   Pulses:     Intact distal pulses.      Dorsalis pedis: 2+ bilaterally.     Posterior tibial: 2+ bilaterally.  Edema:     Peripheral edema absent.   Abdominal:       General: Bowel sounds are normal.      Palpations: Abdomen is soft.      Tenderness: There is no abdominal tenderness.   Musculoskeletal: Normal range of motion.      Cervical back: Normal range of motion and neck supple. Skin:     General: Skin is warm and dry.      Capillary Refill: Capillary refill takes less than 2 seconds.   Feet:      Right foot:      Skin integrity: Skin integrity normal.      Left foot:      Skin integrity: Skin integrity normal.   Neurological:      Mental Status: Alert and oriented to person, place and time.      Sensory: Sensation is intact.      Motor: Motor function is intact.      Coordination: Coordination is intact.   Psychiatric:         Speech: Speech normal.         Behavior: Behavior is cooperative.       Lab Review:         ECG 12 Lead    Date/Time: 2/11/2024 3:20 PM  Performed by: Kun Devine DO    Authorized by: Kun Devine DO  Comparison: compared with previous ECG   Similar to previous ECG  Rhythm: sinus arrhythmia  Rate: normal  Conduction: conduction normal  QRS axis: normal  Other findings: T wave abnormality    Clinical impression: abnormal EKG            Assessment/Plan:     Problem List Items Addressed This Visit       Paroxysmal atrial fibrillation    Relevant Medications    clopidogrel (PLAVIX) 75 MG tablet    metoprolol succinate XL (Toprol XL) 100 MG 24 hr tablet    Hypertension    Relevant Medications    bumetanide (BUMEX) 2 MG tablet    losartan (COZAAR) 50 MG tablet    metoprolol succinate XL (Toprol XL) 100 MG 24 hr tablet    Type 2 diabetes mellitus with hyperglycemia, without long-term current use of insulin    Relevant Medications    dapagliflozin Propanediol (Farxiga) 10 MG tablet    Aortic stenosis, moderate - Primary    Relevant Medications    clopidogrel (PLAVIX) 75 MG tablet    metoprolol succinate XL (Toprol XL) 100 MG 24 hr tablet    COPD (chronic obstructive pulmonary disease)    Cardiomyopathy    Overview     Added  automatically from request for surgery [id number]         Relevant Medications    clopidogrel (PLAVIX) 75 MG tablet    metoprolol succinate XL (Toprol XL) 100 MG 24 hr tablet    Coronary artery disease involving coronary bypass graft of native heart with unstable angina pectoris    Relevant Medications    clopidogrel (PLAVIX) 75 MG tablet    metoprolol succinate XL (Toprol XL) 100 MG 24 hr tablet    Personal history of nicotine dependence (Chronic)    Overview     Quit June 2022. 140 pack years          Other hyperlipidemia    Relevant Medications    atorvastatin (LIPITOR) 40 MG tablet    Chronic systolic congestive heart failure    Relevant Medications    clopidogrel (PLAVIX) 75 MG tablet    metoprolol succinate XL (Toprol XL) 100 MG 24 hr tablet     Cardiac health maintenance  - The patient presents today for a 6-month follow-up for congestive heart failure. He had a CT scan of his lungs, which was normal. Echocardiogram and heart catheterization results from 2023 was reviewed with the patient. His kidney is stable. He is in sinus rhythm today. The patient has high blood pressure, aortic stenosis, heart failure, coronary disease, and diabetes. He will continue his current medications as prescribed and a refill prescription order for his Plavix, Cozaar, metoprolol, and Xarelto was placed and sent to his pharmacy.    Follow-up  The patient will follow up in 1 year.    Recommendations/plans:    Transcribed from ambient dictation for Kun Devine DO by Alverto Giron.  02/06/24   14:56 CST    Patient or patient representative verbalized consent to the visit recording.  I have personally performed the services described in this document as transcribed by the above individual, and it is both accurate and complete.  Kun Devine DO  2/11/2024  15:22 CST

## 2024-02-11 PROBLEM — E78.49 OTHER HYPERLIPIDEMIA: Status: ACTIVE | Noted: 2024-02-11

## 2024-02-11 PROBLEM — I50.22 CHRONIC SYSTOLIC CONGESTIVE HEART FAILURE: Status: ACTIVE | Noted: 2024-02-11

## 2024-02-12 DIAGNOSIS — J43.9 PULMONARY EMPHYSEMA, UNSPECIFIED EMPHYSEMA TYPE: Chronic | ICD-10-CM

## 2024-03-14 LAB
ANION GAP SERPL CALCULATED.3IONS-SCNC: 13 MMOL/L (ref 7–19)
BUN SERPL-MCNC: 58 MG/DL (ref 8–23)
CALCIUM SERPL-MCNC: 9.2 MG/DL (ref 8.8–10.2)
CHLORIDE SERPL-SCNC: 105 MMOL/L (ref 98–111)
CHOLEST SERPL-MCNC: 127 MG/DL (ref 160–199)
CO2 SERPL-SCNC: 20 MMOL/L (ref 22–29)
CREAT SERPL-MCNC: 1.8 MG/DL (ref 0.5–1.2)
GLUCOSE SERPL-MCNC: 148 MG/DL (ref 74–109)
HBA1C MFR BLD: 10.1 % (ref 4–6)
HDLC SERPL-MCNC: 30 MG/DL (ref 55–121)
LDLC SERPL CALC-MCNC: 57 MG/DL
POTASSIUM SERPL-SCNC: 5.1 MMOL/L (ref 3.5–5)
SODIUM SERPL-SCNC: 138 MMOL/L (ref 136–145)
TRIGL SERPL-MCNC: 200 MG/DL (ref 0–149)

## 2024-06-10 NOTE — PROGRESS NOTES
[facility] -  Progress Note     LOS: 3 days   Patient Care Team:  Chito Rubio MD as PCP - General (Family Medicine)    Chief Complaint: F/U CHF    Subjective   Sitting up in bed. Stated his breathing is better. Was taken down for test on minimal oxygen this morning and it has taken him a while to recuperate. Currently on 15 lpm high flow.       REVIEW OF SYSTEMS:  Constitutional: Denies fever or chills. Denies change in energy level or malaise.  HEENT: Denies headaches or visual disturbances. Denies difficulty swallowing or sore throat.  Respiratory: Denies cough or hoarseness. Shortness of breath.   Cardiovascular: Denies chest pain or pressure. Denies palpitations. Denies presyncope/syncope. Denies orthopnea/PND. BLE edema improved.  Gastrointestinal: Denies abdominal pain. Denies nausea/vomiting. Denies change in bowel habits or history of recent GI tract blood loss.   Genitourinary: Denies urinary urgency or frequency. Denies dysuria, hematuria.  Musculoskeletal: Denies pain or swelling in joints. Generalized weakness.   Neurological: Denies paresthesias. Denies headache. Denies seizure or stroke symptoms.  Behavioral/Psych: Denies problems with anxiety or depression.    All other systems are negative except where stated above.      Objective     Vital Sign Min/Max for last 24 hours  Temp  Min: 97.8 °F (36.6 °C)  Max: 98.7 °F (37.1 °C)   BP  Min: 99/62  Max: 121/69   Pulse  Min: 74  Max: 119   Resp  Min: 18  Max: 20   SpO2  Min: 90 %  Max: 97 %   No data recorded   Weight  Min: 67 kg (147 lb 12.8 oz)  Max: 67 kg (147 lb 12.8 oz)         09/04/22  0420   Weight: 67 kg (147 lb 12.8 oz)         Intake/Output Summary (Last 24 hours) at 9/4/2022 1222  Last data filed at 9/4/2022 0730  Gross per 24 hour   Intake 960 ml   Output 1300 ml   Net -340 ml         Physical Exam:    General Appearance:    Alert, cooperative, in no acute distress. Chronically ill-appearing   HEENT:     Normocephalic. Atraumatic. SURINDER.   Lungs:     Diminished throughout with bibasilar rales to auscultation, respirations regular, even and unlabored    Heart:    Regular rhythm and normal rate, normal S1 and S2, 2/6 systolic murmur, no gallop, no rub, no click   Abdomen:     Normal bowel sounds, soft, non-tender, non-distended   Extremities:   Moves all extremities, 2+ BLE edema, no cyanosis, no redness   Skin:   No bleeding, bruising or rash   Neurologic:   Grossly intact           Results Review:   Lab Results (last 72 hours)     Procedure Component Value Units Date/Time    POC Glucose Once [[id number]]  (Abnormal) Collected: 09/04/22 1129    Specimen: Blood Updated: 09/04/22 1141     Glucose 522 mg/dL      Comment: : [id number] Jose Luis EuraisaMeter ID: [id number]       POC Glucose Once [[id number]]  (Abnormal) Collected: 09/04/22 1128    Specimen: Blood Updated: 09/04/22 1141     Glucose 472 mg/dL      Comment: : [id number] Jose Luis EuraisaMeter ID: [id number]       POC Glucose Once [[id number]]  (Abnormal) Collected: 09/04/22 0759    Specimen: Blood Updated: 09/04/22 0842     Glucose 367 mg/dL      Comment: : [id number] Jose Luis EuraisaMeter ID: [id number]       Basic Metabolic Panel [[id number]]  (Abnormal) Collected: 09/04/22 0510    Specimen: Blood Updated: 09/04/22 0607     Glucose 341 mg/dL      BUN 39 mg/dL      Creatinine 1.26 mg/dL      Sodium 139 mmol/L      Potassium 4.2 mmol/L      Chloride 95 mmol/L      CO2 30.0 mmol/L      Calcium 8.3 mg/dL      BUN/Creatinine Ratio 31.0     Anion Gap 14.0 mmol/L      eGFR 62.5 mL/min/1.73      Comment: National Kidney Foundation and American Society of Nephrology (ASN) Task Force recommended calculation based on the Chronic Kidney Disease Epidemiology Collaboration (CKD-EPI) equation refit without adjustment for race.       Narrative:      GFR Normal >60  Chronic Kidney Disease <60  Kidney Failure <15      Blood Culture - Blood, Arm, Left [[id number]]  (Normal)  Collected: 09/01/22 0056    Specimen: Blood from Arm, Left Updated: 09/04/22 0115     Blood Culture No growth at 3 days    Blood Culture - Blood, Arm, Right [[id number]]  (Normal) Collected: 09/01/22 0056    Specimen: Blood from Arm, Right Updated: 09/04/22 0115     Blood Culture No growth at 3 days    POC Glucose Once [[id number]]  (Abnormal) Collected: 09/03/22 1959    Specimen: Blood Updated: 09/03/22 2029     Glucose 166 mg/dL      Comment: : [id number] Ameya GarciaMeter ID: [id number]       Basic Metabolic Panel [[id number]]  (Abnormal) Collected: 09/03/22 1910    Specimen: Blood Updated: 09/03/22 1940     Glucose 92 mg/dL      BUN 39 mg/dL      Creatinine 1.19 mg/dL      Sodium 141 mmol/L      Potassium 3.7 mmol/L      Chloride 98 mmol/L      CO2 32.0 mmol/L      Calcium 8.4 mg/dL      BUN/Creatinine Ratio 32.8     Anion Gap 11.0 mmol/L      eGFR 67.0 mL/min/1.73      Comment: National Kidney Foundation and American Society of Nephrology (ASN) Task Force recommended calculation based on the Chronic Kidney Disease Epidemiology Collaboration (CKD-EPI) equation refit without adjustment for race.       Narrative:      GFR Normal >60  Chronic Kidney Disease <60  Kidney Failure <15      Blood Gas, Arterial - [[id number]]  (Abnormal) Collected: 09/03/22 1827    Specimen: Arterial Blood Updated: 09/03/22 1820     Site Right Brachial     Steve's Test Positive     pH, Arterial 7.523 pH units      Comment: 83 Value above reference range        pCO2, Arterial 38.4 mm Hg      pO2, Arterial 89.0 mm Hg      HCO3, Arterial 31.5 mmol/L      Comment: 83 Value above reference range        Base Excess, Arterial 8.2 mmol/L      Comment: 83 Value above reference range        O2 Saturation, Arterial 98.4 %      Temperature 37.0 C      Barometric Pressure for Blood Gas 750 mmHg      Modality HFNC     Flow Rate 14.0 lpm      Ventilator Mode NA     Collected by [id number]     Comment: Meter: [id number]     :  [id number]         pCO2, Temperature Corrected 38.4 mm Hg      pH, Temp Corrected 7.523 pH Units      pO2, Temperature Corrected 89.0 mm Hg     POC Glucose Once [[id number]]  (Abnormal) Collected: 09/03/22 1628    Specimen: Blood Updated: 09/03/22 1716     Glucose 175 mg/dL      Comment: : [id number] Rickey PepeaMeter ID: [id number]       CBC & Differential [[id number]]  (Abnormal) Collected: 09/03/22 1111    Specimen: Blood Updated: 09/03/22 1141    Narrative:      The following orders were created for panel order CBC & Differential.  Procedure                               Abnormality         Status                     ---------                               -----------         ------                     CBC Auto Differential[[id number]]        Abnormal            Final result                 Please view results for these tests on the individual orders.    CBC Auto Differential [[id number]]  (Abnormal) Collected: 09/03/22 1111    Specimen: Blood Updated: 09/03/22 1141     WBC 8.48 10*3/mm3      RBC 3.65 10*6/mm3      Hemoglobin 10.5 g/dL      Hematocrit 34.1 %      MCV 93.4 fL      MCH 28.8 pg      MCHC 30.8 g/dL      RDW 15.5 %      RDW-SD 52.5 fl      MPV 10.4 fL      Platelets 344 10*3/mm3      Neutrophil % 85.8 %      Lymphocyte % 5.9 %      Monocyte % 7.3 %      Eosinophil % 0.2 %      Basophil % 0.2 %      Immature Grans % 0.6 %      Neutrophils, Absolute 7.27 10*3/mm3      Lymphocytes, Absolute 0.50 10*3/mm3      Monocytes, Absolute 0.62 10*3/mm3      Eosinophils, Absolute 0.02 10*3/mm3      Basophils, Absolute 0.02 10*3/mm3      Immature Grans, Absolute 0.05 10*3/mm3      nRBC 0.0 /100 WBC     POC Glucose Once [[id number]]  (Abnormal) Collected: 09/03/22 1119    Specimen: Blood Updated: 09/03/22 1130     Glucose 253 mg/dL      Comment: : [id number] Micah LeungMeter ID: [id number]       POC Glucose Once [[id number]]  (Abnormal) Collected: 09/03/22 0749    Specimen: Blood Updated: 09/03/22 0801     Glucose 191 mg/dL       Comment: : [id number] Micah LeungMeter ID: [id number]       Basic Metabolic Panel [[id number]]  (Abnormal) Collected: 09/03/22 0519    Specimen: Blood Updated: 09/03/22 0637     Glucose 199 mg/dL      BUN 39 mg/dL      Creatinine 1.13 mg/dL      Sodium 137 mmol/L      Potassium 3.8 mmol/L      Chloride 99 mmol/L      CO2 32.0 mmol/L      Calcium 8.1 mg/dL      BUN/Creatinine Ratio 34.5     Anion Gap 6.0 mmol/L      eGFR 71.2 mL/min/1.73      Comment: National Kidney Foundation and American Society of Nephrology (ASN) Task Force recommended calculation based on the Chronic Kidney Disease Epidemiology Collaboration (CKD-EPI) equation refit without adjustment for race.       Narrative:      GFR Normal >60  Chronic Kidney Disease <60  Kidney Failure <15      POC Glucose Once [[id number]]  (Abnormal) Collected: 09/02/22 1711    Specimen: Blood Updated: 09/02/22 1722     Glucose 308 mg/dL      Comment: : RICHI Curran RonnunaMeter ID: [id number]       POC Glucose Once [[id number]]  (Abnormal) Collected: 09/02/22 1128    Specimen: Blood Updated: 09/02/22 1140     Glucose 219 mg/dL      Comment: : ROQUE Kent VeronicaerMeter ID: [id number]       POC Glucose Once [[id number]]  (Abnormal) Collected: 09/02/22 0757    Specimen: Blood Updated: 09/02/22 0808     Glucose 295 mg/dL      Comment: : ROQUE Kent VeronicaerMeter ID: [id number]       Basic Metabolic Panel [[id number]]  (Abnormal) Collected: 09/02/22 0356    Specimen: Blood Updated: 09/02/22 0426     Glucose 183 mg/dL      BUN 29 mg/dL      Creatinine 1.19 mg/dL      Sodium 140 mmol/L      Potassium 4.1 mmol/L      Chloride 101 mmol/L      CO2 28.0 mmol/L      Calcium 7.6 mg/dL      BUN/Creatinine Ratio 24.4     Anion Gap 11.0 mmol/L      eGFR 67.0 mL/min/1.73      Comment: National Kidney Foundation and American Society of Nephrology (ASN) Task Force recommended calculation based on the Chronic Kidney Disease Epidemiology Collaboration (CKD-EPI)  equation refit without adjustment for race.       Narrative:      GFR Normal >60  Chronic Kidney Disease <60  Kidney Failure <15      Potassium [[id number]]  (Normal) Collected: 09/01/22 1938    Specimen: Blood Updated: 09/01/22 2025     Potassium 4.5 mmol/L     POC Glucose Once [[id number]]  (Abnormal) Collected: 09/01/22 1719    Specimen: Blood Updated: 09/01/22 1730     Glucose 139 mg/dL      Comment: : ROQUE Alvarez ID: [id number]                 2D Echocardiogram:  Report pending      Medication Review: yes  Current Facility-Administered Medications   Medication Dose Route Frequency Provider Last Rate Last Admin   • amiodarone (PACERONE) tablet 200 mg  200 mg Oral Q12H Deepak Pena MD   200 mg at 09/04/22 0957   • atorvastatin (LIPITOR) tablet 20 mg  20 mg Oral Daily Deepak Pena MD   20 mg at 09/04/22 0957   • azithromycin (ZITHROMAX) 500 mg in sodium chloride 0.9 % 250 mL IVPB-VTB  500 mg Intravenous Q24H Edwin Du DO   500 mg at 09/04/22 1147   • bisacodyl (DULCOLAX) suppository 10 mg  10 mg Rectal Daily Edwin Du DO   10 mg at 09/03/22 1741   • budesonide-formoterol (SYMBICORT) 160-4.5 MCG/ACT inhaler 2 puff  2 puff Inhalation BID - RT Stew Dawson MD   2 puff at 09/04/22 0618   • calcium carbonate (TUMS) chewable tablet 500 mg (200 mg elemental)  2 tablet Oral Q2H PRN Edwin Du DO   2 tablet at 09/02/22 1712   • cefTRIAXone (ROCEPHIN) 1 g in sodium chloride 0.9 % 100 mL IVPB-VTB  1 g Intravenous Q24H Edwin Du  mL/hr at 09/04/22 1147 1 g at 09/04/22 1147   • dextrose (D50W) (25 g/50 mL) IV injection 25 g  25 g Intravenous Q15 Min PRN Deepak Pena MD       • dextrose (GLUTOSE) oral gel 15 g  15 g Oral Q15 Min PRN Deepak Pena MD       • furosemide (LASIX) injection 60 mg  60 mg Intravenous TID Keely Carey APRN   60 mg at 09/04/22 0957   • glucagon (human recombinant) (GLUCAGEN DIAGNOSTIC)  injection 1 mg  1 mg Intramuscular Q15 Min PRN Deepak Pena MD       • Insulin Lispro (humaLOG) injection 0-14 Units  0-14 Units Subcutaneous TID AC Stew Dawson MD   14 Units at 09/04/22 1146   • ipratropium (ATROVENT) nebulizer solution 0.5 mg  0.5 mg Nebulization 4x Daily - RT Stew Dawson MD   0.5 mg at 09/04/22 1030   • melatonin tablet 6 mg  6 mg Oral Nightly PRN Brian Cha DO   6 mg at 09/01/22 2343   • methylPREDNISolone sodium succinate (SOLU-Medrol) injection 40 mg  40 mg Intravenous Q12H Eber Mckeon, MARKELL       • nitroglycerin (TRIDIL) 200 mcg/ml infusion  10-50 mcg/min Intravenous Titrated Deepak Pena MD   Stopped at 09/01/22 0815   • pantoprazole (PROTONIX) EC tablet 40 mg  40 mg Oral BID AC Deepak Pena MD   40 mg at 09/04/22 0958   • rivaroxaban (XARELTO) tablet 20 mg  20 mg Oral Daily Deepak Pena MD   20 mg at 09/04/22 0957   • sennosides-docusate (PERICOLACE) 8.6-50 MG per tablet 2 tablet  2 tablet Oral BID PRN Edwin Du DO       • sodium chloride 0.9 % flush 10 mL  10 mL Intravenous PRN eDepak Pena MD   10 mL at 09/01/22 0827         Assessment & Plan       Acute on chronic diastolic congestive heart failure - Improved with IV diuresing. Still on 15 lpm high flow, but was taken for test this morning on minimal oxygen.     Inflammatory Pneumonitis vs Community Acquired Pneumonia - on Ceftriaxone and Azithromycin. Breathing Treatments and Steroids.     Paroxysmal atrial fibrillation (HCC) - Currently SR. On Amiodarone, anticoagulated on Xarelto.    Essential Hypertension    Diabetes mellitus (HCC), Type 2 - Uncontrolled with HgbA1c 8.4. Management per Hospitalist.    Aortic stenosis, moderate - 2D Echocardiogram pending    Moderate malnutrition (HCC)    Continue with IV diuresing. Monitor renal function, I/O and daily weight.       MARKELL Kaiser  09/04/22  12:22 CDT             on the discharge service for the patient. I have reviewed and made amendments to the documentation where necessary.

## 2024-06-19 ENCOUNTER — TELEPHONE (OUTPATIENT)
Dept: CARDIOLOGY | Facility: CLINIC | Age: 69
End: 2024-06-19

## 2024-06-19 NOTE — TELEPHONE ENCOUNTER
Caller: SINDY OSUNA    Relationship:SELF    Callback number: [phone]    Is it ok to leave a message: [x] Yes [] No    Requested medication for samples: XARELTO 20MG    How much medication does the patient currently have left: 4-5 PILLS    Who will be picking up the samples: SINDY OSUNA    Do you need information about patient financial assistance for this medication: [] Yes [x] No

## 2024-06-20 NOTE — TELEPHONE ENCOUNTER
Caller: Sindy Govea    Relationship: Self    Best call back number: [phone]    What is the best time to reach you: ANYTIME    Who are you requesting to speak with (clinical staff, provider,  specific staff member): NELLY    Do you know the name of the person who called: SINDY    What was the call regarding: PT IS CHECKING IN ON THIS REQUEST. STATES HE'LL BE OUT OVER THE WEEKEND AND NEEDS TO  SAMPLES BEFORE THEN    Is it okay if the provider responds through MyChart: NO

## 2024-07-01 RX ORDER — CLOPIDOGREL BISULFATE 75 MG/1
75 TABLET ORAL DAILY
OUTPATIENT
Start: 2024-07-01

## 2024-07-01 NOTE — TELEPHONE ENCOUNTER
Caller: SINDY    Relationship: SELF    Best call back number: [phone]    Requested Prescriptions:   Requested Prescriptions     Pending Prescriptions Disp Refills    clopidogrel (PLAVIX) 75 MG tablet 90 tablet 3     Sig: Take 1 tablet by mouth Daily.        Pharmacy where request should be sent: [facility] [address] [place] [address] [address] [facility] - [phone] [facility] [phone] FX     Last office visit with prescribing clinician: 2/6/2024   Last telemedicine visit with prescribing clinician: Visit date not found   Next office visit with prescribing clinician: 2/6/2025     Additional details provided by patient: 90 DAY SUPPLY PLEASE. THANK YOU!    Does the patient have less than a 3 day supply:  [x] Yes  [] No    Would you like a call back once the refill request has been completed:  [x] Yes [] No    If the office needs to give you a call back, can they leave a voicemail: [] Yes [] No    Nellie Arguello   07/01/24 08:36 CDT

## 2024-07-18 LAB
25(OH)D3 SERPL-MCNC: 26.2 NG/ML
ALBUMIN SERPL-MCNC: 4.2 G/DL (ref 3.5–5.2)
ALP SERPL-CCNC: 125 U/L (ref 40–130)
ALT SERPL-CCNC: 10 U/L (ref 5–41)
ANION GAP SERPL CALCULATED.3IONS-SCNC: 16 MMOL/L (ref 7–19)
AST SERPL-CCNC: 11 U/L (ref 5–40)
BACTERIA URNS QL MICRO: NEGATIVE /HPF
BASOPHILS # BLD: 0.1 K/UL (ref 0–0.2)
BASOPHILS # BLD: 0.1 K/UL (ref 0–0.2)
BASOPHILS NFR BLD: 0.7 % (ref 0–1)
BASOPHILS NFR BLD: 1 % (ref 0–1)
BILIRUB SERPL-MCNC: 0.4 MG/DL (ref 0.2–1.2)
BILIRUB UR QL STRIP: NEGATIVE
BUN SERPL-MCNC: 48 MG/DL (ref 8–23)
CALCIUM SERPL-MCNC: 9.5 MG/DL (ref 8.8–10.2)
CHLORIDE SERPL-SCNC: 102 MMOL/L (ref 98–111)
CHOLEST SERPL-MCNC: 125 MG/DL (ref 160–199)
CLARITY UR: CLEAR
CO2 SERPL-SCNC: 20 MMOL/L (ref 22–29)
COLOR UR: YELLOW
CREAT SERPL-MCNC: 1.8 MG/DL (ref 0.5–1.2)
CREAT UR-MCNC: 49.9 MG/DL (ref 39–259)
CRYSTALS URNS MICRO: ABNORMAL /HPF
EOSINOPHIL # BLD: 0.2 K/UL (ref 0–0.6)
EOSINOPHIL # BLD: 0.2 K/UL (ref 0–0.6)
EOSINOPHIL NFR BLD: 2.6 % (ref 0–5)
EOSINOPHIL NFR BLD: 2.8 % (ref 0–5)
EPI CELLS #/AREA URNS AUTO: 0 /HPF (ref 0–5)
ERYTHROCYTE [DISTWIDTH] IN BLOOD BY AUTOMATED COUNT: 13.6 % (ref 11.5–14.5)
ERYTHROCYTE [DISTWIDTH] IN BLOOD BY AUTOMATED COUNT: 13.6 % (ref 11.5–14.5)
GLUCOSE SERPL-MCNC: 185 MG/DL (ref 74–109)
GLUCOSE UR STRIP.AUTO-MCNC: 500 MG/DL
HBA1C MFR BLD: 9.8 % (ref 4–6)
HCT VFR BLD AUTO: 42.5 % (ref 42–52)
HCT VFR BLD AUTO: 43.3 % (ref 42–52)
HDLC SERPL-MCNC: 28 MG/DL (ref 55–121)
HGB BLD-MCNC: 13.5 G/DL (ref 14–18)
HGB BLD-MCNC: 13.7 G/DL (ref 14–18)
HGB UR STRIP.AUTO-MCNC: NEGATIVE MG/L
HYALINE CASTS #/AREA URNS AUTO: 12 /HPF (ref 0–8)
IMM GRANULOCYTES # BLD: 0 K/UL
IMM GRANULOCYTES # BLD: 0 K/UL
KETONES UR STRIP.AUTO-MCNC: NEGATIVE MG/DL
LDLC SERPL CALC-MCNC: 59 MG/DL
LEUKOCYTE ESTERASE UR QL STRIP.AUTO: NEGATIVE
LYMPHOCYTES # BLD: 1.5 K/UL (ref 1.1–4.5)
LYMPHOCYTES # BLD: 1.5 K/UL (ref 1.1–4.5)
LYMPHOCYTES NFR BLD: 17.7 % (ref 20–40)
LYMPHOCYTES NFR BLD: 18 % (ref 20–40)
MAGNESIUM SERPL-MCNC: 2 MG/DL (ref 1.6–2.4)
MCH RBC QN AUTO: 27.9 PG (ref 27–31)
MCH RBC QN AUTO: 29.2 PG (ref 27–31)
MCHC RBC AUTO-ENTMCNC: 31.2 G/DL (ref 33–37)
MCHC RBC AUTO-ENTMCNC: 32.2 G/DL (ref 33–37)
MCV RBC AUTO: 89.5 FL (ref 80–94)
MCV RBC AUTO: 90.6 FL (ref 80–94)
MONOCYTES # BLD: 0.8 K/UL (ref 0–0.9)
MONOCYTES # BLD: 0.9 K/UL (ref 0–0.9)
MONOCYTES NFR BLD: 10.4 % (ref 0–10)
MONOCYTES NFR BLD: 9.9 % (ref 0–10)
NEUTROPHILS # BLD: 5.6 K/UL (ref 1.5–7.5)
NEUTROPHILS # BLD: 5.7 K/UL (ref 1.5–7.5)
NEUTS SEG NFR BLD: 67.6 % (ref 50–65)
NEUTS SEG NFR BLD: 68.7 % (ref 50–65)
NITRITE UR QL STRIP.AUTO: NEGATIVE
PH UR STRIP.AUTO: 5.5 [PH] (ref 5–8)
PHOSPHATE SERPL-MCNC: 3.9 MG/DL (ref 2.5–4.5)
PLATELET # BLD AUTO: 238 K/UL (ref 130–400)
PLATELET # BLD AUTO: 243 K/UL (ref 130–400)
PMV BLD AUTO: 10.1 FL (ref 9.4–12.4)
PMV BLD AUTO: 9.7 FL (ref 9.4–12.4)
POTASSIUM SERPL-SCNC: 5.1 MMOL/L (ref 3.5–5)
PROT SERPL-MCNC: 7.2 G/DL (ref 6.6–8.7)
PROT UR STRIP.AUTO-MCNC: 30 MG/DL
PROT UR-MCNC: 25 MG/DL (ref 15–45)
PTH-INTACT SERPL-MCNC: 134.1 PG/ML (ref 15–65)
RBC # BLD AUTO: 4.69 M/UL (ref 4.7–6.1)
RBC # BLD AUTO: 4.84 M/UL (ref 4.7–6.1)
RBC #/AREA URNS AUTO: 0 /HPF (ref 0–4)
SODIUM SERPL-SCNC: 138 MMOL/L (ref 136–145)
SP GR UR STRIP.AUTO: 1.01 (ref 1–1.03)
TRIGL SERPL-MCNC: 190 MG/DL (ref 0–149)
URATE SERPL-MCNC: 8.6 MG/DL (ref 3.4–7)
UROBILINOGEN UR STRIP.AUTO-MCNC: 0.2 E.U./DL
WBC # BLD AUTO: 8.3 K/UL (ref 4.8–10.8)
WBC # BLD AUTO: 8.3 K/UL (ref 4.8–10.8)
WBC #/AREA URNS AUTO: 0 /HPF (ref 0–5)

## 2024-08-26 ENCOUNTER — TELEPHONE (OUTPATIENT)
Dept: CARDIOLOGY | Facility: CLINIC | Age: 69
End: 2024-08-26

## 2024-09-27 RX ORDER — POTASSIUM CHLORIDE 1500 MG/1
20 TABLET, EXTENDED RELEASE ORAL DAILY
Qty: 30 TABLET | Refills: 1 | Status: SHIPPED | OUTPATIENT
Start: 2024-09-27

## 2024-10-14 ENCOUNTER — TELEPHONE (OUTPATIENT)
Dept: CARDIOLOGY | Facility: CLINIC | Age: 69
End: 2024-10-14

## 2024-10-14 NOTE — TELEPHONE ENCOUNTER
Caller: SINDY OSUNA    Relationship:SELF    Callback number: [phone]   Is it ok to leave a message: [x] Yes [] No    Requested medication for samples: XARELTO 20 MG     How much medication does the patient currently have left: TIL MONDAY     Who will be picking up the samples: SELF

## 2024-11-26 RX ORDER — POTASSIUM CHLORIDE 1500 MG/1
20 TABLET, EXTENDED RELEASE ORAL DAILY
Qty: 30 TABLET | Refills: 11 | Status: SHIPPED | OUTPATIENT
Start: 2024-11-26

## 2024-12-16 ENCOUNTER — TELEPHONE (OUTPATIENT)
Dept: CARDIOLOGY | Facility: CLINIC | Age: 69
End: 2024-12-16

## 2024-12-16 NOTE — TELEPHONE ENCOUNTER
Caller: SINDY OSUNA    Relationship:SELF    Callback number: [phone]    Is it ok to leave a message: [x] Yes [] No    Requested medication for samples: XARELTO    How much medication does the patient currently have left: 3 DAYS    Who will be picking up the samples: SELF OR NEELA BRODY (DAUGHTER)    Do you need information about patient financial assistance for this medication: [] Yes [x] No

## 2025-01-27 ENCOUNTER — HOSPITAL ENCOUNTER (OUTPATIENT)
Dept: CT IMAGING | Facility: HOSPITAL | Age: 70
Discharge: HOME | End: 2025-01-27
Admitting: NURSE PRACTITIONER
Payer: MEDICARE

## 2025-01-27 ENCOUNTER — TELEPHONE (OUTPATIENT)
Dept: PULMONOLOGY | Facility: CLINIC | Age: 70
End: 2025-01-27
Payer: MEDICARE

## 2025-01-27 DIAGNOSIS — Z87.891 PERSONAL HISTORY OF NICOTINE DEPENDENCE: Chronic | ICD-10-CM

## 2025-01-27 PROCEDURE — 71271 CT THORAX LUNG CANCER SCR C-: CPT

## 2025-01-27 NOTE — TELEPHONE ENCOUNTER
----- Message from Teresa Maciel sent at 1/27/2025  2:44 PM CST -----  Please let patient know that essentially his CAT scan is stable other than he has a new 11 mm nodule in the right lower lobe that will need follow-up on.  Radiologist notes this could represent an evolving infiltrate versus a neoplastic process.  Has he had any recent respiratory exposures or infections or any signs or symptoms of respiratory illness currently?  Otherwise keep his follow-up February 5 and we will discuss further.  
1. Have you been to the ER, urgent care clinic since your last visit?  Hospitalized since your last visit?     Yes, [facility]    2. Have you seen or consulted any other health care providers outside of the [facility] since your last visit?  Include any pap smears or colon screening.      No      
I spoke with patient and relayed test results. Patient voiced understanding. Patient states he has not had any respiratory illnesses lately or exposures he knows of. He plans to keep his upcoming visit with you to discuss.  
Motor: No abnormal muscle tone.    Psychiatric:          Behavior: Behavior normal.       ekg atrial flutter with variable av block.       10/16/18    ECHO ADULT COMPLETE 10/19/2018 10/19/2018                    Lexiscan 10/2018:   Small fixed inferior wall defect       02/10/20    CARDIAC PROCEDURE 02/10/2020 2/10/2020             Diastolic (mmHg)   Mean (mmHg)   dP/dt (mmHg/sec)   A Wave (mmHg)   V Wave (mmHg)            RV Pressures   50                  25                  864                           PA Pressures   52                  20                  34                   RA Pressures            PCW Pressures   26                  31                  35                               06/21/21      ECHO ADULT COMPLETE 06/24/2021 6/24/2021      Interpretation Summary   · LV: Estimated LVEF is 55 - 60%. Normal cavity size and systolic function (ejection fraction normal). Severe concentric hypertrophy. Wall motion: normal. Inconclusive  left ventricular diastolic function.   · LA: Moderately dilated left atrium. Left Atrium volume index is 47.2 mL/m2.   · RV: Mildly dilated right ventricle. Mildly reduced systolic function.   · RA: Moderately dilated right atrium.   · MV: Mitral annular calcification. Mild mitral valve stenosis is present.   · TV: Mild tricuspid valve regurgitation is present. Right Ventricular Arterial Pressure (RVSP) is 53 mmHg. Pulmonary hypertension found to be moderate.   · PV: Mild pulmonic valve regurgitation is present.   · AO: Mild ascending aorta dilatation. Ascending aorta diameter = 4.2 cm.   · IVC: Moderately elevated central venous pressure (8 mmHg); IVC diameter is larger than 21 mm and collapses more than 50% with respiration.      Signed by: Mirna Velasco MD on 6/24/2021 12:21 PM   06/01/21      NUCLEAR CARDIAC STRESS TEST 06/01/2021 7/21/2021      Interpretation Summary   · Baseline ECG: Atrial fibrillation.   · Stress test: Negative stress test.   · SPECT: Left

## 2025-02-04 PROBLEM — J90 BILATERAL PLEURAL EFFUSION: Status: RESOLVED | Noted: 2022-09-05 | Resolved: 2025-02-04

## 2025-02-04 PROBLEM — R91.8 MULTIPLE LUNG NODULES: Status: ACTIVE | Noted: 2023-02-08

## 2025-02-04 PROBLEM — J96.01 ACUTE RESPIRATORY FAILURE WITH HYPOXIA: Status: RESOLVED | Noted: 2022-09-05 | Resolved: 2025-02-04

## 2025-02-05 ENCOUNTER — OFFICE VISIT (OUTPATIENT)
Dept: PULMONOLOGY | Facility: CLINIC | Age: 70
End: 2025-02-05
Payer: MEDICARE

## 2025-02-05 VITALS
DIASTOLIC BLOOD PRESSURE: 88 MMHG | OXYGEN SATURATION: 97 % | HEART RATE: 52 BPM | WEIGHT: 157 LBS | HEIGHT: 65 IN | BODY MASS INDEX: 26.16 KG/M2 | SYSTOLIC BLOOD PRESSURE: 146 MMHG

## 2025-02-05 DIAGNOSIS — Z87.891 PERSONAL HISTORY OF NICOTINE DEPENDENCE: Chronic | ICD-10-CM

## 2025-02-05 DIAGNOSIS — J43.9 PULMONARY EMPHYSEMA, UNSPECIFIED EMPHYSEMA TYPE: ICD-10-CM

## 2025-02-05 DIAGNOSIS — R91.8 MULTIPLE LUNG NODULES: Primary | ICD-10-CM

## 2025-02-05 DIAGNOSIS — R94.2 DECREASED DIFFUSION CAPACITY: Chronic | ICD-10-CM

## 2025-02-05 DIAGNOSIS — G47.34 NOCTURNAL HYPOXIA: ICD-10-CM

## 2025-02-05 PROCEDURE — 3077F SYST BP >= 140 MM HG: CPT | Performed by: NURSE PRACTITIONER

## 2025-02-05 PROCEDURE — 1159F MED LIST DOCD IN RCRD: CPT | Performed by: NURSE PRACTITIONER

## 2025-02-05 PROCEDURE — 99214 OFFICE O/P EST MOD 30 MIN: CPT | Performed by: NURSE PRACTITIONER

## 2025-02-05 PROCEDURE — 1160F RVW MEDS BY RX/DR IN RCRD: CPT | Performed by: NURSE PRACTITIONER

## 2025-02-05 PROCEDURE — 3079F DIAST BP 80-89 MM HG: CPT | Performed by: NURSE PRACTITIONER

## 2025-02-05 RX ORDER — PANTOPRAZOLE SODIUM 40 MG/1
40 TABLET, DELAYED RELEASE ORAL EVERY 12 HOURS SCHEDULED
COMMUNITY

## 2025-02-05 RX ORDER — SEMAGLUTIDE 0.68 MG/ML
INJECTION, SOLUTION SUBCUTANEOUS
COMMUNITY

## 2025-02-05 NOTE — ASSESSMENT & PLAN NOTE
Stable 4 mm right lower lobe nodule, stable irregular shaped noncalcified nodule/opacity in the left lower lobe.  Given the new finding of the 11 mm x 5 mm lobulated nodule in the right lower lobe we will plan a 3-month follow-up CT scan.

## 2025-02-05 NOTE — PROGRESS NOTES
" MARKELL Alan  [facility]   Pulmonary and Critical Care  [address] [place] [address]  Phone: [phone]  Fax: [phone]           Chief Complaint  Pulmonary emphysema, unspecified emphysema type    Subjective        Chito Govea presents to [facility] PULMONARY & CRITICAL CARE MEDICINE     History of Present Illness  Mr. Govea is a pleasant 69 year old male with known CAD with PTCA/ PCI on 10/3/2022, CHF, emphysema, moderate aortic stenosis, chronic obstructive lung disease, atrial fibrillation on chronic OAC. Former Smoker.     He reports an improvement in his condition following eye surgery, but has recently experienced a recurrence of symptoms. He is scheduled for a follow-up visit at the ophthalmology clinic next Thursday. He has been informed of a minor infection in his eye. He also reports a slanting and blurring of his vision. He has been prescribed an antibiotic, which he continues to take.    He continues to use oxygen therapy during sleep and occasionally during periods of exertion. Daytime use is infrequent. He reports no systemic symptoms such as fevers, chills, or night sweats. He also reports no hemoptysis, but does experience occasional coughing with expectoration of phlegm. He is not experiencing any shortness of breath or wheezing. He was experiencing sinus issues at the time of his last scan, but did not have any chest-related symptoms. His grandson, who resides with him, had pneumonia around the same time, but he did not contract the illness. He is not currently using any inhalers.    He has been taking Ozempic for his diabetes, which has resulted in weight loss. His blood sugar levels have been in the 70s, and his A1c has decreased from 9.1 to 7.          Objective   Vital Signs:   /88   Pulse 52   Ht 165.1 cm (65\")   Wt 71.2 kg (157 lb)   SpO2 97% Comment: ra  BMI 26.13 kg/m²     Physical Exam  Vitals reviewed. "   Constitutional:       Appearance: Normal appearance.   Cardiovascular:      Rate and Rhythm: Normal rate and regular rhythm.      Heart sounds: Murmur heard.   Pulmonary:      Effort: Pulmonary effort is normal.      Breath sounds: Normal breath sounds.   Neurological:      General: No focal deficit present.      Mental Status: He is alert and oriented to person, place, and time.   Psychiatric:         Mood and Affect: Mood normal.         Behavior: Behavior normal.            Result Review :  The following data was reviewed by: MARKELL Alan on 2025:    Data reviewed : Radiologic studies CT chest    My interpretation of imaging:  New 11 mm lobulated/ spiculated parenchymal density in the RLL. Small multiple calcified granulomas bilaterally. Irreglular shaped non calcified nodule/opacity left lower lobe stable. 4 mm RLL nodule stable.   My interpretation of labs: none  CT Chest Low Dose Cancer Screening WO (2025 13:04)         PFT Values          2023    13:30   Pre Drug PFT Results   FVC 89   FEV1 87   FEF 25-75% 82   FEV1/FVC 75   Other Tests PFT Results   TLC 93      DLCO 55   D/VAsb 57     My interpretation of the PFT : Not completed today due to recent cataract surgery and subsequent symptoms currently on antibiotic    Results for orders placed in visit on 23    Pulmonary Function Test    Narrative  Pulmonary Function Test  Performed by: Isela Arthur, RRT  Authorized by: Teresa Maciel APRN    Pre Drug % Predicted  FVC: 89%  FEV1: 87%  FEF 25-75%: 82%  FEV1/FVC: 75%  T%  RV: 106%  DLCO: 55%  D/VAsb: 57%    Interpretation  Spirometry  Spirometry shows mild restriction. There is reduced midflow suggesting small airway/airflow obstruction.  Review of FVL curve  Patient's effort is normal.  Lung Volume Measurements  Measurements show normal results.  Diffusion Capacity  The patient's diffusion capacity is moderately reduced.  Diffusion capacity is  moderately reduced when corrected for alveolar volume.            Assessment and Plan   Diagnoses and all orders for this visit:    1. Multiple lung nodules (Primary)  Overview:  CT Chest Low Dose Cancer Screening WO (01/27/2025 13:04)   There are multiple small calcified granulomas in the lungs bilaterally  similar to the previous study.     There is a irregular shaped noncalcified nodule/opacity in the left  lower lobe, image #77 through 80 which is similar to the previous study.  No change.     A small poorly defined lobulated spiculated nodule in the right lower  lobe laterally, centered at image #128 in series 4, was not seen in the  previous study. It measures 11 mm x 5 mm.     A tiny, 4 mm nodule in the right lower lobe medially, image #124 in  series 4 is unchanged in the previous study. There are areas of discoid  atelectasis in the right middle lobe anterolaterally similar to the  previous study.       Assessment & Plan:  Stable 4 mm right lower lobe nodule, stable irregular shaped noncalcified nodule/opacity in the left lower lobe.  Given the new finding of the 11 mm x 5 mm lobulated nodule in the right lower lobe we will plan a 3-month follow-up CT scan.      2. Pulmonary emphysema, unspecified emphysema type    3. Decreased diffusion capacity    4. Nocturnal hypoxia  Assessment & Plan:  Continue supplemental oxygen as he benefits.      5. Personal history of nicotine dependence  Overview:  Quit June 2022. 140 pack years             Chito Govea  reports that he quit smoking about 2 years ago. His smoking use included cigarettes. He started smoking about 55 years ago. He has a 140 pack-year smoking history. He has been exposed to tobacco smoke. He has never used smokeless tobacco.           Alpha 1: Normal, MM  LDCT: Done January 2025 with follow-up in 3 months secondary to new finding of nodule  Smoking Cessation: Former, quit 6/2022  Vaccinations: PNA:  completed         Follow Up   Return in about 3  months (around 5/5/2025) for CT.  Patient was given instructions and counseling regarding his condition or for health maintenance advice. Please see specific information pulled into the AVS if appropriate.     MARKELL Alan  2/5/2025  09:42 CST    Please note that portions of this note were completed with a voice recognition program.    Patient or patient representative verbalized consent for the use of Ambient Listening during the visit with  MARKELL Alan for chart documentation. 2/5/2025  09:42 CST

## 2025-02-06 ENCOUNTER — OFFICE VISIT (OUTPATIENT)
Dept: CARDIOLOGY | Facility: CLINIC | Age: 70
End: 2025-02-06
Payer: MEDICARE

## 2025-02-06 VITALS
HEIGHT: 65 IN | DIASTOLIC BLOOD PRESSURE: 62 MMHG | OXYGEN SATURATION: 98 % | WEIGHT: 158 LBS | HEART RATE: 70 BPM | SYSTOLIC BLOOD PRESSURE: 140 MMHG | BODY MASS INDEX: 26.33 KG/M2

## 2025-02-06 DIAGNOSIS — E78.49 OTHER HYPERLIPIDEMIA: ICD-10-CM

## 2025-02-06 DIAGNOSIS — I35.0 AORTIC STENOSIS, MODERATE: Primary | ICD-10-CM

## 2025-02-06 DIAGNOSIS — I10 PRIMARY HYPERTENSION: ICD-10-CM

## 2025-02-06 DIAGNOSIS — J44.9 CHRONIC OBSTRUCTIVE PULMONARY DISEASE, UNSPECIFIED COPD TYPE: ICD-10-CM

## 2025-02-06 DIAGNOSIS — I48.0 PAROXYSMAL ATRIAL FIBRILLATION: ICD-10-CM

## 2025-02-06 DIAGNOSIS — I50.22 CHRONIC SYSTOLIC CONGESTIVE HEART FAILURE: ICD-10-CM

## 2025-02-06 DIAGNOSIS — E11.65 TYPE 2 DIABETES MELLITUS WITH HYPERGLYCEMIA, WITHOUT LONG-TERM CURRENT USE OF INSULIN: ICD-10-CM

## 2025-02-06 DIAGNOSIS — I25.700 CORONARY ARTERY DISEASE INVOLVING CORONARY BYPASS GRAFT OF NATIVE HEART WITH UNSTABLE ANGINA PECTORIS: ICD-10-CM

## 2025-02-06 DIAGNOSIS — Z87.891 PERSONAL HISTORY OF NICOTINE DEPENDENCE: Chronic | ICD-10-CM

## 2025-02-06 DIAGNOSIS — I42.9 CARDIOMYOPATHY, UNSPECIFIED TYPE: ICD-10-CM

## 2025-02-06 RX ORDER — LOSARTAN POTASSIUM 50 MG/1
50 TABLET ORAL DAILY
Qty: 90 TABLET | Refills: 3 | Status: SHIPPED | OUTPATIENT
Start: 2025-02-06

## 2025-02-06 RX ORDER — ATORVASTATIN CALCIUM 40 MG/1
40 TABLET, FILM COATED ORAL DAILY
Qty: 90 TABLET | Refills: 3 | Status: SHIPPED | OUTPATIENT
Start: 2025-02-06

## 2025-02-06 RX ORDER — METOPROLOL SUCCINATE 100 MG/1
100 TABLET, EXTENDED RELEASE ORAL DAILY
Qty: 90 TABLET | Refills: 3 | Status: SHIPPED | OUTPATIENT
Start: 2025-02-06 | End: 2026-02-01

## 2025-02-06 RX ORDER — BUMETANIDE 2 MG/1
2 TABLET ORAL DAILY
Qty: 90 TABLET | Refills: 3 | Status: SHIPPED | OUTPATIENT
Start: 2025-02-06

## 2025-02-06 RX ORDER — DAPAGLIFLOZIN 10 MG/1
10 TABLET, FILM COATED ORAL DAILY
Qty: 90 TABLET | Refills: 3 | Status: SHIPPED | OUTPATIENT
Start: 2025-02-06

## 2025-02-06 RX ORDER — CLOPIDOGREL BISULFATE 75 MG/1
75 TABLET ORAL DAILY
Qty: 90 TABLET | Refills: 3 | Status: SHIPPED | OUTPATIENT
Start: 2025-02-06

## 2025-02-16 NOTE — PROGRESS NOTES
Subjective:     Encounter Date:02/06/2025      Patient ID: Chito Govea is a 69 y.o. male.    Chief Complaint:  History of Present Illness  History of Present Illness  The patient presents for evaluation of cardiac issues, atrial fibrillation, diabetes mellitus, and eye infection.    He reports a general sense of well-being with no complaints of chest pain or shortness of breath. However, he experiences lightheadedness when bending over and then standing up.    He has been diagnosed with atrial fibrillation and is currently on Xarelto.    His blood glucose levels have been consistently low over the past three days, with readings of 75, 72, and 71. His A1c level was recorded as 9.1 four months ago, but it has since decreased to 7 last month. He has recently started Ozempic therapy and has been advised by Dr. Mas that routine blood glucose monitoring is not necessary. Despite this, he has been checking his blood glucose levels at home using a machine provided by his brother.    He had cataracts removed from both eyes. His right eye is perfect, but the left one had an infection that cleared up. He went back this Tuesday, and it is starting to come back, causing some blurriness. The infection is returning, so he has another appointment next week at the eye clinic for a recheck. He went to his lung doctor yesterday, and they postponed everything they wanted to do until this issue is resolved. He has a follow-up appointment next Thursday for further evaluation of his eyes.    MEDICATIONS  atorvastatin, Bumex, Plavix, Farxiga, losartan, metoprolol, omega-3, vitamin D, Xarelto, Ozempic    The following portions of the patient's history were reviewed and updated as appropriate: allergies, current medications, past family history, past medical history, past social history, past surgical history, and problem list.    Review of Systems   Constitutional: Negative for diaphoresis, fever and malaise/fatigue.   HENT:   Negative for congestion.    Eyes:  Negative for vision loss in left eye and vision loss in right eye.   Cardiovascular:  Negative for chest pain, claudication, dyspnea on exertion, irregular heartbeat, leg swelling, orthopnea, palpitations and syncope.   Respiratory:  Negative for cough, shortness of breath and wheezing.    Hematologic/Lymphatic: Negative for adenopathy.   Skin:  Negative for rash.   Musculoskeletal:  Negative for joint pain and joint swelling.   Gastrointestinal:  Negative for abdominal pain, diarrhea, nausea and vomiting.   Neurological:  Negative for excessive daytime sleepiness, dizziness, focal weakness, light-headedness, numbness and weakness.   Psychiatric/Behavioral:  Negative for depression. The patient does not have insomnia.            Current Outpatient Medications:     atorvastatin (LIPITOR) 40 MG tablet, Take 1 tablet by mouth Daily., Disp: 90 tablet, Rfl: 3    bumetanide (BUMEX) 2 MG tablet, Take 1 tablet by mouth Daily., Disp: 90 tablet, Rfl: 3    clopidogrel (PLAVIX) 75 MG tablet, Take 1 tablet by mouth Daily., Disp: 90 tablet, Rfl: 3    dapagliflozin Propanediol (Farxiga) 10 MG tablet, Take 10 mg by mouth Daily., Disp: 90 tablet, Rfl: 3    glimepiride (AMARYL) 2 MG tablet, Take 1 tablet by mouth Every Morning Before Breakfast., Disp: , Rfl:     losartan (COZAAR) 50 MG tablet, Take 1 tablet by mouth Daily., Disp: 90 tablet, Rfl: 3    metFORMIN (GLUCOPHAGE) 500 MG tablet, Take 1 tablet by mouth Every 12 (Twelve) Hours., Disp: , Rfl:     metoprolol succinate XL (Toprol XL) 100 MG 24 hr tablet, Take 1 tablet by mouth Daily for 360 days., Disp: 90 tablet, Rfl: 3    Omega-3 Fatty Acids (OMEGA-3 FISH OIL PO), Take  by mouth., Disp: , Rfl:     Ozempic, 0.25 or 0.5 MG/DOSE, 2 MG/3ML solution pen-injector, INJECT 0.5 MG SUBCUTANEOUSLY EVERY WEEK FOR 30 DAYS., Disp: , Rfl:     pantoprazole (PROTONIX) 40 MG EC tablet, Take 1 tablet by mouth Every 12 (Twelve) Hours., Disp: , Rfl:      rivaroxaban (Xarelto) 20 MG tablet, Take 1 tablet by mouth Daily., Disp: 90 tablet, Rfl: 3    tamsulosin (FLOMAX) 0.4 MG capsule 24 hr capsule, Take 1 capsule by mouth Daily., Disp: 30 capsule, Rfl: 0    vitamin D3 125 MCG (5000 UT) capsule capsule, Take 1 capsule by mouth Daily., Disp: , Rfl:        Objective:      Vitals:    02/06/25 0914   BP: 140/62   Pulse: 70   SpO2: 98%     Vitals and nursing note reviewed.   Constitutional:       Appearance: Normal and healthy appearance. Well-developed and not in distress.   Eyes:      Extraocular Movements: Extraocular movements intact.      Pupils: Pupils are equal, round, and reactive to light.   HENT:      Head: Normocephalic and atraumatic.    Mouth/Throat:      Pharynx: Oropharynx is clear.   Neck:      Vascular: JVD normal.      Trachea: Trachea normal.   Pulmonary:      Effort: Pulmonary effort is normal.      Breath sounds: Normal breath sounds. No wheezing. No rhonchi. No rales.   Cardiovascular:      PMI at left midclavicular line. Normal rate. Regular rhythm. Normal S1. Normal S2.       Murmurs: There is a grade 2/6 systolic murmur.      No gallop.  No click. No rub.   Pulses:     Dorsalis pedis: 2+ bilaterally.     Posterior tibial: 2+ bilaterally.  Abdominal:      General: Bowel sounds are normal.      Palpations: Abdomen is soft.      Tenderness: There is no abdominal tenderness.   Musculoskeletal: Normal range of motion.      Cervical back: Normal range of motion and neck supple. Skin:     General: Skin is warm and dry.      Capillary Refill: Capillary refill takes less than 2 seconds.   Feet:      Right foot:      Skin integrity: Skin integrity normal.      Left foot:      Skin integrity: Skin integrity normal.   Neurological:      Mental Status: Alert and oriented to person, place and time.      Sensory: Sensation is intact.      Motor: Motor function is intact.      Coordination: Coordination is intact.   Psychiatric:         Speech: Speech normal.          Behavior: Behavior is cooperative.       Physical Exam  Vital Signs  Blood pressure and heart rate are normal.    Lab Review:         ECG 12 Lead    Date/Time: 2/16/2025 12:08 PM  Performed by: Kun Devine DO    Authorized by: Kun Devine DO  Comparison: compared with previous ECG   Similar to previous ECG    Clinical impression: abnormal EKG        Results  Laboratory Studies  Blood sugar levels were 75, 72, and 71. A1c was 9.1 four months ago and dropped to 7 last month. Cholesterol levels are normal.    Imaging  Ejection fraction was 35 before stent placement, increased to 45 after stent placement, and was back to normal at 50 in 2023.    Assessment/Plan:     Problem List Items Addressed This Visit       Paroxysmal atrial fibrillation    Relevant Medications    clopidogrel (PLAVIX) 75 MG tablet    metoprolol succinate XL (Toprol XL) 100 MG 24 hr tablet    Hypertension    Relevant Medications    bumetanide (BUMEX) 2 MG tablet    losartan (COZAAR) 50 MG tablet    metoprolol succinate XL (Toprol XL) 100 MG 24 hr tablet    Type 2 diabetes mellitus with hyperglycemia, without long-term current use of insulin    Relevant Medications    dapagliflozin Propanediol (Farxiga) 10 MG tablet    Aortic stenosis, moderate - Primary    Relevant Medications    clopidogrel (PLAVIX) 75 MG tablet    metoprolol succinate XL (Toprol XL) 100 MG 24 hr tablet    Other Relevant Orders    Adult Transthoracic Echo Complete W/ Cont if Necessary Per Protocol    COPD (chronic obstructive pulmonary disease)    Cardiomyopathy    Overview     Added automatically from request for surgery [id number]         Relevant Medications    clopidogrel (PLAVIX) 75 MG tablet    metoprolol succinate XL (Toprol XL) 100 MG 24 hr tablet    Coronary artery disease involving coronary bypass graft of native heart with unstable angina pectoris    Relevant Medications    clopidogrel (PLAVIX) 75 MG tablet    metoprolol succinate XL (Toprol  XL) 100 MG 24 hr tablet    Personal history of nicotine dependence (Chronic)    Overview     Quit June 2022. 140 pack years          Other hyperlipidemia    Relevant Medications    atorvastatin (LIPITOR) 40 MG tablet    Chronic systolic congestive heart failure    Relevant Medications    clopidogrel (PLAVIX) 75 MG tablet    metoprolol succinate XL (Toprol XL) 100 MG 24 hr tablet     Assessment & Plan  1. Cardiac issues.  His ejection fraction has shown significant improvement, increasing from 35 to 45 post-stent placement, and further normalizing to 50 in 2023. Blood pressure and heart rate are within normal limits. He reports no chest pain or shortness of breath but experiences lightheadedness when bending over and standing up, likely due to age and current medications. He is currently on atorvastatin, Bumex 2 mg daily, Plavix, Farxiga, losartan 50 mg, metoprolol 100 mg, omega-3 supplements, vitamin D, and Xarelto. All medications will be renewed.    2. Atrial Fibrillation.  He is on Xarelto for atrial fibrillation. Samples of Xarelto will be provided as requested.    3. Diabetes Mellitus.  His A1c has improved from 9.1 to 7 over the past four months. He is on Ozempic and has been advised by Dr. Mas to continue the current regimen. He has been monitoring his blood sugar levels, which were 75, 72, and 71 over the last three days.    4. Eye Infection.  He had cataract surgery on both eyes, with the left eye developing an infection that initially cleared up but has recently recurred, causing blurriness. He has an appointment next week at the eye clinic for further evaluation and management.    PROCEDURE  The patient underwent bilateral cataract removal surgery.      Recommendations/plans:    Transcribed from ambient dictation for Kun Devine DO by Kun Devine DO.  02/16/25   12:07 CST    Patient or patient representative verbalized consent for the use of Ambient Listening during the visit with   Kun Devine DO for chart documentation. 2/16/2025  12:07 CST

## 2025-02-24 ENCOUNTER — TELEPHONE (OUTPATIENT)
Dept: CARDIOLOGY | Facility: CLINIC | Age: 70
End: 2025-02-24

## 2025-02-24 NOTE — TELEPHONE ENCOUNTER
CALLED PT BACK TO LET HIM KNOW WE DO NOT HAVE SAMPLES ANYMORE OF XARELTO.  I ASK HIM IF WE COULD SEND A SCRIPT IN FOR HIM AND STATED IT WAS OVER $800.00 A MONTH AND HE COULD NOT AFFORD IT.  I ADVISED HE CALL HIS INS AND SEE WHAT WAS COVERED OR TO FILL OUT PT ASST FORMS.  HE DID NOT SEEM INTERESTED IN EITHER, STATED HE WOULD JUST STOP TAKING IT.  I ADVISED HE DID NOT NEED TO STOP TAKING IT.  HE SAID HE WOULD CALL HIS INS AND CALL ME BACK.

## 2025-02-24 NOTE — TELEPHONE ENCOUNTER
Caller: SINDY OSUNA    Relationship:SELF    Callback number: [phone]    Is it ok to leave a message: [x] Yes [] No    Requested medication for samples: XARELTO    How much medication does the patient currently have left: ONE    Who will be picking up the samples: SELF    Do you need information about patient financial assistance for this medication: [] Yes [x] No

## 2025-02-24 NOTE — TELEPHONE ENCOUNTER
Hub staff attempted to follow warm transfer process and was unsuccessful     Caller: Chito Govea    Relationship to patient: Self    Best call back number:   Telephone Information:   Mobile [phone]         Patient is needing: PATIENT CALLING NELLY HA

## 2025-02-27 ENCOUNTER — TELEPHONE (OUTPATIENT)
Dept: CARDIOLOGY | Facility: CLINIC | Age: 70
End: 2025-02-27
Payer: MEDICARE

## 2025-02-27 RX ORDER — WARFARIN SODIUM 5 MG/1
5 TABLET ORAL
Qty: 60 TABLET | Refills: 11 | Status: SHIPPED | OUTPATIENT
Start: 2025-02-27 | End: 2025-02-28 | Stop reason: SDUPTHER

## 2025-02-27 NOTE — TELEPHONE ENCOUNTER
Caller: Chito Govea    Relationship: Self    Best call back number: [phone]    Which medication are you concerned about: XARELTO    Who prescribed you this medication: DR. GODOY    When did you start taking this medication: A COUPLE YEARS AGO    What are your concerns: PT CALLED STATING DR GODOY HAD PRESCRIBED XARELTO FOR THEM. STATES THAT DR GODOY HAD BEEN PROVIDING THEM WITH SAMPLES BUT THEY WERE NO LONGER RECEIVING SAMPLES AND WERE UNABLE TO GIVE THEM TO THE PT. PT STATES THEY WENT TO THEIR PHARMACY TO HAVE MEDICATION BUT WAS COST PROHIBITIVE. PT IS OUT AND IS REQUESTING AN ALTERNATIVE MEDICATION BE CALLED IN TO THEIR PHARMACY. PT STATES INS TOLD THEM ELIQUIS AND WARFARIN WERE COVERED. CHART SHOWS PT HAS ELIQUIS AT [facility] AND WOULD LIKE PRESCRIPTION RESENT TO THEIR [facility] ON [address].    How long have you had these concerns: 2/6/25.

## 2025-02-27 NOTE — TELEPHONE ENCOUNTER
Caller: Chito Govea    Relationship: Self    Best call back number: [phone]     Who are you requesting to speak with (clinical staff, provider,  specific staff member): CLINICAL STAFF    What was the call regarding: [employer] TOLD THE PATIENT HIS COPAY FOR ELIQUIS IS $297. HE IS REQUESTING TO TRY WARFARIN SINCE IT WILL BE MORE COST EFFECTIVE.

## 2025-02-28 DIAGNOSIS — I48.0 PAROXYSMAL ATRIAL FIBRILLATION: Primary | ICD-10-CM

## 2025-02-28 DIAGNOSIS — Z79.01 CHRONIC ANTICOAGULATION: ICD-10-CM

## 2025-02-28 RX ORDER — WARFARIN SODIUM 5 MG/1
5 TABLET ORAL NIGHTLY
Qty: 60 TABLET | Refills: 11 | Status: SHIPPED | OUTPATIENT
Start: 2025-02-28

## 2025-03-05 ENCOUNTER — ANTICOAGULATION VISIT (OUTPATIENT)
Dept: CARDIOLOGY | Facility: CLINIC | Age: 70
End: 2025-03-05
Payer: MEDICARE

## 2025-03-05 ENCOUNTER — ANTICOAGULATION VISIT (OUTPATIENT)
Dept: CARDIOLOGY | Facility: CLINIC | Age: 70
End: 2025-03-05

## 2025-03-05 DIAGNOSIS — Z79.01 CHRONIC ANTICOAGULATION: ICD-10-CM

## 2025-03-05 DIAGNOSIS — I48.0 PAROXYSMAL ATRIAL FIBRILLATION: Primary | ICD-10-CM

## 2025-03-12 ENCOUNTER — ANTICOAGULATION VISIT (OUTPATIENT)
Dept: CARDIOLOGY | Facility: CLINIC | Age: 70
End: 2025-03-12

## 2025-03-12 ENCOUNTER — HOSPITAL ENCOUNTER (OUTPATIENT)
Dept: CARDIOLOGY | Facility: HOSPITAL | Age: 70
Discharge: HOME | End: 2025-03-12
Admitting: EMERGENCY MEDICINE
Payer: MEDICARE

## 2025-03-12 VITALS
BODY MASS INDEX: 26.33 KG/M2 | WEIGHT: 158 LBS | DIASTOLIC BLOOD PRESSURE: 62 MMHG | HEIGHT: 65 IN | SYSTOLIC BLOOD PRESSURE: 140 MMHG

## 2025-03-12 DIAGNOSIS — I35.0 AORTIC STENOSIS, MODERATE: ICD-10-CM

## 2025-03-12 DIAGNOSIS — I48.0 PAROXYSMAL ATRIAL FIBRILLATION: Primary | ICD-10-CM

## 2025-03-12 DIAGNOSIS — Z79.01 CHRONIC ANTICOAGULATION: ICD-10-CM

## 2025-03-12 PROCEDURE — 93306 TTE W/DOPPLER COMPLETE: CPT

## 2025-03-15 LAB
AORTIC DIMENSIONLESS INDEX: 0.28 (DI)
AV MEAN PRESS GRAD SYS DOP V1V2: 9.8 MMHG
AV VMAX SYS DOP: 223 CM/SEC
BH CV ECHO MEAS - AO MAX PG: 19.9 MMHG
BH CV ECHO MEAS - AO ROOT DIAM: 2.5 CM
BH CV ECHO MEAS - AO V2 VTI: 54.4 CM
BH CV ECHO MEAS - AVA(I,D): 0.78 CM2
BH CV ECHO MEAS - EDV(CUBED): 105.8 ML
BH CV ECHO MEAS - EDV(MOD-SP2): 51.3 ML
BH CV ECHO MEAS - EDV(MOD-SP4): 52.5 ML
BH CV ECHO MEAS - EF(MOD-SP2): 40.2 %
BH CV ECHO MEAS - EF(MOD-SP4): 47.2 %
BH CV ECHO MEAS - ESV(CUBED): 25.4 ML
BH CV ECHO MEAS - ESV(MOD-SP2): 30.7 ML
BH CV ECHO MEAS - ESV(MOD-SP4): 27.7 ML
BH CV ECHO MEAS - FS: 37.8 %
BH CV ECHO MEAS - IVS/LVPW: 1.13 CM
BH CV ECHO MEAS - IVSD: 1.12 CM
BH CV ECHO MEAS - LA DIMENSION: 4.4 CM
BH CV ECHO MEAS - LAT PEAK E' VEL: 4.2 CM/SEC
BH CV ECHO MEAS - LV DIASTOLIC VOL/BSA (35-75): 29.3 CM2
BH CV ECHO MEAS - LV MASS(C)D: 178.7 GRAMS
BH CV ECHO MEAS - LV MAX PG: 1.13 MMHG
BH CV ECHO MEAS - LV MEAN PG: 1 MMHG
BH CV ECHO MEAS - LV SYSTOLIC VOL/BSA (12-30): 15.5 CM2
BH CV ECHO MEAS - LV V1 MAX: 52.9 CM/SEC
BH CV ECHO MEAS - LV V1 VTI: 15 CM
BH CV ECHO MEAS - LVIDD: 4.7 CM
BH CV ECHO MEAS - LVIDS: 2.9 CM
BH CV ECHO MEAS - LVOT AREA: 2.8 CM2
BH CV ECHO MEAS - LVOT DIAM: 1.9 CM
BH CV ECHO MEAS - LVPWD: 0.99 CM
BH CV ECHO MEAS - MED PEAK E' VEL: 3.5 CM/SEC
BH CV ECHO MEAS - MV A MAX VEL: 89.6 CM/SEC
BH CV ECHO MEAS - MV DEC TIME: 0.26 SEC
BH CV ECHO MEAS - MV E MAX VEL: 55.5 CM/SEC
BH CV ECHO MEAS - MV E/A: 0.62
BH CV ECHO MEAS - SV(LVOT): 42.5 ML
BH CV ECHO MEAS - SV(MOD-SP2): 20.6 ML
BH CV ECHO MEAS - SV(MOD-SP4): 24.8 ML
BH CV ECHO MEAS - SVI(LVOT): 23.8 ML/M2
BH CV ECHO MEAS - SVI(MOD-SP2): 11.5 ML/M2
BH CV ECHO MEAS - SVI(MOD-SP4): 13.9 ML/M2
BH CV ECHO MEASUREMENTS AVERAGE E/E' RATIO: 14.42
BH CV XLRA - RV BASE: 2.8 CM
BH CV XLRA - RV LENGTH: 5.4 CM
BH CV XLRA - RV MID: 2.39 CM
LEFT ATRIUM VOLUME INDEX: 27.9 ML/M2
LEFT ATRIUM VOLUME: 49.9 ML
LV EF BIPLANE MOD: 45.6 %

## 2025-03-26 ENCOUNTER — ANTICOAGULATION VISIT (OUTPATIENT)
Dept: CARDIOLOGY | Facility: CLINIC | Age: 70
End: 2025-03-26

## 2025-03-26 DIAGNOSIS — I48.0 PAROXYSMAL ATRIAL FIBRILLATION: Primary | ICD-10-CM

## 2025-03-26 DIAGNOSIS — Z79.01 CHRONIC ANTICOAGULATION: ICD-10-CM

## 2025-04-15 RX ORDER — APIXABAN 5 MG/1
TABLET, FILM COATED ORAL
COMMUNITY

## 2025-04-29 ENCOUNTER — ANTICOAGULATION VISIT (OUTPATIENT)
Dept: CARDIOLOGY | Facility: CLINIC | Age: 70
End: 2025-04-29
Payer: MEDICARE

## 2025-04-29 DIAGNOSIS — Z79.01 CHRONIC ANTICOAGULATION: ICD-10-CM

## 2025-04-29 DIAGNOSIS — I48.0 PAROXYSMAL ATRIAL FIBRILLATION: Primary | ICD-10-CM

## 2025-04-30 ENCOUNTER — LAB (OUTPATIENT)
Dept: LAB | Facility: HOSPITAL | Age: 70
End: 2025-04-30
Payer: MEDICARE

## 2025-04-30 ENCOUNTER — ANTICOAGULATION VISIT (OUTPATIENT)
Dept: CARDIOLOGY | Facility: CLINIC | Age: 70
End: 2025-04-30
Payer: MEDICARE

## 2025-04-30 DIAGNOSIS — I48.0 PAROXYSMAL ATRIAL FIBRILLATION: Primary | ICD-10-CM

## 2025-04-30 DIAGNOSIS — I48.0 PAROXYSMAL ATRIAL FIBRILLATION: ICD-10-CM

## 2025-04-30 DIAGNOSIS — Z79.01 CHRONIC ANTICOAGULATION: ICD-10-CM

## 2025-04-30 LAB
INR PPP: 1.88 (ref 0.91–1.09)
PROTHROMBIN TIME: 22.7 SECONDS (ref 11.8–14.8)

## 2025-04-30 PROCEDURE — 85610 PROTHROMBIN TIME: CPT

## 2025-04-30 PROCEDURE — 36415 COLL VENOUS BLD VENIPUNCTURE: CPT

## 2025-05-02 NOTE — PROGRESS NOTES
" MARKELL Alan  [facility]   Pulmonary and Critical Care  [address]  [place] [address]  Phone: [phone]  Fax: [phone]           Chief Complaint  Multiple Lung Nodules; Emphysema; and Suspected Interstitial lung disease (HCC), likely due to am    Subjective        Chito Govea presents to [facility] PULMONARY & CRITICAL CARE MEDICINE      History of Present Illness  Mr. Govea is a pleasant 69 year old male with known CAD with PTCA/ PCI on 10/3/2022, CHF, emphysema, moderate aortic stenosis, chronic obstructive lung disease, atrial fibrillation on chronic OAC. Former Smoker.    He did not undergo the scheduled CT scan this morning due to a lack of awareness about the appointment. He continues to use oxygen intermittently during the night. He reports no fevers, chills, or night sweats, although he has experienced a few episodes of waking up with perspiration, which he attributes to a fever. He reports no unexplained weight loss or coughing. He is currently on Ozempic injections, which occasionally suppress his appetite, leading to weight loss. He also reports no wheezing.    Supplemental Information  He is still under Dr. Care about his eye. He had an infection in his eye that has not resolved yet. In July 2024, he was given a shot in his eye and is waiting to see what is going on with the infection. All the antibiotics he was given have not cured it.         Objective   Vital Signs:   /80   Pulse 82   Ht 165.1 cm (65\")   Wt 69.8 kg (153 lb 12.8 oz)   SpO2 97% Comment: RA  BMI 25.59 kg/m²     Physical Exam  Vitals reviewed.   Constitutional:       Appearance: Normal appearance.   Cardiovascular:      Rate and Rhythm: Normal rate and regular rhythm.      Heart sounds: Murmur heard.   Pulmonary:      Effort: Pulmonary effort is normal.      Breath sounds: Normal breath sounds.   Neurological:      General: No focal deficit present.      Mental " Status: He is alert and oriented to person, place, and time.   Psychiatric:         Mood and Affect: Mood normal.         Behavior: Behavior normal.            Result Review :  The following data was reviewed by: MARKELL Alan on 2025:    Data reviewed : Radiologic studies CT of the chest    My interpretation of imaging:  did not know about his scheduled apt this am.   My interpretation of labs: None      My interpretation of the PFT : no new     Results for orders placed in visit on 23    Pulmonary Function Test    Narrative  Pulmonary Function Test  Performed by: Isela Arthur, RRT  Authorized by: Teresa Maciel APRN    Pre Drug % Predicted  FVC: 89%  FEV1: 87%  FEF 25-75%: 82%  FEV1/FVC: 75%  T%  RV: 106%  DLCO: 55%  D/VAsb: 57%    Interpretation  Spirometry  Spirometry shows mild restriction. There is reduced midflow suggesting small airway/airflow obstruction.  Review of FVL curve  Patient's effort is normal.  Lung Volume Measurements  Measurements show normal results.  Diffusion Capacity  The patient's diffusion capacity is moderately reduced.  Diffusion capacity is moderately reduced when corrected for alveolar volume.        Assessment and Plan   Diagnoses and all orders for this visit:    1. Multiple lung nodules (Primary)  Overview:  CT Chest Low Dose Cancer Screening WO (2025 13:04)   There are multiple small calcified granulomas in the lungs bilaterally  similar to the previous study.     There is a irregular shaped noncalcified nodule/opacity in the left  lower lobe, image #77 through 80 which is similar to the previous study.  No change.     A small poorly defined lobulated spiculated nodule in the right lower  lobe laterally, centered at image #128 in series 4, was not seen in the  previous study. It measures 11 mm x 5 mm.     A tiny, 4 mm nodule in the right lower lobe medially, image #124 in  series 4 is unchanged in the previous study. There are  areas of discoid  atelectasis in the right middle lobe anterolaterally similar to the  previous study.       Assessment & Plan:  CT in January showed stable 4 mm right lower lobe nodule, stable irregular shaped noncalcified nodule/opacity in the left lower lobe. Given the new finding of the 11 mm x 5 mm lobulated nodule in the right lower lobe.  Patient was scheduled for his 3-month follow-up CT this morning however he was never notified of the appointment.  We will see if he can get him scheduled at the [facility] or [facility] today if not later this week and we will notify him of the results.      2. Interstitial lung disease  Assessment & Plan:  Patient will return to clinic in 6 months.  Been 3 months with flow-volume loop and diffusion capacity    Orders:  -     Spirometry with Diffusion Capacity; Future    3. Decreased diffusion capacity  Assessment & Plan:  Follow-up in 3 months with a flow volume and diffusion capacity    Orders:  -     Spirometry with Diffusion Capacity; Future    4. Nocturnal hypoxia  Assessment & Plan:  Continues to omental oxygen as he benefits from it.      5. Personal history of nicotine dependence  Overview:  Quit June 2022. 140 pack years         Will notify patient of the CT results once we have those in hand.  I have otherwise asked to return in 3 months with flow-volume loop and diffusion capacity however he understands that that appointment is subject to change depending on the findings from the CAT scan.    Chito Govea  reports that he quit smoking about 2 years ago. His smoking use included cigarettes. He started smoking about 55 years ago. He has a 140 pack-year smoking history. He has been exposed to tobacco smoke. He has never used smokeless tobacco.       Alpha 1: Normal, MM  LDCT: Done January 2025 with follow-up in 3 months secondary to new finding of nodule  Smoking Cessation: Former, quit 6/2022  Vaccinations:PNA:   completed      Follow Up   Return in about 3 months  (around 8/5/2025) for FVL w DIF.  Patient was given instructions and counseling regarding his condition or for health maintenance advice. Please see specific information pulled into the AVS if appropriate.     MARKELL Alan  5/5/2025  10:20 CDT    Please note that portions of this note were completed with a voice recognition program.    Patient or patient representative verbalized consent for the use of Ambient Listening during the visit with  MARKELL Alan for chart documentation. 5/5/2025  10:20 CDT

## 2025-05-05 ENCOUNTER — HOSPITAL ENCOUNTER (OUTPATIENT)
Dept: CT IMAGING | Facility: HOSPITAL | Age: 70
Discharge: HOME | End: 2025-05-05
Admitting: NURSE PRACTITIONER
Payer: MEDICARE

## 2025-05-05 ENCOUNTER — OFFICE VISIT (OUTPATIENT)
Dept: PULMONOLOGY | Facility: CLINIC | Age: 70
End: 2025-05-05
Payer: MEDICARE

## 2025-05-05 VITALS
SYSTOLIC BLOOD PRESSURE: 152 MMHG | DIASTOLIC BLOOD PRESSURE: 80 MMHG | OXYGEN SATURATION: 97 % | WEIGHT: 153.8 LBS | BODY MASS INDEX: 25.62 KG/M2 | HEART RATE: 82 BPM | HEIGHT: 65 IN

## 2025-05-05 DIAGNOSIS — Z87.891 PERSONAL HISTORY OF NICOTINE DEPENDENCE: Chronic | ICD-10-CM

## 2025-05-05 DIAGNOSIS — J84.9 INTERSTITIAL LUNG DISEASE: ICD-10-CM

## 2025-05-05 DIAGNOSIS — R91.8 MULTIPLE LUNG NODULES: ICD-10-CM

## 2025-05-05 DIAGNOSIS — J18.9 COMMUNITY ACQUIRED PNEUMONIA OF LEFT LOWER LOBE OF LUNG: Primary | ICD-10-CM

## 2025-05-05 DIAGNOSIS — G47.34 NOCTURNAL HYPOXIA: ICD-10-CM

## 2025-05-05 DIAGNOSIS — R94.2 DECREASED DIFFUSION CAPACITY: Chronic | ICD-10-CM

## 2025-05-05 DIAGNOSIS — R91.8 MULTIPLE LUNG NODULES: Primary | ICD-10-CM

## 2025-05-05 PROCEDURE — 71250 CT THORAX DX C-: CPT

## 2025-05-05 NOTE — ASSESSMENT & PLAN NOTE
Patient will return to clinic in 6 months.  Been 3 months with flow-volume loop and diffusion capacity

## 2025-05-05 NOTE — ASSESSMENT & PLAN NOTE
CT in January showed stable 4 mm right lower lobe nodule, stable irregular shaped noncalcified nodule/opacity in the left lower lobe. Given the new finding of the 11 mm x 5 mm lobulated nodule in the right lower lobe.  Patient was scheduled for his 3-month follow-up CT this morning however he was never notified of the appointment.  We will see if he can get him scheduled at the [facility] or [facility] today if not later this week and we will notify him of the results.

## 2025-05-06 ENCOUNTER — TELEPHONE (OUTPATIENT)
Dept: PULMONOLOGY | Facility: CLINIC | Age: 70
End: 2025-05-06
Payer: MEDICARE

## 2025-05-06 NOTE — TELEPHONE ENCOUNTER
Patient called and left voicemail for me inquiring about a medication that Patience had sent in for him, he said he was at [facility] and they did not have a medication for him. I called and left message letting him know that Pat[facility] sent it to [facility] [address]. I let him know that if he wanted it sent to a different pharmacy he needs to call [facility] and have them transfer it. Left my number in case he had questions.

## 2025-06-11 ENCOUNTER — TELEPHONE (OUTPATIENT)
Dept: PULMONOLOGY | Facility: CLINIC | Age: 70
End: 2025-06-11
Payer: MEDICARE

## 2025-06-11 NOTE — TELEPHONE ENCOUNTER
Called patient let him know after 6/16 he can walk in to [facility] and have his chest xray done and keep his follow up on 8/6 , he voiced understanding.

## 2025-06-11 NOTE — TELEPHONE ENCOUNTER
Patient called left voicemail asking about CAT SCAN chest order, it looks like you mentioned it in your last office note but it hasn't been ordered, please advise, thank you

## 2025-06-11 NOTE — TELEPHONE ENCOUNTER
He saw me 5/5 and was supposed to have a CT just prior or same day however he was never notified of his appointment.  The order was already there and he was able to go same day after his appointment with me to have the CAT scan done.  If you look at the CAT scan dated May 5 the area we were following up on resolved however he had evidence of pneumonia and was treated.  He is now supposed to have a 6-week follow-up chest x-ray from May 5.  Keep August follow-up as scheduled.

## 2025-06-16 ENCOUNTER — HOSPITAL ENCOUNTER (OUTPATIENT)
Dept: GENERAL RADIOLOGY | Facility: HOSPITAL | Age: 70
Discharge: HOME | End: 2025-06-16
Admitting: NURSE PRACTITIONER
Payer: MEDICARE

## 2025-06-16 ENCOUNTER — RESULTS FOLLOW-UP (OUTPATIENT)
Dept: PULMONOLOGY | Facility: CLINIC | Age: 70
End: 2025-06-16
Payer: MEDICARE

## 2025-06-16 DIAGNOSIS — J18.9 COMMUNITY ACQUIRED PNEUMONIA OF LEFT LOWER LOBE OF LUNG: ICD-10-CM

## 2025-06-16 PROCEDURE — 71046 X-RAY EXAM CHEST 2 VIEWS: CPT

## 2025-07-25 ENCOUNTER — ANTICOAGULATION VISIT (OUTPATIENT)
Dept: CARDIOLOGY | Facility: CLINIC | Age: 70
End: 2025-07-25
Payer: MEDICARE

## 2025-07-25 DIAGNOSIS — I48.0 PAROXYSMAL ATRIAL FIBRILLATION: Primary | ICD-10-CM

## 2025-07-25 DIAGNOSIS — Z79.01 CHRONIC ANTICOAGULATION: ICD-10-CM

## 2025-08-04 ENCOUNTER — LAB (OUTPATIENT)
Dept: LAB | Facility: HOSPITAL | Age: 70
End: 2025-08-04
Payer: MEDICARE

## 2025-08-04 DIAGNOSIS — I48.0 PAROXYSMAL ATRIAL FIBRILLATION: ICD-10-CM

## 2025-08-04 DIAGNOSIS — Z79.01 CHRONIC ANTICOAGULATION: ICD-10-CM

## 2025-08-04 LAB
INR PPP: 1.2 (ref 0.91–1.09)
PROTHROMBIN TIME: 14.2 SECONDS (ref 10–13.8)

## 2025-08-04 PROCEDURE — 85610 PROTHROMBIN TIME: CPT | Performed by: EMERGENCY MEDICINE

## 2025-08-06 ENCOUNTER — OFFICE VISIT (OUTPATIENT)
Dept: PULMONOLOGY | Facility: CLINIC | Age: 70
End: 2025-08-06
Payer: MEDICARE

## 2025-08-06 VITALS
SYSTOLIC BLOOD PRESSURE: 116 MMHG | WEIGHT: 157 LBS | HEIGHT: 65 IN | DIASTOLIC BLOOD PRESSURE: 62 MMHG | HEART RATE: 64 BPM | OXYGEN SATURATION: 97 % | BODY MASS INDEX: 26.16 KG/M2

## 2025-08-06 DIAGNOSIS — Z87.891 PERSONAL HISTORY OF NICOTINE DEPENDENCE: Chronic | ICD-10-CM

## 2025-08-06 DIAGNOSIS — J84.9 INTERSTITIAL LUNG DISEASE: ICD-10-CM

## 2025-08-06 DIAGNOSIS — G47.34 NOCTURNAL HYPOXIA: ICD-10-CM

## 2025-08-06 DIAGNOSIS — R94.2 DECREASED DIFFUSION CAPACITY: Chronic | ICD-10-CM

## 2025-08-06 DIAGNOSIS — R91.8 MULTIPLE LUNG NODULES: Primary | ICD-10-CM

## 2025-08-06 PROCEDURE — 3078F DIAST BP <80 MM HG: CPT | Performed by: NURSE PRACTITIONER

## 2025-08-06 PROCEDURE — 3074F SYST BP LT 130 MM HG: CPT | Performed by: NURSE PRACTITIONER

## 2025-08-06 PROCEDURE — 1160F RVW MEDS BY RX/DR IN RCRD: CPT | Performed by: NURSE PRACTITIONER

## 2025-08-06 PROCEDURE — 1159F MED LIST DOCD IN RCRD: CPT | Performed by: NURSE PRACTITIONER

## 2025-08-06 PROCEDURE — 99214 OFFICE O/P EST MOD 30 MIN: CPT | Performed by: NURSE PRACTITIONER

## 2025-08-06 RX ORDER — GUAIFENESIN 600 MG/1
1200 TABLET, EXTENDED RELEASE ORAL 2 TIMES DAILY
Qty: 120 TABLET | Refills: 1 | Status: SHIPPED | OUTPATIENT
Start: 2025-08-06

## (undated) DEVICE — MODEL AT P65, P/N 701554-001KIT CONTENTS: HAND CONTROLLER, 3-WAY HIGH-PRESSURE STOPCOCK WITH ROTATING END AND PREMIUM HIGH-PRESSURE TUBING: Brand: ANGIOTOUCH® KIT

## (undated) DEVICE — RADIFOCUS OPTITORQUE ANGIOGRAPHIC CATHETER: Brand: OPTITORQUE

## (undated) DEVICE — PK CATH CARD 30 CA/4

## (undated) DEVICE — SOL IRR NACL 0.9PCT BT 1000ML

## (undated) DEVICE — CATH MONTR SWANGANZ WP 2L 7F110CM

## (undated) DEVICE — CANN NASL ETCO2 LO/FLO A/

## (undated) DEVICE — PINNACLE INTRODUCER SHEATH: Brand: PINNACLE

## (undated) DEVICE — TR BAND RADIAL ARTERY COMPRESSION DEVICE: Brand: TR BAND

## (undated) DEVICE — GW PRESSUREWIRE X WIRELESS FFR 175CM

## (undated) DEVICE — CATH F6INF PIG 145 110CM 6SH: Brand: INFINITI

## (undated) DEVICE — DRSNG SURESITE WNDW 4X4.5

## (undated) DEVICE — GLIDESHEATH SLENDER STAINLESS STEEL KIT: Brand: GLIDESHEATH SLENDER

## (undated) DEVICE — 6F .070 XB 3.5 SH 100CM: Brand: VISTA BRITE TIP

## (undated) DEVICE — GW STARTER FXD CORE J .035 3X150CM 3MM

## (undated) DEVICE — MODEL BT2000 P/N 700287-012KIT CONTENTS: MANIFOLD WITH SALINE AND CONTRAST PORTS, SALINE TUBING WITH SPIKE AND HAND SYRINGE, TRANSDUCER: Brand: BT2000 AUTOMATED MANIFOLD KIT

## (undated) DEVICE — 6F .070 XB 3.5 100CM: Brand: VISTA BRITE TIP

## (undated) DEVICE — SUP ARMBRD ART/LINE BLU

## (undated) DEVICE — COPILOT BLEEDBACK CONTROL VALVE: Brand: COPILOT

## (undated) DEVICE — TIBURON BRACHIAL ANGIOGRAPHY DRAPE: Brand: CONVERTORS

## (undated) DEVICE — SOLIDIFIER LIQUI LOC PLUS 2000CC

## (undated) DEVICE — PAD, DEFIB, ADULT, RADIOTRANS, PHYSIO: Brand: MEDLINE

## (undated) DEVICE — TOOL INSRT GW MTL OR PLSTC

## (undated) DEVICE — MINI TREK CORONARY DILATATION CATHETER 2.0 MM X 12 MM / RAPID-EXCHANGE: Brand: MINI TREK